# Patient Record
Sex: FEMALE | Race: WHITE | NOT HISPANIC OR LATINO | Employment: OTHER | URBAN - METROPOLITAN AREA
[De-identification: names, ages, dates, MRNs, and addresses within clinical notes are randomized per-mention and may not be internally consistent; named-entity substitution may affect disease eponyms.]

---

## 2017-03-07 ENCOUNTER — OFFICE VISIT (OUTPATIENT)
Dept: MEDICAL GROUP | Facility: LAB | Age: 67
End: 2017-03-07
Payer: COMMERCIAL

## 2017-03-07 VITALS
HEART RATE: 82 BPM | WEIGHT: 279 LBS | DIASTOLIC BLOOD PRESSURE: 98 MMHG | TEMPERATURE: 98.1 F | SYSTOLIC BLOOD PRESSURE: 128 MMHG | RESPIRATION RATE: 12 BRPM | BODY MASS INDEX: 41.32 KG/M2 | OXYGEN SATURATION: 94 % | HEIGHT: 69 IN

## 2017-03-07 DIAGNOSIS — M51.34 DDD (DEGENERATIVE DISC DISEASE), THORACIC: ICD-10-CM

## 2017-03-07 DIAGNOSIS — Z79.891 CHRONIC USE OF OPIATE DRUGS THERAPEUTIC PURPOSES: ICD-10-CM

## 2017-03-07 DIAGNOSIS — M51.36 DDD (DEGENERATIVE DISC DISEASE), LUMBAR: ICD-10-CM

## 2017-03-07 DIAGNOSIS — J45.41 ASTHMA IN ADULT, MODERATE PERSISTENT, WITH ACUTE EXACERBATION: ICD-10-CM

## 2017-03-07 DIAGNOSIS — M50.30 DDD (DEGENERATIVE DISC DISEASE), CERVICAL: ICD-10-CM

## 2017-03-07 DIAGNOSIS — G43.009 NONINTRACTABLE MIGRAINE, UNSPECIFIED MIGRAINE TYPE: ICD-10-CM

## 2017-03-07 PROCEDURE — 99214 OFFICE O/P EST MOD 30 MIN: CPT | Performed by: NURSE PRACTITIONER

## 2017-03-07 RX ORDER — PREDNISONE 20 MG/1
TABLET ORAL
Qty: 90 TAB | Refills: 2 | Status: SHIPPED | OUTPATIENT
Start: 2017-03-07 | End: 2017-03-07

## 2017-03-07 RX ORDER — PREDNISONE 20 MG/1
TABLET ORAL
Qty: 90 TAB | Refills: 2 | Status: SHIPPED | OUTPATIENT
Start: 2017-03-07 | End: 2018-01-03

## 2017-03-07 RX ORDER — HYDROCODONE BITARTRATE AND ACETAMINOPHEN 5; 325 MG/1; MG/1
1 TABLET ORAL EVERY 12 HOURS PRN
Qty: 180 TAB | Refills: 0 | Status: SHIPPED | OUTPATIENT
Start: 2017-03-07 | End: 2017-07-11 | Stop reason: SDUPTHER

## 2017-03-07 RX ORDER — HYDROCODONE BITARTRATE AND ACETAMINOPHEN 5; 325 MG/1; MG/1
1 TABLET ORAL EVERY 12 HOURS PRN
Qty: 12 TAB | Refills: 0 | Status: SHIPPED | OUTPATIENT
Start: 2017-03-07 | End: 2018-07-02 | Stop reason: SDUPTHER

## 2017-03-07 ASSESSMENT — PATIENT HEALTH QUESTIONNAIRE - PHQ9: CLINICAL INTERPRETATION OF PHQ2 SCORE: 0

## 2017-03-07 NOTE — MR AVS SNAPSHOT
"        Sheyla Garcia   3/7/2017 4:00 PM   Office Visit   MRN: [id number]    Department:  [facility]   Dept Phone:  [phone]    Description:  Female : 1950   Provider:  ALANNA Hwang           Reason for Visit     Medication Refill medication refill     Referral Needed referral needed       Allergies as of 3/7/2017     No Known Allergies      You were diagnosed with     Asthma in adult, moderate persistent, with acute exacerbation   [1639881]       DDD (degenerative disc disease), lumbar   [172485]       DDD (degenerative disc disease), cervical   [140019]       DDD (degenerative disc disease), thoracic   [203866]       Nonintractable migraine, unspecified migraine type   [3929707]       Chronic use of opiate drugs therapeutic purposes   [2195393]         Vital Signs     Blood Pressure Pulse Temperature Respirations Height Weight    128/98 mmHg 82 36.7 °C (98.1 °F) 12 1.753 m (5' 9\") 126.554 kg (279 lb)    Body Mass Index Oxygen Saturation Smoking Status             41.18 kg/m2 94% Never Smoker          Basic Information     Date Of Birth Sex Race Ethnicity Preferred Language    1950 Female White Non- English      Problem List              ICD-10-CM Priority Class Noted - Resolved    Hypertension I10   Unknown - Present    Obesity E66.9   Unknown - Present    Hypothyroidism E03.9   Unknown - Present    KIRA (obstructive sleep apnea) G47.33   2014 - Present    Scoliosis M41.9   2014 - Present    Chronic use of opiate drugs therapeutic purposes Z79.899   3/7/2017 - Present      Health Maintenance        Date Due Completion Dates    IMM DTaP/Tdap/Td Vaccine (1 - Tdap) 1971 ---    IMM ZOSTER VACCINE 2012 ---    MAMMOGRAM 2015, 2014    IMM INFLUENZA (1) 2015, 2014    COLONOSCOPY 2018 (Prv Comp), 2009 (Done)    Override on 2013: Previously completed    Override on 2009: Done (Per pt " (colonoscopy done at [facility]))            Current Immunizations     Influenza Vaccine Quad Inj (Pf) 12/7/2014  4:49 PM    Influenza Vaccine Quad Inj (Preserved) 11/13/2015  9:53 AM      Below and/or attached are the medications your provider expects you to take. Review all of your home medications and newly ordered medications with your provider and/or pharmacist. Follow medication instructions as directed by your provider and/or pharmacist. Please keep your medication list with you and share with your provider. Update the information when medications are discontinued, doses are changed, or new medications (including over-the-counter products) are added; and carry medication information at all times in the event of emergency situations     Allergies:  No Known Allergies          Medications  Valid as of: March 07, 2017 -  4:56 PM    Generic Name Brand Name Tablet Size Instructions for use    Albuterol Sulfate (Aero Soln) albuterol 108 (90 BASE) MCG/ACT Inhale 2 Puffs by mouth every 6 hours as needed for Shortness of Breath.        Albuterol Sulfate (Nebu Soln) PROVENTIL 2.5mg/3ml 3 mL by Nebulization route every four hours as needed for Shortness of Breath.        Albuterol Sulfate (Aero Soln) VENTOLIN  (90 BASE) MCG/ACT USE 2 INHALATIONS EVERY 4 HOURS AS NEEDED FOR SHORTNESS OF BREATH        B Complex-C-Folic Acid   Take 1 Tab by mouth every day.        Beta Carotene   Take 1 Tab by mouth every day.        Cyanocobalamin   Take 1 Tab by mouth every day.        Estradiol (Tab) ESTRACE 1 MG TAKE 1 TABLET EVERY DAY        Hydrocodone-Acetaminophen (Tab) NORCO 5-325 MG Take 1 Tab by mouth every 12 hours.        Hydrocodone-Acetaminophen (Tab) NORCO 5-325 MG Take 1 Tab by mouth every 12 hours as needed.        Hydrocodone-Acetaminophen (Tab) NORCO 5-325 MG Take 1 Tab by mouth every 12 hours as needed.        Imipramine HCl (Tab) TOFRANIL 10 MG Take 1 Tab by mouth every evening.        Lisinopril  (Tab) PRINIVIL 20 MG Take 1 Tab by mouth every day. TAKE 1 TAB BY MOUTH EVERY DAY.        Methocarbamol (Tab) ROBAXIN 750 MG Take 1 Tab by mouth 3 times a day as needed.        Mometasone Furo-Formoterol Fum (Aerosol) Mometasone Furo-Formoterol Fum 200-5 MCG/ACT Inhale 2 Puffs by mouth 2 times a day.        Multiple Vitamins-Minerals (Tab) CENTRUM SILVER ULTRA WOMENS  Take 1 Tab by mouth every day.        NON SPECIFIED   Blood pressure monitor.   Check BP 1-2 x daily  DX:  HTN        PredniSONE (Tab) DELTASONE 20 MG Three for two days, two for three days, one for three days, 1/2 for three days.        .                 Medicines prescribed today were sent to:     [facility] #[id number] - [place] - [address]    [address] [place] [address]    Phone: [phone] Fax: [phone]    Open 24 Hours?: No    [facility] - [place] [address]    Phone: [phone] Fax: [phone]    Open 24 Hours?: No    [facility] - [place] [address]    Phone: [phone] Fax: [phone]    Open 24 Hours?: No      Medication refill instructions:       If your prescription bottle indicates you have medication refills left, it is not necessary to call your provider’s office. Please contact your pharmacy and they will refill your medication.    If your prescription bottle indicates you do not have any refills left, you may request refills at any time through one of the following ways: The online [employer] system (except Urgent Care), by calling your provider’s office, or by asking your pharmacy to contact your provider’s office with a refill request. Medication refills are processed only during regular business hours and may not be available until the next business day. Your provider may request additional information or to have a follow-up visit with you prior to refilling your  medication.   *Please Note: Medication refills are assigned a new Rx number when refilled electronically. Your pharmacy may indicate that no refills were authorized even though a new prescription for the same medication is available at the pharmacy. Please request the medicine by name with the pharmacy before contacting your provider for a refill.        Your To Do List     Future Labs/Procedures Complete By [employer] PAIN MANAGEMENT SCREEN  As directed 3/7/2018    Comments:    Current Meds (name, sig, last dose):   Current outpatient prescriptions:   •  predniSONE, Three for two days, two for three days, one for three days, 1/2 for three days.  •  Mometasone Furo-Formoterol Fum, 2 Puff, Inhalation, BID  •  hydrocodone-acetaminophen, 1 Tab, Oral, Q12HRS PRN  •  hydrocodone-acetaminophen, 1 Tab, Oral, Q12HRS PRN  •  VENTOLIN HFA, USE 2 INHALATIONS EVERY 4 HOURS AS NEEDED FOR SHORTNESS OF BREATH  •  estradiol, TAKE 1 TABLET EVERY DAY  •  imipramine, 10 mg, Oral, Q EVENING  •  methocarbamol, 750 mg, Oral, TID PRN  •  lisinopril, 20 mg, Oral, QDAY  •  albuterol, 2 Puff, Inhalation, Q6HRS PRN  •  albuterol, 2.5 mg, Nebulization, Q4HRS PRN  •  NON SPECIFIED, Blood pressure monitor.  Check BP 1-2 x daily DX:  HTN  •  hydrocodone-acetaminophen, 1 Tab, Oral, Q12HRS  •  CENTRUM SILVER ULTRA WOMENS, 1 Tab, Oral, DAILY  •  CYANOCOBALAMIN PO, 1 Tab, Oral, DAILY  •  B Complex-C-Folic Acid (SUPER B-COMPLEX/VIT C/FA PO), 1 Tab, Oral, DAILY  •  BETA CAROTENE PO, 1 Tab, Oral, DAILY            Referral     A referral request has been sent to our patient care coordination department. Please allow 3-5 business days for us to process this request and contact you either by phone or mail. If you do not hear from us by the 5th business day, please call us at [phone].           Myze Access Code: [email or web address]  Expires: 4/6/2017  4:56 PM    Myze  A secure, online tool to manage your health information     [facility]’s MyChart® is a secure, online tool that connects you to your personalized health information from the privacy of your home -- day or night - making it very easy for you to manage your healthcare. Once the activation process is completed, you can even access your medical information using the [employer] tucker, which is available for free in the Apple Tucker store or Google Play store.     [employer] provides the following levels of access (as shown below):   My Chart Features   [facility] Doctor [facility]  Specialists [facility]  Urgent  Care Non-[facility]  Primary Care  Doctor   Email your healthcare team securely and privately 24/7 X X X    Manage appointments: schedule your next appointment; view details of past/upcoming appointments X      Request prescription refills. X      View recent personal medical records, including lab and immunizations X X X X   View health record, including health history, allergies, medications X X X X   Read reports about your outpatient visits, procedures, consult and ER notes X X X X   See your discharge summary, which is a recap of your hospital and/or ER visit that includes your diagnosis, lab results, and care plan. X X       How to register for [employer]:  1. Go to  [email or web address].  2. Click on the Sign Up Now box, which takes you to the New Member Sign Up page. You will need to provide the following information:  a. Enter your [employer] Access Code exactly as it appears at the top of this page. (You will not need to use this code after you’ve completed the sign-up process. If you do not sign up before the expiration date, you must request a new code.)   b. Enter your date of birth.   c. Enter your home email address.   d. Click Submit, and follow the next screen’s instructions.  3. Create a [employer] Software ID. This will be your [employer] Software login ID and cannot be changed, so think of one that is secure and easy to remember.  4. Create a [employer] password. You can change your password at  any time.  5. Enter your Password Reset Question and Answer. This can be used at a later time if you forget your password.   6. Enter your e-mail address. This allows you to receive e-mail notifications when new information is available in [employer].  7. Click Sign Up. You can now view your health information.    For assistance activating your [employer] account, call [phone]

## 2017-03-07 NOTE — Clinical Note
[employer]  DENNIS HwangP.N.  [address]  [place] [address]  Fax: [phone]   Authorization for Release/Disclosure of   Protected Health Information   Name: SHEYLA GARCIA : 1950 SSN: [id number]   Address: [address] [place] [address]  [place] [address] Phone:    [phone] (home)    I authorize the entity listed below to release/disclose the PHI below to:   [facility]/Zaira Swenson A.P.JIMMY. and ALANNA Hwang   Provider or Entity Name:  Dr. Landon Lugo   Address   [facility]   Phone:      Fax:     Reason for request: continuity of care   Information to be released:    [  ] LAST COLONOSCOPY,  including any PATH REPORT and follow-up  [  ] LAST FIT/COLOGUARD RESULT [  ] LAST DEXA  [  ] LAST MAMMOGRAM  [  ] LAST PAP  [  ] LAST LABS [  ] RETINA EXAM REPORT  [  ] IMMUNIZATION RECORDS  [  ] Release all info      [  ] Check here and initial the line next to each item to release ALL health information INCLUDING  _____ Care and treatment for drug and / or alcohol abuse  _____ HIV testing, infection status, or AIDS  _____ Genetic Testing    DATES OF SERVICE OR TIME PERIOD TO BE DISCLOSED: _____________  I understand and acknowledge that:  * This Authorization may be revoked at any time by you in writing, except if your health information has already been used or disclosed.  * Your health information that will be used or disclosed as a result of you signing this authorization could be re-disclosed by the recipient. If this occurs, your re-disclosed health information may no longer be protected by State or Federal laws.  * You may refuse to sign this Authorization. Your refusal will not affect your ability to obtain treatment.  * This Authorization becomes effective upon signing and will  on (date) __________.      If no date is indicated, this Authorization will  one (1) year from the signature date.    Name: Sheyla Garcia    Signature:   Date:      3/7/2017       PLEASE FAX REQUESTED RECORDS BACK TO: [phone]

## 2017-03-08 NOTE — PROGRESS NOTES
"Chief Complaint   Patient presents with   • Medication Refill     medication refill    • Referral Needed     referral needed        HPI  66-year-old established female here to follow-up on a few issues:  #1-asthma: Chronic issue. Feels that her asthma is much better controlled now that she is using a maintenance inhaler twice daily. She keeps prednisone on hand for severe flareups and has had to take a taper of prednisone twice in the past 4 months. Denies any current asthma symptoms today.  #2-chronic pain: Suffers from chronic pain all over her body but the worst area is in her low back. She reports that her pain constantly radiates down her left leg. She has frequent headaches, occurring privately twice per week but not every week. She states that she cannot lie flat because of her severe back pain and must sleep on her side with very poor sleep. She has been seeing Dr. Navarrete, whom she states that her here for a referral to neurosurgery as he suspects that she has hydrocephaly. She did have an MRI of her brain done with Dr. escoto but does not have a copy. She did have a MRI of her cervical, thoracic and lumbar spine which shows mild to moderate arthritis and degenerative disc disease.  #3-chronic narcotic dependence: She is requesting a refill of Norco. She takes approximately Norco per week for severe pain. Denies any negative side effects of Norco. As mentioned above she has pain all over her body.    Past medical, surgical, family, and social history is reviewed and updated in Epic chart by me today.   Medications and allergies reviewed and updated in Epic chart by me today.     ROS:   As documented in history of present illness above    Exam:  Blood pressure 128/98, pulse 82, temperature 36.7 °C (98.1 °F), resp. rate 12, height 1.753 m (5' 9\"), weight 126.554 kg (279 lb), SpO2 94 %.  Constitutional: Morbidly obese female, Alert, no distress, plus 3 vital signs  Skin:  Warm, dry, no rashes invisible " areas  Eye: Equal, round and reactive, conjunctiva clear  ENMT: Lips without lesions, good dentition, oropharynx clear    Neck: Trachea midline  Respiratory: Unlabored respiration  Cardiovascular: Normal rate  Neuro: Cranial nerves II through XII are intact. She reports that when she looks up at a light, that she always sees a halo and a dark center.  Psych: Alert, pleasant, well-groomed, normal affect    A/P:  1. Asthma in adult, moderate persistent, with acute exacerbation  -Stable. Refilled her maintenance inhaler and prednisone for the year that she keeps on hand for severe exacerbations. Reminded her of negative side effects of prednisone such as glaucoma, diabetes, obesity and irritation.  - predniSONE (DELTASONE) 20 MG Tab; Three for two days, two for three days, one for three days, 1/2 for three days.  Dispense: 90 Tab; Refill: 2  - Mometasone Furo-Formoterol Fum (DULERA) 200-5 MCG/ACT Aerosol; Inhale 2 Puffs by mouth 2 times a day.  Dispense: 3 Inhaler; Refill: 3    2. DDD (degenerative disc disease), lumbar  -Refer to neurosurgery as requested. Requested a copy of her medical records from Dr. escoto as well as inquired with [employer] regarding her MRI of the brain.  - REFERRAL TO NEUROSURGERY  - hydrocodone-acetaminophen (NORCO) 5-325 MG Tab per tablet; Take 1 Tab by mouth every 12 hours as needed.  Dispense: 180 Tab; Refill: 0  - hydrocodone-acetaminophen (NORCO) 5-325 MG Tab per tablet; Take 1 Tab by mouth every 12 hours as needed.  Dispense: 12 Tab; Refill: 0    3. DDD (degenerative disc disease), cervical  -As above  - REFERRAL TO NEUROSURGERY  - hydrocodone-acetaminophen (NORCO) 5-325 MG Tab per tablet; Take 1 Tab by mouth every 12 hours as needed.  Dispense: 180 Tab; Refill: 0  - hydrocodone-acetaminophen (NORCO) 5-325 MG Tab per tablet; Take 1 Tab by mouth every 12 hours as needed.  Dispense: 12 Tab; Refill: 0    4. DDD (degenerative disc disease), thoracic  -As above  - REFERRAL TO  NEUROSURGERY  - hydrocodone-acetaminophen (NORCO) 5-325 MG Tab per tablet; Take 1 Tab by mouth every 12 hours as needed.  Dispense: 180 Tab; Refill: 0  - hydrocodone-acetaminophen (NORCO) 5-325 MG Tab per tablet; Take 1 Tab by mouth every 12 hours as needed.  Dispense: 12 Tab; Refill: 0    5. Nonintractable migraine, unspecified migraine type    6. Chronic use of opiate drugs therapeutic purposes  -Updated urine drug screen and contract today. Reviewed  profile itches appropriate, showing her mail order Norco last filled on April 25, 2016.  Overall impression that this patient is benefiting from opioid therapy and that the benefits outweigh the risks of continued use. yes   - [facility]IN [facility]NAGEMENT SCREEN; Future  - Controlled Substance Treatment Agreement

## 2017-03-10 ENCOUNTER — TELEPHONE (OUTPATIENT)
Dept: MEDICAL GROUP | Facility: LAB | Age: 67
End: 2017-03-10

## 2017-03-10 NOTE — TELEPHONE ENCOUNTER
1. Caller Name: Sheyla                                         Call Back Number: [phone]      Patient approves a detailed voicemail message: N\A    Patient states she was seen a couple days ago and was asked if she wanted to try a medication for Fibromyalgia or nerve pain.  At the time she did not want to do this but she says she would like this now.  She is wondering if she can have a prescription called in for her into [facility] [address].  Please advise.

## 2017-03-10 NOTE — TELEPHONE ENCOUNTER
Has she tried gabapentin or cymbalta?  If neither, let's start with gabapentin up to 3 x per day - please let me know

## 2017-03-13 ENCOUNTER — TELEPHONE (OUTPATIENT)
Dept: MEDICAL GROUP | Facility: LAB | Age: 67
End: 2017-03-13

## 2017-03-13 NOTE — TELEPHONE ENCOUNTER
----- Message from ALANNA Hwang sent at 3/7/2017  4:31 PM PST -----  Please call [facility] for MRI of brain ordered by Dr. Lugo if they have it.  Thank you

## 2017-03-13 NOTE — Clinical Note
March 13, 2017        Sheyla Garcia, [birth year]    Good afternoon!    Please fax over patients brain MRI that was  Ordered by Dr. Lugo. Patient would like  His PCP, Zaira Swenson to review and  Place in his chart.    Thank you!        Rylee Blanco MA

## 2017-03-14 DIAGNOSIS — G89.4 CHRONIC PAIN SYNDROME: ICD-10-CM

## 2017-03-14 RX ORDER — GABAPENTIN 300 MG/1
300 CAPSULE ORAL 3 TIMES DAILY
Qty: 90 CAP | Refills: 5 | Status: SHIPPED | OUTPATIENT
Start: 2017-03-14 | End: 2018-08-09

## 2017-03-14 NOTE — TELEPHONE ENCOUNTER
Patient states she has actually been taking the gabapentin for the last 10 days and doing fine on it. She would like a refill reflecting that she takes it 3x daily will set refill

## 2017-03-22 DIAGNOSIS — I10 ESSENTIAL HYPERTENSION: ICD-10-CM

## 2017-03-22 RX ORDER — ESTRADIOL 1 MG/1
TABLET ORAL
Qty: 90 TAB | Refills: 1 | Status: SHIPPED | OUTPATIENT
Start: 2017-03-22 | End: 2017-06-05 | Stop reason: SDUPTHER

## 2017-03-22 RX ORDER — ATENOLOL 50 MG/1
50 TABLET ORAL DAILY
Qty: 30 TAB | Refills: 1 | Status: SHIPPED | OUTPATIENT
Start: 2017-03-22 | End: 2017-04-26 | Stop reason: SDUPTHER

## 2017-03-22 NOTE — TELEPHONE ENCOUNTER
C pt called and states that she had been out of lisinopril so she took some atenolol that she had and she feel that really controlled her BP better. Can she have a refill of that instead of lisinopril? I tried to set up refill but it is an historical med, alber valle

## 2017-03-27 ENCOUNTER — TELEPHONE (OUTPATIENT)
Dept: MEDICAL GROUP | Facility: LAB | Age: 67
End: 2017-03-27

## 2017-04-21 RX ORDER — METHOCARBAMOL 750 MG/1
TABLET, FILM COATED ORAL
Qty: 270 TAB | Refills: 0 | Status: SHIPPED | OUTPATIENT
Start: 2017-04-21 | End: 2019-11-22

## 2017-04-21 RX ORDER — LISINOPRIL 20 MG/1
TABLET ORAL
Qty: 90 TAB | Refills: 1 | Status: SHIPPED | OUTPATIENT
Start: 2017-04-21 | End: 2017-06-05 | Stop reason: SDUPTHER

## 2017-06-05 ENCOUNTER — TELEPHONE (OUTPATIENT)
Dept: MEDICAL GROUP | Facility: LAB | Age: 67
End: 2017-06-05

## 2017-06-05 DIAGNOSIS — N32.81 OVERACTIVE BLADDER: ICD-10-CM

## 2017-06-05 RX ORDER — ESTRADIOL 1 MG/1
TABLET ORAL
Qty: 90 TAB | Refills: 1 | Status: SHIPPED | OUTPATIENT
Start: 2017-06-05 | End: 2017-06-22 | Stop reason: SDUPTHER

## 2017-06-05 RX ORDER — LISINOPRIL 20 MG/1
20 TABLET ORAL
Qty: 90 TAB | Refills: 1 | Status: SHIPPED | OUTPATIENT
Start: 2017-06-05 | End: 2017-10-04 | Stop reason: SDUPTHER

## 2017-06-05 RX ORDER — OXYBUTYNIN CHLORIDE 10 MG/1
10 TABLET, EXTENDED RELEASE ORAL DAILY
Qty: 30 TAB | Refills: 4 | Status: SHIPPED | OUTPATIENT
Start: 2017-06-05 | End: 2018-07-07 | Stop reason: SDUPTHER

## 2017-06-05 NOTE — TELEPHONE ENCOUNTER
Patient states she had a bladder sling put in many years ago and feels it has slipped.  She is having very frequent urination and is wondering if she can have something called in for her overactive bladder.  She states she does not want schedule a surgery or anything related to this until after she becomes eligible for Medicare.  If something can be called in she would like to use [facility] pharmacy.  Please advise.

## 2017-06-05 NOTE — TELEPHONE ENCOUNTER
sent through ditropan - this is taken once daily and main side effect is dry mouth.  Ask her to let us know if it is too expensive or does not work.

## 2017-06-22 RX ORDER — SPIRONOLACTONE 25 MG/1
25 TABLET ORAL DAILY
Qty: 90 TAB | Refills: 1 | Status: SHIPPED | OUTPATIENT
Start: 2017-06-22 | End: 2017-06-22 | Stop reason: SDUPTHER

## 2017-06-22 RX ORDER — ESTRADIOL 1 MG/1
TABLET ORAL
Qty: 90 TAB | Refills: 1 | Status: SHIPPED | OUTPATIENT
Start: 2017-06-22 | End: 2017-06-22 | Stop reason: SDUPTHER

## 2017-06-22 RX ORDER — SPIRONOLACTONE 25 MG/1
25 TABLET ORAL DAILY
Qty: 90 TAB | Refills: 1 | Status: SHIPPED | OUTPATIENT
Start: 2017-06-22 | End: 2018-01-03 | Stop reason: SDUPTHER

## 2017-06-22 RX ORDER — ALBUTEROL SULFATE 90 UG/1
2 AEROSOL, METERED RESPIRATORY (INHALATION) EVERY 4 HOURS PRN
Qty: 54 G | Refills: 1 | Status: SHIPPED | OUTPATIENT
Start: 2017-06-22 | End: 2017-06-22 | Stop reason: SDUPTHER

## 2017-06-22 RX ORDER — ESTRADIOL 1 MG/1
TABLET ORAL
Qty: 90 TAB | Refills: 1 | Status: SHIPPED | OUTPATIENT
Start: 2017-06-22 | End: 2019-09-18

## 2017-06-22 RX ORDER — ALBUTEROL SULFATE 90 UG/1
2 AEROSOL, METERED RESPIRATORY (INHALATION) EVERY 4 HOURS PRN
Qty: 54 G | Refills: 1 | Status: SHIPPED | OUTPATIENT
Start: 2017-06-22 | End: 2019-05-28 | Stop reason: SDUPTHER

## 2017-07-11 DIAGNOSIS — G43.009 NONINTRACTABLE MIGRAINE, UNSPECIFIED MIGRAINE TYPE: ICD-10-CM

## 2017-07-11 DIAGNOSIS — M51.34 DDD (DEGENERATIVE DISC DISEASE), THORACIC: ICD-10-CM

## 2017-07-11 DIAGNOSIS — M51.36 DDD (DEGENERATIVE DISC DISEASE), LUMBAR: ICD-10-CM

## 2017-07-11 DIAGNOSIS — M50.30 DDD (DEGENERATIVE DISC DISEASE), CERVICAL: ICD-10-CM

## 2017-07-11 RX ORDER — HYDROCODONE BITARTRATE AND ACETAMINOPHEN 5; 325 MG/1; MG/1
1 TABLET ORAL EVERY 12 HOURS PRN
Qty: 180 TAB | Refills: 0 | Status: SHIPPED | OUTPATIENT
Start: 2017-07-11 | End: 2018-02-15 | Stop reason: SDUPTHER

## 2017-07-13 DIAGNOSIS — M51.36 DDD (DEGENERATIVE DISC DISEASE), LUMBAR: ICD-10-CM

## 2017-07-13 DIAGNOSIS — M51.34 DDD (DEGENERATIVE DISC DISEASE), THORACIC: ICD-10-CM

## 2017-07-13 DIAGNOSIS — M50.30 DDD (DEGENERATIVE DISC DISEASE), CERVICAL: ICD-10-CM

## 2017-10-04 RX ORDER — LISINOPRIL 20 MG/1
20 TABLET ORAL
Qty: 90 TAB | Refills: 1 | Status: SHIPPED | OUTPATIENT
Start: 2017-10-04 | End: 2017-12-26 | Stop reason: SDUPTHER

## 2017-10-04 NOTE — TELEPHONE ENCOUNTER
Was the patient seen in the last year in this department? Yes Lov 3/7/17    Does patient have an active prescription for medications requested? No     Received Request Via: Pharmacy     Patient lost her prescription bottle and mail order pharmacy will not replace the lost bottle. She is requesting 2 months of Lisinopril to be sent to her local pharmacy.

## 2017-12-19 RX ORDER — ESTRADIOL 1 MG/1
TABLET ORAL
Qty: 90 TAB | Refills: 3 | Status: SHIPPED | OUTPATIENT
Start: 2017-12-19 | End: 2018-10-08 | Stop reason: SDUPTHER

## 2017-12-19 NOTE — TELEPHONE ENCOUNTER
Was the patient seen in the last year in this department? Yes lov 3/7/2017    Does patient have an active prescription for medications requested? No     Received Request Via: Pharmacy

## 2017-12-26 NOTE — TELEPHONE ENCOUNTER
VOICEMAIL  1. Caller Name: Sheyla Garcia                      Call Back Number: [phone] (home)     2. Message: Sheyla Garcia left a voicemail - she now has Medicare and would like to get a physical and a complete blood work done. She is also requesting a refill for lisinopril (PRINIVIL) 20 MG Tab to be sent to [facility] not to the mail order.    3. Patient approves office to leave a detailed voicemail/MyChart message: yes

## 2017-12-27 RX ORDER — LISINOPRIL 20 MG/1
20 TABLET ORAL
Qty: 90 TAB | Refills: 1 | Status: SHIPPED | OUTPATIENT
Start: 2017-12-27 | End: 2018-01-03 | Stop reason: SDUPTHER

## 2018-01-03 ENCOUNTER — OFFICE VISIT (OUTPATIENT)
Dept: MEDICAL GROUP | Facility: LAB | Age: 68
End: 2018-01-03
Payer: MEDICARE

## 2018-01-03 VITALS
HEART RATE: 91 BPM | DIASTOLIC BLOOD PRESSURE: 84 MMHG | BODY MASS INDEX: 43.25 KG/M2 | TEMPERATURE: 98.3 F | HEIGHT: 69 IN | OXYGEN SATURATION: 93 % | SYSTOLIC BLOOD PRESSURE: 124 MMHG | RESPIRATION RATE: 16 BRPM | WEIGHT: 292 LBS

## 2018-01-03 DIAGNOSIS — R20.2 LEFT HAND PARESTHESIA: ICD-10-CM

## 2018-01-03 DIAGNOSIS — R60.0 LOWER EXTREMITY EDEMA: ICD-10-CM

## 2018-01-03 DIAGNOSIS — R73.9 HYPERGLYCEMIA: ICD-10-CM

## 2018-01-03 DIAGNOSIS — N39.41 URGE INCONTINENCE OF URINE: ICD-10-CM

## 2018-01-03 DIAGNOSIS — I10 ESSENTIAL HYPERTENSION: ICD-10-CM

## 2018-01-03 DIAGNOSIS — E66.01 MORBID OBESITY WITH BMI OF 40.0-44.9, ADULT (HCC): ICD-10-CM

## 2018-01-03 DIAGNOSIS — E03.8 OTHER SPECIFIED HYPOTHYROIDISM: ICD-10-CM

## 2018-01-03 DIAGNOSIS — M54.42 CHRONIC BILATERAL LOW BACK PAIN WITH LEFT-SIDED SCIATICA: ICD-10-CM

## 2018-01-03 DIAGNOSIS — G89.29 CHRONIC BILATERAL LOW BACK PAIN WITH LEFT-SIDED SCIATICA: ICD-10-CM

## 2018-01-03 DIAGNOSIS — J45.42 MODERATE PERSISTENT EXTRINSIC ASTHMA WITH STATUS ASTHMATICUS: ICD-10-CM

## 2018-01-03 PROCEDURE — 99215 OFFICE O/P EST HI 40 MIN: CPT | Performed by: NURSE PRACTITIONER

## 2018-01-03 RX ORDER — MONTELUKAST SODIUM 10 MG/1
10 TABLET ORAL DAILY
Qty: 30 TAB | Refills: 5 | Status: SHIPPED | OUTPATIENT
Start: 2018-01-03 | End: 2019-09-18

## 2018-01-03 RX ORDER — SPIRONOLACTONE 25 MG/1
25 TABLET ORAL DAILY
Qty: 90 TAB | Refills: 1 | Status: SHIPPED | OUTPATIENT
Start: 2018-01-03 | End: 2018-08-19 | Stop reason: SDUPTHER

## 2018-01-03 RX ORDER — LISINOPRIL 40 MG/1
40 TABLET ORAL
Qty: 90 TAB | Refills: 2 | Status: SHIPPED | OUTPATIENT
Start: 2018-01-03 | End: 2018-02-15

## 2018-01-03 ASSESSMENT — PATIENT HEALTH QUESTIONNAIRE - PHQ9: CLINICAL INTERPRETATION OF PHQ2 SCORE: 0

## 2018-01-03 NOTE — PROGRESS NOTES
"Chief Complaint   Patient presents with   • Annual Exam       HPI   67-year-old established female here with multiple concerns and complaints. She reports that she has not felt well for multiple years and has not had insurance to take care of her issues. There are several of her most pressing issues that she would like to go over today.  #1- left wrist / hand numbness :  Present x the past 3 yrs.  Has tried a brace for 4 weeks without improvement.  Often feels like a needle sensation in her left hand.  Complains of frequent numbness and tingling in her hand and left wrist. Denies any injuries or traumas. Denies left arm or neck pain.  #2- chronic low back pain with left sided sciatica:  Reports this has been present for decades, worsening for the past year. Describes her low back pain as constant with radiating pain down her left buttocks and her left leg. Describes her left leg is frequently numb and tingling, shooting all the way down to her foot. Her pain is worse with activity and sitting for long periods of time. She does not take anything for her pain on a regular basis.  #3-bilateral lower extremity edema:  Very sedentary.  Occasionally SOB on left side.    #4- asthma: Chronic issue. States her lungs are \"bad,\" with copious amounts of mucus, coughing and wheezing. Denies any hx of smoking. Did not have asthma as a child, per patient. Reports that her breathing issue started when she moved to [facility] - States she is allergic to pine.  Now lives in [place]. Takes occasional benadryl and this knocks her out.  Using dulera 2 puffs twice daily and albuterol prn.  Needing albuterol daily.   #5 - HTN: chronic issue.  Taking lisinopril 40 mg daily - Reports that home BP readings are very high - 188/94 periodically - down to 160/80.  Denies chest pain or swelling in her ankles.  #6-urinary incontinence: Chronic issue. Denies any dysuria or hematuria. Denies having abdominal pain. States that her urinary incontinence occurs " "randomly but especially when she has a full bladder or is moving. She would like a referral to a uro-gynecologist.    Social:  Working on a computer at home most days of the week - up to 8 hours.    Past medical, surgical, family, and social history is reviewed and updated in Epic chart by me today.   Medications and allergies reviewed and updated in Epic chart by me today.     ROS:   As documented in history of present illness above    Exam:  Blood pressure 124/84, pulse 91, temperature 36.8 °C (98.3 °F), resp. rate 16, height 1.753 m (5' 9\"), weight (!) 132.5 kg (292 lb), SpO2 93 %.    Constitutional: Obese female, Alert, no distress, plus 3 vital signs  Skin:  Warm, dry, no rashes invisible areas  Eye: Equal, round and reactive, conjunctiva clear  ENMT: Lips without lesions, good dentition, oropharynx clear    Neck: Trachea midline, no masses, no thyromegaly  Respiratory: Unlabored respiration, has occasional expiratory wheezing and rhonchi. No areas of specific decreased lung sounds.  Cardiovascular: Normal rate and rhythm. No pitting edema to bilateral lower extremities. No increased pigmentation to bilateral lower extremities. Bilateral pedal pulses are +2.  Abdomen: Soft, nontender.  Musculoskeletal: Positive Phalen and Kaye sign to left wrist. Full sensation with soft touch to bilateral upper extremities. Full range of motion to bilateral upper extremities. She has tenderness to her lumbar spine, bony and paraspinal muscular areas as well as her left buttocks, stating that with palpation of her left buttocks, she has radiating pain down her left leg. She walks slowly with a limp and a cane.  Psych: Alert, pleasant, well-groomed. Anxious affect.    A/P:  1. Chronic bilateral low back pain with left-sided sciatica  -Offered a referral to physiatry which she declines area she has no interest in seeing a surgeon at this point either. She is agreeable to starting physical therapy. I encouraged her to work on " trunk strengthening and her weight. Encouraged to follow up here in 4-5 weeks.  - REFERRAL TO PHYSICAL THERAPY Reason for Therapy: Eval/Treat/Report    2. Left hand paresthesia  -Suspect that she has carpal tunnel. She is interested in seeing a hand specialist and she was referred there. She has already failed 4 weeks of a wrist brace.  - REFERRAL TO ORTHOPEDICS    3. Moderate persistent extrinsic asthma with status asthmaticus  -Recommend adding on Singulair 10 mg nightly. She'll continue with twice daily dulera and using albuterol as needed. She was referred to pulmonology. Follow-up 4 weeks.  - montelukast (SINGULAIR) 10 MG Tab; Take 1 Tab by mouth every day.  Dispense: 30 Tab; Refill: 5  - Mometasone Furo-Formoterol Fum (DULERA) 200-5 MCG/ACT Aerosol; Inhale 2 Puffs by mouth 2 times a day.  Dispense: 3 Inhaler; Refill: 3  - REFERRAL TO PULMONOLOGY    4. Lower extremity edema  -Recommend checking a BNP. We'll also check a CMP for her kidney function. Suspect that her lower extremity edema is related to a high salt diet, her weight and being sedentary.  - BTYPE NATRIURETIC PEPTIDE; Future    5. Other specified hypothyroidism  -Recommend updated lab work  - TSH; Future  - FREE THYROXINE; Future    6. Essential hypertension  -Recommend updated lab work. Increased her lisinopril to 40 mg daily and encouraged her to take her spironolactone daily. She will take her spirit elected in the morning and lisinopril at night. Discussed working on her weight, starting an exercise program and eating a low-sodium diet. She will write down her blood pressures twice daily for the next 4 weeks and then follow-up here. Encouraged her to call for readings consistently below 100/70 or above 140/90.  - COMP METABOLIC PANEL; Future  - CBC WITH DIFFERENTIAL; Future  - LIPID PROFILE; Future  - lisinopril (PRINIVIL, ZESTRIL) 40 MG tablet; Take 1 Tab by mouth every day.  Dispense: 90 Tab; Refill: 2  - spironolactone (ALDACTONE) 25 MG Tab;  Take 1 Tab by mouth every day.  Dispense: 90 Tab; Refill: 1    7. Hyperglycemia  - HEMOGLOBIN A1C; Future    8. Urge incontinence of urine  -Encouraged her to avoid caffeine and work on her trunk strength as well as her weight. She was referred to gynecology.  - REFERRAL TO GYNECOLOGY    Total face to face time 40 minutes of which over 50% of this visit is spent in counseling, education and outlining a plan of treatment and coordination of care for the above conditions. This included but was not limited to discussion of medication options and potential risks related to the medications, referral and specialty care options. All patient questions were answered

## 2018-01-03 NOTE — LETTER
BLOOD PRESSURE LOG FOR: Sheyla Garcia    DATE/TIME  AM WEIGHT BLOOD  PRESSURE PULSE TIME  PM BLOOD  PRESSURE   PULSE                                                                                                                                                                                                                                        Current Blood Pressure Medications: (dose and frequency)    MEDICATION DOSE FREQUENCY                         Please tulio any medication change (increase/decrease/new med) with a *.

## 2018-01-22 DIAGNOSIS — R20.2 LEFT HAND PARESTHESIA: ICD-10-CM

## 2018-01-25 ENCOUNTER — TELEPHONE (OUTPATIENT)
Dept: MEDICAL GROUP | Facility: LAB | Age: 68
End: 2018-01-25

## 2018-01-29 DIAGNOSIS — M79.641 BILATERAL HAND PAIN: ICD-10-CM

## 2018-01-29 DIAGNOSIS — M79.642 BILATERAL HAND PAIN: ICD-10-CM

## 2018-02-15 ENCOUNTER — OFFICE VISIT (OUTPATIENT)
Dept: MEDICAL GROUP | Facility: LAB | Age: 68
End: 2018-02-15
Payer: MEDICARE

## 2018-02-15 VITALS
BODY MASS INDEX: 42.97 KG/M2 | WEIGHT: 291 LBS | SYSTOLIC BLOOD PRESSURE: 124 MMHG | OXYGEN SATURATION: 92 % | RESPIRATION RATE: 12 BRPM | DIASTOLIC BLOOD PRESSURE: 82 MMHG | HEART RATE: 84 BPM | TEMPERATURE: 98.6 F

## 2018-02-15 DIAGNOSIS — G43.009 NONINTRACTABLE MIGRAINE, UNSPECIFIED MIGRAINE TYPE: ICD-10-CM

## 2018-02-15 DIAGNOSIS — M54.41 CHRONIC BILATERAL LOW BACK PAIN WITH BILATERAL SCIATICA: ICD-10-CM

## 2018-02-15 DIAGNOSIS — M54.42 CHRONIC BILATERAL LOW BACK PAIN WITH BILATERAL SCIATICA: ICD-10-CM

## 2018-02-15 DIAGNOSIS — Z79.891 CHRONIC USE OF OPIATE DRUGS THERAPEUTIC PURPOSES: ICD-10-CM

## 2018-02-15 DIAGNOSIS — Z12.11 SPECIAL SCREENING FOR MALIGNANT NEOPLASM OF COLON: ICD-10-CM

## 2018-02-15 DIAGNOSIS — J45.40 MODERATE PERSISTENT ASTHMA WITHOUT COMPLICATION: ICD-10-CM

## 2018-02-15 DIAGNOSIS — M79.641 BILATERAL HAND PAIN: ICD-10-CM

## 2018-02-15 DIAGNOSIS — I10 ESSENTIAL HYPERTENSION: ICD-10-CM

## 2018-02-15 DIAGNOSIS — M79.642 BILATERAL HAND PAIN: ICD-10-CM

## 2018-02-15 DIAGNOSIS — G89.29 CHRONIC BILATERAL LOW BACK PAIN WITH BILATERAL SCIATICA: ICD-10-CM

## 2018-02-15 PROCEDURE — 99214 OFFICE O/P EST MOD 30 MIN: CPT | Performed by: NURSE PRACTITIONER

## 2018-02-15 RX ORDER — HYDROCODONE BITARTRATE AND ACETAMINOPHEN 5; 325 MG/1; MG/1
1 TABLET ORAL EVERY 12 HOURS PRN
Qty: 60 TAB | Refills: 0 | Status: SHIPPED | OUTPATIENT
Start: 2018-02-15 | End: 2018-03-17

## 2018-02-15 NOTE — ASSESSMENT & PLAN NOTE
Chronic issue.  Going to [facility] and doing really well with this.  Pain typically wakes her up from sleep.  Can't take gabapentin b/c of having a seizure while taking it.  Takes motrin occasionally before bed.  Takes norco if she is in severe pain.  No improvement with physiatry injections.

## 2018-02-15 NOTE — ASSESSMENT & PLAN NOTE
Chronic issue.  Taking singular 10 mg daily and dulera bid . Needs albuterol a few times per week.  No recent URI symptoms.

## 2018-02-15 NOTE — PROGRESS NOTES
Chief Complaint   Patient presents with   • Hypertension     F/V on lab work, pt did not do labs    • Pain     F/V on back pain        HPI   67-year-old established female here to follow-up on chronic issues:  Chronic bilateral low back pain with bilateral sciatica  Chronic issue.  Happy to report this is improving. Going to [facility] and doing really well with this.  Pain typically wakes her up from sleep, this is been going on for years.  Can't take gabapentin b/c of having a seizure while taking it.  No interest in trying amitriptyline or nortriptyline because of weight gain potential. Also not interested in Lyrica because of weight gain problems. Takes motrin occasionally before bed.  Takes norco if she is in severe pain.  No improvement with physiatry injections. Last MRI showed moderate degenerative disc disease. She does have to walk with a cane because of her chronic back pain. She is requesting a refill of hydrocodone, last refill was June 22 of 2017 for 15 pills and she is requesting a larger one and a that she may take it at night prior to bed when she has severe back pain.  Denies any negative side effects of hydrocodone such as constipation or itching. Denies depression. States that she's never lost or had her pills stolen.    Moderate persistent asthma without complication  Chronic issue.  Taking singular 10 mg daily and dulera bid, notes improvement in her allergy symptoms with the addition of Singulair and no longer needs albuterol 4-5 times a day . Needs albuterol a few times per week.  No recent URI symptoms.      Chronic bilateral hand pain:   Notes large improvement in the numbness and tingling in her hands with physical therapy. She does have an EMG scheduled for April but plans on canceling this if her pain continues to improve.       Past medical, surgical, family, and social history is reviewed and updated in Epic chart by me today.   Medications and allergies reviewed and updated in  Epic chart by me today.     ROS:   As documented in history of present illness above    Exam:  Blood pressure 124/82, pulse 84, temperature 37 °C (98.6 °F), resp. rate 12, weight (!) 132 kg (291 lb), SpO2 92 %.  Constitutional: Overweight female. Alert, no distress, plus 3 vital signs  Skin:  Warm, dry, no rashes invisible areas  Eye: Equal, round and reactive, conjunctiva clear  ENMT: Lips without lesions, good dentition, oropharynx clear    Neck: Trachea midline, no masses, no thyromegaly  Respiratory: Unlabored respiration, lungs clear to auscultation, no wheezes, no rhonchi  Cardiovascular: Normal rate and rhythm, no murmur, no edema  Musculoskeletal: She has a very slow gait with a limp and uses a cane.   Spine:  Palpation: Lumbar paraspinous tenderness to palpate, no midline tenderness ROM: Flexion to 30-40° and then she experiences discomfort  Reflexes - Patellar +1 bilaterally area  Straight Leg Raise -positive on the right, negative on the left  Sensation: grossly intact throughout bilaterally   Psych: Alert, pleasant, well-groomed, normal affect    A/P:  1. Chronic bilateral low back pain with bilateral sciatica  -Refilled her hydrocodone with allowance to take one half to 1 at night as needed for severe pain. For mild to moderate pain she will continue with ibuprofen or Tylenol alone. Offered her referral back to a pain management specialist but she declines. She is doing well with physical therapy and will continue with this. She has lost 1 pound since her last visit, we discussed the benefit of losing weight in terms of controlling her back pain.  - HYDROcodone-acetaminophen (NORCO) 5-325 MG Tab per tablet; Take 1 Tab by mouth every 12 hours as needed for up to 30 days.  Dispense: 60 Tab; Refill: 0    2.  Asthma:   Improved with the addition of Singulair. Continue same.    3. Nonintractable migraine, unspecified migraine type  -She states that these have resolved. She'll follow-up with me as needed  regarding this.    4. Use of opiate drugs therapeutic purposes  -Consent obtained. Urine drug screen not collected as the patient has not taken hydrocodone in several weeks.  - CONSENT FOR OPIATE PRESCRIPTION  In prescribing controlled substances to this patient, I certify that I have obtained and reviewed the medical history of Sheyla Garcia. I have also made a good marcy effort to obtain applicable records from other providers who have treated the patient and records did not demonstrate any increased risk of substance abuse that would prevent me from prescribing controlled substances.     I have conducted a physical exam and documented it. I have reviewed Ms. Garcia’s prescription history as maintained by the [place] Prescription Monitoring Program.     I have assessed the patient’s risk for abuse, dependency, and addiction using the validated Opioid Risk Tool available at https://www.mdcalc.com/mztnoq-crhq-ibtl-ort-narcotic-abuse.     Given the above, I believe the benefits of controlled substance therapy outweigh the risks. The reasons for prescribing controlled substances include non-narcotic, oral analgesic alternatives have been inadequate for pain control. Accordingly, I have discussed the risk and benefits, treatment plan, and alternative therapies with the patient.       5. Special screening for malignant neoplasm of colon  -Declined a referral for a colonoscopy. She is willing to do a fecal occult blood test.  - OCCULT BLOOD FECES IMMUNOASSAY; Future    6. Bilateral hand pain  -Improving with physical therapy. She was given written information to cancel her EMG ahead of time if her pain is improved.    7. Essential hypertension  -Currently stable. Taking only spironolactone. Follow-up for blood pressures consistently greater than 140/90.

## 2018-03-02 DIAGNOSIS — J45.41 ASTHMA IN ADULT, MODERATE PERSISTENT, WITH ACUTE EXACERBATION: ICD-10-CM

## 2018-03-05 RX ORDER — MOMETASONE FUROATE AND FORMOTEROL FUMARATE DIHYDRATE 200; 5 UG/1; UG/1
AEROSOL RESPIRATORY (INHALATION)
Qty: 39 G | Refills: 3 | Status: SHIPPED | OUTPATIENT
Start: 2018-03-05 | End: 2022-06-16 | Stop reason: SDUPTHER

## 2018-03-05 NOTE — TELEPHONE ENCOUNTER
Was the patient seen in the last year in this department? Yes Lov 2/15/18    Does patient have an active prescription for medications requested? No     Received Request Via: Pharmacy

## 2018-04-10 ENCOUNTER — TELEPHONE (OUTPATIENT)
Dept: MEDICAL GROUP | Facility: LAB | Age: 68
End: 2018-04-10

## 2018-04-10 DIAGNOSIS — K04.7 DENTAL INFECTION: ICD-10-CM

## 2018-04-10 RX ORDER — CLINDAMYCIN HYDROCHLORIDE 300 MG/1
300 CAPSULE ORAL 3 TIMES DAILY
Qty: 15 CAP | Refills: 0 | Status: SHIPPED | OUTPATIENT
Start: 2018-04-10 | End: 2018-04-15

## 2018-04-10 NOTE — TELEPHONE ENCOUNTER
would you please call in a prescription, at [facility], for a strong antibiotic as one of my molars has broken and I need to have a crown put on. While I was waiting I developed a painful swelling the same side of my face and the dentist will not install a crown unless I have taken a course in a strong antibiotic.

## 2018-05-04 ENCOUNTER — TELEPHONE (OUTPATIENT)
Dept: MEDICAL GROUP | Facility: LAB | Age: 68
End: 2018-05-04

## 2018-05-04 NOTE — TELEPHONE ENCOUNTER
"2. Physical Therapy paperwork received from [facility]  requiring provider signature.     3. All appropriate fields completed by Medical Assistant: N/A CMA printed and distributed to MA    4. Paperwork placed in \"MA to Provider\" folder/basket. Awaiting provider completion/signature.  "

## 2018-07-02 DIAGNOSIS — M51.36 DDD (DEGENERATIVE DISC DISEASE), LUMBAR: ICD-10-CM

## 2018-07-02 DIAGNOSIS — M51.34 DDD (DEGENERATIVE DISC DISEASE), THORACIC: ICD-10-CM

## 2018-07-02 DIAGNOSIS — G89.29 CHRONIC BILATERAL LOW BACK PAIN WITH BILATERAL SCIATICA: ICD-10-CM

## 2018-07-02 DIAGNOSIS — M54.5 LOW BACK PAIN, UNSPECIFIED BACK PAIN LATERALITY, UNSPECIFIED CHRONICITY, WITH SCIATICA PRESENCE UNSPECIFIED: ICD-10-CM

## 2018-07-02 DIAGNOSIS — M50.30 DDD (DEGENERATIVE DISC DISEASE), CERVICAL: ICD-10-CM

## 2018-07-02 DIAGNOSIS — M54.42 CHRONIC BILATERAL LOW BACK PAIN WITH BILATERAL SCIATICA: ICD-10-CM

## 2018-07-02 DIAGNOSIS — M54.41 CHRONIC BILATERAL LOW BACK PAIN WITH BILATERAL SCIATICA: ICD-10-CM

## 2018-07-02 RX ORDER — HYDROCODONE BITARTRATE AND ACETAMINOPHEN 5; 325 MG/1; MG/1
1 TABLET ORAL EVERY 12 HOURS PRN
Qty: 12 TAB | Refills: 0 | Status: SHIPPED | OUTPATIENT
Start: 2018-07-02 | End: 2018-08-01

## 2018-07-02 NOTE — TELEPHONE ENCOUNTER
From: Sheyla Garcia  Sent: 7/2/2018 1:29 PM PDT  Subject: Medication Renewal Request    Sheyla Garcia would like a refill of the following medications:     hydrocodone-acetaminophen (NORCO) 5-325 MG Tab per tablet [Zaira Swenson, A.P.N.]   Patient Comment: I am unable to get around can you email me a original prescription doc for a refill?    Preferred pharmacy: [id number] - [place], [place] - [address]

## 2018-07-02 NOTE — TELEPHONE ENCOUNTER
Was the patient seen in the last year in this department? Yes     Does patient have an active prescription for medications requested? No     Received Request Via: Patient     Last visit:2/15/18    Last  fill:2/15/18

## 2018-07-07 DIAGNOSIS — N32.81 OVERACTIVE BLADDER: ICD-10-CM

## 2018-07-09 RX ORDER — OXYBUTYNIN CHLORIDE 10 MG/1
10 TABLET, EXTENDED RELEASE ORAL DAILY
Qty: 30 TAB | Refills: 4 | Status: SHIPPED | OUTPATIENT
Start: 2018-07-09 | End: 2019-01-28 | Stop reason: SDUPTHER

## 2018-07-09 RX ORDER — ESTRADIOL 1 MG/1
TABLET ORAL
Qty: 90 TAB | Refills: 0 | Status: SHIPPED | OUTPATIENT
Start: 2018-07-09 | End: 2019-02-25 | Stop reason: SDUPTHER

## 2018-07-27 DIAGNOSIS — M51.36 DDD (DEGENERATIVE DISC DISEASE), LUMBAR: ICD-10-CM

## 2018-07-27 DIAGNOSIS — M51.34 DDD (DEGENERATIVE DISC DISEASE), THORACIC: ICD-10-CM

## 2018-07-27 DIAGNOSIS — M50.30 DDD (DEGENERATIVE DISC DISEASE), CERVICAL: ICD-10-CM

## 2018-07-27 NOTE — TELEPHONE ENCOUNTER
Was the patient seen in the last year in this department? Yes    Does patient have an active prescription for medications requested? No     Received Request Via: Patient   Last - 2/15/18

## 2018-07-30 RX ORDER — HYDROCODONE BITARTRATE AND ACETAMINOPHEN 5; 325 MG/1; MG/1
1 TABLET ORAL EVERY 12 HOURS PRN
Qty: 12 TAB | Refills: 0 | OUTPATIENT
Start: 2018-07-30 | End: 2018-08-29

## 2018-08-08 ENCOUNTER — TELEPHONE (OUTPATIENT)
Dept: MEDICAL GROUP | Facility: LAB | Age: 68
End: 2018-08-08

## 2018-08-08 NOTE — TELEPHONE ENCOUNTER
ESTABLISHED PATIENT PRE-VISIT PLANNING     Note: Patient will not be contacted if there is no indication to call.     1.  Reviewed notes from the last few office visits within the medical group: Yes    2.  If any orders were placed at last visit or intended to be done for this visit (i.e. 6 mos follow-up), do we have Results/Consult Notes?        •  Labs - Labs ordered, NOT completed. Patient advised to complete prior to next appointment.       •  Imaging - Imaging was not ordered at last office visit.       •  Referrals - No referrals were ordered at last office visit.    3. Is this appointment scheduled as a Hospital Follow-Up? No    4.  Immunizations were updated in Epic using WebIZ?: Epic matches WebIZ       •  Web Iz Recommendations: PREVNAR (PCV13) , TDAP and ZOSTAVAX (Shingles)    5.  Patient is due for the following Health Maintenance Topics:   Health Maintenance Due   Topic Date Due   • Annual Wellness Visit  1950   • IMM DTaP/Tdap/Td Vaccine (1 - Tdap) 11/26/1971   • IMM ZOSTER VACCINES (1 of 2) 11/26/2002   • MAMMOGRAM  02/13/2015   • BONE DENSITY  11/26/2017   • IMM PNEUMOCOCCAL 65+ (ADULT) LOW/MEDIUM RISK SERIES (1 of 2 - PCV13) 11/26/2017   • COLONOSCOPY  01/01/2018   • URINE DRUG SCREEN  03/09/2018       - Patient has completed PNEUMOVAX (PPSV23) Immunization(s) per WebIZ. Chart has been updated.    6.  MDX printed for Provider? NO    7.  Patient was NOT informed to arrive 15 min prior to their scheduled appointment and bring in their medication bottles.

## 2018-08-09 ENCOUNTER — OFFICE VISIT (OUTPATIENT)
Dept: MEDICAL GROUP | Facility: LAB | Age: 68
End: 2018-08-09
Payer: MEDICARE

## 2018-08-09 VITALS
BODY MASS INDEX: 42.65 KG/M2 | TEMPERATURE: 99.1 F | OXYGEN SATURATION: 97 % | RESPIRATION RATE: 16 BRPM | DIASTOLIC BLOOD PRESSURE: 78 MMHG | HEART RATE: 78 BPM | SYSTOLIC BLOOD PRESSURE: 108 MMHG | HEIGHT: 69 IN | WEIGHT: 288 LBS

## 2018-08-09 DIAGNOSIS — Z23 NEED FOR PNEUMOCOCCAL VACCINATION: ICD-10-CM

## 2018-08-09 DIAGNOSIS — M54.42 CHRONIC BILATERAL LOW BACK PAIN WITH LEFT-SIDED SCIATICA: ICD-10-CM

## 2018-08-09 DIAGNOSIS — M51.36 DDD (DEGENERATIVE DISC DISEASE), LUMBAR: ICD-10-CM

## 2018-08-09 DIAGNOSIS — G89.29 CHRONIC BILATERAL LOW BACK PAIN WITH LEFT-SIDED SCIATICA: ICD-10-CM

## 2018-08-09 DIAGNOSIS — Z79.891 CHRONIC USE OF OPIATE DRUGS THERAPEUTIC PURPOSES: ICD-10-CM

## 2018-08-09 DIAGNOSIS — R20.2 ARM PARESTHESIA, LEFT: ICD-10-CM

## 2018-08-09 PROCEDURE — G0009 ADMIN PNEUMOCOCCAL VACCINE: HCPCS | Performed by: NURSE PRACTITIONER

## 2018-08-09 PROCEDURE — 99213 OFFICE O/P EST LOW 20 MIN: CPT | Mod: 25 | Performed by: NURSE PRACTITIONER

## 2018-08-09 PROCEDURE — 90670 PCV13 VACCINE IM: CPT | Performed by: NURSE PRACTITIONER

## 2018-08-09 RX ORDER — HYDROCODONE BITARTRATE AND ACETAMINOPHEN 5; 325 MG/1; MG/1
1 TABLET ORAL EVERY 12 HOURS
Qty: 60 TAB | Refills: 0 | Status: SHIPPED | OUTPATIENT
Start: 2018-08-09 | End: 2018-09-08
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
## 2018-08-17 ENCOUNTER — TELEPHONE (OUTPATIENT)
Dept: MEDICAL GROUP | Facility: LAB | Age: 68
End: 2018-08-17

## 2018-08-18 DIAGNOSIS — I10 ESSENTIAL HYPERTENSION: ICD-10-CM

## 2018-08-19 RX ORDER — SPIRONOLACTONE 25 MG/1
25 TABLET ORAL DAILY
Qty: 90 TAB | Refills: 3 | Status: SHIPPED | OUTPATIENT
Start: 2018-08-19 | End: 2019-06-04

## 2018-08-19 NOTE — TELEPHONE ENCOUNTER
Was the patient seen in the last year in this department? Yes lov 8/9/18    Does patient have an active prescription for medications requested? No     Received Request Via: Pharmacy

## 2018-10-08 RX ORDER — METHOCARBAMOL 750 MG/1
750 TABLET, FILM COATED ORAL 3 TIMES DAILY PRN
Qty: 270 TAB | Refills: 2 | Status: SHIPPED | OUTPATIENT
Start: 2018-10-08 | End: 2019-06-04 | Stop reason: SDUPTHER

## 2018-10-08 RX ORDER — ESTRADIOL 1 MG/1
1 TABLET ORAL DAILY
Qty: 90 TAB | Refills: 3 | Status: SHIPPED | OUTPATIENT
Start: 2018-10-08 | End: 2019-01-02 | Stop reason: SDUPTHER

## 2018-10-23 RX ORDER — AMOXICILLIN AND CLAVULANATE POTASSIUM 875; 125 MG/1; MG/1
1 TABLET, FILM COATED ORAL 2 TIMES DAILY
Qty: 14 TAB | Refills: 0 | Status: SHIPPED | OUTPATIENT
Start: 2018-10-23 | End: 2018-10-30

## 2018-11-07 LAB
ALBUMIN SERPL-MCNC: 3.6 G/DL (ref 3.4–5)
ALP SERPL-CCNC: 95 U/L (ref 45–117)
ALT SERPL-CCNC: 24 U/L (ref 12–78)
ANION GAP SERPL CALC-SCNC: 12 MMOL/L (ref 5–15)
APTT BLD: 31 SECONDS (ref 25–31)
BASOPHILS # BLD AUTO: 0.03 X10^3/UL (ref 0–0.1)
BASOPHILS NFR BLD AUTO: 0 % (ref 0–1)
BILIRUB SERPL-MCNC: 0.6 MG/DL (ref 0.2–1)
CALCIUM SERPL-MCNC: 9.3 MG/DL (ref 8.5–10.1)
CHLORIDE SERPL-SCNC: 108 MMOL/L (ref 98–107)
CREAT SERPL-MCNC: 0.79 MG/DL (ref 0.55–1.02)
CULTURE INDICATED?: NO
EOSINOPHIL # BLD AUTO: 0.27 X10^3/UL (ref 0–0.4)
EOSINOPHIL NFR BLD AUTO: 2 % (ref 1–7)
ERYTHROCYTE [DISTWIDTH] IN BLOOD BY AUTOMATED COUNT: 13.5 % (ref 9.6–15.2)
ERYTHROCYTE [SEDIMENTATION RATE] IN BLOOD BY WESTERGREN METHOD: 25 MM/HR (ref 0–20)
HCT (SEDRATE): 45.2 % (ref 34.6–47.8)
INR PPP: 0.91 (ref 0.93–1.1)
LYMPHOCYTES # BLD AUTO: 2.87 X10^3/UL (ref 1–3.4)
LYMPHOCYTES NFR BLD AUTO: 21 % (ref 22–44)
MCH RBC QN AUTO: 32.2 PG (ref 27–34.8)
MCHC RBC AUTO-ENTMCNC: 34.3 G/DL (ref 32.4–35.8)
MCV RBC AUTO: 93.9 FL (ref 80–100)
MD: NO
MICROSCOPIC: (no result)
MONOCYTES # BLD AUTO: 0.9 X10^3/UL (ref 0.2–0.8)
MONOCYTES NFR BLD AUTO: 7 % (ref 2–9)
NEUTROPHILS # BLD AUTO: 9.68 X10^3/UL (ref 1.8–6.8)
NEUTROPHILS NFR BLD AUTO: 70 % (ref 42–75)
PLATELET # BLD AUTO: 320 X10^3/UL (ref 130–400)
PMV BLD AUTO: 8.1 FL (ref 7.4–10.4)
PROT SERPL-MCNC: 7.8 G/DL (ref 6.4–8.2)
PROTHROMBIN TIME: 9.5 SECONDS (ref 9.6–11.5)
RBC # BLD AUTO: 4.81 X10^6/UL (ref 3.82–5.3)

## 2018-11-08 ENCOUNTER — HOSPITAL ENCOUNTER (INPATIENT)
Dept: HOSPITAL 8 - OUT | Age: 68
LOS: 1 days | Discharge: HOME HEALTH | DRG: 460 | End: 2018-11-09
Attending: ORTHOPAEDIC SURGERY | Admitting: ORTHOPAEDIC SURGERY
Payer: MEDICARE

## 2018-11-08 VITALS — BODY MASS INDEX: 41.95 KG/M2 | HEIGHT: 70 IN | WEIGHT: 293 LBS

## 2018-11-08 VITALS — SYSTOLIC BLOOD PRESSURE: 139 MMHG | DIASTOLIC BLOOD PRESSURE: 79 MMHG

## 2018-11-08 DIAGNOSIS — M53.3: Primary | ICD-10-CM

## 2018-11-08 DIAGNOSIS — M19.90: ICD-10-CM

## 2018-11-08 DIAGNOSIS — M46.1: ICD-10-CM

## 2018-11-08 PROCEDURE — 93005 ELECTROCARDIOGRAM TRACING: CPT

## 2018-11-08 PROCEDURE — 0QB30ZZ EXCISION OF LEFT PELVIC BONE, OPEN APPROACH: ICD-10-PCS | Performed by: ORTHOPAEDIC SURGERY

## 2018-11-08 PROCEDURE — C1713 ANCHOR/SCREW BN/BN,TIS/BN: HCPCS

## 2018-11-08 PROCEDURE — 85610 PROTHROMBIN TIME: CPT

## 2018-11-08 PROCEDURE — 76000 FLUOROSCOPY <1 HR PHYS/QHP: CPT

## 2018-11-08 PROCEDURE — 71046 X-RAY EXAM CHEST 2 VIEWS: CPT

## 2018-11-08 PROCEDURE — 81003 URINALYSIS AUTO W/O SCOPE: CPT

## 2018-11-08 PROCEDURE — C1762 CONN TISS, HUMAN(INC FASCIA): HCPCS

## 2018-11-08 PROCEDURE — 36415 COLL VENOUS BLD VENIPUNCTURE: CPT

## 2018-11-08 PROCEDURE — 80053 COMPREHEN METABOLIC PANEL: CPT

## 2018-11-08 PROCEDURE — 0SG807Z FUSION OF LEFT SACROILIAC JOINT WITH AUTOLOGOUS TISSUE SUBSTITUTE, OPEN APPROACH: ICD-10-PCS | Performed by: ORTHOPAEDIC SURGERY

## 2018-11-08 PROCEDURE — 0SG804Z FUSION OF LEFT SACROILIAC JOINT WITH INTERNAL FIXATION DEVICE, OPEN APPROACH: ICD-10-PCS | Performed by: ORTHOPAEDIC SURGERY

## 2018-11-08 PROCEDURE — 85025 COMPLETE CBC W/AUTO DIFF WBC: CPT

## 2018-11-08 PROCEDURE — 72202 X-RAY EXAM SI JOINTS 3/> VWS: CPT

## 2018-11-08 PROCEDURE — 85730 THROMBOPLASTIN TIME PARTIAL: CPT

## 2018-11-08 PROCEDURE — 85651 RBC SED RATE NONAUTOMATED: CPT

## 2018-11-08 PROCEDURE — 4A11X4G MONITORING OF PERIPHERAL NERVOUS ELECTRICAL ACTIVITY, INTRAOPERATIVE, EXTERNAL APPROACH: ICD-10-PCS | Performed by: ORTHOPAEDIC SURGERY

## 2018-11-08 RX ADMIN — HYDROMORPHONE HYDROCHLORIDE PRN MG: 1 INJECTION, SOLUTION INTRAMUSCULAR; INTRAVENOUS; SUBCUTANEOUS at 09:41

## 2018-11-08 RX ADMIN — METHOCARBAMOL PRN MG: 750 TABLET ORAL at 23:10

## 2018-11-08 RX ADMIN — INSULIN LISPRO SCH NOTE: 100 INJECTION, SOLUTION INTRAVENOUS; SUBCUTANEOUS at 16:29

## 2018-11-08 RX ADMIN — FENTANYL CITRATE PRN MCG: 50 INJECTION INTRAMUSCULAR; INTRAVENOUS at 09:49

## 2018-11-08 RX ADMIN — HYDROCODONE BITARTRATE AND ACETAMINOPHEN PRN TAB: 10; 325 TABLET ORAL at 17:46

## 2018-11-08 RX ADMIN — INSULIN LISPRO SCH NOTE: 100 INJECTION, SOLUTION INTRAVENOUS; SUBCUTANEOUS at 16:00

## 2018-11-08 RX ADMIN — FENTANYL CITRATE PRN MCG: 50 INJECTION INTRAMUSCULAR; INTRAVENOUS at 09:39

## 2018-11-08 RX ADMIN — HYDROMORPHONE HYDROCHLORIDE PRN MG: 1 INJECTION, SOLUTION INTRAMUSCULAR; INTRAVENOUS; SUBCUTANEOUS at 10:18

## 2018-11-08 RX ADMIN — SPIRONOLACTONE SCH MG: 25 TABLET ORAL at 21:00

## 2018-11-08 RX ADMIN — HYDROMORPHONE HYDROCHLORIDE PRN MG: 1 INJECTION, SOLUTION INTRAMUSCULAR; INTRAVENOUS; SUBCUTANEOUS at 10:00

## 2018-11-08 RX ADMIN — HYDROCODONE BITARTRATE AND ACETAMINOPHEN PRN TAB: 10; 325 TABLET ORAL at 23:10

## 2018-11-09 VITALS — SYSTOLIC BLOOD PRESSURE: 125 MMHG | DIASTOLIC BLOOD PRESSURE: 80 MMHG

## 2018-11-09 VITALS — SYSTOLIC BLOOD PRESSURE: 124 MMHG | DIASTOLIC BLOOD PRESSURE: 72 MMHG

## 2018-11-09 VITALS — DIASTOLIC BLOOD PRESSURE: 73 MMHG | SYSTOLIC BLOOD PRESSURE: 114 MMHG

## 2018-11-09 RX ADMIN — ESTRADIOL SCH MG: 1 TABLET ORAL at 08:01

## 2018-11-09 RX ADMIN — HYDROCODONE BITARTRATE AND ACETAMINOPHEN PRN TAB: 10; 325 TABLET ORAL at 08:37

## 2018-11-09 RX ADMIN — HYDROCODONE BITARTRATE AND ACETAMINOPHEN PRN TAB: 10; 325 TABLET ORAL at 07:58

## 2018-11-09 RX ADMIN — ESTRADIOL SCH MG: 1 TABLET ORAL at 07:58

## 2018-11-09 RX ADMIN — SPIRONOLACTONE SCH MG: 25 TABLET ORAL at 07:58

## 2018-11-09 RX ADMIN — METHOCARBAMOL PRN MG: 750 TABLET ORAL at 08:37

## 2018-11-09 RX ADMIN — INSULIN LISPRO SCH NOTE: 100 INJECTION, SOLUTION INTRAVENOUS; SUBCUTANEOUS at 00:00

## 2018-11-09 RX ADMIN — INSULIN LISPRO SCH NOTE: 100 INJECTION, SOLUTION INTRAVENOUS; SUBCUTANEOUS at 07:59

## 2018-11-09 RX ADMIN — HYDROCODONE BITARTRATE AND ACETAMINOPHEN PRN TAB: 10; 325 TABLET ORAL at 13:08

## 2019-01-02 RX ORDER — ESTRADIOL 1 MG/1
1 TABLET ORAL DAILY
Qty: 90 TAB | Refills: 3 | Status: SHIPPED | OUTPATIENT
Start: 2019-01-02 | End: 2019-09-18

## 2019-01-28 DIAGNOSIS — N32.81 OVERACTIVE BLADDER: ICD-10-CM

## 2019-01-28 RX ORDER — OXYBUTYNIN CHLORIDE 10 MG/1
10 TABLET, EXTENDED RELEASE ORAL DAILY
Qty: 90 TAB | Refills: 3 | Status: SHIPPED | OUTPATIENT
Start: 2019-01-28 | End: 2019-11-22

## 2019-01-29 ENCOUNTER — TELEPHONE (OUTPATIENT)
Dept: MEDICAL GROUP | Facility: LAB | Age: 69
End: 2019-01-29

## 2019-01-29 NOTE — TELEPHONE ENCOUNTER
----- Message from Sheyla Garcia sent at 1/29/2019 12:50 PM PST -----  Regarding: Prescription Question  Contact: [phone]  URGENT I finally was able to have surgery on my hip, It turned out my long term pain in my back was from my Sciatica being broken. The surgery to fuse my sciatica was completed on 11/08/19 Dr. Pineda, has prescribed Oxycodone HCL, 10 MG Tablets, Quantity 80.    I am undergoing aquatic therapy'   after 1 day I am barely able to walk and the pain is really bad at night. I have been told that I need to go to my primary doctor new RX for my physical therapy pain.    I only have 2 pills left. Please write a new RX for me for: Oxycodone HCL, 10 MG Tablets, Qty 80.  Let me know when the RX is ready so, I can pick it up at your office. Please call my 's phone ED [phone] so we can  your new RX after my therapy at [facility].    Sheyla Rivera

## 2019-01-30 ENCOUNTER — OFFICE VISIT (OUTPATIENT)
Dept: MEDICAL GROUP | Facility: LAB | Age: 69
End: 2019-01-30
Payer: MEDICARE

## 2019-01-30 VITALS
HEIGHT: 69 IN | OXYGEN SATURATION: 92 % | HEART RATE: 76 BPM | RESPIRATION RATE: 16 BRPM | WEIGHT: 285 LBS | DIASTOLIC BLOOD PRESSURE: 60 MMHG | TEMPERATURE: 98.3 F | SYSTOLIC BLOOD PRESSURE: 158 MMHG | BODY MASS INDEX: 42.21 KG/M2

## 2019-01-30 DIAGNOSIS — Z98.1 S/P SPINAL FUSION: ICD-10-CM

## 2019-01-30 DIAGNOSIS — Z79.891 CHRONIC USE OF OPIATE DRUGS THERAPEUTIC PURPOSES: ICD-10-CM

## 2019-01-30 DIAGNOSIS — M54.42 ACUTE LEFT-SIDED LOW BACK PAIN WITH LEFT-SIDED SCIATICA: ICD-10-CM

## 2019-01-30 PROCEDURE — 99213 OFFICE O/P EST LOW 20 MIN: CPT | Performed by: NURSE PRACTITIONER

## 2019-01-30 RX ORDER — OXYCODONE AND ACETAMINOPHEN 10; 325 MG/1; MG/1
1 TABLET ORAL EVERY 8 HOURS PRN
Qty: 60 TAB | Refills: 0 | Status: SHIPPED | OUTPATIENT
Start: 2019-01-30 | End: 2019-02-25 | Stop reason: SDUPTHER

## 2019-01-30 NOTE — TELEPHONE ENCOUNTER
Unfortunately patient needs to come in for a prescription for oxycodone, guidelines are that narcotics are unable to be prescribed over the phone.

## 2019-01-31 NOTE — PROGRESS NOTES
"Chief Complaint   Patient presents with   • Pain     follow up surgery       HPI  Sheyla is a 68-year-old established female here with request for refill on postoperative oxycodone.  She tells me that Dr. Pineda fused SI joint 2018 but told her to return here for any continued pain management.  Started PT about 1 month ago outpatient, she was in a rehab facility for several weeks prior to. Was given oxycodone 10 mg by Dr. Pineda - taking 2 per day approximately and is running low.  Based on her  profile, her last prescription of oxycodone was given to her 2018 from Dr. Toribio.  She denies any negative side effects from oxycodone, particularly denies rash, itching, shortness of breath or constipation.      Past medical, surgical, family, and social history is reviewed and updated in Epic chart by me today.   Medications and allergies reviewed and updated in Epic chart by me today.     ROS:   As documented in history of present illness above    Exam:  Blood pressure 158/60, pulse 76, temperature 36.8 °C (98.3 °F), resp. rate 16, height 1.753 m (5' 9\"), weight (!) 129.3 kg (285 lb), SpO2 92 %, not currently breastfeeding.  Constitutional: Alert, no distress, plus 3 vital signs  Skin:  Warm, dry, no rashes invisible areas  Eye: Equal, round and reactive, conjunctiva clear  ENMT: Lips without lesions, good dentition, oropharynx clear    Neck: Trachea midline  Cardiovascular: Normal rate and rhythm  Musculoskeletal: She has a very well-healed scar to left distal lower lumbar spine-SI joint area without any open sores or overlying redness.  She walks very slowly with a cane, verbalizing pain the entire way out to the lobby.  She does tell me numerous times that Dr. Toribio has told her that her pain will improve the longer that she is in physical therapy.  Psych: Alert, pleasant, well-groomed, normal affect    Assessment / Plan / Medical Decision makin. Acute left-sided low back pain with left-sided " sciatica  - oxyCODONE-acetaminophen (PERCOCET-10)  MG Tab; Take 1 Tab by mouth every 8 hours as needed for Severe Pain for up to 30 days.  Dispense: 60 Tab; Refill: 0    2. S/P spinal fusion  -She does continue to follow-up with Dr. Toribio.  Records were requested.  She will continue in physical therapy.  OxyCODONE-acetaminophen (PERCOCET-10)  MG Tab; Take 1 Tab by mouth every 8 hours as needed for Severe Pain for up to 30 days.  Dispense: 60 Tab; Refill: 0    3. Chronic use of opiate drugs therapeutic purposes  -Overall impression that this patient is benefiting from opioid therapy and that the benefits outweigh the risks of continued use. yes   - Controlled Substance Use Agreement updated today  - Urine drug screen will be done if she continues on oxycodone but her hope is to get off of this in the next month  - Referral to pain management no  - Consent for Opiate Prescription  In prescribing controlled substances to this patient, I certify that I have obtained and reviewed the medical history of Sheyla Garcia. I have also made a good marcy effort to obtain applicable records from other providers who have treated the patient and records did not demonstrate any increased risk of substance abuse that would prevent me from prescribing controlled substances.     I have conducted a physical exam and documented it. I have reviewed Ms. Garcia’s prescription history as maintained by the [place] Prescription Monitoring Program.     I have assessed the patient’s risk for abuse, dependency, and addiction using the validated Opioid Risk Tool available at https://www.mdcalc.com/rdhfuf-mlpp-jnxb-ort-narcotic-abuse.     Given the above, I believe the benefits of controlled substance therapy outweigh the risks. The reasons for prescribing controlled substances include non-narcotic, oral analgesic alternatives have been inadequate for pain control. Accordingly, I have discussed the risk and benefits, treatment plan, and  alternative therapies with the patient.       Healthcare maintenance: Discussed all of her healthcare maintenance issues that are due and she declines all of them at this time.  She tells me that when she starts to recover, her pain is lessened and she is more mobile, she will return for a physical and orders for all healthcare maintenance.

## 2019-02-12 ENCOUNTER — TELEPHONE (OUTPATIENT)
Dept: MEDICAL GROUP | Facility: LAB | Age: 69
End: 2019-02-12

## 2019-02-12 NOTE — TELEPHONE ENCOUNTER
"· OUTPATIENT  paperwork received from [facility] requiring provider signature.     · All appropriate fields completed by Medical Assistant: No    · Paperwork placed in \"MA to Provider\" folder/basket. Awaiting provider completion/signature.  "

## 2019-02-25 DIAGNOSIS — Z98.1 S/P SPINAL FUSION: ICD-10-CM

## 2019-02-25 DIAGNOSIS — M54.42 ACUTE LEFT-SIDED LOW BACK PAIN WITH LEFT-SIDED SCIATICA: ICD-10-CM

## 2019-02-25 RX ORDER — AMOXICILLIN AND CLAVULANATE POTASSIUM 875; 125 MG/1; MG/1
1 TABLET, FILM COATED ORAL 2 TIMES DAILY
Qty: 14 TAB | Refills: 0 | Status: SHIPPED | OUTPATIENT
Start: 2019-02-25 | End: 2019-03-04

## 2019-02-25 RX ORDER — OXYCODONE AND ACETAMINOPHEN 10; 325 MG/1; MG/1
1 TABLET ORAL EVERY 8 HOURS PRN
Qty: 60 TAB | Refills: 0 | Status: SHIPPED
Start: 2019-02-25 | End: 2019-03-11 | Stop reason: SDUPTHER

## 2019-02-25 RX ORDER — ESTRADIOL 1 MG/1
1 TABLET ORAL
Qty: 90 TAB | Refills: 4 | Status: SHIPPED | OUTPATIENT
Start: 2019-02-25 | End: 2019-06-04 | Stop reason: SDUPTHER

## 2019-02-25 NOTE — TELEPHONE ENCOUNTER
Was the patient seen in the last year in this department? Yes 1-    Does patient have an active prescription for medications requested? No     Received Request Via: Pharmacy    1- #60

## 2019-02-27 ENCOUNTER — TELEPHONE (OUTPATIENT)
Dept: MEDICAL GROUP | Facility: LAB | Age: 69
End: 2019-02-27

## 2019-02-27 NOTE — TELEPHONE ENCOUNTER
"· order [facility]  paperwork received from outpatient requiring provider signature.     · All appropriate fields completed by Medical Assistant: No    · Paperwork placed in \"MA to Provider\" folder/basket. Awaiting provider completion/signature.  "

## 2019-03-11 DIAGNOSIS — M54.42 ACUTE LEFT-SIDED LOW BACK PAIN WITH LEFT-SIDED SCIATICA: ICD-10-CM

## 2019-03-11 DIAGNOSIS — Z98.1 S/P SPINAL FUSION: ICD-10-CM

## 2019-03-11 NOTE — TELEPHONE ENCOUNTER
Was the patient seen in the last year in this department? Yes LOV 1/30/19  1/30/19 #60    Does patient have an active prescription for medications requested? No     Received Request Via: Patient

## 2019-03-12 RX ORDER — OXYCODONE AND ACETAMINOPHEN 10; 325 MG/1; MG/1
1 TABLET ORAL EVERY 8 HOURS PRN
Qty: 60 TAB | Refills: 0 | Status: SHIPPED | OUTPATIENT
Start: 2019-03-12 | End: 2019-04-11

## 2019-05-28 ENCOUNTER — OFFICE VISIT (OUTPATIENT)
Dept: MEDICAL GROUP | Facility: LAB | Age: 69
End: 2019-05-28
Payer: MEDICARE

## 2019-05-28 VITALS
SYSTOLIC BLOOD PRESSURE: 130 MMHG | TEMPERATURE: 98.5 F | DIASTOLIC BLOOD PRESSURE: 86 MMHG | HEART RATE: 104 BPM | WEIGHT: 274 LBS | HEIGHT: 69 IN | BODY MASS INDEX: 40.58 KG/M2 | OXYGEN SATURATION: 97 % | RESPIRATION RATE: 16 BRPM

## 2019-05-28 DIAGNOSIS — M53.3 COCCYDYNIA: ICD-10-CM

## 2019-05-28 DIAGNOSIS — Z98.1 S/P SPINAL FUSION: ICD-10-CM

## 2019-05-28 DIAGNOSIS — Z79.891 CHRONIC USE OF OPIATE DRUGS THERAPEUTIC PURPOSES: ICD-10-CM

## 2019-05-28 DIAGNOSIS — M54.42 CHRONIC LEFT-SIDED LOW BACK PAIN WITH LEFT-SIDED SCIATICA: ICD-10-CM

## 2019-05-28 DIAGNOSIS — J45.42 MODERATE PERSISTENT EXTRINSIC ASTHMA WITH STATUS ASTHMATICUS: ICD-10-CM

## 2019-05-28 DIAGNOSIS — G89.29 CHRONIC LEFT-SIDED LOW BACK PAIN WITH LEFT-SIDED SCIATICA: ICD-10-CM

## 2019-05-28 PROBLEM — M54.41 CHRONIC BILATERAL LOW BACK PAIN WITH BILATERAL SCIATICA: Status: RESOLVED | Noted: 2018-02-15 | Resolved: 2019-05-28

## 2019-05-28 PROCEDURE — 99214 OFFICE O/P EST MOD 30 MIN: CPT | Performed by: NURSE PRACTITIONER

## 2019-05-28 RX ORDER — OXYCODONE AND ACETAMINOPHEN 10; 325 MG/1; MG/1
1 TABLET ORAL EVERY 8 HOURS PRN
Qty: 60 TAB | Refills: 0 | Status: SHIPPED | OUTPATIENT
Start: 2019-05-28 | End: 2019-06-27

## 2019-05-28 RX ORDER — ALBUTEROL SULFATE 90 UG/1
2 AEROSOL, METERED RESPIRATORY (INHALATION) EVERY 4 HOURS PRN
Qty: 54 G | Refills: 1 | Status: SHIPPED | OUTPATIENT
Start: 2019-05-28 | End: 2019-09-18 | Stop reason: SDUPTHER

## 2019-05-28 ASSESSMENT — PATIENT HEALTH QUESTIONNAIRE - PHQ9: CLINICAL INTERPRETATION OF PHQ2 SCORE: 0

## 2019-05-28 NOTE — PROGRESS NOTES
"    HPI:  68-year-old established female here for chronic pain management follow-up.  She suffers from chronic pain in \"tailbone,\" SI joints, sciatica on left side.   Dr. Toribio fused SI joint in Nov, she did PT and pain has all returned 100%.  Takes percocet for severe pain, norco for mild pain.  Does not take hydrocodone or oxycodone daily.   Ibuprofen / tylenol are not helpful and she bruises easily with ibuprofen   Last dose of controlled substance: hydrocodone 3 days ago, oxycodone 7 d ago.     She  reports that she does not drink alcohol.  She  reports that she does not use drugs.    Interval history: Jan 2019  Any major change in health since last appointment? Yes - pain is worsening.     Consequences of Chronic Opiate therapy:  (5 A's)  Analgesia: Compared to no treatment or prior treatment, pain is currently worsened  Activity: worsened  Adverse Events:denies constipation, dry mouth, itchy skin, nausea and sedation  Aberrant Behaviors: She reports she is taking medication as prescribed and is not veering from agreed treatment regimen or provider recommendations. There have been no inappropriate refills or lost/stolen meds reported.  Affect/Mood: Pain is not impacting patient's mood.  Patient denies depression/anxiety.    Nonnarcotic treatments that are being used: physical therapy, ice, heat and cbd oil.     Last imaging: Dr. Toribio's office    Opioid Risk Score: 1    Interpretation of Opioid Risk Score   Score 0-3 = Low risk of abuse. Do UDS at least once per year.  Score 4-7 = Moderate risk of abuse. Do UDS 1-4 times per year.  Score 8+ = High risk of abuse. Refer to specialist.    Last order of CONTROLLED SUBSTANCE TREATMENT AGREEMENT was found on 12/28/2017 from Office Visit on 12/28/2017     UDS Summary                URINE DRUG SCREEN Next Due 8/11/2019      Done 8/16/2018 [facility] PAIN MANAGEMENT SCREEN     Patient has more history with this topic...        Most recent UDS reviewed today and is " "consistent with prescribed medications.     I have reviewed the medical records, the Prescription Monitoring Program and I have determined that controlled substance treatment is medically indicated.     #2-asthma: Chronic issue.  Well controlled with Dulera twice daily and albuterol as needed.  Denies any recent flareups of asthma.  She also feels that Dulera helps to control her allergy symptoms.    Past medical, surgical, family, and social history is reviewed and updated in Epic chart by me today.   Medications and allergies reviewed and updated in Epic chart by me today.     ROS:   As documented in history of present illness above    Exam:  /86 (BP Location: Left arm, Patient Position: Sitting, BP Cuff Size: Large adult)   Pulse (!) 104   Temp 36.9 °C (98.5 °F)   Resp 16   Ht 1.753 m (5' 9\")   Wt 124.3 kg (274 lb)   SpO2 97%   Constitutional: Alert, no distress, plus 3 vital signs  Skin:  Warm, dry, no rashes invisible areas  Eye: Equal, round and reactive, conjunctiva clear  ENMT: Lips without lesions  Respiratory: Unlabored respiration.  Cardiovascular: Normal rate  Musculoskeletal: She does need help getting up from the exam table.  She walks slowly with a cane.  She has tenderness to her left SI joint and mid buttocks.  Psych: Alert, pleasant, well-groomed, normal affect    Assessment/ Plan / Medical Decision makin. Chronic pain from:    Low back pain with left-sided sciatica    2. Chronic use of opiate drugs therapeutic purposes  -Overall impression that this patient is benefiting from opioid therapy and that the benefits outweigh the risks of continued use. yes   - Controlled Substance Use Agreement current  -She has not taken an opiate in 3 days, urine drug screen was not collected.  She was referred back to see Dr. Toribio for discussion of possible spinal cord stimulator.  - Additional lab: CMP to assess liver and renal function - UTD   -Percocet was refilled for number 60 pills which " typically lasts her for approximately 3 months.  - Referral to pain management no  In prescribing controlled substances to this patient, I certify that I have obtained and reviewed the medical history.. I have also made a good marcy effort to obtain applicable records from other providers who have treated the patient and records did not demonstrate any increased risk of substance abuse that would prevent me from prescribing controlled substances.     I have conducted a physical exam and documented it. I have reviewed prescription history as maintained by the [place] Prescription Monitoring Program.     I have assessed the patient’s risk for abuse, dependency, and addiction using the validated Opioid Risk Tool available at https://www.mdcalc.com/tenwmo-zavl-ydcq-ort-narcotic-abuse.     Given the above, I believe the benefits of controlled substance therapy outweigh the risks. The reasons for prescribing controlled substances include non-narcotic, oral analgesic alternatives have been inadequate for pain control. Accordingly, I have discussed the risk and benefits, treatment plan, and alternative therapies with the patient.     3.  Asthma: Stable.  Continue same.    In terms of her healthcare maintenance, I discussed with her that she is due for a lot of healthcare maintenance items, all of which she refused today but would like to come back for a wellness exam and an update on her healthcare maintenance after her back is feeling better.

## 2019-06-04 DIAGNOSIS — J45.41 ASTHMA IN ADULT, MODERATE PERSISTENT, WITH ACUTE EXACERBATION: ICD-10-CM

## 2019-06-04 DIAGNOSIS — J45.20 MILD INTERMITTENT ASTHMA, UNSPECIFIED WHETHER COMPLICATED: ICD-10-CM

## 2019-06-04 RX ORDER — PREDNISONE 20 MG/1
TABLET ORAL
Qty: 90 TAB | Refills: 2 | Status: SHIPPED | OUTPATIENT
Start: 2019-06-04 | End: 2019-06-04

## 2019-06-04 RX ORDER — ESTRADIOL 1 MG/1
1 TABLET ORAL
Qty: 90 TAB | Refills: 4 | Status: SHIPPED | OUTPATIENT
Start: 2019-06-04 | End: 2019-11-22

## 2019-06-04 RX ORDER — METHOCARBAMOL 750 MG/1
750 TABLET, FILM COATED ORAL 3 TIMES DAILY PRN
Qty: 90 TAB | Refills: 2 | Status: SHIPPED | OUTPATIENT
Start: 2019-06-04 | End: 2019-11-22

## 2019-06-04 RX ORDER — PREDNISONE 20 MG/1
TABLET ORAL
Qty: 15 TAB | Refills: 0 | Status: SHIPPED | OUTPATIENT
Start: 2019-06-04 | End: 2019-06-26 | Stop reason: SDUPTHER

## 2019-06-04 NOTE — TELEPHONE ENCOUNTER
Was the patient seen in the last year in this department? Yes LOV 5/28/19    Does patient have an active prescription for medications requested? No     Received Request Via: Patient

## 2019-06-04 NOTE — TELEPHONE ENCOUNTER
According to her chart, patient has not taken predniSONE since her last Rx was discontinued on 1/3/2018.

## 2019-06-05 RX ORDER — LISINOPRIL 10 MG/1
10 TABLET ORAL DAILY
Qty: 30 TAB | Refills: 5 | Status: SHIPPED | OUTPATIENT
Start: 2019-06-05 | End: 2019-11-22

## 2019-06-26 DIAGNOSIS — J45.20 MILD INTERMITTENT ASTHMA, UNSPECIFIED WHETHER COMPLICATED: ICD-10-CM

## 2019-06-26 RX ORDER — NITROFURANTOIN 25; 75 MG/1; MG/1
100 CAPSULE ORAL 2 TIMES DAILY
Qty: 10 CAP | Refills: 0 | Status: SHIPPED | OUTPATIENT
Start: 2019-06-26 | End: 2020-02-03 | Stop reason: SDUPTHER

## 2019-06-26 RX ORDER — PREDNISONE 20 MG/1
TABLET ORAL
Qty: 15 TAB | Refills: 0 | Status: SHIPPED | OUTPATIENT
Start: 2019-06-26 | End: 2019-08-26 | Stop reason: SDUPTHER

## 2019-06-26 NOTE — TELEPHONE ENCOUNTER
From: Sheyla Garcia  Sent: 6/26/2019 10:48 AM PDT  Subject: Medication Renewal Request    Sheyla Garcia would like a refill of the following medications:     predniSONE (DELTASONE) 20 MG Tab [Zaira Swenson, A.P.N.]    Preferred pharmacy: [id number] - [place], [place] - [address]

## 2019-06-26 NOTE — TELEPHONE ENCOUNTER
Was the patient seen in the last year in this department? Yes  5/28/19  Does patient have an active prescription for medications requested? No     Received Request Via: Patient

## 2019-08-19 DIAGNOSIS — G89.29 CHRONIC LEFT-SIDED LOW BACK PAIN WITH LEFT-SIDED SCIATICA: ICD-10-CM

## 2019-08-19 DIAGNOSIS — M54.42 CHRONIC LEFT-SIDED LOW BACK PAIN WITH LEFT-SIDED SCIATICA: ICD-10-CM

## 2019-08-19 RX ORDER — OXYCODONE AND ACETAMINOPHEN 10; 325 MG/1; MG/1
1-2 TABLET ORAL EVERY 8 HOURS PRN
Qty: 30 TAB | Refills: 0 | Status: SHIPPED | OUTPATIENT
Start: 2019-08-19 | End: 2019-09-18 | Stop reason: SDUPTHER

## 2019-08-19 RX ORDER — OXYCODONE AND ACETAMINOPHEN 10; 325 MG/1; MG/1
1-2 TABLET ORAL EVERY 4 HOURS PRN
COMMUNITY
End: 2019-08-19 | Stop reason: SDUPTHER

## 2019-08-19 NOTE — TELEPHONE ENCOUNTER
Was the patient seen in the last year in this department? Yes  Does patient have an active prescription for medications requested? No   Received Request Via: Pharmacy      last filled: 5/28/19, #60

## 2019-08-26 DIAGNOSIS — J45.20 MILD INTERMITTENT ASTHMA, UNSPECIFIED WHETHER COMPLICATED: ICD-10-CM

## 2019-08-27 DIAGNOSIS — J45.20 MILD INTERMITTENT ASTHMA, UNSPECIFIED WHETHER COMPLICATED: ICD-10-CM

## 2019-08-27 RX ORDER — PREDNISONE 20 MG/1
TABLET ORAL
Refills: 0 | Status: CANCELLED | OUTPATIENT
Start: 2019-08-27

## 2019-08-27 RX ORDER — PREDNISONE 10 MG/1
TABLET ORAL
Qty: 30 TAB | Refills: 0 | Status: SHIPPED | OUTPATIENT
Start: 2019-08-27 | End: 2019-11-22

## 2019-08-27 NOTE — TELEPHONE ENCOUNTER
Was the patient seen in the last year in this department? Yes 5/28/19    Does patient have an active prescription for medications requested? No     Received Request Via: Pharmacy

## 2019-08-27 NOTE — TELEPHONE ENCOUNTER
Was the patient seen in the last year in this department? Yes    Does patient have an active prescription for medications requested? No     Received Request Via: Pharmacy/ pt asking for a 90 DAY

## 2019-08-27 NOTE — TELEPHONE ENCOUNTER
Please ask Sheyla what she is using the prednisone for?  Asthma or her arthritis?  She should not stay on prednisone long term b/c of risk of osteoporosis / diabetes / glaucoma

## 2019-08-29 DIAGNOSIS — J45.20 MILD INTERMITTENT ASTHMA, UNSPECIFIED WHETHER COMPLICATED: ICD-10-CM

## 2019-09-18 ENCOUNTER — OFFICE VISIT (OUTPATIENT)
Dept: MEDICAL GROUP | Facility: LAB | Age: 69
End: 2019-09-18
Payer: MEDICARE

## 2019-09-18 VITALS
RESPIRATION RATE: 16 BRPM | BODY MASS INDEX: 40.43 KG/M2 | OXYGEN SATURATION: 94 % | HEART RATE: 78 BPM | WEIGHT: 273 LBS | TEMPERATURE: 98 F | SYSTOLIC BLOOD PRESSURE: 124 MMHG | DIASTOLIC BLOOD PRESSURE: 90 MMHG | HEIGHT: 69 IN

## 2019-09-18 DIAGNOSIS — Z12.39 ENCOUNTER FOR SCREENING FOR MALIGNANT NEOPLASM OF BREAST: ICD-10-CM

## 2019-09-18 DIAGNOSIS — G89.29 CHRONIC LEFT-SIDED LOW BACK PAIN WITH LEFT-SIDED SCIATICA: ICD-10-CM

## 2019-09-18 DIAGNOSIS — E66.01 MORBID OBESITY WITH BMI OF 40.0-44.9, ADULT (HCC): ICD-10-CM

## 2019-09-18 DIAGNOSIS — E03.8 OTHER SPECIFIED HYPOTHYROIDISM: ICD-10-CM

## 2019-09-18 DIAGNOSIS — J45.40 MODERATE PERSISTENT ASTHMA WITHOUT COMPLICATION: ICD-10-CM

## 2019-09-18 DIAGNOSIS — M54.42 CHRONIC LEFT-SIDED LOW BACK PAIN WITH LEFT-SIDED SCIATICA: ICD-10-CM

## 2019-09-18 DIAGNOSIS — G47.33 OSA (OBSTRUCTIVE SLEEP APNEA): ICD-10-CM

## 2019-09-18 DIAGNOSIS — Z79.891 CHRONIC USE OF OPIATE DRUG FOR THERAPEUTIC PURPOSE: ICD-10-CM

## 2019-09-18 DIAGNOSIS — Z12.11 SPECIAL SCREENING FOR MALIGNANT NEOPLASM OF COLON: ICD-10-CM

## 2019-09-18 DIAGNOSIS — I10 ESSENTIAL HYPERTENSION: ICD-10-CM

## 2019-09-18 DIAGNOSIS — Z00.00 MEDICARE ANNUAL WELLNESS VISIT, SUBSEQUENT: ICD-10-CM

## 2019-09-18 PROCEDURE — G0439 PPPS, SUBSEQ VISIT: HCPCS | Performed by: NURSE PRACTITIONER

## 2019-09-18 RX ORDER — OXYCODONE AND ACETAMINOPHEN 10; 325 MG/1; MG/1
1 TABLET ORAL EVERY 8 HOURS PRN
Qty: 90 TAB | Refills: 0 | Status: SHIPPED | OUTPATIENT
Start: 2019-09-18 | End: 2019-10-18

## 2019-09-18 RX ORDER — ALBUTEROL SULFATE 90 UG/1
2 AEROSOL, METERED RESPIRATORY (INHALATION) EVERY 4 HOURS PRN
Qty: 1 INHALER | Refills: 5 | Status: SHIPPED | OUTPATIENT
Start: 2019-09-18 | End: 2019-11-22

## 2019-09-18 ASSESSMENT — ENCOUNTER SYMPTOMS: GENERAL WELL-BEING: FAIR

## 2019-09-18 ASSESSMENT — PATIENT HEALTH QUESTIONNAIRE - PHQ9: CLINICAL INTERPRETATION OF PHQ2 SCORE: 0

## 2019-09-18 ASSESSMENT — ACTIVITIES OF DAILY LIVING (ADL): BATHING_REQUIRES_ASSISTANCE: 1

## 2019-09-18 NOTE — PROGRESS NOTES
Chief Complaint   Patient presents with   • Annual Exam         HPI:  Sheyla is a 68 y.o. here for Medicare Annual Wellness Visit.  Her main concern is persistent severe back pain, radiating down her legs.  She had a surgery on her SI joint with Dr. Toribio and tells me that this was completely unhelpful and now her pain is worse.  She takes anywhere from 0-2 Percocet per day, trying to stay away from the pain pills if her pain is not severe.  Typically she goes through approximately 30 pills/month.  She is not leaving her house very often as it is difficult for her to stand and walk because of her severe back pain.  She tells me that she was turned away from 2 other neurosurgeons office, stating they could not help her.  She is interested in seeing pain management.  Physical therapy was helpful but she exhausted her physical therapy resources for the year.      Patient Active Problem List    Diagnosis Date Noted   • Moderate persistent asthma without complication 02/15/2018   • Morbid obesity with BMI of 40.0-44.9, adult (HCC) 01/03/2018   • Chronic use of opiate drugs therapeutic purposes 03/07/2017   • KIRA (obstructive sleep apnea) 12/06/2014   • Scoliosis 12/06/2014   • Hypertension    • Obesity    • Hypothyroidism        Current Outpatient Medications   Medication Sig Dispense Refill   • predniSONE (DELTASONE) 10 MG Tab TAKE 6TABS BY MOUTH DAILY X3DAYS,4TABS X2DAYS,THEN 2TABS X2DAYS 30 Tab 0   • oxyCODONE-acetaminophen (PERCOCET-10)  MG Tab Take 1-2 Tabs by mouth every 8 hours as needed for up to 30 days. 30 Tab 0   • lisinopril (PRINIVIL) 10 MG Tab Take 1 Tab by mouth every day. 30 Tab 5   • estradiol (ESTRACE) 1 MG Tab Take 1 Tab by mouth every day. 90 Tab 4   • methocarbamol (ROBAXIN) 750 MG Tab Take 1 Tab by mouth 3 times a day as needed. 90 Tab 2   • albuterol (VENTOLIN HFA) 108 (90 Base) MCG/ACT Aero Soln inhalation aerosol Inhale 2 Puffs by mouth every four hours as needed for Shortness of Breath. 54  g 1   • oxybutynin SR (DITROPAN-XL) 10 MG CR tablet Take 1 Tab by mouth every day. 90 Tab 3   • DULERA 200-5 MCG/ACT Aerosol USE 2 INHALATIONS TWICE A DAY 39 g 3   • montelukast (SINGULAIR) 10 MG Tab Take 1 Tab by mouth every day. 30 Tab 5   • methocarbamol (ROBAXIN) 750 MG Tab TAKE 1 TABLET THREE TIMES A DAY AS NEEDED 270 Tab 0   • imipramine (TOFRANIL) 10 MG Tab Take 1 Tab by mouth every evening. 90 Tab 3   • albuterol (PROAIR HFA) 108 (90 BASE) MCG/ACT Aero Soln inhalation aerosol Inhale 2 Puffs by mouth every 6 hours as needed for Shortness of Breath. 8.5 g 3   • albuterol (PROVENTIL) 2.5mg/3ml Nebu Soln solution for nebulization 3 mL by Nebulization route every four hours as needed for Shortness of Breath. 75 mL 3   • NON SPECIFIED Blood pressure monitor.   Check BP 1-2 x daily  DX:  HTN 1 Device 3   • Multiple Vitamins-Minerals (CENTRUM SILVER ULTRA WOMENS) TABS Take 1 Tab by mouth every day.     • CYANOCOBALAMIN PO Take 1 Tab by mouth every day.     • B Complex-C-Folic Acid (SUPER B-COMPLEX/VIT C/FA PO) Take 1 Tab by mouth every day.     • BETA CAROTENE PO Take 1 Tab by mouth every day.     • Mometasone Furo-Formoterol Fum (DULERA) 200-5 MCG/ACT Aerosol Inhale 2 Puffs by mouth 2 times a day. 3 Inhaler 3   • estradiol (ESTRACE) 1 MG Tab Take 1 Tab by mouth every day. 90 Tab 3   • estradiol (ESTRACE) 1 MG Tab TAKE 1 TABLET EVERY DAY 90 Tab 1     No current facility-administered medications for this visit.         Patient is taking medications as noted in medication list.  Current supplements as per medication list.     Allergies: Gabapentin    Current social contact/activities: She is involved in very little because of her pain    Is patient current with immunizations? No, due for FLU and TD. Patient is interested in receiving NONE today.    She  reports that she has never smoked. She has never used smokeless tobacco. She reports that she drinks alcohol.  Counseling given: Not Answered        DPA/Advanced  directive: unsure    ROS:    Gait: Uses a cane   Ostomy: No   Other tubes: No   Amputations: No   Chronic oxygen use No   Last eye exam overdue  Wears hearing aids: No   : Denies any urinary leakage during the last 6 months      Screening:        Depression Screening    Little interest or pleasure in doing things?  0 - not at all  Feeling down, depressed, or hopeless? 0 - not at all  Patient Health Questionnaire Score: 0    If depressive symptoms identified deferred to follow up visit unless specifically addressed in assessment and plan.    Interpretation of PHQ-9 Total Score   Score Severity   1-4 No Depression   5-9 Mild Depression   10-14 Moderate Depression   15-19 Moderately Severe Depression   20-27 Severe Depression    Screening for Cognitive Impairment    Three Minute Recall (village, kitchen, baby)  1/3    [place] clock face with all 12 numbers and set the hands to show 10 past 10.       If cognitive concerns identified, deferred for follow up unless specifically addressed in assessment and plan.    Fall Risk Assessment    Has the patient had two or more falls in the last year or any fall with injury in the last year?  No  If fall risk identified, deferred for follow up unless specifically addressed in assessment and plan.    Safety Assessment    Throw rugs on floor.  No  Handrails on all stairs.  Yes  Good lighting in all hallways.  Yes  Difficulty hearing.  No  Patient counseled about all safety risks that were identified.    Functional Assessment ADLs    Are there any barriers preventing you from cooking for yourself or meeting nutritional needs?  No.    Are there any barriers preventing you from driving safely or obtaining transportation?  No.    Are there any barriers preventing you from using a telephone or calling for help?  No.    Are there any barriers preventing you from shopping?  Yes.    Are there any barriers preventing you from taking care of your own finances?  No.    Are there any barriers  preventing you from managing your medications?  No.    Are there any barriers preventing you from showering, bathing or dressing yourself?  Yes.    Are you currently engaging in any exercise or physical activity?  No.     What is your perception of your health?  Fair.    Health Maintenance Summary                Annual Wellness Visit Overdue 1950     HEPATITIS C SCREENING Overdue 1950     IMM DTaP/Tdap/Td Vaccine Overdue 11/26/1971     IMM ZOSTER VACCINES Overdue 11/26/2002     MAMMOGRAM Overdue 2/13/2015      Done 2/13/2014 MA-SCREENING MAMMOGRAM W/ CAD    BONE DENSITY Overdue 11/26/2017     COLONOSCOPY Overdue 1/1/2018      Previously completed 1/1/2013      Patient has more history with this topic...    URINE DRUG SCREEN Overdue 3/9/2018      [facility]    IMM INFLUENZA Overdue 9/1/2019      Done 11/13/2015 Imm Admin: Influenza Vaccine Quad Inj (Preserved)     Patient has more history with this topic...    IMM PNEUMOCOCCAL VACCINE: 65+ Years Next Due 12/12/2019      Done 8/9/2018 Imm Admin: Pneumococcal Conjugate Vaccine (Prevnar/PCV-13)     Patient has more history with this topic...          Patient Care Team:  ALANNA Childers as PCP - General (Family Medicine)    Social History     Tobacco Use   • Smoking status: Never Smoker   • Smokeless tobacco: Never Used   Substance Use Topics   • Alcohol use: Yes     Comment: mod   • Drug use: Not on file     Family History   Problem Relation Age of Onset   • Heart Disease Mother         chf   • Cancer Father         lung, berrylium exposure   • Stroke Father         tia's   • Cancer Maternal Grandfather         lung   • Diabetes Neg Hx      She  has a past medical history of Hypertension, Hypothyroidism (2008), Migraine, and Obesity.   Past Surgical History:   Procedure Laterality Date   • KNEE ORIF  1/3/08    tibial plateau fracture   • BLADDER SLING FEMALE     • NASAL SEPTAL RECONSTRUCTION      age 26   •  "OTHER SURGICAL PROCEDURE      SI joint fusion - Dr. Pineda 1/2019   • TONSILLECTOMY             Exam:     /90 (BP Location: Left arm, Patient Position: Sitting, BP Cuff Size: Large adult)   Pulse 78   Temp 36.7 °C (98 °F)   Resp 16   Ht 1.753 m (5' 9\")   Wt 123.8 kg (273 lb)   SpO2 94%  Body mass index is 40.32 kg/m².    Hearing good.    Dentition good  Alert, oriented in no acute distress.  Eye contact is good, speech goal directed, affect calm      Assessment and Plan. The following treatment and monitoring plan is recommended:    1. Medicare annual wellness visit, subsequent    2. Moderate persistent asthma without complication  -Refilled albuterol.  Dulera is very expensive for her, therefore she was given a prescription to  Breo at the healthcare center.  Discussed the importance of maintenance inhalers versus rescue inhalers.  She dislikes Singulair and has good discontinue that.  Encouraged her to start an antihistamine.  Encouraged her to get a flu shot at her pharmacy as we were out of these today.  Pneumonia shots are up-to-date.  - albuterol (VENTOLIN HFA) 108 (90 Base) MCG/ACT Aero Soln inhalation aerosol; Inhale 2 Puffs by mouth every four hours as needed for Shortness of Breath.  Dispense: 1 Inhaler; Refill: 5  - Fluticasone Furoate-Vilanterol (BREO ELLIPTA) 200-25 MCG/INH AEROSOL POWDER, BREATH ACTIVATED; Inhale 1 Puff by mouth every day. Instead of dulera  Dispense: 2 Each; Refill: 0    3. Special screening for malignant neoplasm of colon  - REFERRAL TO GASTROENTEROLOGY    4. Encounter for screening for malignant neoplasm of breast  - MA-SCREENING MAMMO BILAT W/TOMOSYNTHESIS W/CAD; Future    5. Essential hypertension  -Stable.  Continue same.  - Comp Metabolic Panel; Future  - CBC WITH DIFFERENTIAL; Future  - Lipid Profile; Future    6. Other specified hypothyroidism  -Stable based on last blood work.  Recommend updated blood work.  - TSH; Future  - FREE THYROXINE; Future    7. " Chronic left-sided low back pain with left-sided sciatica  -She was given #90 oxycodone as she is having difficulty leaving her house and picking up prescriptions.  She is been very responsible with her opiates over the past several years.  She understands that she may not refill this for 90 days.  Updated her urine drug screen.  Contract is up-to-date.  She was referred to physiatry as she is interested in seeing a pain management specialist for procedures.  We did review her MRI.  - oxyCODONE-acetaminophen (PERCOCET-10)  MG Tab; Take 1 Tab by mouth every 8 hours as needed for up to 30 days.  Dispense: 90 Tab; Refill: 0  - REFERRAL TO PHYSIATRY (PMR)    8. Chronic use of opiate drug for therapeutic purpose  In prescribing controlled substances to this patient, I certify that I have obtained and reviewed the medical history of Sheyla Garcia. I have also made a good marcy effort to obtain applicable records from other providers who have treated the patient and records did not demonstrate any increased risk of substance abuse that would prevent me from prescribing controlled substances.     I have conducted a physical exam and documented it. I have reviewed Ms. Garcia’s prescription history as maintained by the [place] Prescription Monitoring Program.     I have assessed the patient’s risk for abuse, dependency, and addiction using the validated Opioid Risk Tool available at https://www.mdcalc.com/dkgisv-exuo-azlg-ort-narcotic-abuse.     Given the above, I believe the benefits of controlled substance therapy outweigh the risks. The reasons for prescribing controlled substances include non-narcotic, oral analgesic alternatives have been inadequate for pain control. Accordingly, I have discussed the risk and benefits, treatment plan, and alternative therapies with the patient.     - Pain Management Screen; Future    9. Morbid obesity with BMI of 40.0-44.9, adult (HCC)  -I encouraged her to work on portion control,  chair exercises and weight loss management.    10. KIRA (obstructive sleep apnea)  -She does poorly with a CPAP machine.  I encouraged her to prop herself up to sleep.      Services suggested: No services needed at this time  Health Care Screening recommendations as per orders if indicated.  Referrals offered: PT/OT/Nutrition counseling/Behavioral Health/Smoking cessation as per orders if indicated.    Discussion today about general wellness and lifestyle habits:    · Prevent falls and reduce trip hazards; Cautioned about securing or removing rugs.  · Have a working fire alarm and carbon monoxide detector;   · Engage in regular physical activity and social activities       Follow-up: No follow-ups on file.

## 2019-10-10 ENCOUNTER — TELEPHONE (OUTPATIENT)
Dept: MEDICAL GROUP | Facility: LAB | Age: 69
End: 2019-10-10

## 2019-10-10 DIAGNOSIS — R09.02 HYPOXIA: ICD-10-CM

## 2019-10-10 NOTE — TELEPHONE ENCOUNTER
If he says that she was not recently hospitalized, she will need an overnight pulse oximeter to qualify for nighttime oxygen.  Would like a referral to pulmonology?

## 2019-10-10 NOTE — TELEPHONE ENCOUNTER
Not quite sure what he means by coming home with it?  Was she hospitalized lately?  If so, please obtain records.

## 2019-10-10 NOTE — TELEPHONE ENCOUNTER
Spouse stated that pt needs to be on oxygen and would like to know what he needs to do. She did come home with it at one time but is not on anything right now.

## 2019-10-11 NOTE — TELEPHONE ENCOUNTER
"Referred to pulmonology and for overnight pulse oximetry.  Please fax overnight pulse ox referral / \"order\" to oxygen company that you like to use / accepts medicare.  Referrals won't take care of the overnight pulse oximetry - it is just an opportunity to create a piece of paper to fax.  thanks  "

## 2019-11-04 ENCOUNTER — APPOINTMENT (OUTPATIENT)
Dept: RADIOLOGY | Facility: MEDICAL CENTER | Age: 69
End: 2019-11-04
Attending: NURSE PRACTITIONER
Payer: MEDICARE

## 2019-11-22 ENCOUNTER — APPOINTMENT (OUTPATIENT)
Dept: RADIOLOGY | Facility: MEDICAL CENTER | Age: 69
DRG: 563 | End: 2019-11-22
Attending: EMERGENCY MEDICINE
Payer: MEDICARE

## 2019-11-22 ENCOUNTER — HOSPITAL ENCOUNTER (INPATIENT)
Facility: MEDICAL CENTER | Age: 69
LOS: 15 days | DRG: 563 | End: 2019-12-07
Attending: EMERGENCY MEDICINE | Admitting: INTERNAL MEDICINE
Payer: MEDICARE

## 2019-11-22 DIAGNOSIS — S82.842A CLOSED BIMALLEOLAR FRACTURE OF LEFT ANKLE, INITIAL ENCOUNTER: ICD-10-CM

## 2019-11-22 DIAGNOSIS — R26.2 UNABLE TO AMBULATE: ICD-10-CM
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
## 2019-11-22 DIAGNOSIS — S82.892A CLOSED FRACTURE OF LEFT ANKLE, INITIAL ENCOUNTER: ICD-10-CM

## 2019-11-22 DIAGNOSIS — M51.36 DDD (DEGENERATIVE DISC DISEASE), LUMBAR: ICD-10-CM

## 2019-11-22 DIAGNOSIS — M17.12 PRIMARY OSTEOARTHRITIS OF LEFT KNEE: ICD-10-CM

## 2019-11-22 DIAGNOSIS — M25.562 ACUTE PAIN OF LEFT KNEE: ICD-10-CM

## 2019-11-22 LAB
ALBUMIN SERPL BCP-MCNC: 3.9 G/DL (ref 3.2–4.9)
ALBUMIN/GLOB SERPL: 1.4 G/DL
ALP SERPL-CCNC: 59 U/L (ref 30–99)
ALT SERPL-CCNC: 6 U/L (ref 2–50)
ANION GAP SERPL CALC-SCNC: 10 MMOL/L (ref 0–11.9)
AST SERPL-CCNC: 13 U/L (ref 12–45)
BASOPHILS # BLD AUTO: 0.3 % (ref 0–1.8)
BASOPHILS # BLD: 0.03 K/UL (ref 0–0.12)
BILIRUB SERPL-MCNC: 0.6 MG/DL (ref 0.1–1.5)
BUN SERPL-MCNC: 16 MG/DL (ref 8–22)
CALCIUM SERPL-MCNC: 8.8 MG/DL (ref 8.5–10.5)
CHLORIDE SERPL-SCNC: 108 MMOL/L (ref 96–112)
CO2 SERPL-SCNC: 21 MMOL/L (ref 20–33)
CREAT SERPL-MCNC: 0.64 MG/DL (ref 0.5–1.4)
EOSINOPHIL # BLD AUTO: 0.11 K/UL (ref 0–0.51)
EOSINOPHIL NFR BLD: 1 % (ref 0–6.9)
ERYTHROCYTE [DISTWIDTH] IN BLOOD BY AUTOMATED COUNT: 43.6 FL (ref 35.9–50)
GLOBULIN SER CALC-MCNC: 2.7 G/DL (ref 1.9–3.5)
GLUCOSE SERPL-MCNC: 94 MG/DL (ref 65–99)
HCT VFR BLD AUTO: 49.7 % (ref 37–47)
HGB BLD-MCNC: 15.8 G/DL (ref 12–16)
IMM GRANULOCYTES # BLD AUTO: 0.03 K/UL (ref 0–0.11)
IMM GRANULOCYTES NFR BLD AUTO: 0.3 % (ref 0–0.9)
LYMPHOCYTES # BLD AUTO: 1.5 K/UL (ref 1–4.8)
LYMPHOCYTES NFR BLD: 14 % (ref 22–41)
MCH RBC QN AUTO: 30.2 PG (ref 27–33)
MCHC RBC AUTO-ENTMCNC: 31.8 G/DL (ref 33.6–35)
MCV RBC AUTO: 94.8 FL (ref 81.4–97.8)
MONOCYTES # BLD AUTO: 0.63 K/UL (ref 0–0.85)
MONOCYTES NFR BLD AUTO: 5.9 % (ref 0–13.4)
NEUTROPHILS # BLD AUTO: 8.39 K/UL (ref 2–7.15)
NEUTROPHILS NFR BLD: 78.5 % (ref 44–72)
NRBC # BLD AUTO: 0 K/UL
NRBC BLD-RTO: 0 /100 WBC
PLATELET # BLD AUTO: 250 K/UL (ref 164–446)
PMV BLD AUTO: 10.6 FL (ref 9–12.9)
POTASSIUM SERPL-SCNC: 3.6 MMOL/L (ref 3.6–5.5)
PROT SERPL-MCNC: 6.6 G/DL (ref 6–8.2)
RBC # BLD AUTO: 5.24 M/UL (ref 4.2–5.4)
SODIUM SERPL-SCNC: 139 MMOL/L (ref 135–145)
WBC # BLD AUTO: 10.7 K/UL (ref 4.8–10.8)

## 2019-11-22 PROCEDURE — 73590 X-RAY EXAM OF LOWER LEG: CPT | Mod: LT

## 2019-11-22 PROCEDURE — 99285 EMERGENCY DEPT VISIT HI MDM: CPT

## 2019-11-22 PROCEDURE — 700105 HCHG RX REV CODE 258: Performed by: INTERNAL MEDICINE

## 2019-11-22 PROCEDURE — 2W3MX1Z IMMOBILIZATION OF LEFT LOWER EXTREMITY USING SPLINT: ICD-10-PCS | Performed by: ORTHOPAEDIC SURGERY

## 2019-11-22 PROCEDURE — A9270 NON-COVERED ITEM OR SERVICE: HCPCS | Performed by: EMERGENCY MEDICINE

## 2019-11-22 PROCEDURE — 770001 HCHG ROOM/CARE - MED/SURG/GYN PRIV*

## 2019-11-22 PROCEDURE — 99222 1ST HOSP IP/OBS MODERATE 55: CPT | Mod: AI | Performed by: INTERNAL MEDICINE

## 2019-11-22 PROCEDURE — 36415 COLL VENOUS BLD VENIPUNCTURE: CPT

## 2019-11-22 PROCEDURE — 85025 COMPLETE CBC W/AUTO DIFF WBC: CPT

## 2019-11-22 PROCEDURE — 700102 HCHG RX REV CODE 250 W/ 637 OVERRIDE(OP): Performed by: EMERGENCY MEDICINE

## 2019-11-22 PROCEDURE — 72100 X-RAY EXAM L-S SPINE 2/3 VWS: CPT

## 2019-11-22 PROCEDURE — 80053 COMPREHEN METABOLIC PANEL: CPT

## 2019-11-22 PROCEDURE — 700102 HCHG RX REV CODE 250 W/ 637 OVERRIDE(OP): Performed by: INTERNAL MEDICINE

## 2019-11-22 PROCEDURE — 73560 X-RAY EXAM OF KNEE 1 OR 2: CPT | Mod: LT

## 2019-11-22 PROCEDURE — 73610 X-RAY EXAM OF ANKLE: CPT | Mod: LT

## 2019-11-22 PROCEDURE — A9270 NON-COVERED ITEM OR SERVICE: HCPCS | Performed by: INTERNAL MEDICINE

## 2019-11-22 RX ORDER — ALBUTEROL SULFATE 90 UG/1
2 AEROSOL, METERED RESPIRATORY (INHALATION) EVERY 6 HOURS PRN
Status: DISCONTINUED | OUTPATIENT
Start: 2019-11-22 | End: 2019-12-07 | Stop reason: HOSPADM

## 2019-11-22 RX ORDER — LISINOPRIL 10 MG/1
10 TABLET ORAL DAILY
COMMUNITY
End: 2020-06-18 | Stop reason: SDUPTHER

## 2019-11-22 RX ORDER — OXYCODONE HYDROCHLORIDE 10 MG/1
10 TABLET ORAL
Status: DISCONTINUED | OUTPATIENT
Start: 2019-11-22 | End: 2019-11-26

## 2019-11-22 RX ORDER — ONDANSETRON 2 MG/ML
4 INJECTION INTRAMUSCULAR; INTRAVENOUS EVERY 4 HOURS PRN
Status: DISCONTINUED | OUTPATIENT
Start: 2019-11-22 | End: 2019-12-07 | Stop reason: HOSPADM

## 2019-11-22 RX ORDER — ESTRADIOL 1 MG/1
1 TABLET ORAL DAILY
Status: ON HOLD | COMMUNITY
End: 2019-12-07

## 2019-11-22 RX ORDER — POLYETHYLENE GLYCOL 3350 17 G/17G
1 POWDER, FOR SOLUTION ORAL
Status: DISCONTINUED | OUTPATIENT
Start: 2019-11-22 | End: 2019-11-26

## 2019-11-22 RX ORDER — METHOCARBAMOL 750 MG/1
750 TABLET, FILM COATED ORAL 3 TIMES DAILY PRN
Status: ON HOLD | COMMUNITY
End: 2019-12-07

## 2019-11-22 RX ORDER — AMOXICILLIN 250 MG
2 CAPSULE ORAL 2 TIMES DAILY
Status: DISCONTINUED | OUTPATIENT
Start: 2019-11-22 | End: 2019-11-26

## 2019-11-22 RX ORDER — LISINOPRIL 10 MG/1
10 TABLET ORAL DAILY
Status: DISCONTINUED | OUTPATIENT
Start: 2019-11-22 | End: 2019-12-07 | Stop reason: HOSPADM

## 2019-11-22 RX ORDER — OXYCODONE HYDROCHLORIDE 5 MG/1
5 TABLET ORAL
Status: DISCONTINUED | OUTPATIENT
Start: 2019-11-22 | End: 2019-11-26

## 2019-11-22 RX ORDER — ACETAMINOPHEN 325 MG/1
650 TABLET ORAL EVERY 6 HOURS PRN
Status: DISCONTINUED | OUTPATIENT
Start: 2019-11-22 | End: 2019-12-07 | Stop reason: HOSPADM

## 2019-11-22 RX ORDER — M-VIT,TX,IRON,MINS/CALC/FOLIC 27MG-0.4MG
1 TABLET ORAL DAILY
COMMUNITY
End: 2023-03-13

## 2019-11-22 RX ORDER — BISACODYL 10 MG
10 SUPPOSITORY, RECTAL RECTAL
Status: DISCONTINUED | OUTPATIENT
Start: 2019-11-22 | End: 2019-11-26

## 2019-11-22 RX ORDER — ONDANSETRON 4 MG/1
4 TABLET, ORALLY DISINTEGRATING ORAL EVERY 4 HOURS PRN
Status: DISCONTINUED | OUTPATIENT
Start: 2019-11-22 | End: 2019-12-07 | Stop reason: HOSPADM

## 2019-11-22 RX ORDER — DULOXETIN HYDROCHLORIDE 30 MG/1
30 CAPSULE, DELAYED RELEASE ORAL DAILY
COMMUNITY
End: 2020-03-27 | Stop reason: SDUPTHER

## 2019-11-22 RX ORDER — BUDESONIDE AND FORMOTEROL FUMARATE DIHYDRATE 160; 4.5 UG/1; UG/1
2 AEROSOL RESPIRATORY (INHALATION) 2 TIMES DAILY
Status: DISCONTINUED | OUTPATIENT
Start: 2019-11-22 | End: 2019-12-07 | Stop reason: HOSPADM

## 2019-11-22 RX ORDER — OXYCODONE AND ACETAMINOPHEN 10; 325 MG/1; MG/1
1 TABLET ORAL
Status: ON HOLD | COMMUNITY
End: 2019-12-01

## 2019-11-22 RX ORDER — METHOCARBAMOL 750 MG/1
750 TABLET, FILM COATED ORAL 3 TIMES DAILY PRN
Status: DISCONTINUED | OUTPATIENT
Start: 2019-11-22 | End: 2019-11-24

## 2019-11-22 RX ORDER — ESTRADIOL 1 MG/1
1 TABLET ORAL
Status: DISCONTINUED | OUTPATIENT
Start: 2019-11-22 | End: 2019-12-03

## 2019-11-22 RX ORDER — OXYCODONE AND ACETAMINOPHEN 10; 325 MG/1; MG/1
1 TABLET ORAL ONCE
Status: COMPLETED | OUTPATIENT
Start: 2019-11-22 | End: 2019-11-22

## 2019-11-22 RX ORDER — SODIUM CHLORIDE 9 MG/ML
INJECTION, SOLUTION INTRAVENOUS CONTINUOUS
Status: DISCONTINUED | OUTPATIENT
Start: 2019-11-22 | End: 2019-12-03

## 2019-11-22 RX ORDER — MORPHINE SULFATE 4 MG/ML
4 INJECTION, SOLUTION INTRAMUSCULAR; INTRAVENOUS
Status: DISCONTINUED | OUTPATIENT
Start: 2019-11-22 | End: 2019-11-26

## 2019-11-22 RX ORDER — OXYBUTYNIN CHLORIDE 10 MG/1
10 TABLET, EXTENDED RELEASE ORAL DAILY
Status: ON HOLD | COMMUNITY
End: 2021-10-13

## 2019-11-22 RX ADMIN — METHOCARBAMOL TABLETS 750 MG: 750 TABLET, COATED ORAL at 22:09

## 2019-11-22 RX ADMIN — OXYCODONE HYDROCHLORIDE 10 MG: 10 TABLET ORAL at 17:22

## 2019-11-22 RX ADMIN — OXYCODONE HYDROCHLORIDE 10 MG: 10 TABLET ORAL at 21:12

## 2019-11-22 RX ADMIN — OXYCODONE HYDROCHLORIDE AND ACETAMINOPHEN 1 TABLET: 10; 325 TABLET ORAL at 14:07

## 2019-11-22 RX ADMIN — SODIUM CHLORIDE: 9 INJECTION, SOLUTION INTRAVENOUS at 22:10

## 2019-11-22 RX ADMIN — BUDESONIDE AND FORMOTEROL FUMARATE DIHYDRATE 2 PUFF: 160; 4.5 AEROSOL RESPIRATORY (INHALATION) at 22:09

## 2019-11-22 ASSESSMENT — ENCOUNTER SYMPTOMS
BACK PAIN: 1
SHORTNESS OF BREATH: 0
VOMITING: 0
FALLS: 1
FLANK PAIN: 0
FEVER: 0
DIZZINESS: 0
HEADACHES: 0

## 2019-11-22 ASSESSMENT — LIFESTYLE VARIABLES
HAVE YOU EVER FELT YOU SHOULD CUT DOWN ON YOUR DRINKING: YES
TOTAL SCORE: 1
EVER FELT BAD OR GUILTY ABOUT YOUR DRINKING: NO
AVERAGE NUMBER OF DAYS PER WEEK YOU HAVE A DRINK CONTAINING ALCOHOL: 4
ON A TYPICAL DAY WHEN YOU DRINK ALCOHOL HOW MANY DRINKS DO YOU HAVE: 3
TOTAL SCORE: 1
HAVE PEOPLE ANNOYED YOU BY CRITICIZING YOUR DRINKING: NO
EVER HAD A DRINK FIRST THING IN THE MORNING TO STEADY YOUR NERVES TO GET RID OF A HANGOVER: NO
ALCOHOL_USE: YES
CONSUMPTION TOTAL: POSITIVE
TOTAL SCORE: 1
HOW MANY TIMES IN THE PAST YEAR HAVE YOU HAD 5 OR MORE DRINKS IN A DAY: 12
DOES PATIENT WANT TO STOP DRINKING: NO

## 2019-11-22 ASSESSMENT — COGNITIVE AND FUNCTIONAL STATUS - GENERAL
DAILY ACTIVITIY SCORE: 19
SUGGESTED CMS G CODE MODIFIER MOBILITY: CL
TURNING FROM BACK TO SIDE WHILE IN FLAT BAD: A LITTLE
CLIMB 3 TO 5 STEPS WITH RAILING: A LOT
DRESSING REGULAR LOWER BODY CLOTHING: A LOT
MOVING FROM LYING ON BACK TO SITTING ON SIDE OF FLAT BED: A LOT
TOILETING: A LOT
MOBILITY SCORE: 14
STANDING UP FROM CHAIR USING ARMS: A LOT
HELP NEEDED FOR BATHING: A LITTLE
SUGGESTED CMS G CODE MODIFIER DAILY ACTIVITY: CK
MOVING TO AND FROM BED TO CHAIR: A LITTLE
WALKING IN HOSPITAL ROOM: A LOT

## 2019-11-22 ASSESSMENT — PATIENT HEALTH QUESTIONNAIRE - PHQ9
7. TROUBLE CONCENTRATING ON THINGS, SUCH AS READING THE NEWSPAPER OR WATCHING TELEVISION: SEVERAL DAYS
8. MOVING OR SPEAKING SO SLOWLY THAT OTHER PEOPLE COULD HAVE NOTICED. OR THE OPPOSITE, BEING SO FIGETY OR RESTLESS THAT YOU HAVE BEEN MOVING AROUND A LOT MORE THAN USUAL: NOT AT ALL
5. POOR APPETITE OR OVEREATING: NOT AT ALL
1. LITTLE INTEREST OR PLEASURE IN DOING THINGS: SEVERAL DAYS
2. FEELING DOWN, DEPRESSED, IRRITABLE, OR HOPELESS: SEVERAL DAYS
3. TROUBLE FALLING OR STAYING ASLEEP OR SLEEPING TOO MUCH: NOT AT ALL
SUM OF ALL RESPONSES TO PHQ QUESTIONS 1-9: 4
SUM OF ALL RESPONSES TO PHQ9 QUESTIONS 1 AND 2: 2
9. THOUGHTS THAT YOU WOULD BE BETTER OFF DEAD, OR OF HURTING YOURSELF: NOT AT ALL
4. FEELING TIRED OR HAVING LITTLE ENERGY: SEVERAL DAYS
6. FEELING BAD ABOUT YOURSELF - OR THAT YOU ARE A FAILURE OR HAVE LET YOURSELF OR YOUR FAMILY DOWN: NOT AL ALL

## 2019-11-22 NOTE — ED PROVIDER NOTES
"ED Provider Note    ED Provider Note    Primary care provider: ALANNA Childers  Means of arrival: EMS  History obtained from: patient  History limited by: None    CHIEF COMPLAINT  Chief Complaint   Patient presents with   • T-5000 GLF   • Knee Pain       HPI  Sheyla Garcia is a 68 y.o. female who presents to the Emergency Department via EMS.  Patient presents with a complaint of pain to her low back, left knee, left tib-fib and left ankle.  Patient reports a fall prior to arrival.  She was getting ready to go to a an appointment with her primary care doctor.  This takes quite a bit of effort between her and her  who typically helps her maneuver to and from the house to the car.  Typically, she uses a cane and balances, supporting herself against a wall.  She states that her primary care doctor has been \"trying desperately\" to get her a wheelchair but they have not been successful yet.  Typically, her  assists her by walking backwards holding his hands out in front of him, so that she can grasp onto them.  She has 2 steps to go down from her house to her garage to get into the car.  When she gets down the steps, the using the office chair with wheels, to get her over to the car.  They were in the process of doing this when her  forgot something, left her standing at the threshold of the garage for a few minutes.  When he returned, he tried to assist her down the steps and she states that her leg gave out on her.  She fell with her left leg folded underneath her, onto her bottom in the house.  Did not strike her head.  No upper extremity injuries.  No neck pain.  No loss of consciousness.  She does not report any new symptoms to her right lower extremity.  She reports having low back pain, that has been worsened, for the last several months, after she underwent a procedure on her SI joint by Dr. Toribio which she states has worsened the pain gradually.  She does not report any new " "symptoms since the fall.  Since the fall, she complains of new onset left knee pain, tib-fib pain and left ankle pain.  She is requesting, despite no new injuries to her back, x-rays of her back.  She is otherwise been in her normal state of health.  When I discussed with her concern about getting her home she states \"I am always in this level of pain\".  She is requesting Percocet which she was unable to take prior to leaving the house when she normally would.    REVIEW OF SYSTEMS  Review of Systems   Constitutional: Negative for fever.   HENT: Negative for congestion.    Respiratory: Negative for shortness of breath.    Cardiovascular: Negative for chest pain.   Gastrointestinal: Negative for vomiting.   Genitourinary: Negative for flank pain.   Musculoskeletal: Positive for back pain, falls and joint pain.   Neurological: Negative for dizziness and headaches.   All other systems reviewed and are negative.      PAST MEDICAL HISTORY   has a past medical history of Hypertension, Hypothyroidism (2008), Migraine, and Obesity.    SURGICAL HISTORY   has a past surgical history that includes knee orif (1/3/08); nasal septal reconstruction; tonsillectomy; bladder sling female; and other surgical procedure.    SOCIAL HISTORY  Social History     Tobacco Use   • Smoking status: Never Smoker   • Smokeless tobacco: Never Used   Substance Use Topics   • Alcohol use: Yes     Comment: mod   • Drug use: Not on file      Social History     Substance and Sexual Activity   Drug Use Not on file       FAMILY HISTORY  Family History   Problem Relation Age of Onset   • Heart Disease Mother         chf   • Cancer Father         lung, berrylium exposure   • Stroke Father         tia's   • Cancer Maternal Grandfather         lung   • Diabetes Neg Hx        CURRENT MEDICATIONS  Home Medications     Reviewed by Andrew Parker (Pharmacy Tech) on 11/22/19 at 1616  Med List Status: Complete   Medication Last Dose Status   albuterol " "(PROAIR HFA) 108 (90 BASE) MCG/ACT Aero Soln inhalation aerosol 11/21/2019 Active   albuterol (PROVENTIL) 2.5mg/3ml Nebu Soln solution for nebulization 11/21/2019 Active   B Complex-C-Folic Acid (SUPER B-COMPLEX/VIT C/FA PO) 11/21/2019 Active   BETA CAROTENE PO 11/21/2019 Active   CYANOCOBALAMIN PO 11/21/2019 Active   DULERA 200-5 MCG/ACT Aerosol 11/21/2019 Active   DULoxetine (CYMBALTA) 30 MG Cap DR Particles 11/21/2019 Active   estradiol (ESTRACE) 1 MG Tab 11/21/2019 Active   lisinopril (PRINIVIL) 10 MG Tab 11/21/2019 Active   methocarbamol (ROBAXIN) 750 MG Tab 11/21/2019 Active   oxybutynin SR (DITROPAN XL) 10 MG CR tablet 11/21/2019 Active   oxyCODONE-acetaminophen (PERCOCET-10)  MG Tab 11/21/2019 Active   therapeutic multivitamin-minerals (THERAGRAN-M) Tab 11/21/2019 Active                ALLERGIES  Allergies   Allergen Reactions   • Gabapentin      seizure       PHYSICAL EXAM  VITAL SIGNS: /81   Pulse 63   Temp 36.7 °C (98 °F) (Temporal)   Resp 16   Ht 1.753 m (5' 9\")   Wt (!) 126.6 kg (279 lb)   SpO2 92%   BMI 41.20 kg/m²   Vitals reviewed.  Constitutional: Patient is oriented to person, place, and time. Obese female Moderate distress.    Head: Normocephalic and atraumatic.   Ears: Normal external ears bilaterally.   Mouth/Throat: Oropharynx is clear and moist  Eyes: Conjunctivae are normal. Pupils are equal, round, and reactive to light.   Neck: Normal range of motion. Neck supple.  No midline tenderness or step-offs.    Cardiovascular: Normal rate, regular rhythm and normal heart sounds. Normal peripheral pulses, bilateral upper and lower extremities.  Pulmonary/Chest: Effort normal and breath sounds normal. No respiratory distress, no wheezes, rhonchi, or rales.   Abdominal: Soft. Bowel sounds are normal. There is no tenderness. No rebound or guarding, or peritoneal signs. No CVA tenderness.  Musculoskeletal: No edema.  There is diffuse tenderness to bilateral knees.  No deformities, " ecchymosis or abrasions.  There is diffuse tenderness without notable deformity to the left ankle.  There is limited range of motion of the left ankle and left knee.  No overlying skin changes to the back, there is pain to the left lumbar paraspinal muscles.  No midline lumbar tenderness.  Neurological: No focal deficits.   Skin: Skin is warm and dry. No erythema. No pallor.   Psychiatric: Patient has a normal mood and affect.     LABS  pending  All labs reviewed by me.    RADIOLOGY  DX-LUMBAR SPINE-2 OR 3 VIEWS   Final Result      1.  Moderate degenerative change of lumbar spine.   2.  No fracture or subluxation.   3.  Mild dextroconvex curvature, likely positional.   4.  Lateral view is limited by technique.      DX-TIBIA AND FIBULA LEFT   Final Result      Distal fibular and tibial fractures.      DX-ANKLE 3+ VIEWS LEFT   Final Result      Trimalleolar fracture of the left ankle.      DX-KNEE 2- LEFT   Final Result      No acute fracture. Mild degenerative changes.        The radiologist's interpretation of all radiological studies have been reviewed by me.    COURSE & MEDICAL DECISION MAKING  Pertinent Labs & Imaging studies reviewed. (See chart for details)    Obtained and reviewed past medical records.  Patient's last outpatient encounter was in September of this year she was seen by her PCP for an annual exam.  She notes at that time, she underwent SI joint surgery with Dr. Toribio, and orthopedist was unhelpful and pain was reported to be worse.  Takes Percocet daily.  Typically, 30/month.  Other problems include asthma, obesity, obstructive sleep apnea, hypertension, scoliosis hypothyroidism.  Last admission to the hospital was in December 2014 she was seen for pneumonia and hypoxic respiratory failure.    1:39 PM - Patient seen and examined at bedside.  This is a 68-year-old female who suffers with chronic low back pain, left-sided sciatica and chronic pain in general, requiring use of chronic opioids.  She  reports worsening overall pain and then suffered a fall prior to arrival with subsequent pain to the left knee lower leg and ankle.  Is requesting imaging of her low back.  Of also ordered x-rays of the left knee, tib-fib and ankle.  She will be treated with Percocet which she normally takes.  Await x-rays for disposition.    3:25 PM since reevaluated the bedside.  We discussed x-ray findings consistent with degenerative degenerative changes of her lumbar spine as well as her knee.  She also, unfortunately, has a bimalleolar fracture of her posterior tib and distal fibula on the left.  At baseline, patient has a very difficult time with mobilization at home, using her  for nearly all of her activities.  She was a fall risk before and now, I do not think there is any way she can safely be discharged home.   is at the bedside and he agrees.  Will contact the hospitalist who covers for her PCP for admission as well as Dr. Holliday to weigh in on whether this could benefit from surgical intervention.    3:37 PM discussed with the hospitalist regarding patient's baseline status as well as findings today after her fall.  Await return call from the orthopedist.  He agrees to hospitalize the patient under their service.    4:35 PM discussed with Dr. Holliday, orthopedist was able to view the patient's x-rays.  He is aware, patient will be admitted to the hospitalist service.  Patient is in a posterior splint.    Patient is admitted in stable but guarded condition.      FINAL IMPRESSION  1. Closed bimalleolar fracture of left ankle, initial encounter    2. Fall, initial encounter    3. Acute pain of left knee    4. Primary osteoarthritis of left knee    5. DDD (degenerative disc disease), lumbar    6. Unable to ambulate

## 2019-11-22 NOTE — ED TRIAGE NOTES
"Patient presents from home for a GLF while going down 2 steps, her left knee gave out and she fell. Reports her leg folded down under her as she fell onto her bottom. Patient requesting percocet during triage \"since I couldn't take one at home before I left. im always in pain.\"    No head injury and no LOC. Pt A&Ox4.   "

## 2019-11-23 PROBLEM — R53.81 DEBILITY: Status: ACTIVE | Noted: 2019-11-23

## 2019-11-23 PROBLEM — S82.892A CLOSED LEFT ANKLE FRACTURE: Status: ACTIVE | Noted: 2019-11-23

## 2019-11-23 PROBLEM — E87.6 HYPOKALEMIA: Status: ACTIVE | Noted: 2019-11-23

## 2019-11-23 LAB
ANION GAP SERPL CALC-SCNC: 10 MMOL/L (ref 0–11.9)
BASOPHILS # BLD AUTO: 0.2 % (ref 0–1.8)
BASOPHILS # BLD: 0.02 K/UL (ref 0–0.12)
BUN SERPL-MCNC: 14 MG/DL (ref 8–22)
CALCIUM SERPL-MCNC: 8.7 MG/DL (ref 8.5–10.5)
CHLORIDE SERPL-SCNC: 110 MMOL/L (ref 96–112)
CO2 SERPL-SCNC: 20 MMOL/L (ref 20–33)
CREAT SERPL-MCNC: 0.54 MG/DL (ref 0.5–1.4)
EOSINOPHIL # BLD AUTO: 0.21 K/UL (ref 0–0.51)
EOSINOPHIL NFR BLD: 2.1 % (ref 0–6.9)
ERYTHROCYTE [DISTWIDTH] IN BLOOD BY AUTOMATED COUNT: 41.9 FL (ref 35.9–50)
GLUCOSE SERPL-MCNC: 130 MG/DL (ref 65–99)
HCT VFR BLD AUTO: 43.1 % (ref 37–47)
HGB BLD-MCNC: 14.4 G/DL (ref 12–16)
IMM GRANULOCYTES # BLD AUTO: 0.03 K/UL (ref 0–0.11)
IMM GRANULOCYTES NFR BLD AUTO: 0.3 % (ref 0–0.9)
LYMPHOCYTES # BLD AUTO: 1.61 K/UL (ref 1–4.8)
LYMPHOCYTES NFR BLD: 16.2 % (ref 22–41)
MCH RBC QN AUTO: 31 PG (ref 27–33)
MCHC RBC AUTO-ENTMCNC: 33.4 G/DL (ref 33.6–35)
MCV RBC AUTO: 92.7 FL (ref 81.4–97.8)
MONOCYTES # BLD AUTO: 0.94 K/UL (ref 0–0.85)
MONOCYTES NFR BLD AUTO: 9.5 % (ref 0–13.4)
NEUTROPHILS # BLD AUTO: 7.13 K/UL (ref 2–7.15)
NEUTROPHILS NFR BLD: 71.7 % (ref 44–72)
NRBC # BLD AUTO: 0 K/UL
NRBC BLD-RTO: 0 /100 WBC
PLATELET # BLD AUTO: 232 K/UL (ref 164–446)
PMV BLD AUTO: 10.6 FL (ref 9–12.9)
POTASSIUM SERPL-SCNC: 3.3 MMOL/L (ref 3.6–5.5)
RBC # BLD AUTO: 4.65 M/UL (ref 4.2–5.4)
SODIUM SERPL-SCNC: 140 MMOL/L (ref 135–145)
WBC # BLD AUTO: 9.9 K/UL (ref 4.8–10.8)

## 2019-11-23 PROCEDURE — 80048 BASIC METABOLIC PNL TOTAL CA: CPT

## 2019-11-23 PROCEDURE — 99232 SBSQ HOSP IP/OBS MODERATE 35: CPT | Performed by: INTERNAL MEDICINE

## 2019-11-23 PROCEDURE — 700102 HCHG RX REV CODE 250 W/ 637 OVERRIDE(OP): Performed by: INTERNAL MEDICINE

## 2019-11-23 PROCEDURE — 700111 HCHG RX REV CODE 636 W/ 250 OVERRIDE (IP): Performed by: INTERNAL MEDICINE

## 2019-11-23 PROCEDURE — 36415 COLL VENOUS BLD VENIPUNCTURE: CPT

## 2019-11-23 PROCEDURE — 770001 HCHG ROOM/CARE - MED/SURG/GYN PRIV*

## 2019-11-23 PROCEDURE — A9270 NON-COVERED ITEM OR SERVICE: HCPCS | Performed by: INTERNAL MEDICINE

## 2019-11-23 PROCEDURE — 85025 COMPLETE CBC W/AUTO DIFF WBC: CPT

## 2019-11-23 RX ORDER — POTASSIUM CHLORIDE 20 MEQ/1
40 TABLET, EXTENDED RELEASE ORAL ONCE
Status: COMPLETED | OUTPATIENT
Start: 2019-11-23 | End: 2019-11-23

## 2019-11-23 RX ADMIN — ENOXAPARIN SODIUM 40 MG: 100 INJECTION SUBCUTANEOUS at 05:49

## 2019-11-23 RX ADMIN — BUDESONIDE AND FORMOTEROL FUMARATE DIHYDRATE 2 PUFF: 160; 4.5 AEROSOL RESPIRATORY (INHALATION) at 05:48

## 2019-11-23 RX ADMIN — POTASSIUM CHLORIDE 40 MEQ: 1500 TABLET, EXTENDED RELEASE ORAL at 09:38

## 2019-11-23 RX ADMIN — OXYCODONE HYDROCHLORIDE 10 MG: 10 TABLET ORAL at 13:58

## 2019-11-23 RX ADMIN — OXYCODONE HYDROCHLORIDE 10 MG: 10 TABLET ORAL at 07:53

## 2019-11-23 RX ADMIN — BUDESONIDE AND FORMOTEROL FUMARATE DIHYDRATE 2 PUFF: 160; 4.5 AEROSOL RESPIRATORY (INHALATION) at 17:37

## 2019-11-23 RX ADMIN — ACETAMINOPHEN 650 MG: 325 TABLET, FILM COATED ORAL at 13:58

## 2019-11-23 RX ADMIN — SENNOSIDES AND DOCUSATE SODIUM 2 TABLET: 8.6; 5 TABLET ORAL at 17:37

## 2019-11-23 RX ADMIN — ESTRADIOL 1 MG: 1 TABLET ORAL at 17:37

## 2019-11-23 RX ADMIN — METHOCARBAMOL TABLETS 750 MG: 750 TABLET, COATED ORAL at 05:49

## 2019-11-23 RX ADMIN — OXYCODONE HYDROCHLORIDE 10 MG: 10 TABLET ORAL at 02:37

## 2019-11-23 RX ADMIN — METHOCARBAMOL TABLETS 750 MG: 750 TABLET, COATED ORAL at 21:27

## 2019-11-23 RX ADMIN — LISINOPRIL 10 MG: 10 TABLET ORAL at 05:48

## 2019-11-23 ASSESSMENT — ENCOUNTER SYMPTOMS
PHOTOPHOBIA: 0
PALPITATIONS: 0
SPUTUM PRODUCTION: 0
CHILLS: 0
HEADACHES: 0
NERVOUS/ANXIOUS: 1
POLYDIPSIA: 0
WEIGHT LOSS: 0
NAUSEA: 0
HEMOPTYSIS: 0
FOCAL WEAKNESS: 0
TREMORS: 0
VOMITING: 0
FALLS: 0
BLURRED VISION: 0
RESPIRATORY NEGATIVE: 1
HEARTBURN: 0
NERVOUS/ANXIOUS: 0
FLANK PAIN: 0
SPEECH CHANGE: 0
BACK PAIN: 0
HALLUCINATIONS: 0
ORTHOPNEA: 0
BRUISES/BLEEDS EASILY: 0
FEVER: 0
NECK PAIN: 0
DOUBLE VISION: 0
COUGH: 0
CARDIOVASCULAR NEGATIVE: 1

## 2019-11-23 ASSESSMENT — LIFESTYLE VARIABLES: SUBSTANCE_ABUSE: 0

## 2019-11-23 NOTE — PROGRESS NOTES
"Internal Medicine Daily Progress Note    Date of Service  11/23/2019    Chief Complaint  68 y.o. female admitted 11/22/2019 with L closed bimalleolar fracture    Hospital Course   Pt with baseline debility secondary to chronic back pain, scoliosis multiple joint osteoarthritis, history of right ankle fracture, opiate dependence, hypertension, hypothyroidism, migraine, obesity who presented 11/22/2019 with complaints of left leg pain started after mechanical fall earlier today.  Apparently in the process of transferring, patient's legs \" gave up\" and she fell.  Patient was supposed to see primary care doctor yesterday.  Work-up in ER showed closed bimalleolar fracture of left ankle  ERP consulted with Dr. Holliday.  Ortho deems pt non-operative at this time.      Interval Problem Update  Significant pain but improved temporarily with pain meds morphine, oxycodone.    Consultants/Specialty  Ortho    Code Status  Full    Disposition  Home when cleared by Ortho    Review of Systems  Review of Systems   Constitutional: Positive for malaise/fatigue.   HENT: Negative.    Respiratory: Negative.    Cardiovascular: Negative.    Musculoskeletal: Positive for joint pain. Negative for falls.   Psychiatric/Behavioral: The patient is nervous/anxious.    All other systems reviewed and are negative.       Physical Exam  Temp:  [36.3 °C (97.4 °F)-36.9 °C (98.5 °F)] 36.7 °C (98.1 °F)  Pulse:  [61-96] 91  Resp:  [16-18] 16  BP: (112-158)/(74-96) 158/87  SpO2:  [90 %-93 %] 92 %    Physical Exam  Constitutional:       General: She is not in acute distress.     Appearance: She is obese. She is not toxic-appearing.   Cardiovascular:      Rate and Rhythm: Normal rate and regular rhythm.   Pulmonary:      Effort: Pulmonary effort is normal.      Breath sounds: Normal breath sounds.   Abdominal:      General: Abdomen is flat. Bowel sounds are normal.      Palpations: Abdomen is soft.   Musculoskeletal:         General: Signs of injury present. "        Feet:       Comments: L ankle wrapped   Skin:     General: Skin is warm and dry.   Neurological:      General: No focal deficit present.      Mental Status: She is alert and oriented to person, place, and time. Mental status is at baseline.      Comments: Disoriented overnight per RN         Fluids  No intake or output data in the 24 hours ending 11/23/19 1017    Laboratory  Recent Labs     11/22/19  1637 11/23/19  0234   WBC 10.7 9.9   RBC 5.24 4.65   HEMOGLOBIN 15.8 14.4   HEMATOCRIT 49.7* 43.1   MCV 94.8 92.7   MCH 30.2 31.0   MCHC 31.8* 33.4*   RDW 43.6 41.9   PLATELETCT 250 232   MPV 10.6 10.6     Recent Labs     11/22/19  1637 11/23/19  0234   SODIUM 139 140   POTASSIUM 3.6 3.3*   CHLORIDE 108 110   CO2 21 20   GLUCOSE 94 130*   BUN 16 14   CREATININE 0.64 0.54   CALCIUM 8.8 8.7                        Assessment/Plan  Closed left ankle fracture  Assessment & Plan  Non-operable fracture, will re-eval for OR if pain worsening etc  Ortho following  PT/OT per Ortho    Hypokalemia  Assessment & Plan  Replete today    Debility  Assessment & Plan  PT/OT as per Ortho    Moderate persistent asthma without complication- (present on admission)  Assessment & Plan  Outpatient f/u with [facility] recommended    Hypertension- (present on admission)  Assessment & Plan  Controlled on lisinopril       VTE prophylaxis: Daily Lovenox

## 2019-11-23 NOTE — PROGRESS NOTES
2 RN skin check complete with RN Angela.   Devices in place splint LLE.  Skin assessed under devices: unable to fully assess under splint. No breakdown noted under edges of splint.  Confirmed pressure ulcers found on N/A.  The following interventions in place: Frequent turning encouraged, pillows in use for support/positioning, purewick in use for incontinence.   Blanchable redness to sacrum, slight redness under abdominal fold lower right side abdomen. No other skin breakdown noted.

## 2019-11-23 NOTE — ED NOTES
Updated with the poc on getting admitted by hospitalist.  piv established, unable to collect enough blood sample.  called for blood draw.

## 2019-11-23 NOTE — ASSESSMENT & PLAN NOTE
Orthopedics recommended non surgical management  Pain control:  tylenol, NSAIDS, lidocaine patch for now  Avoid narcotics  PT OT evaluation  SNF

## 2019-11-23 NOTE — ASSESSMENT & PLAN NOTE
Non-operable fracture, will re-eval for OR if pain worsening etc  Ortho following  PT/OT per Ortho  Likely needs Rehab placement prior to d/c home

## 2019-11-23 NOTE — H&P
"Hospital Medicine History & Physical Note    Date of Service  11/22/2019    Primary Care Physician  ALANNA Childers    Consultants  Orthopedic surgery    Code Status  Full code    Chief Complaint  Pain in the left leg and left ankle    History of Presenting Illness  68 y.o. female with baseline debility secondary to chronic back pain, scoliosis multiple joint osteoarthritis, history of right ankle fracture, opiate dependence, hypertension, hypothyroidism, migraine, obesity who presented 11/22/2019 with complaints of left leg pain started after mechanical fall earlier today.  Apparently in the process of transferring, patient's legs \" gave up\" and she fell.  Patient was supposed to see primary care doctor today  Work-up in ER showed closed bimalleolar fracture of left ankle  ERP consulted with Dr. Holliday.  Patient will be n.p.o. at midnight for possible surgery    Review of Systems  Review of Systems   Constitutional: Negative for chills, fever and weight loss.   HENT: Negative for ear pain, hearing loss and tinnitus.    Eyes: Negative for blurred vision, double vision and photophobia.   Respiratory: Negative for cough, hemoptysis and sputum production.    Cardiovascular: Negative for chest pain, palpitations and orthopnea.   Gastrointestinal: Negative for heartburn, nausea and vomiting.   Genitourinary: Negative for dysuria, flank pain, frequency and hematuria.   Musculoskeletal: Positive for joint pain. Negative for back pain and neck pain.   Skin: Negative for itching and rash.   Neurological: Negative for tremors, speech change, focal weakness and headaches.   Endo/Heme/Allergies: Negative for environmental allergies and polydipsia. Does not bruise/bleed easily.   Psychiatric/Behavioral: Negative for hallucinations and substance abuse. The patient is not nervous/anxious.        Past Medical History   has a past medical history of Hypertension, Hypothyroidism (2008), Migraine, and Obesity.    Surgical " History   has a past surgical history that includes knee orif (1/3/08); nasal septal reconstruction; tonsillectomy; bladder sling female; and other surgical procedure.     Family History  Cancer in her father and maternal grandfather; Heart Disease in her mother; Stroke in her father.     Social History  She denies smoking she has never used smokeless tobacco. She reports current alcohol use.    Allergies  Allergies   Allergen Reactions   • Gabapentin      seizure       Medications  Prior to Admission Medications   Prescriptions Last Dose Informant Patient Reported? Taking?   B Complex-C-Folic Acid (SUPER B-COMPLEX/VIT C/FA PO) 11/21/2019 at 1000 Patient Yes No   Sig: Take 1 Tab by mouth every day.   BETA CAROTENE PO 11/21/2019 at 1000 Patient Yes No   Sig: Take 1 Dose by mouth every day. Unknown OTC Strength.   CYANOCOBALAMIN PO 11/21/2019 at 1000 Patient Yes No   Sig: Take 1 Dose by mouth every day. Unknown OTC Strength.   DULERA 200-5 MCG/ACT Aerosol 11/21/2019 at 2100 Patient No No   Sig: USE 2 INHALATIONS TWICE A DAY   DULoxetine (CYMBALTA) 30 MG Cap DR Particles 11/21/2019 at 1000 Patient Yes Yes   Sig: Take 30 mg by mouth every day.   albuterol (PROAIR HFA) 108 (90 BASE) MCG/ACT Aero Soln inhalation aerosol 11/21/2019 at 2100 Patient No No   Sig: Inhale 2 Puffs by mouth every 6 hours as needed for Shortness of Breath.   albuterol (PROVENTIL) 2.5mg/3ml Nebu Soln solution for nebulization 11/21/2019 at 2100 Patient No No   Sig: 3 mL by Nebulization route every four hours as needed for Shortness of Breath.   estradiol (ESTRACE) 1 MG Tab 11/21/2019 at 1000 Patient Yes Yes   Sig: Take 1 mg by mouth every day.   lisinopril (PRINIVIL) 10 MG Tab 11/21/2019 at 1000 Patient Yes Yes   Sig: Take 10 mg by mouth every day.   methocarbamol (ROBAXIN) 750 MG Tab 11/21/2019 at 2100 Patient Yes Yes   Sig: Take 750 mg by mouth 3 times a day as needed (Muscle Spasms.).   oxyCODONE-acetaminophen (PERCOCET-10)  MG Tab  11/21/2019 at 2100 Patient Yes Yes   Sig: Take 1 Tab by mouth every bedtime.   oxybutynin SR (DITROPAN XL) 10 MG CR tablet 11/21/2019 at 1000 Patient Yes Yes   Sig: Take 10 mg by mouth every day.   therapeutic multivitamin-minerals (THERAGRAN-M) Tab 11/21/2019 at 1000 Patient Yes Yes   Sig: Take 1 Tab by mouth every day.      Facility-Administered Medications: None       Physical Exam  Temp:  [36.4 °C (97.5 °F)-36.7 °C (98 °F)] 36.4 °C (97.5 °F)  Pulse:  [61-82] 82  Resp:  [16-18] 16  BP: (112-136)/(77-96) 112/96  SpO2:  [92 %-93 %] 93 %    Physical Exam  Vitals signs and nursing note reviewed.   Constitutional:       General: She is not in acute distress.     Appearance: Normal appearance.   HENT:      Head: Normocephalic and atraumatic.      Nose: Nose normal.      Mouth/Throat:      Mouth: Mucous membranes are moist.   Eyes:      Extraocular Movements: Extraocular movements intact.      Pupils: Pupils are equal, round, and reactive to light.   Neck:      Musculoskeletal: Normal range of motion and neck supple.   Cardiovascular:      Rate and Rhythm: Normal rate and regular rhythm.   Pulmonary:      Effort: Pulmonary effort is normal.      Breath sounds: Normal breath sounds.   Abdominal:      General: Abdomen is flat. There is no distension.      Tenderness: There is no tenderness.   Musculoskeletal: Normal range of motion.         General: No swelling or deformity.      Comments: Swelling, deformity, tenderness to palpation over the left ankle   Skin:     General: Skin is warm and dry.   Neurological:      General: No focal deficit present.      Mental Status: She is alert and oriented to person, place, and time.   Psychiatric:         Mood and Affect: Mood normal.         Behavior: Behavior normal.         Laboratory:  Recent Labs     11/22/19  1637   WBC 10.7   RBC 5.24   HEMOGLOBIN 15.8   HEMATOCRIT 49.7*   MCV 94.8   MCH 30.2   MCHC 31.8*   RDW 43.6   PLATELETCT 250   MPV 10.6     Recent Labs      11/22/19  1637   SODIUM 139   POTASSIUM 3.6   CHLORIDE 108   CO2 21   GLUCOSE 94   BUN 16   CREATININE 0.64   CALCIUM 8.8     Recent Labs     11/22/19  1637   ALTSGPT 6   ASTSGOT 13   ALKPHOSPHAT 59   TBILIRUBIN 0.6   GLUCOSE 94         No results for input(s): NTPROBNP in the last 72 hours.      No results for input(s): TROPONINT in the last 72 hours.    Urinalysis:    No results found     Imaging:  DX-LUMBAR SPINE-2 OR 3 VIEWS   Final Result      1.  Moderate degenerative change of lumbar spine.   2.  No fracture or subluxation.   3.  Mild dextroconvex curvature, likely positional.   4.  Lateral view is limited by technique.      DX-TIBIA AND FIBULA LEFT   Final Result      Distal fibular and tibial fractures.      DX-ANKLE 3+ VIEWS LEFT   Final Result      Trimalleolar fracture of the left ankle.      DX-KNEE 2- LEFT   Final Result      No acute fracture. Mild degenerative changes.            Assessment/Plan:  I anticipate this patient will require at least two midnights for appropriate medical management, necessitating inpatient admission.    Debility  Assessment & Plan  Secondary to degenerative joint and disc disease and chronic pain, obesity.  PT OT evaluation    Closed left ankle fracture  Assessment & Plan  Orthopedics evaluation pending  Pain control  PT OT evaluation  DVT prevention with heparin    Moderate persistent asthma without complication- (present on admission)  Assessment & Plan  Patient is currently not in exacerbation  Continue home inhalers  RT protocol      Morbid obesity with BMI of 40.0-44.9, adult (HCC)- (present on admission)  Assessment & Plan  Follow with PCP for weight management    Chronic use of opiate drug for therapeutic purpose- (present on admission)  Assessment & Plan  Continue muscle relaxants and opiates as needed for pain    Hypertension- (present on admission)  Assessment & Plan  Blood pressure is under control.  Continue lisinopril      VTE prophylaxis: Heparin

## 2019-11-23 NOTE — PROGRESS NOTES
Report received. Assumed care of pt. A/O x4. VSS. Responds appropriately. Denies pain, SOB. NPO at this time. Explained importance of calling before getting OOB. Call light and belongings within reach. Bed in the lowest position

## 2019-11-23 NOTE — CARE PLAN
Problem: Communication  Goal: The ability to communicate needs accurately and effectively will improve  Outcome: PROGRESSING AS EXPECTED   Patient uses call light appropriately to alert staff to needs. Educated to use call light before attempting to get out of bed.     Problem: Pain Management  Goal: Pain level will decrease to patient's comfort goal  Outcome: PROGRESSING AS EXPECTED   Patient has PRN oxycodone and robaxin given for pain. Patient complaining of muscle cramps in addition to aching and stabbing pain in leg, but reports pain medications working well to alleviate this.

## 2019-11-23 NOTE — THERAPY
PT orders received and acknowledged. Pt awaiting ortho consult and possible surgical intervention for tri-malleolar fracture. Will complete PT eval as able.

## 2019-11-23 NOTE — ED NOTES
Med Rec Updated and Complete per Pt's family at bedside  Allergies Reviewed  No PO ABX last 14 days.

## 2019-11-23 NOTE — PROGRESS NOTES
Case discussed with Dr Martinez/ Dr Holliday  Left shayne fracture non operative management  Ok to resume diet   Follow up with Dr Holliday in one week at [facility]

## 2019-11-24 PROBLEM — R41.0 DELIRIUM: Status: ACTIVE | Noted: 2019-11-24

## 2019-11-24 LAB
ANION GAP SERPL CALC-SCNC: 10 MMOL/L (ref 0–11.9)
BUN SERPL-MCNC: 8 MG/DL (ref 8–22)
CALCIUM SERPL-MCNC: 8.8 MG/DL (ref 8.5–10.5)
CHLORIDE SERPL-SCNC: 108 MMOL/L (ref 96–112)
CO2 SERPL-SCNC: 21 MMOL/L (ref 20–33)
CREAT SERPL-MCNC: 0.51 MG/DL (ref 0.5–1.4)
GLUCOSE SERPL-MCNC: 107 MG/DL (ref 65–99)
POTASSIUM SERPL-SCNC: 3.5 MMOL/L (ref 3.6–5.5)
SODIUM SERPL-SCNC: 139 MMOL/L (ref 135–145)

## 2019-11-24 PROCEDURE — 36415 COLL VENOUS BLD VENIPUNCTURE: CPT

## 2019-11-24 PROCEDURE — A9270 NON-COVERED ITEM OR SERVICE: HCPCS | Performed by: INTERNAL MEDICINE

## 2019-11-24 PROCEDURE — 770001 HCHG ROOM/CARE - MED/SURG/GYN PRIV*

## 2019-11-24 PROCEDURE — 97162 PT EVAL MOD COMPLEX 30 MIN: CPT

## 2019-11-24 PROCEDURE — 80048 BASIC METABOLIC PNL TOTAL CA: CPT

## 2019-11-24 PROCEDURE — 99232 SBSQ HOSP IP/OBS MODERATE 35: CPT | Performed by: INTERNAL MEDICINE

## 2019-11-24 PROCEDURE — 700102 HCHG RX REV CODE 250 W/ 637 OVERRIDE(OP): Performed by: INTERNAL MEDICINE

## 2019-11-24 PROCEDURE — 700111 HCHG RX REV CODE 636 W/ 250 OVERRIDE (IP): Performed by: INTERNAL MEDICINE

## 2019-11-24 RX ORDER — AMLODIPINE BESYLATE 5 MG/1
5 TABLET ORAL
Status: DISCONTINUED | OUTPATIENT
Start: 2019-11-24 | End: 2019-12-07 | Stop reason: HOSPADM

## 2019-11-24 RX ORDER — METHOCARBAMOL 500 MG/1
500 TABLET, FILM COATED ORAL 3 TIMES DAILY PRN
Status: DISCONTINUED | OUTPATIENT
Start: 2019-11-24 | End: 2019-12-03

## 2019-11-24 RX ORDER — POTASSIUM CHLORIDE 20 MEQ/1
40 TABLET, EXTENDED RELEASE ORAL ONCE
Status: COMPLETED | OUTPATIENT
Start: 2019-11-24 | End: 2019-11-24

## 2019-11-24 RX ADMIN — OXYCODONE HYDROCHLORIDE 5 MG: 5 TABLET ORAL at 12:38

## 2019-11-24 RX ADMIN — AMLODIPINE BESYLATE 5 MG: 5 TABLET ORAL at 09:00

## 2019-11-24 RX ADMIN — POTASSIUM CHLORIDE 40 MEQ: 1500 TABLET, EXTENDED RELEASE ORAL at 09:00

## 2019-11-24 RX ADMIN — ESTRADIOL 1 MG: 1 TABLET ORAL at 17:58

## 2019-11-24 RX ADMIN — METHOCARBAMOL TABLETS 500 MG: 500 TABLET, COATED ORAL at 12:44

## 2019-11-24 RX ADMIN — BUDESONIDE AND FORMOTEROL FUMARATE DIHYDRATE 2 PUFF: 160; 4.5 AEROSOL RESPIRATORY (INHALATION) at 18:07

## 2019-11-24 RX ADMIN — METHOCARBAMOL TABLETS 750 MG: 750 TABLET, COATED ORAL at 05:53

## 2019-11-24 RX ADMIN — OXYCODONE HYDROCHLORIDE 10 MG: 10 TABLET ORAL at 01:58

## 2019-11-24 RX ADMIN — LISINOPRIL 10 MG: 10 TABLET ORAL at 05:53

## 2019-11-24 RX ADMIN — BUDESONIDE AND FORMOTEROL FUMARATE DIHYDRATE 2 PUFF: 160; 4.5 AEROSOL RESPIRATORY (INHALATION) at 05:56

## 2019-11-24 RX ADMIN — METHOCARBAMOL TABLETS 500 MG: 500 TABLET, COATED ORAL at 22:00

## 2019-11-24 RX ADMIN — SENNOSIDES AND DOCUSATE SODIUM 2 TABLET: 8.6; 5 TABLET ORAL at 05:53

## 2019-11-24 RX ADMIN — OXYCODONE HYDROCHLORIDE 5 MG: 5 TABLET ORAL at 17:57

## 2019-11-24 RX ADMIN — ENOXAPARIN SODIUM 40 MG: 100 INJECTION SUBCUTANEOUS at 05:53

## 2019-11-24 ASSESSMENT — COGNITIVE AND FUNCTIONAL STATUS - GENERAL
TURNING FROM BACK TO SIDE WHILE IN FLAT BAD: UNABLE
CLIMB 3 TO 5 STEPS WITH RAILING: TOTAL
SUGGESTED CMS G CODE MODIFIER MOBILITY: CM
MOBILITY SCORE: 7
MOVING FROM LYING ON BACK TO SITTING ON SIDE OF FLAT BED: UNABLE
WALKING IN HOSPITAL ROOM: TOTAL
STANDING UP FROM CHAIR USING ARMS: A LOT
MOVING TO AND FROM BED TO CHAIR: UNABLE

## 2019-11-24 ASSESSMENT — GAIT ASSESSMENTS: GAIT LEVEL OF ASSIST: UNABLE TO PARTICIPATE

## 2019-11-24 ASSESSMENT — ENCOUNTER SYMPTOMS
CARDIOVASCULAR NEGATIVE: 1
NEUROLOGICAL NEGATIVE: 1
CONSTITUTIONAL NEGATIVE: 1
RESPIRATORY NEGATIVE: 1
DIARRHEA: 1

## 2019-11-24 NOTE — PROGRESS NOTES
2 RN skin check under splint complete.   No skin breakdown noted around splint on LLE.   Pt demonstrated movement of toes. Cap refill less than 3 seconds.

## 2019-11-24 NOTE — DISCHARGE PLANNING
[facility] Transitional Care Coordination     Referral from:  Dr. Leigh    Facesheet indicates: Medicare    Potential Rehab Diagnosis: Debility    Chart review indicates patient does not have on going medical management meeting CMS criteria for IRF level care. Patient does have therapy needs.     D/C support: spouse     Physiatry consultation denied per protocol.      Trimalleolar fracture of the left ankle. Non-operative management. Limited medical management. Anticipate patients post acute care needs can be met at skilled nursing level care.     Thank you for the referral.

## 2019-11-24 NOTE — CARE PLAN
Problem: Communication  Goal: The ability to communicate needs accurately and effectively will improve  Outcome: PROGRESSING AS EXPECTED  Note:   Patient is increasingly anxious and confused when  is not here. Called  around 1200 when patient became escalated. Talked to MD, no need for anxiety or sedative medications at this time. Patient has since been able to relax with  in room.      Problem: Pain Management  Goal: Pain level will decrease to patient's comfort goal  Outcome: PROGRESSING AS EXPECTED  Note:   Patient is still in pain today with movement. Seems comfortable when not moving. MD would like us to try to decrease pain medications due to increasing confusion.      Problem: Skin Integrity  Goal: Risk for impaired skin integrity will decrease  Outcome: PROGRESSING AS EXPECTED  Note:   Turn q2h, change when incontinent.      Problem: Mobility  Goal: Risk for activity intolerance will decrease  Outcome: PROGRESSING SLOWER THAN EXPECTED  Note:   Poor mobility. NWB to LLE, unable to control all weight on RLE.

## 2019-11-24 NOTE — PROGRESS NOTES
"Internal Medicine Daily Progress Note    Date of Service  11/24/2019    Chief Complaint  68 y.o. female admitted 11/22/2019 with L closed bimalleolar fracture    Hospital Course   Pt with baseline debility secondary to chronic back pain, scoliosis multiple joint osteoarthritis, history of right ankle fracture, opiate dependence, hypertension, hypothyroidism, migraine, obesity who presented 11/22/2019 with complaints of left leg pain started after mechanical fall earlier today.  Apparently in the process of transferring, patient's legs \" gave up\" and she fell.  Patient was supposed to see primary care doctor yesterday.  Work-up in ER showed closed bimalleolar fracture of left ankle  ERP consulted with Dr. Holliday.  Ortho deems pt non-operative at this time.      Interval Problem Update  Ankle pain improving  Very confused  Unable to participate in PT so reducing pain med dose to see if improves mentation    Consultants/Specialty  Ortho    Code Status  Full    Disposition  Rehab when approved    Review of Systems  Review of Systems   Constitutional: Negative.    HENT: Negative.    Respiratory: Negative.    Cardiovascular: Negative.    Gastrointestinal: Positive for diarrhea.        She states she has intermittent diarrhea  She was advised to f/u with GI as outpt but has had colonoscopy   Musculoskeletal: Positive for joint pain.        C/w L ankle fx   Neurological: Negative.    Psychiatric/Behavioral:        Confused        Physical Exam  Temp:  [36.1 °C (97 °F)-37.1 °C (98.7 °F)] 36.1 °C (97 °F)  Pulse:  [74-91] 74  Resp:  [16-18] 18  BP: (137-171)/(66-93) 146/91  SpO2:  [90 %-98 %] 91 %    Physical Exam  Constitutional:       Appearance: She is obese. She is not ill-appearing, toxic-appearing or diaphoretic.   Cardiovascular:      Rate and Rhythm: Normal rate and regular rhythm.      Pulses: Normal pulses.      Heart sounds: Normal heart sounds.   Pulmonary:      Effort: Pulmonary effort is normal.      Breath " sounds: Normal breath sounds.   Abdominal:      General: Abdomen is flat. Bowel sounds are normal.      Palpations: Abdomen is soft.   Musculoskeletal:         General: Tenderness and signs of injury present.   Skin:     General: Skin is warm and dry.   Neurological:      Mental Status: She is alert. She is disoriented.      Comments: Speaking about her outpt primary provider and how she has seen her today walking by in the hospital.         Fluids    Intake/Output Summary (Last 24 hours) at 11/24/2019 1303  Last data filed at 11/24/2019 0636  Gross per 24 hour   Intake 240 ml   Output 550 ml   Net -310 ml       Laboratory  Recent Labs     11/22/19  1637 11/23/19  0234   WBC 10.7 9.9   RBC 5.24 4.65   HEMOGLOBIN 15.8 14.4   HEMATOCRIT 49.7* 43.1   MCV 94.8 92.7   MCH 30.2 31.0   MCHC 31.8* 33.4*   RDW 43.6 41.9   PLATELETCT 250 232   MPV 10.6 10.6     Recent Labs     11/22/19  1637 11/23/19  0234 11/24/19  0438   SODIUM 139 140 139   POTASSIUM 3.6 3.3* 3.5*   CHLORIDE 108 110 108   CO2 21 20 21   GLUCOSE 94 130* 107*   BUN 16 14 8   CREATININE 0.64 0.54 0.51   CALCIUM 8.8 8.7 8.8                   Imaging  DX-LUMBAR SPINE-2 OR 3 VIEWS   Final Result      1.  Moderate degenerative change of lumbar spine.   2.  No fracture or subluxation.   3.  Mild dextroconvex curvature, likely positional.   4.  Lateral view is limited by technique.      DX-TIBIA AND FIBULA LEFT   Final Result      Distal fibular and tibial fractures.      DX-ANKLE 3+ VIEWS LEFT   Final Result      Trimalleolar fracture of the left ankle.      DX-KNEE 2- LEFT   Final Result      No acute fracture. Mild degenerative changes.           Assessment/Plan  Closed left ankle fracture  Assessment & Plan  Non-operable fracture, will re-eval for OR if pain worsening etc  Ortho following  PT/OT per Ortho  Likely needs Rehab placement prior to d/c home    Delirium- (present on admission)  Assessment & Plan  Pt still confused  Reduce pain meds  Manage HTN  No  evidence of new infection    Hypokalemia  Assessment & Plan  Replete today    Debility  Assessment & Plan  PT/OT as per Ortho    Morbid obesity with BMI of 40.0-44.9, adult (HCC)- (present on admission)  Assessment & Plan  Outpatient f/u with [facility] recommended      Chronic use of opiate drug for therapeutic purpose- (present on admission)  Assessment & Plan  Reduce to see if mental status improves    Hypertension- (present on admission)  Assessment & Plan  Not currently controlled on lisinopril  Will add Norvasc       VTE prophylaxis: enoxaparin

## 2019-11-24 NOTE — CARE PLAN
Problem: Communication  Goal: The ability to communicate needs accurately and effectively will improve  Outcome: PROGRESSING AS EXPECTED  Note:   Patient has pulled out IV x 2 today. Talked with MD Leigh, patient is eating and drinking, ok for no IV access for now.      Problem: Safety  Goal: Will remain free from injury  Outcome: PROGRESSING AS EXPECTED  Note:   Patient is still intermittently confused.  is planning to stay the night.      Problem: Pain Management  Goal: Pain level will decrease to patient's comfort goal  Outcome: PROGRESSING AS EXPECTED

## 2019-11-24 NOTE — THERAPY
"Physical Therapy Evaluation completed.   Bed Mobility:  Supine to Sit: Maximal Assist  Transfers: Sit to Stand: Unable to Participate  Gait: Level Of Assist: Unable to Participate   Plan of Care: Will benefit from Physical Therapy 3 times per week  Discharge Recommendations: Equipment: Will Continue to Assess for Equipment Needs. Recommend post-acute placement for continued physical therapy services prior to discharge home. Patient can tolerate post-acute therapies at a 5x/week frequency.       See \"Rehab Therapy-Acute\" Patient Summary Report for complete documentation.     Pt is a 69yo female presenting to acute after GLF. Pt sustained L tib/fib fx, non-op. Per RN, LLE is NWB and splinted. Pt presents to PT limited by pain and impaired cognition. RN reports spouse states this is not baseline for pt. Pt required continuous redirection to task and showed poor insight into deficits. Pt required max A for all mobility. Sat EOB 10 min with physical assist to maintain balance despite BUE support and feet on ground. Unable to perform STS at this time. Pt incontinent of stool and urine during session, performed rolling multiple times for pericare. Pt is not safe to return home at this time and is unable to transfer to . Recommend postacute placement for continued therapy. Will continue to work with pt in acute setting.   "

## 2019-11-25 ENCOUNTER — APPOINTMENT (OUTPATIENT)
Dept: RADIOLOGY | Facility: MEDICAL CENTER | Age: 69
DRG: 563 | End: 2019-11-25
Attending: INTERNAL MEDICINE
Payer: MEDICARE

## 2019-11-25 LAB
AMORPH CRY #/AREA URNS HPF: PRESENT /HPF
ANION GAP SERPL CALC-SCNC: 11 MMOL/L (ref 0–11.9)
APPEARANCE UR: ABNORMAL
BACTERIA #/AREA URNS HPF: ABNORMAL /HPF
BASOPHILS # BLD AUTO: 0.3 % (ref 0–1.8)
BASOPHILS # BLD: 0.03 K/UL (ref 0–0.12)
BILIRUB UR QL STRIP.AUTO: NEGATIVE
BUN SERPL-MCNC: 8 MG/DL (ref 8–22)
CALCIUM SERPL-MCNC: 9.1 MG/DL (ref 8.5–10.5)
CHLORIDE SERPL-SCNC: 105 MMOL/L (ref 96–112)
CO2 SERPL-SCNC: 25 MMOL/L (ref 20–33)
COLOR UR: YELLOW
CREAT SERPL-MCNC: 0.59 MG/DL (ref 0.5–1.4)
EOSINOPHIL # BLD AUTO: 0.33 K/UL (ref 0–0.51)
EOSINOPHIL NFR BLD: 3.2 % (ref 0–6.9)
EPI CELLS #/AREA URNS HPF: ABNORMAL /HPF
ERYTHROCYTE [DISTWIDTH] IN BLOOD BY AUTOMATED COUNT: 42.9 FL (ref 35.9–50)
GLUCOSE SERPL-MCNC: 112 MG/DL (ref 65–99)
GLUCOSE UR STRIP.AUTO-MCNC: NEGATIVE MG/DL
HCT VFR BLD AUTO: 46.6 % (ref 37–47)
HGB BLD-MCNC: 15.1 G/DL (ref 12–16)
IMM GRANULOCYTES # BLD AUTO: 0.03 K/UL (ref 0–0.11)
IMM GRANULOCYTES NFR BLD AUTO: 0.3 % (ref 0–0.9)
KETONES UR STRIP.AUTO-MCNC: 40 MG/DL
LEUKOCYTE ESTERASE UR QL STRIP.AUTO: ABNORMAL
LYMPHOCYTES # BLD AUTO: 1.85 K/UL (ref 1–4.8)
LYMPHOCYTES NFR BLD: 18.1 % (ref 22–41)
MCH RBC QN AUTO: 30.3 PG (ref 27–33)
MCHC RBC AUTO-ENTMCNC: 32.4 G/DL (ref 33.6–35)
MCV RBC AUTO: 93.6 FL (ref 81.4–97.8)
MICRO URNS: ABNORMAL
MONOCYTES # BLD AUTO: 1.08 K/UL (ref 0–0.85)
MONOCYTES NFR BLD AUTO: 10.6 % (ref 0–13.4)
MUCOUS THREADS #/AREA URNS HPF: ABNORMAL /HPF
NEUTROPHILS # BLD AUTO: 6.88 K/UL (ref 2–7.15)
NEUTROPHILS NFR BLD: 67.5 % (ref 44–72)
NITRITE UR QL STRIP.AUTO: NEGATIVE
NRBC # BLD AUTO: 0 K/UL
NRBC BLD-RTO: 0 /100 WBC
PH UR STRIP.AUTO: 6.5 [PH] (ref 5–8)
PLATELET # BLD AUTO: 200 K/UL (ref 164–446)
PMV BLD AUTO: 11.5 FL (ref 9–12.9)
POTASSIUM SERPL-SCNC: 3.8 MMOL/L (ref 3.6–5.5)
PROT UR QL STRIP: NEGATIVE MG/DL
RBC # BLD AUTO: 4.98 M/UL (ref 4.2–5.4)
RBC # URNS HPF: ABNORMAL /HPF
RBC UR QL AUTO: NEGATIVE
SODIUM SERPL-SCNC: 141 MMOL/L (ref 135–145)
SP GR UR STRIP.AUTO: 1.02
UROBILINOGEN UR STRIP.AUTO-MCNC: 0.2 MG/DL
WBC # BLD AUTO: 10.2 K/UL (ref 4.8–10.8)
WBC #/AREA URNS HPF: ABNORMAL /HPF

## 2019-11-25 PROCEDURE — 70551 MRI BRAIN STEM W/O DYE: CPT

## 2019-11-25 PROCEDURE — 70450 CT HEAD/BRAIN W/O DYE: CPT

## 2019-11-25 PROCEDURE — 700111 HCHG RX REV CODE 636 W/ 250 OVERRIDE (IP): Performed by: INTERNAL MEDICINE

## 2019-11-25 PROCEDURE — 80048 BASIC METABOLIC PNL TOTAL CA: CPT

## 2019-11-25 PROCEDURE — A9270 NON-COVERED ITEM OR SERVICE: HCPCS | Performed by: INTERNAL MEDICINE

## 2019-11-25 PROCEDURE — 36415 COLL VENOUS BLD VENIPUNCTURE: CPT

## 2019-11-25 PROCEDURE — 85025 COMPLETE CBC W/AUTO DIFF WBC: CPT

## 2019-11-25 PROCEDURE — 81001 URINALYSIS AUTO W/SCOPE: CPT

## 2019-11-25 PROCEDURE — 700102 HCHG RX REV CODE 250 W/ 637 OVERRIDE(OP): Performed by: INTERNAL MEDICINE

## 2019-11-25 PROCEDURE — 770001 HCHG ROOM/CARE - MED/SURG/GYN PRIV*

## 2019-11-25 PROCEDURE — 306589 SLEEVE,VASO CALF LARGE: Performed by: INTERNAL MEDICINE

## 2019-11-25 PROCEDURE — 700105 HCHG RX REV CODE 258: Performed by: INTERNAL MEDICINE

## 2019-11-25 PROCEDURE — 99232 SBSQ HOSP IP/OBS MODERATE 35: CPT | Performed by: INTERNAL MEDICINE

## 2019-11-25 RX ORDER — TOPIRAMATE 25 MG/1
25 TABLET ORAL
COMMUNITY
End: 2020-03-27 | Stop reason: SDUPTHER

## 2019-11-25 RX ORDER — DULOXETIN HYDROCHLORIDE 30 MG/1
30 CAPSULE, DELAYED RELEASE ORAL DAILY
Status: DISCONTINUED | OUTPATIENT
Start: 2019-11-25 | End: 2019-12-07 | Stop reason: HOSPADM

## 2019-11-25 RX ADMIN — BUDESONIDE AND FORMOTEROL FUMARATE DIHYDRATE 2 PUFF: 160; 4.5 AEROSOL RESPIRATORY (INHALATION) at 04:49

## 2019-11-25 RX ADMIN — BUDESONIDE AND FORMOTEROL FUMARATE DIHYDRATE 2 PUFF: 160; 4.5 AEROSOL RESPIRATORY (INHALATION) at 16:59

## 2019-11-25 RX ADMIN — ONDANSETRON 4 MG: 4 TABLET, ORALLY DISINTEGRATING ORAL at 11:55

## 2019-11-25 RX ADMIN — DULOXETINE HYDROCHLORIDE 30 MG: 30 CAPSULE, DELAYED RELEASE ORAL at 14:30

## 2019-11-25 RX ADMIN — LISINOPRIL 10 MG: 10 TABLET ORAL at 04:49

## 2019-11-25 RX ADMIN — ESTRADIOL 1 MG: 1 TABLET ORAL at 16:59

## 2019-11-25 RX ADMIN — METHOCARBAMOL TABLETS 500 MG: 500 TABLET, COATED ORAL at 20:46

## 2019-11-25 RX ADMIN — ACETAMINOPHEN 650 MG: 325 TABLET, FILM COATED ORAL at 16:59

## 2019-11-25 RX ADMIN — AMLODIPINE BESYLATE 5 MG: 5 TABLET ORAL at 04:49

## 2019-11-25 RX ADMIN — METHOCARBAMOL TABLETS 500 MG: 500 TABLET, COATED ORAL at 11:53

## 2019-11-25 RX ADMIN — SODIUM CHLORIDE: 9 INJECTION, SOLUTION INTRAVENOUS at 14:30

## 2019-11-25 RX ADMIN — ONDANSETRON 4 MG: 2 INJECTION INTRAMUSCULAR; INTRAVENOUS at 20:17

## 2019-11-25 RX ADMIN — ENOXAPARIN SODIUM 40 MG: 100 INJECTION SUBCUTANEOUS at 04:49

## 2019-11-25 ASSESSMENT — ENCOUNTER SYMPTOMS
NEUROLOGICAL NEGATIVE: 1
DIARRHEA: 1
RESPIRATORY NEGATIVE: 1
CARDIOVASCULAR NEGATIVE: 1
CONSTITUTIONAL NEGATIVE: 1

## 2019-11-25 NOTE — PROGRESS NOTES
"Internal Medicine Daily Progress Note    Date of Service  11/25/2019    Chief Complaint  68 y.o. female admitted 11/22/2019 with L closed bimalleolar fracture    Hospital Course   Pt with baseline debility secondary to chronic back pain, scoliosis multiple joint osteoarthritis, history of right ankle fracture, opiate dependence, hypertension, hypothyroidism, migraine, obesity who presented 11/22/2019 with complaints of left leg pain started after mechanical fall earlier today.  Apparently in the process of transferring, patient's legs \" gave up\" and she fell.  Patient was supposed to see primary care doctor yesterday.  Work-up in ER showed closed bimalleolar fracture of left ankle  ERP consulted with Dr. Holliday.  Ortho deems pt non-operative at this time.      Interval Problem Update  Pt seen and examined, afebrile, no overnight events, still with some ankle pain. Pt is very confused, unclear etiology at this time. Ct head neg for acute issues, holding narcotics.   PT/OT eval     Consultants/Specialty  Ortho    Code Status  Full    Disposition  Rehab when approved    Review of Systems  Review of Systems   Constitutional: Negative.    HENT: Negative.    Respiratory: Negative.    Cardiovascular: Negative.    Gastrointestinal: Positive for diarrhea.        She states she has intermittent diarrhea  She was advised to f/u with GI as outpt but has had colonoscopy   Musculoskeletal: Positive for joint pain.        C/w L ankle fx   Neurological: Negative.    Psychiatric/Behavioral:        Confused        Physical Exam  Temp:  [36.1 °C (96.9 °F)-36.8 °C (98.2 °F)] 36.5 °C (97.7 °F)  Pulse:  [] 80  Resp:  [17-18] 18  BP: (116-156)/(71-93) 116/88  SpO2:  [92 %-94 %] 93 %    Physical Exam  Constitutional:       Appearance: She is obese. She is not ill-appearing, toxic-appearing or diaphoretic.   Cardiovascular:      Rate and Rhythm: Normal rate and regular rhythm.      Pulses: Normal pulses.      Heart sounds: Normal " heart sounds.   Pulmonary:      Effort: Pulmonary effort is normal.      Breath sounds: Normal breath sounds.   Abdominal:      General: Abdomen is flat. Bowel sounds are normal.      Palpations: Abdomen is soft.   Musculoskeletal:         General: Tenderness and signs of injury present.   Skin:     General: Skin is warm and dry.   Neurological:      Mental Status: She is alert. She is disoriented.      Comments: Speaking about her outpt primary provider and how she has seen her today walking by in the hospital.         Fluids    Intake/Output Summary (Last 24 hours) at 11/25/2019 1416  Last data filed at 11/25/2019 1300  Gross per 24 hour   Intake 600 ml   Output --   Net 600 ml       Laboratory  Recent Labs     11/22/19  1637 11/23/19  0234 11/25/19  0430   WBC 10.7 9.9 10.2   RBC 5.24 4.65 4.98   HEMOGLOBIN 15.8 14.4 15.1   HEMATOCRIT 49.7* 43.1 46.6   MCV 94.8 92.7 93.6   MCH 30.2 31.0 30.3   MCHC 31.8* 33.4* 32.4*   RDW 43.6 41.9 42.9   PLATELETCT 250 232 200   MPV 10.6 10.6 11.5     Recent Labs     11/23/19  0234 11/24/19  0438 11/25/19  0430   SODIUM 140 139 141   POTASSIUM 3.3* 3.5* 3.8   CHLORIDE 110 108 105   CO2 20 21 25   GLUCOSE 130* 107* 112*   BUN 14 8 8   CREATININE 0.54 0.51 0.59   CALCIUM 8.7 8.8 9.1                   Imaging  CT-HEAD W/O   Final Result      No acute intracranial findings.         DX-LUMBAR SPINE-2 OR 3 VIEWS   Final Result      1.  Moderate degenerative change of lumbar spine.   2.  No fracture or subluxation.   3.  Mild dextroconvex curvature, likely positional.   4.  Lateral view is limited by technique.      DX-TIBIA AND FIBULA LEFT   Final Result      Distal fibular and tibial fractures.      DX-ANKLE 3+ VIEWS LEFT   Final Result      Trimalleolar fracture of the left ankle.      DX-KNEE 2- LEFT   Final Result      No acute fracture. Mild degenerative changes.           Assessment/Plan  Closed left ankle fracture  Assessment & Plan  Orthopedics evaluation pending  Pain  control  PT OT evaluation  DVT prevention with heparin    Delirium- (present on admission)  Assessment & Plan  Pt still confused, unclear etiology  CT head neg for acute issues.  Possible narcotics, will need cautious with their use for her pain.      Debility  Assessment & Plan  Secondary to degenerative joint and disc disease and chronic pain, obesity.  PT OT evaluation    Moderate persistent asthma without complication- (present on admission)  Assessment & Plan  Patient is currently not in exacerbation  Continue home inhalers  RT protocol      Morbid obesity with BMI of 40.0-44.9, adult (HCC)- (present on admission)  Assessment & Plan  Follow with PCP for weight management    Chronic use of opiate drug for therapeutic purpose- (present on admission)  Assessment & Plan  Continue muscle relaxants and also carefull on narcotics as might be the cause of her confusion     Hypertension- (present on admission)  Assessment & Plan  Blood pressure is under control.  Continue lisinopril       VTE prophylaxis: enoxaparin

## 2019-11-25 NOTE — PROGRESS NOTES
Notified Dr. Lyon regarding change in mentation, patient confused, oriented to self only, MD placed orders for head CT.

## 2019-11-25 NOTE — ASSESSMENT & PLAN NOTE
Related to narcotics ??  And UTI ??    Electrolytes wnl.   No narcotics for few days now.   MRI brain showed possible NPH (ventromegaly)   UA positive for enterococcus   No need for EEG at this time   No signs of withdrawal.  Continue IVF at this time

## 2019-11-25 NOTE — PROGRESS NOTES
2 RN skin check under splint complete with Ramakrishna.  No skin breakdown noted around splint on LLE.  Pt demostrated movement of toes. Cap refill less than 3 seconds.

## 2019-11-25 NOTE — PROGRESS NOTES
Received verbal order for urinalysis and CBC per Dr. Lyon.     reports patient takes cymbalta 30 mg at home, received verbal order per Dr. Lyon to re-order home medication.    No IV, received verbal order to insert IV.
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
## 2019-11-26 PROBLEM — G93.40 ENCEPHALOPATHY ACUTE: Status: ACTIVE | Noted: 2019-11-24

## 2019-11-26 LAB
ANION GAP SERPL CALC-SCNC: 11 MMOL/L (ref 0–11.9)
BUN SERPL-MCNC: 9 MG/DL (ref 8–22)
CALCIUM SERPL-MCNC: 9.1 MG/DL (ref 8.5–10.5)
CHLORIDE SERPL-SCNC: 107 MMOL/L (ref 96–112)
CO2 SERPL-SCNC: 22 MMOL/L (ref 20–33)
CREAT SERPL-MCNC: 0.55 MG/DL (ref 0.5–1.4)
ERYTHROCYTE [DISTWIDTH] IN BLOOD BY AUTOMATED COUNT: 42.5 FL (ref 35.9–50)
GLUCOSE SERPL-MCNC: 90 MG/DL (ref 65–99)
HCT VFR BLD AUTO: 47.1 % (ref 37–47)
HGB BLD-MCNC: 15.5 G/DL (ref 12–16)
MCH RBC QN AUTO: 30.8 PG (ref 27–33)
MCHC RBC AUTO-ENTMCNC: 32.9 G/DL (ref 33.6–35)
MCV RBC AUTO: 93.6 FL (ref 81.4–97.8)
PLATELET # BLD AUTO: 236 K/UL (ref 164–446)
PMV BLD AUTO: 10.7 FL (ref 9–12.9)
POTASSIUM SERPL-SCNC: 3.5 MMOL/L (ref 3.6–5.5)
RBC # BLD AUTO: 5.03 M/UL (ref 4.2–5.4)
SODIUM SERPL-SCNC: 140 MMOL/L (ref 135–145)
WBC # BLD AUTO: 9.1 K/UL (ref 4.8–10.8)

## 2019-11-26 PROCEDURE — 700102 HCHG RX REV CODE 250 W/ 637 OVERRIDE(OP): Performed by: INTERNAL MEDICINE

## 2019-11-26 PROCEDURE — A9270 NON-COVERED ITEM OR SERVICE: HCPCS | Performed by: INTERNAL MEDICINE

## 2019-11-26 PROCEDURE — 80048 BASIC METABOLIC PNL TOTAL CA: CPT

## 2019-11-26 PROCEDURE — 700105 HCHG RX REV CODE 258: Performed by: INTERNAL MEDICINE

## 2019-11-26 PROCEDURE — 85027 COMPLETE CBC AUTOMATED: CPT

## 2019-11-26 PROCEDURE — 99232 SBSQ HOSP IP/OBS MODERATE 35: CPT | Performed by: INTERNAL MEDICINE

## 2019-11-26 PROCEDURE — 700111 HCHG RX REV CODE 636 W/ 250 OVERRIDE (IP): Performed by: INTERNAL MEDICINE

## 2019-11-26 PROCEDURE — 97535 SELF CARE MNGMENT TRAINING: CPT

## 2019-11-26 PROCEDURE — 36415 COLL VENOUS BLD VENIPUNCTURE: CPT

## 2019-11-26 PROCEDURE — 770001 HCHG ROOM/CARE - MED/SURG/GYN PRIV*

## 2019-11-26 PROCEDURE — 97530 THERAPEUTIC ACTIVITIES: CPT

## 2019-11-26 PROCEDURE — 97165 OT EVAL LOW COMPLEX 30 MIN: CPT

## 2019-11-26 RX ORDER — LOPERAMIDE HYDROCHLORIDE 2 MG/1
2 CAPSULE ORAL 4 TIMES DAILY PRN
Status: DISCONTINUED | OUTPATIENT
Start: 2019-11-26 | End: 2019-12-03

## 2019-11-26 RX ORDER — IBUPROFEN 400 MG/1
400 TABLET ORAL EVERY 6 HOURS PRN
Status: DISCONTINUED | OUTPATIENT
Start: 2019-11-26 | End: 2019-12-07 | Stop reason: HOSPADM

## 2019-11-26 RX ADMIN — BUDESONIDE AND FORMOTEROL FUMARATE DIHYDRATE 2 PUFF: 160; 4.5 AEROSOL RESPIRATORY (INHALATION) at 17:02

## 2019-11-26 RX ADMIN — SODIUM CHLORIDE: 9 INJECTION, SOLUTION INTRAVENOUS at 04:53

## 2019-11-26 RX ADMIN — METHOCARBAMOL TABLETS 500 MG: 500 TABLET, COATED ORAL at 17:02

## 2019-11-26 RX ADMIN — ESTRADIOL 1 MG: 1 TABLET ORAL at 17:00

## 2019-11-26 RX ADMIN — IBUPROFEN 400 MG: 400 TABLET, FILM COATED ORAL at 17:00

## 2019-11-26 RX ADMIN — ENOXAPARIN SODIUM 40 MG: 100 INJECTION SUBCUTANEOUS at 04:53

## 2019-11-26 RX ADMIN — AMLODIPINE BESYLATE 5 MG: 5 TABLET ORAL at 04:54

## 2019-11-26 RX ADMIN — ACETAMINOPHEN 650 MG: 325 TABLET, FILM COATED ORAL at 11:51

## 2019-11-26 RX ADMIN — ACETAMINOPHEN 650 MG: 325 TABLET, FILM COATED ORAL at 05:37

## 2019-11-26 RX ADMIN — ONDANSETRON 4 MG: 2 INJECTION INTRAMUSCULAR; INTRAVENOUS at 11:52

## 2019-11-26 RX ADMIN — LOPERAMIDE HYDROCHLORIDE 2 MG: 2 CAPSULE ORAL at 17:00

## 2019-11-26 RX ADMIN — METHOCARBAMOL TABLETS 500 MG: 500 TABLET, COATED ORAL at 05:37

## 2019-11-26 RX ADMIN — LISINOPRIL 10 MG: 10 TABLET ORAL at 04:53

## 2019-11-26 RX ADMIN — OXYCODONE HYDROCHLORIDE 5 MG: 5 TABLET ORAL at 07:40

## 2019-11-26 RX ADMIN — DULOXETINE HYDROCHLORIDE 30 MG: 30 CAPSULE, DELAYED RELEASE ORAL at 04:53

## 2019-11-26 ASSESSMENT — ENCOUNTER SYMPTOMS
DIARRHEA: 1
NEUROLOGICAL NEGATIVE: 1
HEADACHES: 0
CHILLS: 0
WEIGHT LOSS: 0
FOCAL WEAKNESS: 0
ABDOMINAL PAIN: 0
COUGH: 0
NECK PAIN: 0
VOMITING: 0
EYE PAIN: 0
CONSTITUTIONAL NEGATIVE: 1
STRIDOR: 0
SEIZURES: 0
NAUSEA: 0
DIZZINESS: 0
BACK PAIN: 0
EYE REDNESS: 0
MYALGIAS: 0
EYE DISCHARGE: 0
SPUTUM PRODUCTION: 0
BLURRED VISION: 0
ORTHOPNEA: 0
FEVER: 0
PALPITATIONS: 0
SHORTNESS OF BREATH: 0
RESPIRATORY NEGATIVE: 1
HEARTBURN: 0
CARDIOVASCULAR NEGATIVE: 1

## 2019-11-26 ASSESSMENT — ACTIVITIES OF DAILY LIVING (ADL): TOILETING: INDEPENDENT

## 2019-11-26 ASSESSMENT — COGNITIVE AND FUNCTIONAL STATUS - GENERAL
PERSONAL GROOMING: A LITTLE
DAILY ACTIVITIY SCORE: 14
STANDING UP FROM CHAIR USING ARMS: A LOT
SUGGESTED CMS G CODE MODIFIER MOBILITY: CM
WALKING IN HOSPITAL ROOM: TOTAL
SUGGESTED CMS G CODE MODIFIER DAILY ACTIVITY: CK
DRESSING REGULAR LOWER BODY CLOTHING: A LOT
MOVING FROM LYING ON BACK TO SITTING ON SIDE OF FLAT BED: UNABLE
TOILETING: A LOT
TURNING FROM BACK TO SIDE WHILE IN FLAT BAD: A LOT
EATING MEALS: A LITTLE
HELP NEEDED FOR BATHING: TOTAL
MOBILITY SCORE: 8
DRESSING REGULAR UPPER BODY CLOTHING: A LITTLE
MOVING TO AND FROM BED TO CHAIR: UNABLE
CLIMB 3 TO 5 STEPS WITH RAILING: TOTAL

## 2019-11-26 ASSESSMENT — GAIT ASSESSMENTS: GAIT LEVEL OF ASSIST: UNABLE TO PARTICIPATE
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
## 2019-11-26 NOTE — PROGRESS NOTES
Received bedside report from NOC RN. Patient participated in report and asked for pain medication. Patient had no other needs at this time and call light is within reach. PT approached this RN and asked to work with her when  arrives.

## 2019-11-26 NOTE — THERAPY
"Physical Therapy Treatment completed.   Bed Mobility:  Supine to Sit: Minimal Assist  Transfers: Sit to Stand: Unable to Participate  Gait: Level Of Assist: Unable to Participate  Plan of Care: Will benefit from Physical Therapy 3 times per week  Discharge Recommendations: Equipment: Will Continue to Assess for Equipment Needs. Recommend post-acute placement for continued physical therapy services prior to discharge home. Patient can tolerate post-acute therapies at a 5x/week frequency.       See \"Rehab Therapy-Acute\" Patient Summary Report for complete documentation.     Pt seen for PT treatment session. Spouse present and wishes to take pt home. Spouse reports he assists pt with bed mobility and transfers at home. Pt typically ambulates short household distances with cane. Spouse understands pt needs to be more mobile before cleared for home from therapy. Pt performed supine <> sit with min A and HOB elevated. Has adjustable bed at home. Pt was able to maintain sitting balance EOB with UE support. Was unable to perform STS at this time despite assist, demonstration, and use of FWW. Poor sequencing and insight into deficits. Easily distracted and requires redirection to task. Pt eager to pivot and to ambulate to bathroom. Pt becoming dizzy at EOB with increased anxiousness and began to impulsively return to bed. Discussed POC including attempting slide board xfr next session for using WC at home. Continue to recommend postacute placement for therapy prior to return home to increase safety, reduce falls, and maximize functional independence. Will follow.   "

## 2019-11-26 NOTE — PROGRESS NOTES
"Internal Medicine Daily Progress Note    Date of Service  11/26/2019    Chief Complaint  68 y.o. female admitted 11/22/2019 with L closed bimalleolar fracture    Hospital Course   Pt with baseline debility secondary to chronic back pain, scoliosis multiple joint osteoarthritis, history of right ankle fracture, opiate dependence, hypertension, hypothyroidism, migraine, obesity who presented 11/22/2019 with complaints of left leg pain started after mechanical fall earlier today.  Apparently in the process of transferring, patient's legs \" gave up\" and she fell.  Patient was supposed to see primary care doctor yesterday.  Work-up in ER showed closed bimalleolar fracture of left ankle  ERP consulted with Dr. Holliday.  Ortho deems pt non-operative at this time.      Interval Problem Update  Patient is alert and orientated X4 today. Complaint about her left LE pain 16/10. I explained to her about pain management with complication of narcotics and plan to choose to use non narcotics treatment and she agrees to it. Labs reviewed K 3.5    Consultants/Specialty  Ortho    Code Status  Full    Disposition  Rehab when approved    Review of Systems  Review of Systems   Constitutional: Negative.  Negative for chills, fever and weight loss.   HENT: Negative.  Negative for congestion and nosebleeds.    Eyes: Negative for blurred vision, pain, discharge and redness.   Respiratory: Negative.  Negative for cough, sputum production, shortness of breath and stridor.    Cardiovascular: Negative.  Negative for chest pain, palpitations and orthopnea.   Gastrointestinal: Positive for diarrhea. Negative for abdominal pain, heartburn, nausea and vomiting.        She states she has intermittent diarrhea  She was advised to f/u with GI as outpt but has had colonoscopy   Genitourinary: Negative for dysuria, frequency and urgency.   Musculoskeletal: Positive for joint pain. Negative for back pain, myalgias and neck pain.        C/w L ankle fx "   Skin: Negative for itching and rash.   Neurological: Negative.  Negative for dizziness, focal weakness, seizures and headaches.   Psychiatric/Behavioral:        Confused        Physical Exam  Temp:  [36.1 °C (96.9 °F)-36.9 °C (98.5 °F)] 36.1 °C (96.9 °F)  Pulse:  [76-89] 76  Resp:  [16-18] 16  BP: (116-153)/(66-90) 153/82  SpO2:  [90 %-96 %] 96 %    Physical Exam  Vitals signs reviewed.   Constitutional:       General: She is not in acute distress.     Appearance: Normal appearance. She is obese. She is not ill-appearing, toxic-appearing or diaphoretic.   HENT:      Head: Normocephalic and atraumatic.      Nose: No congestion or rhinorrhea.   Eyes:      Extraocular Movements: Extraocular movements intact.      Pupils: Pupils are equal, round, and reactive to light.   Neck:      Musculoskeletal: Normal range of motion and neck supple.   Cardiovascular:      Rate and Rhythm: Normal rate and regular rhythm.      Pulses: Normal pulses.      Heart sounds: Normal heart sounds.   Pulmonary:      Effort: Pulmonary effort is normal. No respiratory distress.      Breath sounds: Normal breath sounds.   Abdominal:      General: Abdomen is flat. Bowel sounds are normal. There is no distension.      Palpations: Abdomen is soft.      Tenderness: There is no tenderness.   Musculoskeletal:         General: Tenderness and signs of injury present. No swelling.   Skin:     General: Skin is warm and dry.      Findings: No erythema.   Neurological:      General: No focal deficit present.      Mental Status: She is alert. She is disoriented.      Comments: Speaking about her outpt primary provider and how she has seen her today walking by in the hospital.         Fluids    Intake/Output Summary (Last 24 hours) at 11/26/2019 0804  Last data filed at 11/26/2019 0538  Gross per 24 hour   Intake 1356.5 ml   Output 300 ml   Net 1056.5 ml       Laboratory  Recent Labs     11/25/19  0430 11/26/19  0527   WBC 10.2 9.1   RBC 4.98 5.03    HEMOGLOBIN 15.1 15.5   HEMATOCRIT 46.6 47.1*   MCV 93.6 93.6   MCH 30.3 30.8   MCHC 32.4* 32.9*   RDW 42.9 42.5   PLATELETCT 200 236   MPV 11.5 10.7     Recent Labs     11/24/19  0438 11/25/19  0430 11/26/19  0527   SODIUM 139 141 140   POTASSIUM 3.5* 3.8 3.5*   CHLORIDE 108 105 107   CO2 21 25 22   GLUCOSE 107* 112* 90   BUN 8 8 9   CREATININE 0.51 0.59 0.55   CALCIUM 8.8 9.1 9.1                   Imaging  CT-HEAD W/O   Final Result      No acute intracranial findings.         DX-LUMBAR SPINE-2 OR 3 VIEWS   Final Result      1.  Moderate degenerative change of lumbar spine.   2.  No fracture or subluxation.   3.  Mild dextroconvex curvature, likely positional.   4.  Lateral view is limited by technique.      DX-TIBIA AND FIBULA LEFT   Final Result      Distal fibular and tibial fractures.      DX-ANKLE 3+ VIEWS LEFT   Final Result      Trimalleolar fracture of the left ankle.      DX-KNEE 2- LEFT   Final Result      No acute fracture. Mild degenerative changes.      MR-BRAIN-W/O    (Results Pending)        Assessment/Plan  Closed left ankle fracture  Assessment & Plan  Orthopedics recommended non surgical management  Pain control: no narcotics, tylenol, NSAIDS  PT OT evaluation  DVT prevention with heparin    Encephalopathy acute- (present on admission)  Assessment & Plan  Improving  Holding narcotics for pain    Hypokalemia  Assessment & Plan  K 3.5 today  Worsening  Replete as needed  Follow cmp in am    Debility  Assessment & Plan  Secondary to degenerative joint and disc disease and chronic pain, obesity.  PT OT evaluation    Moderate persistent asthma without complication- (present on admission)  Assessment & Plan  Patient is currently not in exacerbation  Continue home inhalers  RT protocol      Morbid obesity with BMI of 40.0-44.9, adult (HCC)- (present on admission)  Assessment & Plan  Follow with PCP for weight management    Chronic use of opiate drug for therapeutic purpose- (present on  admission)  Assessment & Plan  Continue muscle relaxants and also carefull on narcotics as might be the cause of her confusion     Hypertension- (present on admission)  Assessment & Plan  Blood pressure is under control.  Continue lisinopril       VTE prophylaxis: enoxaparin

## 2019-11-27 PROCEDURE — 770001 HCHG ROOM/CARE - MED/SURG/GYN PRIV*

## 2019-11-27 PROCEDURE — 29515 APPLICATION SHORT LEG SPLINT: CPT

## 2019-11-27 PROCEDURE — A9270 NON-COVERED ITEM OR SERVICE: HCPCS | Performed by: INTERNAL MEDICINE

## 2019-11-27 PROCEDURE — 700102 HCHG RX REV CODE 250 W/ 637 OVERRIDE(OP): Performed by: INTERNAL MEDICINE

## 2019-11-27 PROCEDURE — 99232 SBSQ HOSP IP/OBS MODERATE 35: CPT | Performed by: INTERNAL MEDICINE

## 2019-11-27 PROCEDURE — 97530 THERAPEUTIC ACTIVITIES: CPT

## 2019-11-27 PROCEDURE — 97535 SELF CARE MNGMENT TRAINING: CPT

## 2019-11-27 PROCEDURE — 700111 HCHG RX REV CODE 636 W/ 250 OVERRIDE (IP): Performed by: INTERNAL MEDICINE

## 2019-11-27 RX ADMIN — ACETAMINOPHEN 650 MG: 325 TABLET, FILM COATED ORAL at 00:23

## 2019-11-27 RX ADMIN — LISINOPRIL 10 MG: 10 TABLET ORAL at 09:15

## 2019-11-27 RX ADMIN — IBUPROFEN 400 MG: 400 TABLET, FILM COATED ORAL at 22:14

## 2019-11-27 RX ADMIN — METHOCARBAMOL TABLETS 500 MG: 500 TABLET, COATED ORAL at 22:14

## 2019-11-27 RX ADMIN — METHOCARBAMOL TABLETS 500 MG: 500 TABLET, COATED ORAL at 09:15

## 2019-11-27 RX ADMIN — METHOCARBAMOL TABLETS 500 MG: 500 TABLET, COATED ORAL at 17:32

## 2019-11-27 RX ADMIN — DULOXETINE HYDROCHLORIDE 30 MG: 30 CAPSULE, DELAYED RELEASE ORAL at 09:15

## 2019-11-27 RX ADMIN — ONDANSETRON 4 MG: 4 TABLET, ORALLY DISINTEGRATING ORAL at 12:05

## 2019-11-27 RX ADMIN — ACETAMINOPHEN 650 MG: 325 TABLET, FILM COATED ORAL at 22:14

## 2019-11-27 RX ADMIN — AMLODIPINE BESYLATE 5 MG: 5 TABLET ORAL at 09:15

## 2019-11-27 RX ADMIN — IBUPROFEN 400 MG: 400 TABLET, FILM COATED ORAL at 03:45

## 2019-11-27 RX ADMIN — BUDESONIDE AND FORMOTEROL FUMARATE DIHYDRATE 2 PUFF: 160; 4.5 AEROSOL RESPIRATORY (INHALATION) at 09:14

## 2019-11-27 RX ADMIN — BUDESONIDE AND FORMOTEROL FUMARATE DIHYDRATE 2 PUFF: 160; 4.5 AEROSOL RESPIRATORY (INHALATION) at 17:32

## 2019-11-27 RX ADMIN — ESTRADIOL 1 MG: 1 TABLET ORAL at 11:04

## 2019-11-27 RX ADMIN — ENOXAPARIN SODIUM 40 MG: 100 INJECTION SUBCUTANEOUS at 09:14

## 2019-11-27 RX ADMIN — METHOCARBAMOL TABLETS 500 MG: 500 TABLET, COATED ORAL at 00:23

## 2019-11-27 ASSESSMENT — ENCOUNTER SYMPTOMS
SPUTUM PRODUCTION: 0
VOMITING: 0
COUGH: 0
DIZZINESS: 0
NAUSEA: 0
NECK PAIN: 0
MYALGIAS: 0
EYE DISCHARGE: 0
HEARTBURN: 0
SEIZURES: 0
STRIDOR: 0
HEADACHES: 0
SHORTNESS OF BREATH: 0
EYE REDNESS: 0
PALPITATIONS: 0
EYE PAIN: 0
DIARRHEA: 0
FOCAL WEAKNESS: 0
WEIGHT LOSS: 0
CHILLS: 0
BACK PAIN: 0

## 2019-11-27 ASSESSMENT — COGNITIVE AND FUNCTIONAL STATUS - GENERAL
PERSONAL GROOMING: A LITTLE
CLIMB 3 TO 5 STEPS WITH RAILING: TOTAL
TURNING FROM BACK TO SIDE WHILE IN FLAT BAD: UNABLE
MOBILITY SCORE: 7
SUGGESTED CMS G CODE MODIFIER DAILY ACTIVITY: CK
MOVING FROM LYING ON BACK TO SITTING ON SIDE OF FLAT BED: UNABLE
TOILETING: A LOT
DRESSING REGULAR UPPER BODY CLOTHING: A LITTLE
DRESSING REGULAR LOWER BODY CLOTHING: A LOT
STANDING UP FROM CHAIR USING ARMS: A LOT
EATING MEALS: A LITTLE
DAILY ACTIVITIY SCORE: 15
MOVING TO AND FROM BED TO CHAIR: UNABLE
HELP NEEDED FOR BATHING: A LOT
WALKING IN HOSPITAL ROOM: TOTAL
SUGGESTED CMS G CODE MODIFIER MOBILITY: CM

## 2019-11-27 ASSESSMENT — GAIT ASSESSMENTS: GAIT LEVEL OF ASSIST: UNABLE TO PARTICIPATE

## 2019-11-27 NOTE — PROGRESS NOTES
Reapplied posterior short leg splint to patients LLE  If you have any questions please [place] at [phone]

## 2019-11-27 NOTE — PROGRESS NOTES
2 RN skin check under splint complete with Percy.  No skin breakdown noted around splint on LLE.  Pt demostrated movement of toes. Cap refill less than 3 seconds.  Pt has some excoriation to perineal area, dri-kanika pads and barrier cream in use. Imodium ordered for frequent diarrhea.

## 2019-11-27 NOTE — THERAPY
"Physical Therapy Treatment completed.   Bed Mobility:  Supine to Sit: Minimal Assist  Transfers: Sit to Stand: Unable to Participate  Gait: Level Of Assist: Unable to Participate   Plan of Care: Will benefit from Physical Therapy 4 times per week  Discharge Recommendations: Equipment: Will Continue to Assess for Equipment Needs. Recommend post-acute placement for continued physical therapy services prior to discharge home. Patient can tolerate post-acute therapies at a 5x/week frequency.       See \"Rehab Therapy-Acute\" Patient Summary Report for complete documentation.     Pt seen for family training with seated slide board transfers. Spouse present throughout. Spouse demonstrated ability to assist pt to EOB like at home. Pt required mod A from therapist x2 for seated SB xfr from EOB to WC. Pt had difficulty maintaining LLE NWB despite cues, and difficulty weight shifting anteriorly for safe transfer. Pt requires continuous redirection to task and still presents with poor insight into deficits and poor safety awareness. Unsafe for spouse to perform seated slide board transfers alone with pt at this time. Recommend postacute placement for continued therapy and family training prior to return home. Will continue to follow in acute setting.     Addendum: returned in PM to assist BTB. Required max A with slide board from WC to EOB. Spouse present for continued family training. Pt continues to have difficulty with anterior weight shift.   "

## 2019-11-27 NOTE — PROGRESS NOTES
Received a call from the switch board that the pt had called the [place] [employer] for assistance to get up from her bed. This RN went into her room to check on pt, and reeducated pt on using her call light and to not call 911 as the nursing staff would be taking care of her. Cell phone was placed on night stand and pt was given her call light to use. Security alos came up to speak to pt. The pt is still A & O 2-3 with disorientation to time and event at times.

## 2019-11-27 NOTE — PROGRESS NOTES
"Internal Medicine Daily Progress Note    Date of Service  11/27/2019    Chief Complaint  68 y.o. female admitted 11/22/2019 with L closed bimalleolar fracture    Hospital Course   Pt with baseline debility secondary to chronic back pain, scoliosis multiple joint osteoarthritis, history of right ankle fracture, opiate dependence, hypertension, hypothyroidism, migraine, obesity who presented 11/22/2019 with complaints of left leg pain started after mechanical fall earlier today.  Apparently in the process of transferring, patient's legs \" gave up\" and she fell.  Patient was supposed to see primary care doctor yesterday.  Work-up in ER showed closed bimalleolar fracture of left ankle  ERP consulted with Dr. Holliday.  Ortho deems pt non-operative at this time.      Interval Problem Update  Her pain is tolerable. Had a long discussion with the patient and  about pain control and they agreed with non narcotics pain management. PT/OT. Patient was seen and examined by me today. Case was discussed with RN and multidisplinary team during rounds. Denies nausea, vomiting, diarrhea.       Consultants/Specialty  Ortho    Code Status  Full    Disposition  Rehab when approved    Review of Systems  Review of Systems   Constitutional: Negative for chills and weight loss.   HENT: Negative for congestion and nosebleeds.    Eyes: Negative for pain, discharge and redness.   Respiratory: Negative for cough, sputum production, shortness of breath and stridor.    Cardiovascular: Negative for chest pain and palpitations.   Gastrointestinal: Negative for diarrhea, heartburn, nausea and vomiting.        She states she has intermittent diarrhea  She was advised to f/u with GI as outpt but has had colonoscopy   Genitourinary: Negative for dysuria, frequency and urgency.   Musculoskeletal: Positive for joint pain. Negative for back pain, myalgias and neck pain.        C/w L ankle fx   Skin: Negative for itching and rash.   Neurological: " Negative for dizziness, focal weakness, seizures and headaches.   Psychiatric/Behavioral:        Confused        Physical Exam  Temp:  [36.4 °C (97.5 °F)-37 °C (98.6 °F)] 37 °C (98.6 °F)  Pulse:  [73-82] 78  Resp:  [15-17] 17  BP: (112-153)/(64-82) 132/73  SpO2:  [90 %-96 %] 90 %    Physical Exam  Vitals signs reviewed.   Constitutional:       General: She is not in acute distress.     Appearance: Normal appearance. She is not toxic-appearing.   HENT:      Head: Normocephalic and atraumatic.      Nose: No congestion or rhinorrhea.   Eyes:      Extraocular Movements: Extraocular movements intact.      Pupils: Pupils are equal, round, and reactive to light.   Neck:      Musculoskeletal: Normal range of motion and neck supple.   Cardiovascular:      Rate and Rhythm: Normal rate and regular rhythm.      Pulses: Normal pulses.      Heart sounds: Normal heart sounds.   Pulmonary:      Effort: Pulmonary effort is normal.      Breath sounds: Normal breath sounds.   Abdominal:      General: Abdomen is flat. Bowel sounds are normal. There is no distension.      Palpations: Abdomen is soft.   Musculoskeletal:         General: Tenderness and signs of injury present. No swelling.   Skin:     General: Skin is warm and dry.      Findings: No erythema.   Neurological:      General: No focal deficit present.      Mental Status: She is alert. She is disoriented.      Comments: Speaking about her outpt primary provider and how she has seen her today walking by in the hospital.         Fluids    Intake/Output Summary (Last 24 hours) at 11/27/2019 0759  Last data filed at 11/27/2019 0400  Gross per 24 hour   Intake 480 ml   Output 450 ml   Net 30 ml       Laboratory  Recent Labs     11/25/19 0430 11/26/19  0527   WBC 10.2 9.1   RBC 4.98 5.03   HEMOGLOBIN 15.1 15.5   HEMATOCRIT 46.6 47.1*   MCV 93.6 93.6   MCH 30.3 30.8   MCHC 32.4* 32.9*   RDW 42.9 42.5   PLATELETCT 200 236   MPV 11.5 10.7     Recent Labs     11/25/19 0430  11/26/19  0527   SODIUM 141 140   POTASSIUM 3.8 3.5*   CHLORIDE 105 107   CO2 25 22   GLUCOSE 112* 90   BUN 8 9   CREATININE 0.59 0.55   CALCIUM 9.1 9.1                   Imaging  MR-BRAIN-W/O   Final Result      1.  Moderate lateral ventriculomegaly has detailed above. This may in part be related to underlying generalized atrophy and ex vacuo dilatation. Cannot exclude communicating or normal pressure hydrocephalus.   2.  Mild chronic microvascular ischemic type changes.   3.  No acute infarct or hemorrhage.      CT-HEAD W/O   Final Result      No acute intracranial findings.         DX-LUMBAR SPINE-2 OR 3 VIEWS   Final Result      1.  Moderate degenerative change of lumbar spine.   2.  No fracture or subluxation.   3.  Mild dextroconvex curvature, likely positional.   4.  Lateral view is limited by technique.      DX-TIBIA AND FIBULA LEFT   Final Result      Distal fibular and tibial fractures.      DX-ANKLE 3+ VIEWS LEFT   Final Result      Trimalleolar fracture of the left ankle.      DX-KNEE 2- LEFT   Final Result      No acute fracture. Mild degenerative changes.           Assessment/Plan  Closed left ankle fracture  Assessment & Plan  Orthopedics recommended non surgical management  Pain control: no narcotics, tylenol, NSAIDS  PT OT evaluation  DVT prevention with heparin    Encephalopathy acute- (present on admission)  Assessment & Plan  Related to narcotics  Improving  Holding narcotics for pain    Hypokalemia  Assessment & Plan  Replete as needed  Follow cmp in am    Debility  Assessment & Plan  Secondary to degenerative joint and disc disease and chronic pain, obesity.  PT OT evaluation    Moderate persistent asthma without complication- (present on admission)  Assessment & Plan  Patient is currently not in exacerbation  Continue home inhalers  RT protocol      Morbid obesity with BMI of 40.0-44.9, adult (HCC)- (present on admission)  Assessment & Plan  Follow with PCP for weight management    Chronic use  of opiate drug for therapeutic purpose- (present on admission)  Assessment & Plan  Continue muscle relaxants and also carefull on narcotics as might be the cause of her confusion     Hypertension- (present on admission)  Assessment & Plan  Blood pressure is under control.  Continue lisinopril       VTE prophylaxis: enoxaparin

## 2019-11-27 NOTE — CARE PLAN
Problem: Safety  Goal: Will remain free from falls  Outcome: PROGRESSING AS EXPECTED   The pt is a high fall risk, precautions are in place including: bed alarm, bed is low, room next to nurse's station, RN charting outside of room, and checking on pt often.     Problem: Communication  Goal: The ability to communicate needs accurately and effectively will improve  Outcome: PROGRESSING SLOWER THAN EXPECTED   The pt has been educated a nd reeducated on using her call light to get assistance but was often be found trying to get out of bed. Pt also called 911 to get help to get out of bed and this RN was informed.

## 2019-11-27 NOTE — THERAPY
"Occupational Therapy Treatment completed with focus on ADLs, ADL transfers, patient education and caregiver training.  Functional Status: Pt seen today for OT treatment. Pleasant and cooperative, less anxious today but still with poor insight into deficits and sequencing of txfs/ADLs. Vielka bed mobility. ModA rolling in bed. MaxA to don underwear -  assisting throughout session. ModA seated SB txf to w/c. Pt able to tolerate 1.5 hr up in w/c, then needed to be assisted back to bed with maxA. Pt's  present for training however at this point pt is still not progressing as quickly with SB txfs as would be preferred to go home. She remains limited by weakness, fatigue, impaired balance, and impaired cognition which impacts independence in self care and functional mobility.  Plan of Care: Will benefit from Occupational Therapy 3 times per week  Discharge Recommendations:  Equipment Will Continue to Assess for Equipment Needs. Recommend post-acute placement for additional occupational therapy services prior to discharge home. Patient can tolerate post-acute therapies at a 5x/week frequency.      See \"Rehab Therapy-Acute\" Patient Summary Report for complete documentation.   "

## 2019-11-28 PROCEDURE — 99232 SBSQ HOSP IP/OBS MODERATE 35: CPT | Performed by: INTERNAL MEDICINE

## 2019-11-28 PROCEDURE — 700102 HCHG RX REV CODE 250 W/ 637 OVERRIDE(OP): Performed by: INTERNAL MEDICINE

## 2019-11-28 PROCEDURE — A9270 NON-COVERED ITEM OR SERVICE: HCPCS | Performed by: INTERNAL MEDICINE

## 2019-11-28 PROCEDURE — 700111 HCHG RX REV CODE 636 W/ 250 OVERRIDE (IP): Performed by: INTERNAL MEDICINE

## 2019-11-28 PROCEDURE — 770001 HCHG ROOM/CARE - MED/SURG/GYN PRIV*

## 2019-11-28 RX ADMIN — IBUPROFEN 400 MG: 400 TABLET, FILM COATED ORAL at 16:51

## 2019-11-28 RX ADMIN — IBUPROFEN 400 MG: 400 TABLET, FILM COATED ORAL at 06:22

## 2019-11-28 RX ADMIN — ENOXAPARIN SODIUM 40 MG: 100 INJECTION SUBCUTANEOUS at 06:22

## 2019-11-28 RX ADMIN — DULOXETINE HYDROCHLORIDE 30 MG: 30 CAPSULE, DELAYED RELEASE ORAL at 06:21

## 2019-11-28 RX ADMIN — LISINOPRIL 10 MG: 10 TABLET ORAL at 06:21

## 2019-11-28 RX ADMIN — ACETAMINOPHEN 650 MG: 325 TABLET, FILM COATED ORAL at 06:22

## 2019-11-28 RX ADMIN — BUDESONIDE AND FORMOTEROL FUMARATE DIHYDRATE 2 PUFF: 160; 4.5 AEROSOL RESPIRATORY (INHALATION) at 06:22

## 2019-11-28 RX ADMIN — ESTRADIOL 1 MG: 1 TABLET ORAL at 15:10

## 2019-11-28 RX ADMIN — ACETAMINOPHEN 650 MG: 325 TABLET, FILM COATED ORAL at 20:06

## 2019-11-28 RX ADMIN — AMLODIPINE BESYLATE 5 MG: 5 TABLET ORAL at 06:21

## 2019-11-28 RX ADMIN — BUDESONIDE AND FORMOTEROL FUMARATE DIHYDRATE 2 PUFF: 160; 4.5 AEROSOL RESPIRATORY (INHALATION) at 16:51

## 2019-11-28 RX ADMIN — METHOCARBAMOL TABLETS 500 MG: 500 TABLET, COATED ORAL at 15:10

## 2019-11-28 ASSESSMENT — ENCOUNTER SYMPTOMS
CHILLS: 0
HEADACHES: 0
NECK PAIN: 0
EYE REDNESS: 0
SHORTNESS OF BREATH: 0
BACK PAIN: 0
DIARRHEA: 0
EYE DISCHARGE: 0
FOCAL WEAKNESS: 0
MYALGIAS: 0
EYE PAIN: 0
SPUTUM PRODUCTION: 0
VOMITING: 0
SEIZURES: 0
WEIGHT LOSS: 0
STRIDOR: 0

## 2019-11-28 NOTE — PROGRESS NOTES
"Internal Medicine Daily Progress Note    Date of Service  11/28/2019    Chief Complaint  68 y.o. female admitted 11/22/2019 with L closed bimalleolar fracture    Hospital Course   Pt with baseline debility secondary to chronic back pain, scoliosis multiple joint osteoarthritis, history of right ankle fracture, opiate dependence, hypertension, hypothyroidism, migraine, obesity who presented 11/22/2019 with complaints of left leg pain started after mechanical fall earlier today.  Apparently in the process of transferring, patient's legs \" gave up\" and she fell.  Patient was supposed to see primary care doctor yesterday.  Work-up in ER showed closed bimalleolar fracture of left ankle  ERP consulted with Dr. Holliday.  Ortho deems pt non-operative at this time.      Interval Problem Update  Patient is sleeping comfortably on the bed.  I discussed with the  about discharge planning.  Her pain is under control.  No more confusion according to the .  Patient was seen and examined by me today. Case was discussed with RN and multidisplinary team during rounds. Denies nausea, vomiting, diarrhea.       Consultants/Specialty  Ortho    Code Status  Full    Disposition  Rehab when approved    Review of Systems  Review of Systems   Constitutional: Negative for chills and weight loss.   HENT: Negative for congestion and nosebleeds.    Eyes: Negative for pain, discharge and redness.   Respiratory: Negative for sputum production, shortness of breath and stridor.    Cardiovascular: Negative for chest pain.   Gastrointestinal: Negative for diarrhea and vomiting.        She states she has intermittent diarrhea  She was advised to f/u with GI as outpt but has had colonoscopy   Genitourinary: Negative for frequency and urgency.   Musculoskeletal: Positive for joint pain. Negative for back pain, myalgias and neck pain.        C/w L ankle fx   Skin: Negative for itching and rash.   Neurological: Negative for focal weakness, " seizures and headaches.   Psychiatric/Behavioral:        Confused        Physical Exam  Temp:  [36.1 °C (97 °F)-36.7 °C (98.1 °F)] 36.1 °C (97 °F)  Pulse:  [76-84] 84  Resp:  [17-18] 17  BP: (122-163)/(75-83) 122/75  SpO2:  [90 %-94 %] 90 %    Physical Exam  Vitals signs reviewed.   Constitutional:       General: She is not in acute distress.     Appearance: Normal appearance. She is not toxic-appearing.   HENT:      Head: Normocephalic and atraumatic.      Nose: No congestion or rhinorrhea.   Eyes:      Extraocular Movements: Extraocular movements intact.      Pupils: Pupils are equal, round, and reactive to light.   Neck:      Musculoskeletal: Normal range of motion and neck supple.   Cardiovascular:      Rate and Rhythm: Normal rate and regular rhythm.      Heart sounds: Normal heart sounds.   Pulmonary:      Effort: Pulmonary effort is normal.      Breath sounds: Normal breath sounds.   Abdominal:      General: Abdomen is flat. Bowel sounds are normal.   Musculoskeletal:         General: Tenderness and signs of injury present. No swelling.   Skin:     General: Skin is warm and dry.      Findings: No erythema.   Neurological:      General: No focal deficit present.      Mental Status: She is alert.      Comments: Speaking about her outpt primary provider and how she has seen her today walking by in the hospital.         Fluids    Intake/Output Summary (Last 24 hours) at 11/28/2019 0752  Last data filed at 11/28/2019 0317  Gross per 24 hour   Intake --   Output 900 ml   Net -900 ml       Laboratory  Recent Labs     11/26/19  0527   WBC 9.1   RBC 5.03   HEMOGLOBIN 15.5   HEMATOCRIT 47.1*   MCV 93.6   MCH 30.8   MCHC 32.9*   RDW 42.5   PLATELETCT 236   MPV 10.7     Recent Labs     11/26/19  0527   SODIUM 140   POTASSIUM 3.5*   CHLORIDE 107   CO2 22   GLUCOSE 90   BUN 9   CREATININE 0.55   CALCIUM 9.1                   Imaging  MR-BRAIN-W/O   Final Result      1.  Moderate lateral ventriculomegaly has detailed above.  This may in part be related to underlying generalized atrophy and ex vacuo dilatation. Cannot exclude communicating or normal pressure hydrocephalus.   2.  Mild chronic microvascular ischemic type changes.   3.  No acute infarct or hemorrhage.      CT-HEAD W/O   Final Result      No acute intracranial findings.         DX-LUMBAR SPINE-2 OR 3 VIEWS   Final Result      1.  Moderate degenerative change of lumbar spine.   2.  No fracture or subluxation.   3.  Mild dextroconvex curvature, likely positional.   4.  Lateral view is limited by technique.      DX-TIBIA AND FIBULA LEFT   Final Result      Distal fibular and tibial fractures.      DX-ANKLE 3+ VIEWS LEFT   Final Result      Trimalleolar fracture of the left ankle.      DX-KNEE 2- LEFT   Final Result      No acute fracture. Mild degenerative changes.           Assessment/Plan  Closed left ankle fracture  Assessment & Plan  Orthopedics recommended non surgical management  Pain control: no narcotics, tylenol, NSAIDS  PT OT evaluation  SNF    Encephalopathy acute- (present on admission)  Assessment & Plan  Related to narcotics  Improved  No narcotics     Hypokalemia  Assessment & Plan  Replete as needed  Follow cmp in am    Debility  Assessment & Plan  Secondary to degenerative joint and disc disease and chronic pain, obesity.  PT OT evaluation    Moderate persistent asthma without complication- (present on admission)  Assessment & Plan  Patient is currently not in exacerbation  Continue home inhalers  RT protocol      Morbid obesity with BMI of 40.0-44.9, adult (HCC)- (present on admission)  Assessment & Plan  Follow with PCP for weight management    Chronic use of opiate drug for therapeutic purpose- (present on admission)  Assessment & Plan  Continue muscle relaxants and also carefull on narcotics as might be the cause of her confusion     Hypertension- (present on admission)  Assessment & Plan  Blood pressure is under control.  Continue lisinopril       VTE  prophylaxis: enoxaparin

## 2019-11-28 NOTE — CARE PLAN
Problem: Communication  Goal: The ability to communicate needs accurately and effectively will improve  Outcome: PROGRESSING AS EXPECTED     Problem: Safety  Goal: Will remain free from injury  Outcome: PROGRESSING AS EXPECTED  Goal: Will remain free from falls  Outcome: PROGRESSING AS EXPECTED     Problem: Discharge Barriers/Planning  Goal: Patient's continuum of care needs will be met  Outcome: PROGRESSING AS EXPECTED     Problem: Mobility  Goal: Risk for activity intolerance will decrease  Outcome: PROGRESSING AS EXPECTED

## 2019-11-28 NOTE — CARE PLAN
Problem: Communication  Goal: The ability to communicate needs accurately and effectively will improve  Outcome: PROGRESSING AS EXPECTED     Problem: Safety  Goal: Will remain free from injury  Outcome: PROGRESSING AS EXPECTED  Goal: Will remain free from falls  Outcome: PROGRESSING AS EXPECTED     Problem: Pain Management  Goal: Pain level will decrease to patient's comfort goal  Outcome: PROGRESSING AS EXPECTED     Problem: Mobility  Goal: Risk for activity intolerance will decrease  Outcome: PROGRESSING AS EXPECTED

## 2019-11-28 NOTE — CARE PLAN
Problem: Pain Management  Goal: Pain level will decrease to patient's comfort goal  Outcome: PROGRESSING AS EXPECTED   The pt is complaining of less pain even with movement.    Problem: Respiratory:  Goal: Respiratory status will improve  Outcome: PROGRESSING AS EXPECTED   The pt is able to maintain a normal oxygen level without supplemental oxygen.      Problem: Urinary Elimination:  Goal: Ability to reestablish a normal urinary elimination pattern will improve  Outcome: PROGRESSING AS EXPECTED   The pt is urinating with the help of the pure wick at a normal rate without pain.

## 2019-11-29 PROCEDURE — A9270 NON-COVERED ITEM OR SERVICE: HCPCS | Performed by: INTERNAL MEDICINE

## 2019-11-29 PROCEDURE — 700102 HCHG RX REV CODE 250 W/ 637 OVERRIDE(OP): Performed by: INTERNAL MEDICINE

## 2019-11-29 PROCEDURE — 700111 HCHG RX REV CODE 636 W/ 250 OVERRIDE (IP): Performed by: INTERNAL MEDICINE

## 2019-11-29 PROCEDURE — 770001 HCHG ROOM/CARE - MED/SURG/GYN PRIV*

## 2019-11-29 PROCEDURE — 99232 SBSQ HOSP IP/OBS MODERATE 35: CPT | Performed by: INTERNAL MEDICINE

## 2019-11-29 RX ORDER — TRAMADOL HYDROCHLORIDE 50 MG/1
50 TABLET ORAL EVERY 6 HOURS PRN
Status: DISCONTINUED | OUTPATIENT
Start: 2019-11-29 | End: 2019-12-07 | Stop reason: HOSPADM

## 2019-11-29 RX ORDER — GUAIFENESIN 600 MG/1
600 TABLET, EXTENDED RELEASE ORAL EVERY 12 HOURS
Status: DISCONTINUED | OUTPATIENT
Start: 2019-11-29 | End: 2019-12-07 | Stop reason: HOSPADM

## 2019-11-29 RX ADMIN — BUDESONIDE AND FORMOTEROL FUMARATE DIHYDRATE 2 PUFF: 160; 4.5 AEROSOL RESPIRATORY (INHALATION) at 16:08

## 2019-11-29 RX ADMIN — LISINOPRIL 10 MG: 10 TABLET ORAL at 05:56

## 2019-11-29 RX ADMIN — BUDESONIDE AND FORMOTEROL FUMARATE DIHYDRATE 2 PUFF: 160; 4.5 AEROSOL RESPIRATORY (INHALATION) at 05:58

## 2019-11-29 RX ADMIN — GUAIFENESIN 600 MG: 600 TABLET, EXTENDED RELEASE ORAL at 17:52

## 2019-11-29 RX ADMIN — METHOCARBAMOL TABLETS 500 MG: 500 TABLET, COATED ORAL at 05:56

## 2019-11-29 RX ADMIN — TRAMADOL HYDROCHLORIDE 50 MG: 50 TABLET, FILM COATED ORAL at 10:06

## 2019-11-29 RX ADMIN — ESTRADIOL 1 MG: 1 TABLET ORAL at 16:08

## 2019-11-29 RX ADMIN — IBUPROFEN 400 MG: 400 TABLET, FILM COATED ORAL at 15:13

## 2019-11-29 RX ADMIN — TRAMADOL HYDROCHLORIDE 50 MG: 50 TABLET, FILM COATED ORAL at 16:08

## 2019-11-29 RX ADMIN — ENOXAPARIN SODIUM 40 MG: 100 INJECTION SUBCUTANEOUS at 05:56

## 2019-11-29 RX ADMIN — METHOCARBAMOL TABLETS 500 MG: 500 TABLET, COATED ORAL at 11:44

## 2019-11-29 RX ADMIN — IBUPROFEN 400 MG: 400 TABLET, FILM COATED ORAL at 05:56

## 2019-11-29 RX ADMIN — AMLODIPINE BESYLATE 5 MG: 5 TABLET ORAL at 05:56

## 2019-11-29 RX ADMIN — ACETAMINOPHEN 650 MG: 325 TABLET, FILM COATED ORAL at 21:34

## 2019-11-29 RX ADMIN — METHOCARBAMOL TABLETS 500 MG: 500 TABLET, COATED ORAL at 21:34

## 2019-11-29 ASSESSMENT — ENCOUNTER SYMPTOMS
BACK PAIN: 0
CHILLS: 0
SPUTUM PRODUCTION: 0
HEADACHES: 0
SHORTNESS OF BREATH: 0
STRIDOR: 0
VOMITING: 0
NECK PAIN: 0
WEIGHT LOSS: 0
DIARRHEA: 0
EYE PAIN: 0
FOCAL WEAKNESS: 0
EYE DISCHARGE: 0

## 2019-11-29 NOTE — CARE PLAN
Problem: Nutritional:  Goal: Achieve adequate nutritional intake  Description  Patient will consume = or >50% of meals and snacks.    Outcome: NOT MET

## 2019-11-29 NOTE — PROGRESS NOTES
"Internal Medicine Daily Progress Note    Date of Service  11/29/2019    Chief Complaint  68 y.o. female admitted 11/22/2019 with L closed bimalleolar fracture    Hospital Course   Pt with baseline debility secondary to chronic back pain, scoliosis multiple joint osteoarthritis, history of right ankle fracture, opiate dependence, hypertension, hypothyroidism, migraine, obesity who presented 11/22/2019 with complaints of left leg pain started after mechanical fall earlier today.  Apparently in the process of transferring, patient's legs \" gave up\" and she fell.  Patient was supposed to see primary care doctor yesterday.  Work-up in ER showed closed bimalleolar fracture of left ankle  ERP consulted with Dr. Holliday.  Ortho deems pt non-operative at this time.      Interval Problem Update  Still complaining about pain and requests for more pain medication.  I started her on tramadol 50 mg every 6 hours as needed.  Monitor closely for possible narcotic related complication including confusion and respiratory arrest. patient was seen and examined by me today. Case was discussed with RN and multidisplinary team during rounds. Denies nausea, vomiting, diarrhea.       Consultants/Specialty  Ortho    Code Status  Full    Disposition  Rehab when approved    Review of Systems  Review of Systems   Constitutional: Negative for chills and weight loss.   HENT: Negative for congestion and nosebleeds.    Eyes: Negative for pain and discharge.   Respiratory: Negative for sputum production, shortness of breath and stridor.    Cardiovascular: Negative for chest pain.   Gastrointestinal: Negative for diarrhea and vomiting.        She states she has intermittent diarrhea  She was advised to f/u with GI as outpt but has had colonoscopy   Genitourinary: Negative for frequency and urgency.   Musculoskeletal: Positive for joint pain. Negative for back pain and neck pain.        C/w L ankle fx   Skin: Negative for itching and rash. "   Neurological: Negative for focal weakness and headaches.   Psychiatric/Behavioral:        Confused        Physical Exam  Temp:  [36.6 °C (97.8 °F)-36.9 °C (98.4 °F)] 36.6 °C (97.8 °F)  Pulse:  [66-81] 66  Resp:  [15-18] 16  BP: (129-160)/(75-77) 142/75  SpO2:  [92 %-95 %] 95 %    Physical Exam  Vitals signs reviewed.   Constitutional:       General: She is not in acute distress.     Appearance: Normal appearance. She is not toxic-appearing.   HENT:      Head: Normocephalic.      Nose: No congestion.   Eyes:      Extraocular Movements: Extraocular movements intact.      Pupils: Pupils are equal, round, and reactive to light.   Neck:      Musculoskeletal: Normal range of motion and neck supple.   Cardiovascular:      Rate and Rhythm: Normal rate and regular rhythm.   Pulmonary:      Effort: Pulmonary effort is normal.      Breath sounds: Normal breath sounds.   Abdominal:      General: Abdomen is flat. Bowel sounds are normal.   Musculoskeletal:         General: Tenderness and signs of injury present. No swelling.   Skin:     General: Skin is warm and dry.      Findings: No erythema.   Neurological:      General: No focal deficit present.      Mental Status: She is alert.      Comments: Speaking about her outpt primary provider and how she has seen her today walking by in the hospital.         Fluids    Intake/Output Summary (Last 24 hours) at 11/29/2019 0746  Last data filed at 11/29/2019 0600  Gross per 24 hour   Intake 440 ml   Output 600 ml   Net -160 ml       Laboratory                        Imaging  MR-BRAIN-W/O   Final Result      1.  Moderate lateral ventriculomegaly has detailed above. This may in part be related to underlying generalized atrophy and ex vacuo dilatation. Cannot exclude communicating or normal pressure hydrocephalus.   2.  Mild chronic microvascular ischemic type changes.   3.  No acute infarct or hemorrhage.      CT-HEAD W/O   Final Result      No acute intracranial findings.          DX-LUMBAR SPINE-2 OR 3 VIEWS   Final Result      1.  Moderate degenerative change of lumbar spine.   2.  No fracture or subluxation.   3.  Mild dextroconvex curvature, likely positional.   4.  Lateral view is limited by technique.      DX-TIBIA AND FIBULA LEFT   Final Result      Distal fibular and tibial fractures.      DX-ANKLE 3+ VIEWS LEFT   Final Result      Trimalleolar fracture of the left ankle.      DX-KNEE 2- LEFT   Final Result      No acute fracture. Mild degenerative changes.           Assessment/Plan  Closed left ankle fracture  Assessment & Plan  Orthopedics recommended non surgical management  Pain control:  tylenol, NSAIDS, tramadol for now  PT OT evaluation  SNF    Encephalopathy acute- (present on admission)  Assessment & Plan  Related to narcotics  Improved  No narcotics     Hypokalemia  Assessment & Plan  Replete as needed  Follow cmp in am    Debility  Assessment & Plan  Secondary to degenerative joint and disc disease and chronic pain, obesity.  PT OT evaluation    Moderate persistent asthma without complication- (present on admission)  Assessment & Plan  Patient is currently not in exacerbation  Continue home inhalers  RT protocol      Morbid obesity with BMI of 40.0-44.9, adult (HCC)- (present on admission)  Assessment & Plan  Follow with PCP for weight management    Chronic use of opiate drug for therapeutic purpose- (present on admission)  Assessment & Plan  Continue muscle relaxants and also carefull on narcotics as might be the cause of her confusion     Hypertension- (present on admission)  Assessment & Plan  Blood pressure is under control.  Continue lisinopril       VTE prophylaxis: enoxaparin

## 2019-11-29 NOTE — DISCHARGE PLANNING
Received Choice form at 1054  Agency/Facility Name: [facility]  Referral sent per Choice form @ [phone]

## 2019-11-29 NOTE — CARE PLAN
Problem: Safety  Goal: Will remain free from falls  Outcome: PROGRESSING AS EXPECTED   Fall precautions in place. Patient and family encouraged to use call light to alert staff of needs and before attempting to get out of bed. Patient and family verbalized understanding. Call light and personal belongings within reach. Hourly rounding in place.     Problem: Urinary Elimination:  Goal: Ability to reestablish a normal urinary elimination pattern will improve  Outcome: PROGRESSING SLOWER THAN EXPECTED   Patient incontinent. Frequent checks for incontinence in place to maintain skin integrity. Purewick changed.

## 2019-11-29 NOTE — DIETARY
"Nutrition services: Day 7 of admit.  Sheyla Garcia is a 69 y.o. female with admitting DX of Closed Fracture of left tibia and fibula.    Pt seen for weekly rescreen, noted poor intake. Pt reports poor intake related to abd pain/diarrhea. Discussed food options that can be helpful for diarrhea and tips for optimizing intake. Pt agreeable to snacks three times daily. Interview limited as pt had trouble staying focused on nutrition. Pt reports normal intake PTA (~ 2 meals/day) with intentional wt loss related to eating better/watching portion sizes over past 2-3 months, pt did not wish to state UBW. Caregiver at bedside and reports pt was eating healthy PTA.       Assessment:  Height: 175.3 cm (5' 9\")  Weight: (!) 126.6 kg (279 lb)  Body mass index is 41.2 kg/m²., BMI classification: Class III Obesity/Morbid obesity.   Diet/Intake: Regular. Per ADL, pt with variable PO (<25% to 50-75% of meals) over past five documented meals.     Evaluation:   1. Pertinent labs: K+ 3.5  2. Pertinent Meds: Imodium.   3. LLE with generalize, 2+ edema per RN notes/ADL today.       Malnutrition Risk: does not meet criteria at this itme.     Recommendations/Plan:  1. Will add snacks TID to help optimize PO intake.   2. Encourage PO and document all intake  as % taken in ADL's to provide interdisciplinary communication across all shifts.   3. Monitor weight.  4. Nutrition rep will continue to see patient for ongoing meal and snack preferences.     RD following.           "

## 2019-11-29 NOTE — CARE PLAN
Problem: Communication  Goal: The ability to communicate needs accurately and effectively will improve  Outcome: PROGRESSING AS EXPECTED     Problem: Safety  Goal: Will remain free from injury  Outcome: PROGRESSING AS EXPECTED  Goal: Will remain free from falls  Outcome: PROGRESSING AS EXPECTED  Note:   Bed locked in lowest position with two side rails up     Problem: Mobility  Goal: Risk for activity intolerance will decrease  Outcome: PROGRESSING AS EXPECTED

## 2019-11-30 PROCEDURE — A9270 NON-COVERED ITEM OR SERVICE: HCPCS | Performed by: INTERNAL MEDICINE

## 2019-11-30 PROCEDURE — 51798 US URINE CAPACITY MEASURE: CPT

## 2019-11-30 PROCEDURE — 700101 HCHG RX REV CODE 250: Performed by: INTERNAL MEDICINE

## 2019-11-30 PROCEDURE — 770001 HCHG ROOM/CARE - MED/SURG/GYN PRIV*

## 2019-11-30 PROCEDURE — 700102 HCHG RX REV CODE 250 W/ 637 OVERRIDE(OP): Performed by: INTERNAL MEDICINE

## 2019-11-30 PROCEDURE — 700111 HCHG RX REV CODE 636 W/ 250 OVERRIDE (IP): Performed by: INTERNAL MEDICINE

## 2019-11-30 PROCEDURE — 99232 SBSQ HOSP IP/OBS MODERATE 35: CPT | Performed by: INTERNAL MEDICINE

## 2019-11-30 PROCEDURE — 700105 HCHG RX REV CODE 258: Performed by: INTERNAL MEDICINE

## 2019-11-30 RX ORDER — LIDOCAINE 50 MG/G
1 PATCH TOPICAL EVERY 24 HOURS
Status: DISCONTINUED | OUTPATIENT
Start: 2019-11-30 | End: 2019-12-07 | Stop reason: HOSPADM

## 2019-11-30 RX ADMIN — SODIUM CHLORIDE: 9 INJECTION, SOLUTION INTRAVENOUS at 06:09

## 2019-11-30 RX ADMIN — BUDESONIDE AND FORMOTEROL FUMARATE DIHYDRATE 2 PUFF: 160; 4.5 AEROSOL RESPIRATORY (INHALATION) at 18:26

## 2019-11-30 RX ADMIN — GUAIFENESIN 600 MG: 600 TABLET, EXTENDED RELEASE ORAL at 06:09

## 2019-11-30 RX ADMIN — ONDANSETRON 4 MG: 2 INJECTION INTRAMUSCULAR; INTRAVENOUS at 01:05

## 2019-11-30 RX ADMIN — IBUPROFEN 400 MG: 400 TABLET, FILM COATED ORAL at 19:56

## 2019-11-30 RX ADMIN — AMLODIPINE BESYLATE 5 MG: 5 TABLET ORAL at 06:10

## 2019-11-30 RX ADMIN — ACETAMINOPHEN 650 MG: 325 TABLET, FILM COATED ORAL at 19:56

## 2019-11-30 RX ADMIN — SODIUM CHLORIDE: 9 INJECTION, SOLUTION INTRAVENOUS at 19:47

## 2019-11-30 RX ADMIN — LIDOCAINE 1 PATCH: 50 PATCH CUTANEOUS at 14:10

## 2019-11-30 RX ADMIN — LISINOPRIL 10 MG: 10 TABLET ORAL at 06:09

## 2019-11-30 RX ADMIN — GUAIFENESIN 600 MG: 600 TABLET, EXTENDED RELEASE ORAL at 18:26

## 2019-11-30 RX ADMIN — ENOXAPARIN SODIUM 40 MG: 100 INJECTION SUBCUTANEOUS at 06:12

## 2019-11-30 RX ADMIN — ESTRADIOL 1 MG: 1 TABLET ORAL at 16:39

## 2019-11-30 ASSESSMENT — ENCOUNTER SYMPTOMS
PHOTOPHOBIA: 0
VOMITING: 0
WEIGHT LOSS: 0
EYE PAIN: 0
BACK PAIN: 0
HEADACHES: 0
DIARRHEA: 0
SPUTUM PRODUCTION: 0
FOCAL WEAKNESS: 0
NAUSEA: 0
EYE DISCHARGE: 0
STRIDOR: 0

## 2019-11-30 NOTE — PROGRESS NOTES
Received report from NOC RN. Patient and  sleeping. Call light is within reach and no other needs at this time.

## 2019-11-30 NOTE — CARE PLAN
Problem: Pain Management  Goal: Pain level will decrease to patient's comfort goal  Outcome: PROGRESSING AS EXPECTED  Managing Pt pain with non narcotics.      Problem: Psychosocial Needs:  Goal: Level of anxiety will decrease  Outcome: PROGRESSING SLOWER THAN EXPECTED  Pt needs frequent reorientation and reassurance.

## 2019-11-30 NOTE — PROGRESS NOTES
"Internal Medicine Daily Progress Note    Date of Service  11/30/2019    Chief Complaint  68 y.o. female admitted 11/22/2019 with L closed bimalleolar fracture    Hospital Course   Pt with baseline debility secondary to chronic back pain, scoliosis multiple joint osteoarthritis, history of right ankle fracture, opiate dependence, hypertension, hypothyroidism, migraine, obesity who presented 11/22/2019 with complaints of left leg pain started after mechanical fall earlier today.  Apparently in the process of transferring, patient's legs \" gave up\" and she fell.  Patient was supposed to see primary care doctor yesterday.  Work-up in ER showed closed bimalleolar fracture of left ankle  ERP consulted with Dr. Holliday.  Ortho deems pt non-operative at this time.      Interval Problem Update  The patient and  don't want tramadol. Started on lidocaine patch. Monitor closely for possible narcotic related complication including confusion and respiratory arrest. patient was seen and examined by me today. Case was discussed with RN and multidisplinary team during rounds. Denies nausea, vomiting, diarrhea.       Consultants/Specialty  Ortho    Code Status  Full    Disposition  Rehab when approved    Review of Systems  Review of Systems   Constitutional: Negative for weight loss.   HENT: Negative for congestion and nosebleeds.    Eyes: Negative for photophobia, pain and discharge.   Respiratory: Negative for sputum production and stridor.    Cardiovascular: Negative for chest pain.   Gastrointestinal: Negative for diarrhea, nausea and vomiting.        She states she has intermittent diarrhea  She was advised to f/u with GI as outpt but has had colonoscopy   Genitourinary: Negative for frequency and urgency.   Musculoskeletal: Positive for joint pain. Negative for back pain.        C/w L ankle fx   Skin: Negative for itching and rash.   Neurological: Negative for focal weakness and headaches.   Psychiatric/Behavioral:        " Confused        Physical Exam  Temp:  [35.8 °C (96.5 °F)-37.1 °C (98.8 °F)] 36.7 °C (98.1 °F)  Pulse:  [69-77] 77  Resp:  [16-17] 16  BP: (128-152)/(70-83) 152/80  SpO2:  [91 %-97 %] 91 %    Physical Exam  Vitals signs reviewed.   Constitutional:       General: She is not in acute distress.     Appearance: Normal appearance. She is not toxic-appearing.   HENT:      Head: Normocephalic.      Nose: No congestion.   Eyes:      Extraocular Movements: Extraocular movements intact.      Pupils: Pupils are equal, round, and reactive to light.   Neck:      Musculoskeletal: Normal range of motion.   Cardiovascular:      Rate and Rhythm: Normal rate.   Pulmonary:      Effort: Pulmonary effort is normal.      Breath sounds: Normal breath sounds.   Abdominal:      General: Abdomen is flat. Bowel sounds are normal.   Musculoskeletal:         General: Tenderness and signs of injury present. No swelling.   Skin:     General: Skin is warm.   Neurological:      General: No focal deficit present.      Mental Status: She is alert.      Comments: Speaking about her outpt primary provider and how she has seen her today walking by in the hospital.         Fluids    Intake/Output Summary (Last 24 hours) at 11/30/2019 0746  Last data filed at 11/30/2019 0600  Gross per 24 hour   Intake 150 ml   Output 850 ml   Net -700 ml       Laboratory                        Imaging  MR-BRAIN-W/O   Final Result      1.  Moderate lateral ventriculomegaly has detailed above. This may in part be related to underlying generalized atrophy and ex vacuo dilatation. Cannot exclude communicating or normal pressure hydrocephalus.   2.  Mild chronic microvascular ischemic type changes.   3.  No acute infarct or hemorrhage.      CT-HEAD W/O   Final Result      No acute intracranial findings.         DX-LUMBAR SPINE-2 OR 3 VIEWS   Final Result      1.  Moderate degenerative change of lumbar spine.   2.  No fracture or subluxation.   3.  Mild dextroconvex curvature,  likely positional.   4.  Lateral view is limited by technique.      DX-TIBIA AND FIBULA LEFT   Final Result      Distal fibular and tibial fractures.      DX-ANKLE 3+ VIEWS LEFT   Final Result      Trimalleolar fracture of the left ankle.      DX-KNEE 2- LEFT   Final Result      No acute fracture. Mild degenerative changes.           Assessment/Plan  Closed left ankle fracture  Assessment & Plan  Orthopedics recommended non surgical management  Pain control:  tylenol, NSAIDS, lidocaine patch for now  PT OT evaluation  SNF    Encephalopathy acute- (present on admission)  Assessment & Plan  Related to narcotics  Improved  No narcotics     Hypokalemia  Assessment & Plan  Replete as needed  Follow cmp in am    Debility  Assessment & Plan  Secondary to degenerative joint and disc disease and chronic pain, obesity.  PT OT evaluation    Moderate persistent asthma without complication- (present on admission)  Assessment & Plan  Patient is currently not in exacerbation  Continue home inhalers  RT protocol      Morbid obesity with BMI of 40.0-44.9, adult (HCC)- (present on admission)  Assessment & Plan  Follow with PCP for weight management    Chronic use of opiate drug for therapeutic purpose- (present on admission)  Assessment & Plan  Continue muscle relaxants and also carefull on narcotics as might be the cause of her confusion     Essential hypertension- (present on admission)  Assessment & Plan  Blood pressure is under control.  Continue lisinopril       VTE prophylaxis: enoxaparin

## 2019-12-01 ENCOUNTER — HOME HEALTH ADMISSION (OUTPATIENT)
Dept: HOME HEALTH SERVICES | Facility: HOME HEALTHCARE | Age: 69
End: 2019-12-01
Payer: MEDICARE

## 2019-12-01 PROCEDURE — A9270 NON-COVERED ITEM OR SERVICE: HCPCS | Performed by: INTERNAL MEDICINE

## 2019-12-01 PROCEDURE — 99232 SBSQ HOSP IP/OBS MODERATE 35: CPT | Performed by: INTERNAL MEDICINE

## 2019-12-01 PROCEDURE — 770001 HCHG ROOM/CARE - MED/SURG/GYN PRIV*

## 2019-12-01 PROCEDURE — 700102 HCHG RX REV CODE 250 W/ 637 OVERRIDE(OP): Performed by: INTERNAL MEDICINE

## 2019-12-01 PROCEDURE — A9270 NON-COVERED ITEM OR SERVICE: HCPCS | Performed by: HOSPITALIST

## 2019-12-01 PROCEDURE — 700111 HCHG RX REV CODE 636 W/ 250 OVERRIDE (IP): Performed by: INTERNAL MEDICINE

## 2019-12-01 PROCEDURE — 700101 HCHG RX REV CODE 250: Performed by: INTERNAL MEDICINE

## 2019-12-01 PROCEDURE — 700102 HCHG RX REV CODE 250 W/ 637 OVERRIDE(OP): Performed by: HOSPITALIST

## 2019-12-01 PROCEDURE — 700112 HCHG RX REV CODE 229: Performed by: HOSPITALIST

## 2019-12-01 PROCEDURE — 700105 HCHG RX REV CODE 258: Performed by: INTERNAL MEDICINE

## 2019-12-01 RX ORDER — LIDOCAINE 50 MG/G
1 PATCH TOPICAL EVERY 24 HOURS
Qty: 10 PATCH | Refills: 0 | Status: SHIPPED | OUTPATIENT
Start: 2019-12-02 | End: 2020-10-15 | Stop reason: SDUPTHER

## 2019-12-01 RX ORDER — POLYETHYLENE GLYCOL 3350 17 G/17G
1 POWDER, FOR SOLUTION ORAL DAILY
Status: DISCONTINUED | OUTPATIENT
Start: 2019-12-02 | End: 2019-12-03

## 2019-12-01 RX ORDER — BISACODYL 10 MG
10 SUPPOSITORY, RECTAL RECTAL ONCE
Status: COMPLETED | OUTPATIENT
Start: 2019-12-01 | End: 2019-12-01

## 2019-12-01 RX ORDER — DOCUSATE SODIUM 100 MG/1
100 CAPSULE, LIQUID FILLED ORAL 2 TIMES DAILY
Status: DISCONTINUED | OUTPATIENT
Start: 2019-12-01 | End: 2019-12-07 | Stop reason: HOSPADM

## 2019-12-01 RX ORDER — IBUPROFEN 400 MG/1
400 TABLET ORAL EVERY 6 HOURS PRN
Qty: 30 TAB | Refills: 0 | Status: SHIPPED | OUTPATIENT
Start: 2019-12-01 | End: 2021-05-05

## 2019-12-01 RX ORDER — AMLODIPINE BESYLATE 5 MG/1
5 TABLET ORAL DAILY
Qty: 30 TAB | Refills: 0 | Status: ON HOLD | OUTPATIENT
Start: 2019-12-02 | End: 2021-08-06

## 2019-12-01 RX ADMIN — GUAIFENESIN 600 MG: 600 TABLET, EXTENDED RELEASE ORAL at 17:08

## 2019-12-01 RX ADMIN — GUAIFENESIN 600 MG: 600 TABLET, EXTENDED RELEASE ORAL at 05:58

## 2019-12-01 RX ADMIN — BUDESONIDE AND FORMOTEROL FUMARATE DIHYDRATE 2 PUFF: 160; 4.5 AEROSOL RESPIRATORY (INHALATION) at 17:08

## 2019-12-01 RX ADMIN — SODIUM CHLORIDE: 9 INJECTION, SOLUTION INTRAVENOUS at 08:34

## 2019-12-01 RX ADMIN — ENOXAPARIN SODIUM 40 MG: 100 INJECTION SUBCUTANEOUS at 05:58

## 2019-12-01 RX ADMIN — SODIUM CHLORIDE: 9 INJECTION, SOLUTION INTRAVENOUS at 20:44

## 2019-12-01 RX ADMIN — AMLODIPINE BESYLATE 5 MG: 5 TABLET ORAL at 05:58

## 2019-12-01 RX ADMIN — ACETAMINOPHEN 650 MG: 325 TABLET, FILM COATED ORAL at 17:08

## 2019-12-01 RX ADMIN — BISACODYL 10 MG: 10 SUPPOSITORY RECTAL at 20:24

## 2019-12-01 RX ADMIN — LIDOCAINE 1 PATCH: 50 PATCH CUTANEOUS at 08:35

## 2019-12-01 RX ADMIN — BUDESONIDE AND FORMOTEROL FUMARATE DIHYDRATE 2 PUFF: 160; 4.5 AEROSOL RESPIRATORY (INHALATION) at 05:58

## 2019-12-01 RX ADMIN — ESTRADIOL 1 MG: 1 TABLET ORAL at 17:08

## 2019-12-01 RX ADMIN — METHOCARBAMOL TABLETS 500 MG: 500 TABLET, COATED ORAL at 17:10

## 2019-12-01 RX ADMIN — LISINOPRIL 10 MG: 10 TABLET ORAL at 05:58

## 2019-12-01 RX ADMIN — DOCUSATE SODIUM 100 MG: 100 CAPSULE, LIQUID FILLED ORAL at 20:24

## 2019-12-01 ASSESSMENT — ENCOUNTER SYMPTOMS
WEIGHT LOSS: 0
PHOTOPHOBIA: 0
EYE PAIN: 0
STRIDOR: 0
FOCAL WEAKNESS: 0
VOMITING: 0
BACK PAIN: 0
DIARRHEA: 0

## 2019-12-01 NOTE — FACE TO FACE
Face to Face Supporting Documentation - Home Health    The encounter with this patient was in whole or in part the primary reason for home health admission.    Date of encounter:   Patient:                    MRN:                       YOB: 2017  Sheyla Garcia  [id number]  1950     Home health to see patient for:  Skilled Nursing care for assessment, interventions & education    Skilled need for:  Comment: left ankle fracture    Skilled nursing interventions to include:  Comment: PT/OT    Homebound status evidenced by:  Need the aid of supportive devices such as crutches, canes, wheelchairs or walkers. Leaving home requires a considerable and taxing effort. There is a normal inability to leave the home.    Community Physician to provide follow up care: ALANNA Childers     Optional Interventions? No      I certify the face to face encounter for this home health care referral meets the CMS requirements and the encounter/clinical assessment with the patient was, in whole, or in part, for the medical condition(s) listed above, which is the primary reason for home health care. Based on my clinical findings: the service(s) are medically necessary, support the need for home health care, and the homebound criteria are met.  I certify that this patient has had a face to face encounter by myself.  Florentin Evans M.D. - NPI: [id number]

## 2019-12-01 NOTE — PROGRESS NOTES
"Internal Medicine Daily Progress Note    Date of Service  12/1/2019    Chief Complaint  68 y.o. female admitted 11/22/2019 with L closed bimalleolar fracture    Hospital Course   Pt with baseline debility secondary to chronic back pain, scoliosis multiple joint osteoarthritis, history of right ankle fracture, opiate dependence, hypertension, hypothyroidism, migraine, obesity who presented 11/22/2019 with complaints of left leg pain started after mechanical fall earlier today.  Apparently in the process of transferring, patient's legs \" gave up\" and she fell.  Patient was supposed to see primary care doctor yesterday.  Work-up in ER showed closed bimalleolar fracture of left ankle  ERP consulted with Dr. Holliday.  Ortho deems pt non-operative at this time.      Interval Problem Update  I had a long discussion with patient and  today.  Her pain is controlled with lidocaine patch.  They stated that they would like to be discharged home with home health and do not wish to go to SNF.  The  stated that he works from home and he should be able to take care of her at home.  I arranged home health for the patient.   patient was seen and examined by me today. Case was discussed with RN and multidisplinary team during rounds. Denies nausea, vomiting, diarrhea.       Consultants/Specialty  Ortho    Code Status  Full    Disposition  Rehab when approved    Review of Systems  Review of Systems   Constitutional: Negative for weight loss.   HENT: Negative for congestion and nosebleeds.    Eyes: Negative for photophobia and pain.   Respiratory: Negative for stridor.    Cardiovascular: Negative for chest pain.   Gastrointestinal: Negative for diarrhea and vomiting.        She states she has intermittent diarrhea  She was advised to f/u with GI as outpt but has had colonoscopy   Genitourinary: Negative for frequency and urgency.   Musculoskeletal: Positive for joint pain. Negative for back pain.        C/w L ankle fx "   Skin: Negative for rash.   Neurological: Negative for focal weakness.   Psychiatric/Behavioral:        Confused        Physical Exam  Temp:  [36.1 °C (97 °F)-37.2 °C (98.9 °F)] 36.1 °C (97 °F)  Pulse:  [67-86] 67  Resp:  [16-18] 16  BP: (108-147)/(56-83) 147/81  SpO2:  [90 %-92 %] 92 %    Physical Exam  Vitals signs reviewed.   Constitutional:       General: She is not in acute distress.     Appearance: Normal appearance. She is not toxic-appearing.   HENT:      Nose: No congestion.   Eyes:      Extraocular Movements: Extraocular movements intact.      Pupils: Pupils are equal, round, and reactive to light.   Neck:      Musculoskeletal: Normal range of motion.   Pulmonary:      Effort: Pulmonary effort is normal.   Abdominal:      General: Bowel sounds are normal.   Musculoskeletal:         General: Tenderness and signs of injury present. No swelling.   Skin:     General: Skin is warm.   Neurological:      General: No focal deficit present.      Mental Status: She is alert.      Comments: Speaking about her outpt primary provider and how she has seen her today walking by in the hospital.         Fluids    Intake/Output Summary (Last 24 hours) at 12/1/2019 0808  Last data filed at 12/1/2019 0027  Gross per 24 hour   Intake 100 ml   Output 150 ml   Net -50 ml       Laboratory                        Imaging  MR-BRAIN-W/O   Final Result      1.  Moderate lateral ventriculomegaly has detailed above. This may in part be related to underlying generalized atrophy and ex vacuo dilatation. Cannot exclude communicating or normal pressure hydrocephalus.   2.  Mild chronic microvascular ischemic type changes.   3.  No acute infarct or hemorrhage.      CT-HEAD W/O   Final Result      No acute intracranial findings.         DX-LUMBAR SPINE-2 OR 3 VIEWS   Final Result      1.  Moderate degenerative change of lumbar spine.   2.  No fracture or subluxation.   3.  Mild dextroconvex curvature, likely positional.   4.  Lateral view is  limited by technique.      DX-TIBIA AND FIBULA LEFT   Final Result      Distal fibular and tibial fractures.      DX-ANKLE 3+ VIEWS LEFT   Final Result      Trimalleolar fracture of the left ankle.      DX-KNEE 2- LEFT   Final Result      No acute fracture. Mild degenerative changes.           Assessment/Plan  Closed left ankle fracture  Assessment & Plan  Orthopedics recommended non surgical management  Pain control:  tylenol, NSAIDS, lidocaine patch for now  Avoid narcotics  PT OT evaluation  SNF    Encephalopathy acute- (present on admission)  Assessment & Plan  Related to narcotics  Improved  No narcotics     Hypokalemia  Assessment & Plan  Replete as needed  Follow cmp in am    Debility  Assessment & Plan  Secondary to degenerative joint and disc disease and chronic pain, obesity.  PT OT evaluation    Moderate persistent asthma without complication- (present on admission)  Assessment & Plan  Patient is currently not in exacerbation  Continue home inhalers  RT protocol      Morbid obesity with BMI of 40.0-44.9, adult (HCC)- (present on admission)  Assessment & Plan  Follow with PCP for weight management    Chronic use of opiate drug for therapeutic purpose- (present on admission)  Assessment & Plan  Continue muscle relaxants and also carefull on narcotics as might be the cause of her confusion     Essential hypertension- (present on admission)  Assessment & Plan  Blood pressure is under control.  Continue lisinopril       VTE prophylaxis: enoxaparin

## 2019-12-01 NOTE — DISCHARGE PLANNING
Received Choice form at 1520  Agency/Facility Name: [facility]  Referral sent per Choice form @ 1548     Agency/Facility Name: [facility]  Spoke To: Candy  Outcome: Will patient be okay to DC home without assistance until 12/4? If so, MD must make note for HH to accept.

## 2019-12-01 NOTE — DISCHARGE SUMMARY
"Discharge Summary    CHIEF COMPLAINT ON ADMISSION  Chief Complaint   Patient presents with   • T-5000 GLF   • Knee Pain       Reason for Admission  EMS      Admission Date  11/22/2019    CODE STATUS  Full Code    HPI & HOSPITAL COURSE    Pt with baseline debility secondary to chronic back pain, scoliosis multiple joint osteoarthritis, history of right ankle fracture, opiate dependence, hypertension, hypothyroidism, migraine, obesity who presented 11/22/2019 with complaints of left leg pain started after mechanical fall earlier today.  Apparently in the process of transferring, patient's legs \" gave up\" and she fell.  Work-up in ER showed closed bimalleolar fracture of left ankle, ERP consulted with Dr. Holliday.  Ortho deems pt non-operative at this time.  The patient was admitted for pain control.  PT OT saw her and recommended SNF.  According to Dr. Holliday the patient can be discharged home and follow-up with them as an outpatient.  Today the patient and  stated that they want to go to SNF and he wanted to go home with home health which I arranged for them.  I saw and examined the patient today.  They were explained about the risks of not going to SNF including recurrent fall and he understood the risks but he insisted that they want to go home. [facility] will follow the patient after the discharge.  Please call [phone] to schedule PCP appointment for patient.    Required specialty appointments include:     Dr. Holliday on 12/3/19    Therefore, she is discharged in fair and stable condition to home with close outpatient follow-up.    The patient met 2-midnight criteria for an inpatient stay at the time of discharge.    Discharge Date  12/02/19      FOLLOW UP ITEMS POST DISCHARGE      DISCHARGE DIAGNOSES  Active Problems:    Closed left ankle fracture POA: Unknown    Essential hypertension POA: Yes    Chronic use of opiate drug for therapeutic purpose POA: Yes    Morbid obesity with BMI of 40.0-44.9, adult " (HCC) POA: Yes    Moderate persistent asthma without complication POA: Yes    Debility POA: Unknown    Hypokalemia POA: No    Encephalopathy acute POA: Yes  Resolved Problems:    * No resolved hospital problems. *      FOLLOW UP  Future Appointments   Date Time Provider Department Center   2/21/2020  2:00 PM Elsa Hodges M.D. [facility] None     Zaira Swenson, [facility]  [address] [facility] [address]  [place] [id number] [phone]    Schedule an appointment as soon as possible for a visit in 1 week  Hospital follow-up appointment with PCP    Abe Holliday M.D.  [address]  [place] [address]  [phone]    On 12/3/2019        MEDICATIONS ON DISCHARGE     Medication List      START taking these medications      Instructions   amLODIPine 5 MG Tabs  Start taking on:  December 2, 2019  Commonly known as:  NORVASC   Take 1 Tab by mouth every day.  Dose:  5 mg     ibuprofen 400 MG Tabs  Commonly known as:  MOTRIN   Take 1 Tab by mouth every 6 hours as needed.  Dose:  400 mg     lidocaine 5 % Ptch  Start taking on:  December 2, 2019  Commonly known as:  LIDODERM   Apply 1 Patch to skin as directed every 24 hours.  Dose:  1 Patch        CONTINUE taking these medications      Instructions   * albuterol 108 (90 Base) MCG/ACT Aers inhalation aerosol  Commonly known as:  PROAIR HFA   Inhale 2 Puffs by mouth every 6 hours as needed for Shortness of Breath.  Dose:  2 Puff     * albuterol 2.5mg/3ml Nebu solution for nebulization  Commonly known as:  PROVENTIL   3 mL by Nebulization route every four hours as needed for Shortness of Breath.  Dose:  2.5 mg     BETA CAROTENE PO   Take 1 Dose by mouth every day. Unknown OTC Strength.  Dose:  1 Dose     CYANOCOBALAMIN PO   Take 1 Dose by mouth every day. Unknown OTC Strength.  Dose:  1 Dose     DITROPAN XL 10 MG CR tablet  Generic drug:  oxybutynin SR   Take 10 mg by mouth every day.  Dose:  10 mg     DULERA 200-5 MCG/ACT Aero  Generic drug:  Mometasone Furo-Formoterol Fum    USE 2 INHALATIONS TWICE A DAY     DULoxetine 30 MG Cpep  Commonly known as:  CYMBALTA   Take 30 mg by mouth every day.  Dose:  30 mg     estradiol 1 MG Tabs  Commonly known as:  ESTRACE   Take 1 mg by mouth every day.  Dose:  1 mg     lisinopril 10 MG Tabs  Commonly known as:  PRINIVIL   Take 10 mg by mouth every day.  Dose:  10 mg     methocarbamol 750 MG Tabs  Commonly known as:  ROBAXIN   Take 750 mg by mouth 3 times a day as needed (Muscle Spasms.).  Dose:  750 mg     SUPER B-COMPLEX/VIT C/FA PO   Take 1 Tab by mouth every day.  Dose:  1 Tab     therapeutic multivitamin-minerals Tabs   Take 1 Tab by mouth every day.  Dose:  1 Tab     topiramate 25 MG Tabs  Commonly known as:  TOPAMAX   Take 25 mg by mouth every bedtime.  Dose:  25 mg         * This list has 2 medication(s) that are the same as other medications prescribed for you. Read the directions carefully, and ask your doctor or other care provider to review them with you.            STOP taking these medications    oxyCODONE-acetaminophen  MG Tabs  Commonly known as:  PERCOCET-10            Allergies  Allergies   Allergen Reactions   • Gabapentin      seizure       DIET  Orders Placed This Encounter   Procedures   • Diet Order Regular     Standing Status:   Standing     Number of Occurrences:   1     Order Specific Question:   Diet:     Answer:   Regular [1]       ACTIVITY  As tolerated.  Weight bearing as tolerated    CONSULTATIONS  Dr. vann    PROCEDURES      LABORATORY  Lab Results   Component Value Date    SODIUM 140 11/26/2019    POTASSIUM 3.5 (L) 11/26/2019    CHLORIDE 107 11/26/2019    CO2 22 11/26/2019    GLUCOSE 90 11/26/2019    BUN 9 11/26/2019    CREATININE 0.55 11/26/2019        Lab Results   Component Value Date    WBC 9.1 11/26/2019    HEMOGLOBIN 15.5 11/26/2019    HEMATOCRIT 47.1 (H) 11/26/2019    PLATELETCT 236 11/26/2019        Total time of the discharge process exceeds 38 minutes.

## 2019-12-01 NOTE — DISCHARGE PLANNING
Order for home health received. Met with patient at bedside. She is worried about her  and does not want to discuss home health at this time. Nursing aware. Nursing will call when patient ready.

## 2019-12-02 ENCOUNTER — PATIENT OUTREACH (OUTPATIENT)
Dept: HEALTH INFORMATION MANAGEMENT | Facility: OTHER | Age: 69
End: 2019-12-02

## 2019-12-02 PROCEDURE — 700112 HCHG RX REV CODE 229: Performed by: HOSPITALIST

## 2019-12-02 PROCEDURE — 700102 HCHG RX REV CODE 250 W/ 637 OVERRIDE(OP): Performed by: HOSPITALIST

## 2019-12-02 PROCEDURE — 770006 HCHG ROOM/CARE - MED/SURG/GYN SEMI*

## 2019-12-02 PROCEDURE — A9270 NON-COVERED ITEM OR SERVICE: HCPCS | Performed by: HOSPITALIST

## 2019-12-02 PROCEDURE — A9270 NON-COVERED ITEM OR SERVICE: HCPCS | Performed by: INTERNAL MEDICINE

## 2019-12-02 PROCEDURE — 700102 HCHG RX REV CODE 250 W/ 637 OVERRIDE(OP): Performed by: INTERNAL MEDICINE

## 2019-12-02 PROCEDURE — 700101 HCHG RX REV CODE 250: Performed by: INTERNAL MEDICINE

## 2019-12-02 PROCEDURE — 99239 HOSP IP/OBS DSCHRG MGMT >30: CPT | Performed by: INTERNAL MEDICINE

## 2019-12-02 PROCEDURE — 97530 THERAPEUTIC ACTIVITIES: CPT

## 2019-12-02 PROCEDURE — 700111 HCHG RX REV CODE 636 W/ 250 OVERRIDE (IP): Performed by: INTERNAL MEDICINE

## 2019-12-02 RX ADMIN — GUAIFENESIN 600 MG: 600 TABLET, EXTENDED RELEASE ORAL at 05:30

## 2019-12-02 RX ADMIN — LISINOPRIL 10 MG: 10 TABLET ORAL at 05:29

## 2019-12-02 RX ADMIN — GUAIFENESIN 600 MG: 600 TABLET, EXTENDED RELEASE ORAL at 16:15

## 2019-12-02 RX ADMIN — BUDESONIDE AND FORMOTEROL FUMARATE DIHYDRATE 2 PUFF: 160; 4.5 AEROSOL RESPIRATORY (INHALATION) at 21:07

## 2019-12-02 RX ADMIN — POLYETHYLENE GLYCOL 3350 1 PACKET: 17 POWDER, FOR SOLUTION ORAL at 05:30

## 2019-12-02 RX ADMIN — DOCUSATE SODIUM 100 MG: 100 CAPSULE, LIQUID FILLED ORAL at 05:29

## 2019-12-02 RX ADMIN — ACETAMINOPHEN 650 MG: 325 TABLET, FILM COATED ORAL at 05:29

## 2019-12-02 RX ADMIN — TRAMADOL HYDROCHLORIDE 50 MG: 50 TABLET, FILM COATED ORAL at 22:23

## 2019-12-02 RX ADMIN — ESTRADIOL 1 MG: 1 TABLET ORAL at 16:16

## 2019-12-02 RX ADMIN — DOCUSATE SODIUM 100 MG: 100 CAPSULE, LIQUID FILLED ORAL at 16:15

## 2019-12-02 RX ADMIN — LIDOCAINE 1 PATCH: 50 PATCH CUTANEOUS at 09:15

## 2019-12-02 RX ADMIN — ENOXAPARIN SODIUM 40 MG: 100 INJECTION SUBCUTANEOUS at 05:30

## 2019-12-02 RX ADMIN — AMLODIPINE BESYLATE 5 MG: 5 TABLET ORAL at 05:30

## 2019-12-02 RX ADMIN — TRAMADOL HYDROCHLORIDE 50 MG: 50 TABLET, FILM COATED ORAL at 16:15

## 2019-12-02 ASSESSMENT — COGNITIVE AND FUNCTIONAL STATUS - GENERAL
STANDING UP FROM CHAIR USING ARMS: A LOT
SUGGESTED CMS G CODE MODIFIER MOBILITY: CM
CLIMB 3 TO 5 STEPS WITH RAILING: TOTAL
WALKING IN HOSPITAL ROOM: TOTAL
MOVING FROM LYING ON BACK TO SITTING ON SIDE OF FLAT BED: UNABLE
MOBILITY SCORE: 7
TURNING FROM BACK TO SIDE WHILE IN FLAT BAD: UNABLE
MOVING TO AND FROM BED TO CHAIR: UNABLE

## 2019-12-02 ASSESSMENT — GAIT ASSESSMENTS: GAIT LEVEL OF ASSIST: UNABLE TO PARTICIPATE

## 2019-12-02 NOTE — THERAPY
"Physical Therapy Treatment completed.   Bed Mobility:  Supine to Sit: Moderate Assist  Transfers: Sit to Stand: Unable to Participate  Gait: Level Of Assist: Unable to Participate   Plan of Care: Will benefit from Physical Therapy 4 times per week  Discharge Recommendations: Equipment: Will Continue to Assess for Equipment Needs. Recommend post-acute placement for continued physical therapy services prior to discharge home. Patient can tolerate post-acute therapies at a 5x/week frequency.       See \"Rehab Therapy-Acute\" Patient Summary Report for complete documentation.     Pt seen for PT treatment session. Spouse adamant on taking pt home but with greater understanding at end of session how SNF will benefit them. Pt remains unsafe to return home at this time. Pt and spouse required additional assist from therapist for slide board transfer from EOB to commode. Pt unable to maintain LLE NWB when spouse assists pt to standing. Given impaired cognition, pt is unable to follow commands for weight shifting appropriately for successful slide board transfer. Required assist for scooting, slide board placement, and to stabilize commode which spouse would be unable to do alone. Spouse rolled pt from commode back to bed total A though not advised, required four people to assist in repositioning pt in bed. Continue to recommend SNF prior to return home. Recommend EOB mobilization with nursing. May need to use mechanical lift for dependent transfers. Will continue to work with pt in acute setting.   "

## 2019-12-02 NOTE — PROGRESS NOTES
"Notified hospitalist of patient requesting bowel movement medications (specifically suppository) to help facilitate a bm as patient has not had a bm since 11/28.  Patient states she is \"prone to constipation ever since I gave birth to my daughter.\"  Hospitalist put in orders for bowel protocol medications.  "

## 2019-12-02 NOTE — CARE PLAN
Problem: Nutritional:  Goal: Achieve adequate nutritional intake  Description  Patient will consume = or >50% of meals and snacks.    Outcome: PROGRESSING AS EXPECTED  Per chart pt PO remains variable, %. RD will continue to follow.

## 2019-12-02 NOTE — DISCHARGE PLANNING
ATTN: Case Management  RE: Referral for Home Health    [facility]  has received your referral.   At this time we will be unable to see patient until 12/4/2019. MELVIN Vivar is aware and will notify ELIZABETH and MD As of 12/1/2019, we have accepted the Home Health referral for the patient listed above.    A [facility] clinician will be out to see the patient within 48 hours. If you have any questions or concerns regarding the patient’s transition to Home Health, please do not hesitate to contact us at [phone].      We look forward to collaborating with you,  [facility] Team

## 2019-12-02 NOTE — CARE PLAN
Problem: Mobility  Goal: Risk for activity intolerance will decrease  Outcome: PROGRESSING SLOWER THAN EXPECTED   Patient refused ambulation this shift. Continue to encourage OOB for meals, and edge of bed.     Problem: Venous Thromboembolism (VTW)/Deep Vein Thrombosis (DVT) Prevention:  Goal: Patient will participate in Venous Thrombosis (VTE)/Deep Vein Thrombosis (DVT)Prevention Measures  Outcome: PROGRESSING AS EXPECTED  Lovenox in use. No order for SCDs.     Problem: Pain Management  Goal: Pain level will decrease to patient's comfort goal  Outcome: PROGRESSING AS EXPECTED     Problem: Skin Integrity  Goal: Risk for impaired skin integrity will decrease  Outcome: PROGRESSING AS EXPECTED  Barrier cream in use, changing ying pads to keep patient skin as dry as possible, pillows in use for support and comfort.     Problem: Urinary Elimination:  Goal: Ability to reestablish a normal urinary elimination pattern will improve  Outcome: PROGRESSING AS EXPECTED  Purewick in place to monitor input and output and because patient incontinent at times

## 2019-12-03 PROBLEM — E87.6 HYPOKALEMIA: Status: RESOLVED | Noted: 2019-11-23 | Resolved: 2019-12-03

## 2019-12-03 PROCEDURE — A9270 NON-COVERED ITEM OR SERVICE: HCPCS | Performed by: INTERNAL MEDICINE

## 2019-12-03 PROCEDURE — 700102 HCHG RX REV CODE 250 W/ 637 OVERRIDE(OP): Performed by: INTERNAL MEDICINE

## 2019-12-03 PROCEDURE — 700111 HCHG RX REV CODE 636 W/ 250 OVERRIDE (IP): Performed by: INTERNAL MEDICINE

## 2019-12-03 PROCEDURE — A9270 NON-COVERED ITEM OR SERVICE: HCPCS | Performed by: HOSPITALIST

## 2019-12-03 PROCEDURE — 770006 HCHG ROOM/CARE - MED/SURG/GYN SEMI*

## 2019-12-03 PROCEDURE — 99231 SBSQ HOSP IP/OBS SF/LOW 25: CPT | Performed by: INTERNAL MEDICINE

## 2019-12-03 PROCEDURE — 700101 HCHG RX REV CODE 250: Performed by: INTERNAL MEDICINE

## 2019-12-03 PROCEDURE — 700112 HCHG RX REV CODE 229: Performed by: HOSPITALIST

## 2019-12-03 RX ORDER — TIZANIDINE 4 MG/1
4 TABLET ORAL EVERY 6 HOURS PRN
Status: DISCONTINUED | OUTPATIENT
Start: 2019-12-03 | End: 2019-12-07 | Stop reason: HOSPADM

## 2019-12-03 RX ADMIN — DULOXETINE HYDROCHLORIDE 30 MG: 30 CAPSULE, DELAYED RELEASE ORAL at 05:10

## 2019-12-03 RX ADMIN — ACETAMINOPHEN 650 MG: 325 TABLET, FILM COATED ORAL at 17:13

## 2019-12-03 RX ADMIN — GUAIFENESIN 600 MG: 600 TABLET, EXTENDED RELEASE ORAL at 17:14

## 2019-12-03 RX ADMIN — ENOXAPARIN SODIUM 40 MG: 100 INJECTION SUBCUTANEOUS at 05:10

## 2019-12-03 RX ADMIN — LIDOCAINE 1 PATCH: 50 PATCH CUTANEOUS at 09:15

## 2019-12-03 RX ADMIN — DOCUSATE SODIUM 100 MG: 100 CAPSULE, LIQUID FILLED ORAL at 05:10

## 2019-12-03 RX ADMIN — LISINOPRIL 10 MG: 10 TABLET ORAL at 05:10

## 2019-12-03 RX ADMIN — TRAMADOL HYDROCHLORIDE 50 MG: 50 TABLET, FILM COATED ORAL at 05:10

## 2019-12-03 RX ADMIN — AMLODIPINE BESYLATE 5 MG: 5 TABLET ORAL at 05:10

## 2019-12-03 RX ADMIN — GUAIFENESIN 600 MG: 600 TABLET, EXTENDED RELEASE ORAL at 05:10

## 2019-12-03 RX ADMIN — BUDESONIDE AND FORMOTEROL FUMARATE DIHYDRATE 2 PUFF: 160; 4.5 AEROSOL RESPIRATORY (INHALATION) at 17:15

## 2019-12-03 ASSESSMENT — ENCOUNTER SYMPTOMS
VOMITING: 0
FOCAL WEAKNESS: 0
BACK PAIN: 0
PHOTOPHOBIA: 0
WEIGHT LOSS: 0
STRIDOR: 0
DIARRHEA: 0
EYE PAIN: 0

## 2019-12-03 NOTE — CONSULTS
11/22/2019    Reason for consultation: Left ankle fracture    Consultation on Sheyla Garcia at the request of Dr. Lopez for a left ankle fracture.  The patient is a 69 y.o. female who presents with a left ankle fracture due to a mechanical fall.  The patient noted immediate pain and inability to move the affected extremity due to pain.  They were evaluated in the ER, and Orthopedics was consulted. Patient denies numbness, paresthesias, loss of consciousness or other symptoms.  She is reportedly minimally ambulatory secondary to chronic pain and back/neck issues.    Past Medical History:   Diagnosis Date   • Hypertension    • Hypothyroidism 2008   • Migraine    • Obesity        Past Surgical History:   Procedure Laterality Date   • KNEE ORIF  1/3/08    tibial plateau fracture   • BLADDER SLING FEMALE     • NASAL SEPTAL RECONSTRUCTION      age 26   • OTHER SURGICAL PROCEDURE      SI joint fusion - Dr. Pineda 1/2019   • TONSILLECTOMY         Medications  No current facility-administered medications on file prior to encounter.      Current Outpatient Medications on File Prior to Encounter   Medication Sig Dispense Refill   • topiramate (TOPAMAX) 25 MG Tab Take 25 mg by mouth every bedtime.     • estradiol (ESTRACE) 1 MG Tab Take 1 mg by mouth every day.     • lisinopril (PRINIVIL) 10 MG Tab Take 10 mg by mouth every day.     • methocarbamol (ROBAXIN) 750 MG Tab Take 750 mg by mouth 3 times a day as needed (Muscle Spasms.).     • therapeutic multivitamin-minerals (THERAGRAN-M) Tab Take 1 Tab by mouth every day.     • oxybutynin SR (DITROPAN XL) 10 MG CR tablet Take 10 mg by mouth every day.     • DULoxetine (CYMBALTA) 30 MG Cap DR Particles Take 30 mg by mouth every day.     • DULERA 200-5 MCG/ACT Aerosol USE 2 INHALATIONS TWICE A DAY 39 g 3   • albuterol (PROAIR HFA) 108 (90 BASE) MCG/ACT Aero Soln inhalation aerosol Inhale 2 Puffs by mouth every 6 hours as needed for Shortness of Breath. 8.5 g 3   • albuterol  (PROVENTIL) 2.5mg/3ml Nebu Soln solution for nebulization 3 mL by Nebulization route every four hours as needed for Shortness of Breath. 75 mL 3   • CYANOCOBALAMIN PO Take 1 Dose by mouth every day. Unknown OTC Strength.     • B Complex-C-Folic Acid (SUPER B-COMPLEX/VIT C/FA PO) Take 1 Tab by mouth every day.     • BETA CAROTENE PO Take 1 Dose by mouth every day. Unknown OTC Strength.         Allergies  Gabapentin    ROS  Per HPI. All other systems were reviewed and found to be negative    Family History   Problem Relation Age of Onset   • Heart Disease Mother         chf   • Cancer Father         lung, berrylium exposure   • Stroke Father         tia's   • Cancer Maternal Grandfather         lung   • Diabetes Neg Hx        Social History     Socioeconomic History   • Marital status:      Spouse name: Not on file   • Number of children: Not on file   • Years of education: Not on file   • Highest education level: Not on file   Occupational History   • Not on file   Social Needs   • Financial resource strain: Not on file   • Food insecurity:     Worry: Not on file     Inability: Not on file   • Transportation needs:     Medical: Not on file     Non-medical: Not on file   Tobacco Use   • Smoking status: Never Smoker   • Smokeless tobacco: Never Used   Substance and Sexual Activity   • Alcohol use: Yes     Comment: mod   • Drug use: Not on file   • 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:  , realtor   Lifestyle   • Physical activity:     Days per week: Not on file     Minutes per session: Not on file   • Stress: Not on file   Relationships   • Social connections:     Talks on phone: Not on file     Gets together: Not on file     Attends [employer] service: Not on file     Active member of club or organization: Not on file     Attends meetings of clubs or organizations: Not on file     Relationship status: Not on file   • Intimate partner violence:     Fear of current or ex partner: Not on file      "Emotionally abused: Not on file     Physically abused: Not on file     Forced sexual activity: Not on file   Other Topics Concern   • Not on file   Social History Narrative   • Not on file       Physical Exam  Vitals  /89   Pulse 96   Temp 36.5 °C (97.7 °F) (Temporal)   Resp 17   Ht 1.753 m (5' 9\")   Wt (!) 126.6 kg (279 lb)   SpO2 90%   General: Well Developed, Well Nourished, no acute distress  Psychiatric: Alert and oriented x3, appropriate responses to questions, pleasant mood and affect.  HEENT: Normocephalic, atraumatic  Eyes: Anicteric, PERRL  Neck: Supple, nontender, no masses  Chest: Symmetric expansion of the chest wall, non-tender to palpation, no distress.  Heart: RRR, palpable peripheral pulses  Abdomen: Soft, NT, ND  Skin: Intact, no open wounds  Extremities: Tender to palpation about left ankle laterally  Neuro: Intact light touch sensation in left foot at baseline, intact motor TA/GS/EHL/P  Vascular: 2+ DP on left , Capillary refill <2 seconds    Radiographs:  MR-BRAIN-W/O   Final Result      1.  Moderate lateral ventriculomegaly has detailed above. This may in part be related to underlying generalized atrophy and ex vacuo dilatation. Cannot exclude communicating or normal pressure hydrocephalus.   2.  Mild chronic microvascular ischemic type changes.   3.  No acute infarct or hemorrhage.      CT-HEAD W/O   Final Result      No acute intracranial findings.         DX-LUMBAR SPINE-2 OR 3 VIEWS   Final Result      1.  Moderate degenerative change of lumbar spine.   2.  No fracture or subluxation.   3.  Mild dextroconvex curvature, likely positional.   4.  Lateral view is limited by technique.      DX-TIBIA AND FIBULA LEFT   Final Result      Distal fibular and tibial fractures.      DX-ANKLE 3+ VIEWS LEFT   Final Result      Trimalleolar fracture of the left ankle.      DX-KNEE 2- LEFT   Final Result      No acute fracture. Mild degenerative changes.          Laboratory Values      No " results for input(s): SODIUM, POTASSIUM, CHLORIDE, CO2, GLUCOSE, BUN, CPKTOTAL in the last 72 hours.          Impression:    #1 Minimally displaced left distal fibula fracture  #2 Nondisplaced left posterior malleolus fracture    Plan:    Non-operative management for now.  Toe touch weightbearing left lower extremity, splint immobilization.  She should follow up in my office in a week for standing radiographs or stress films to verify fracture stability.

## 2019-12-03 NOTE — CARE PLAN
Problem: Safety  Goal: Will remain free from falls  Outcome: PROGRESSING AS EXPECTED  Pt education reinforced, pt verbalized she will use call light if needing assistance, pt demonstrates ability to use call light appropriately.        Problem: Discharge Barriers/Planning  Goal: Patient's continuum of care needs will be met  Outcome: PROGRESSING AS EXPECTED   Pt updated on POC, pt referral to [facility] was accepted but unable to be seen until 12/4 for evaluation. Pt verbalized understanding and has no further questions or concerns or needs at this time.

## 2019-12-03 NOTE — PROGRESS NOTES
"Internal Medicine Daily Progress Note    Date of Service  12/3/2019    Chief Complaint  68 y.o. female admitted 11/22/2019 with L closed bimalleolar fracture    Hospital Course   Pt with baseline debility secondary to chronic back pain, scoliosis multiple joint osteoarthritis, history of right ankle fracture, opiate dependence, hypertension, hypothyroidism, migraine, obesity who presented 11/22/2019 with complaints of left leg pain started after mechanical fall earlier today.  Apparently in the process of transferring, patient's legs \" gave up\" and she fell.  Patient was supposed to see primary care doctor yesterday.  Work-up in ER showed closed bimalleolar fracture of left ankle  ERP consulted with Dr. Holliday.  Ortho deems pt non-operative at this time.      Interval Problem Update  Pt still oriented x2. MRI showed ventromegaly. Possible NPH ??  states that she does not have memory problems at home. She is real stator. Drinking 3 times per week. Mild tremor on examination. There is some problem with gait and urinary incontinence per her  at home.       Consultants/Specialty  Ortho    Code Status  Full    Disposition  Rehab when approved    Review of Systems  Review of Systems   Constitutional: Negative for weight loss.   HENT: Negative for congestion and nosebleeds.    Eyes: Negative for photophobia and pain.   Respiratory: Negative for stridor.    Cardiovascular: Negative for chest pain.   Gastrointestinal: Negative for diarrhea and vomiting.   Genitourinary: Negative for frequency and urgency.   Musculoskeletal: Positive for joint pain. Negative for back pain.        C/w L ankle fx   Skin: Negative for rash.   Neurological: Negative for focal weakness.   Psychiatric/Behavioral:        Confused        Physical Exam  Temp:  [36 °C (96.8 °F)-36.6 °C (97.8 °F)] 36.5 °C (97.7 °F)  Pulse:  [68-96] 96  Resp:  [16-17] 17  BP: (136-161)/(80-89) 148/89  SpO2:  [90 %-93 %] 90 %    Physical Exam  Vitals signs " reviewed.   Constitutional:       General: She is not in acute distress.     Appearance: Normal appearance. She is not toxic-appearing.   HENT:      Nose: No congestion.   Eyes:      Extraocular Movements: Extraocular movements intact.      Pupils: Pupils are equal, round, and reactive to light.   Neck:      Musculoskeletal: Normal range of motion.   Pulmonary:      Effort: Pulmonary effort is normal.   Abdominal:      General: Bowel sounds are normal.   Musculoskeletal:         General: Tenderness and signs of injury present. No swelling.   Skin:     General: Skin is warm.   Neurological:      General: No focal deficit present.      Mental Status: She is alert. She is disoriented.      Comments: .         Fluids    Intake/Output Summary (Last 24 hours) at 12/3/2019 1413  Last data filed at 12/3/2019 0800  Gross per 24 hour   Intake 80 ml   Output 650 ml   Net -570 ml       Laboratory                        Imaging  MR-BRAIN-W/O   Final Result      1.  Moderate lateral ventriculomegaly has detailed above. This may in part be related to underlying generalized atrophy and ex vacuo dilatation. Cannot exclude communicating or normal pressure hydrocephalus.   2.  Mild chronic microvascular ischemic type changes.   3.  No acute infarct or hemorrhage.      CT-HEAD W/O   Final Result      No acute intracranial findings.         DX-LUMBAR SPINE-2 OR 3 VIEWS   Final Result      1.  Moderate degenerative change of lumbar spine.   2.  No fracture or subluxation.   3.  Mild dextroconvex curvature, likely positional.   4.  Lateral view is limited by technique.      DX-TIBIA AND FIBULA LEFT   Final Result      Distal fibular and tibial fractures.      DX-ANKLE 3+ VIEWS LEFT   Final Result      Trimalleolar fracture of the left ankle.      DX-KNEE 2- LEFT   Final Result      No acute fracture. Mild degenerative changes.           Assessment/Plan  Closed left ankle fracture  Assessment & Plan  Orthopedics recommended non surgical  management  Pain control:  tylenol, NSAIDS, lidocaine patch for now  Avoid narcotics  PT OT evaluation  SNF    Encephalopathy acute- (present on admission)  Assessment & Plan  Related to narcotics ??   Improved  No narcotics   MRI brain showed possible NPH (ventromegaly)   Recheck electrolytes. Continue to monitor     Debility  Assessment & Plan  Secondary to degenerative joint and disc disease and chronic pain, obesity.  PT OT evaluation    Moderate persistent asthma without complication- (present on admission)  Assessment & Plan  Patient is currently not in exacerbation  Continue home inhalers  RT protocol      Morbid obesity with BMI of 40.0-44.9, adult (HCC)- (present on admission)  Assessment & Plan  Follow with PCP for weight management    Chronic use of opiate drug for therapeutic purpose- (present on admission)  Assessment & Plan  Continue muscle relaxants and also carefull on narcotics as might be the cause of her confusion     Hypothyroidism- (present on admission)  Assessment & Plan  tsh to follow  Not on meds       Essential hypertension- (present on admission)  Assessment & Plan  Blood pressure is under control.  Continue lisinopril       VTE prophylaxis: enoxaparin

## 2019-12-04 LAB
ALBUMIN SERPL BCP-MCNC: 4 G/DL (ref 3.2–4.9)
ALBUMIN/GLOB SERPL: 1.3 G/DL
ALP SERPL-CCNC: 79 U/L (ref 30–99)
ALT SERPL-CCNC: 12 U/L (ref 2–50)
ANION GAP SERPL CALC-SCNC: 11 MMOL/L (ref 0–11.9)
APPEARANCE UR: ABNORMAL
AST SERPL-CCNC: 17 U/L (ref 12–45)
BACTERIA #/AREA URNS HPF: ABNORMAL /HPF
BASOPHILS # BLD AUTO: 0.3 % (ref 0–1.8)
BASOPHILS # BLD: 0.03 K/UL (ref 0–0.12)
BILIRUB SERPL-MCNC: 0.9 MG/DL (ref 0.1–1.5)
BILIRUB UR QL STRIP.AUTO: ABNORMAL
BUN SERPL-MCNC: 12 MG/DL (ref 8–22)
CALCIUM SERPL-MCNC: 9.6 MG/DL (ref 8.5–10.5)
CHLORIDE SERPL-SCNC: 104 MMOL/L (ref 96–112)
CO2 SERPL-SCNC: 24 MMOL/L (ref 20–33)
COLOR UR: ABNORMAL
CREAT SERPL-MCNC: 0.42 MG/DL (ref 0.5–1.4)
EOSINOPHIL # BLD AUTO: 0.55 K/UL (ref 0–0.51)
EOSINOPHIL NFR BLD: 4.9 % (ref 0–6.9)
EPI CELLS #/AREA URNS HPF: NEGATIVE /HPF
ERYTHROCYTE [DISTWIDTH] IN BLOOD BY AUTOMATED COUNT: 40.6 FL (ref 35.9–50)
GLOBULIN SER CALC-MCNC: 3.1 G/DL (ref 1.9–3.5)
GLUCOSE SERPL-MCNC: 118 MG/DL (ref 65–99)
GLUCOSE UR STRIP.AUTO-MCNC: NEGATIVE MG/DL
HCT VFR BLD AUTO: 48.9 % (ref 37–47)
HGB BLD-MCNC: 16.3 G/DL (ref 12–16)
HYALINE CASTS #/AREA URNS LPF: ABNORMAL /LPF
IMM GRANULOCYTES # BLD AUTO: 0.04 K/UL (ref 0–0.11)
IMM GRANULOCYTES NFR BLD AUTO: 0.4 % (ref 0–0.9)
KETONES UR STRIP.AUTO-MCNC: 40 MG/DL
LEUKOCYTE ESTERASE UR QL STRIP.AUTO: ABNORMAL
LYMPHOCYTES # BLD AUTO: 2.18 K/UL (ref 1–4.8)
LYMPHOCYTES NFR BLD: 19.4 % (ref 22–41)
MAGNESIUM SERPL-MCNC: 1.6 MG/DL (ref 1.5–2.5)
MCH RBC QN AUTO: 30.1 PG (ref 27–33)
MCHC RBC AUTO-ENTMCNC: 33.3 G/DL (ref 33.6–35)
MCV RBC AUTO: 90.2 FL (ref 81.4–97.8)
MICRO URNS: ABNORMAL
MONOCYTES # BLD AUTO: 0.97 K/UL (ref 0–0.85)
MONOCYTES NFR BLD AUTO: 8.7 % (ref 0–13.4)
NEUTROPHILS # BLD AUTO: 7.44 K/UL (ref 2–7.15)
NEUTROPHILS NFR BLD: 66.3 % (ref 44–72)
NITRITE UR QL STRIP.AUTO: NEGATIVE
NRBC # BLD AUTO: 0 K/UL
NRBC BLD-RTO: 0 /100 WBC
PH UR STRIP.AUTO: 5.5 [PH] (ref 5–8)
PHOSPHATE SERPL-MCNC: 3 MG/DL (ref 2.5–4.5)
PLATELET # BLD AUTO: 242 K/UL (ref 164–446)
PMV BLD AUTO: 9.9 FL (ref 9–12.9)
POTASSIUM SERPL-SCNC: 3.2 MMOL/L (ref 3.6–5.5)
PROT SERPL-MCNC: 7.1 G/DL (ref 6–8.2)
PROT UR QL STRIP: NEGATIVE MG/DL
RBC # BLD AUTO: 5.42 M/UL (ref 4.2–5.4)
RBC UR QL AUTO: NEGATIVE
SODIUM SERPL-SCNC: 139 MMOL/L (ref 135–145)
SP GR UR STRIP.AUTO: 1.03
TSH SERPL DL<=0.005 MIU/L-ACNC: 3.23 UIU/ML (ref 0.38–5.33)
UROBILINOGEN UR STRIP.AUTO-MCNC: 1 MG/DL
WBC # BLD AUTO: 11.2 K/UL (ref 4.8–10.8)
WBC #/AREA URNS HPF: ABNORMAL /HPF

## 2019-12-04 PROCEDURE — 700102 HCHG RX REV CODE 250 W/ 637 OVERRIDE(OP): Performed by: INTERNAL MEDICINE

## 2019-12-04 PROCEDURE — A9270 NON-COVERED ITEM OR SERVICE: HCPCS | Performed by: INTERNAL MEDICINE

## 2019-12-04 PROCEDURE — 85025 COMPLETE CBC W/AUTO DIFF WBC: CPT

## 2019-12-04 PROCEDURE — 87077 CULTURE AEROBIC IDENTIFY: CPT

## 2019-12-04 PROCEDURE — 700112 HCHG RX REV CODE 229: Performed by: HOSPITALIST

## 2019-12-04 PROCEDURE — 84100 ASSAY OF PHOSPHORUS: CPT

## 2019-12-04 PROCEDURE — 87186 SC STD MICRODIL/AGAR DIL: CPT

## 2019-12-04 PROCEDURE — 80053 COMPREHEN METABOLIC PANEL: CPT

## 2019-12-04 PROCEDURE — 700101 HCHG RX REV CODE 250: Performed by: INTERNAL MEDICINE

## 2019-12-04 PROCEDURE — A9270 NON-COVERED ITEM OR SERVICE: HCPCS | Performed by: HOSPITALIST

## 2019-12-04 PROCEDURE — 87086 URINE CULTURE/COLONY COUNT: CPT

## 2019-12-04 PROCEDURE — 84443 ASSAY THYROID STIM HORMONE: CPT

## 2019-12-04 PROCEDURE — 99232 SBSQ HOSP IP/OBS MODERATE 35: CPT | Performed by: INTERNAL MEDICINE

## 2019-12-04 PROCEDURE — 770006 HCHG ROOM/CARE - MED/SURG/GYN SEMI*

## 2019-12-04 PROCEDURE — 700111 HCHG RX REV CODE 636 W/ 250 OVERRIDE (IP): Performed by: INTERNAL MEDICINE

## 2019-12-04 PROCEDURE — 81001 URINALYSIS AUTO W/SCOPE: CPT

## 2019-12-04 PROCEDURE — 83735 ASSAY OF MAGNESIUM: CPT

## 2019-12-04 PROCEDURE — 36415 COLL VENOUS BLD VENIPUNCTURE: CPT

## 2019-12-04 RX ORDER — POTASSIUM CHLORIDE 20 MEQ/1
40 TABLET, EXTENDED RELEASE ORAL ONCE
Status: COMPLETED | OUTPATIENT
Start: 2019-12-04 | End: 2019-12-04

## 2019-12-04 RX ORDER — LORAZEPAM 2 MG/ML
1 INJECTION INTRAMUSCULAR ONCE
Status: COMPLETED | OUTPATIENT
Start: 2019-12-04 | End: 2019-12-04

## 2019-12-04 RX ADMIN — IBUPROFEN 400 MG: 400 TABLET, FILM COATED ORAL at 08:53

## 2019-12-04 RX ADMIN — GUAIFENESIN 600 MG: 600 TABLET, EXTENDED RELEASE ORAL at 18:01

## 2019-12-04 RX ADMIN — LIDOCAINE 1 PATCH: 50 PATCH CUTANEOUS at 08:55

## 2019-12-04 RX ADMIN — IBUPROFEN 400 MG: 400 TABLET, FILM COATED ORAL at 18:01

## 2019-12-04 RX ADMIN — POTASSIUM CHLORIDE 40 MEQ: 1500 TABLET, EXTENDED RELEASE ORAL at 08:53

## 2019-12-04 RX ADMIN — ENOXAPARIN SODIUM 40 MG: 100 INJECTION SUBCUTANEOUS at 05:22

## 2019-12-04 RX ADMIN — GUAIFENESIN 600 MG: 600 TABLET, EXTENDED RELEASE ORAL at 05:22

## 2019-12-04 RX ADMIN — DULOXETINE HYDROCHLORIDE 30 MG: 30 CAPSULE, DELAYED RELEASE ORAL at 05:22

## 2019-12-04 RX ADMIN — BUDESONIDE AND FORMOTEROL FUMARATE DIHYDRATE 2 PUFF: 160; 4.5 AEROSOL RESPIRATORY (INHALATION) at 18:06

## 2019-12-04 RX ADMIN — DOCUSATE SODIUM 100 MG: 100 CAPSULE, LIQUID FILLED ORAL at 18:01

## 2019-12-04 RX ADMIN — LISINOPRIL 10 MG: 10 TABLET ORAL at 05:22

## 2019-12-04 RX ADMIN — AMLODIPINE BESYLATE 5 MG: 5 TABLET ORAL at 05:22

## 2019-12-04 RX ADMIN — LORAZEPAM 1 MG: 2 INJECTION INTRAMUSCULAR; INTRAVENOUS at 15:30

## 2019-12-04 ASSESSMENT — ENCOUNTER SYMPTOMS
PHOTOPHOBIA: 0
DIARRHEA: 0
VOMITING: 0
FOCAL WEAKNESS: 0
BACK PAIN: 0
WEIGHT LOSS: 0
EYE PAIN: 0
STRIDOR: 0

## 2019-12-04 ASSESSMENT — PATIENT HEALTH QUESTIONNAIRE - PHQ9
1. LITTLE INTEREST OR PLEASURE IN DOING THINGS: NOT AT ALL
SUM OF ALL RESPONSES TO PHQ9 QUESTIONS 1 AND 2: 0

## 2019-12-04 NOTE — DISCHARGE PLANNING
Agency/Facility Name: [facility]   Spoke To: Aminah  Outcome: Patient accepted, pending verification of bed availability for 12/4. Awaiting return call to this CCA.

## 2019-12-04 NOTE — DISCHARGE PLANNING
Anticipated Discharge Disposition: [facility]     Action: Transfer packet completed and placed in chart, Pam, bedside nurse updated.  Pt not to transfer today per Dr. Lyon, checking u/a. Per Dr. Lyon, possible transfer to SNF tomorrow.    Barriers to Discharge: Medical Clearance    Plan: F/U with medical team, pt

## 2019-12-04 NOTE — CARE PLAN
"  Problem: Discharge Barriers/Planning  Goal: Patient's continuum of care needs will be met  Outcome: PROGRESSING AS EXPECTED  Pt updated on POC, pt to be seen by [facility] today, but PT/OT recommend SNF for rehab. Pt verbalized understanding, denies further questions.       Problem: Pain Management  Goal: Pain level will decrease to patient's comfort goal  Outcome: PROGRESSING AS EXPECTED  Pt rated pain at 4/10 after being asked to use pain scale 2x, pt alert, oriented to self \"Sheyla\", event \"broke my left ankle\", location \"[facility]\", and partial time \"2019, I forgot what day it is\". No nonverbal signs of pain evident, pt refuses need for pain intervention, instructed to use call light if needing assistance, verbalized understanding.  "

## 2019-12-04 NOTE — DISCHARGE PLANNING
Per RN ELIZABETH Stoll patient not medically cleared.   Spoke to [facility] at [facility]    Transport is scheduled for 12/5 at 1300 going to [facility].    Voicemail left with Aminah at [facility].

## 2019-12-04 NOTE — DISCHARGE PLANNING
Received Transport Form at 0930  Spoke to [facility] at [facility]    Transport is scheduled for 12/4 at 1300 going to [facility].    BERLIN Stoll notified.

## 2019-12-04 NOTE — PROGRESS NOTES
"Internal Medicine Daily Progress Note    Date of Service  12/4/2019    Chief Complaint  68 y.o. female admitted 11/22/2019 with L closed bimalleolar fracture    Hospital Course   Pt with baseline debility secondary to chronic back pain, scoliosis multiple joint osteoarthritis, history of right ankle fracture, opiate dependence, hypertension, hypothyroidism, migraine, obesity who presented 11/22/2019 with complaints of left leg pain started after mechanical fall earlier today.  Apparently in the process of transferring, patient's legs \" gave up\" and she fell.  Patient was supposed to see primary care doctor yesterday.  Work-up in ER showed closed bimalleolar fracture of left ankle  ERP consulted with Dr. Holliday.  Ortho deems pt non-operative at this time.      Interval Problem Update  Pt still oriented x2. MRI showed ventromegaly. Possible NPH ??  states that she does not have memory problems at home. She is real stator. Drinking 3 times per week. Mild tremor on examination. There is some problem with gait and urinary incontinence per her  at home.   12/4- still not her baseline per her . Discuss with medical RN staff. No much difference on her mentation since pt arrived. She has chronic urinary incontinence but no other urinary symptoms. WBC slight elevated. Slight tachycardia. Hold on transfer to SNF until pt medical cleared. Will consider EEG if UA negative for infection.       Consultants/Specialty  Ortho    Code Status  Full    Disposition  Rehab when approved    Review of Systems  Review of Systems   Constitutional: Negative for weight loss.   HENT: Negative for congestion and nosebleeds.    Eyes: Negative for photophobia and pain.   Respiratory: Negative for stridor.    Cardiovascular: Negative for chest pain.   Gastrointestinal: Negative for diarrhea and vomiting.   Genitourinary: Negative for frequency and urgency.   Musculoskeletal: Positive for joint pain. Negative for back pain.       "  C/w L ankle fx   Skin: Negative for rash.   Neurological: Negative for focal weakness.   Psychiatric/Behavioral:        Confused        Physical Exam  Temp:  [36.2 °C (97.1 °F)-36.7 °C (98 °F)] 36.2 °C (97.1 °F)  Pulse:  [] 106  Resp:  [16-18] 18  BP: (131-145)/() 145/107  SpO2:  [90 %-92 %] 90 %    Physical Exam  Vitals signs reviewed.   Constitutional:       General: She is not in acute distress.     Appearance: Normal appearance. She is not toxic-appearing.   HENT:      Nose: No congestion.   Eyes:      Extraocular Movements: Extraocular movements intact.      Pupils: Pupils are equal, round, and reactive to light.   Neck:      Musculoskeletal: Normal range of motion.   Pulmonary:      Effort: Pulmonary effort is normal.   Abdominal:      General: Bowel sounds are normal.   Musculoskeletal:         General: Tenderness and signs of injury present. No swelling.   Skin:     General: Skin is warm.   Neurological:      General: No focal deficit present.      Mental Status: She is alert. She is disoriented.      Comments: .         Fluids    Intake/Output Summary (Last 24 hours) at 12/4/2019 1500  Last data filed at 12/4/2019 0900  Gross per 24 hour   Intake 420 ml   Output 300 ml   Net 120 ml       Laboratory  Recent Labs     12/04/19  0430   WBC 11.2*   RBC 5.42*   HEMOGLOBIN 16.3*   HEMATOCRIT 48.9*   MCV 90.2   MCH 30.1   MCHC 33.3*   RDW 40.6   PLATELETCT 242   MPV 9.9     Recent Labs     12/04/19  0430   SODIUM 139   POTASSIUM 3.2*   CHLORIDE 104   CO2 24   GLUCOSE 118*   BUN 12   CREATININE 0.42*   CALCIUM 9.6                   Imaging  MR-BRAIN-W/O   Final Result      1.  Moderate lateral ventriculomegaly has detailed above. This may in part be related to underlying generalized atrophy and ex vacuo dilatation. Cannot exclude communicating or normal pressure hydrocephalus.   2.  Mild chronic microvascular ischemic type changes.   3.  No acute infarct or hemorrhage.      CT-HEAD W/O   Final Result       No acute intracranial findings.         DX-LUMBAR SPINE-2 OR 3 VIEWS   Final Result      1.  Moderate degenerative change of lumbar spine.   2.  No fracture or subluxation.   3.  Mild dextroconvex curvature, likely positional.   4.  Lateral view is limited by technique.      DX-TIBIA AND FIBULA LEFT   Final Result      Distal fibular and tibial fractures.      DX-ANKLE 3+ VIEWS LEFT   Final Result      Trimalleolar fracture of the left ankle.      DX-KNEE 2- LEFT   Final Result      No acute fracture. Mild degenerative changes.           Assessment/Plan  Closed left ankle fracture  Assessment & Plan  Orthopedics recommended non surgical management  Pain control:  tylenol, NSAIDS, lidocaine patch for now  Avoid narcotics  PT OT evaluation  SNF    Encephalopathy acute- (present on admission)  Assessment & Plan  Related to narcotics ?? No clear explanation. Electrolytes wnl.   No narcotics for few days now.   MRI brain showed possible NPH (ventromegaly)   UA to follow   Next test EEG ??   No signs of withdrawal. Also consider psychiatry eval     Debility  Assessment & Plan  Secondary to degenerative joint and disc disease and chronic pain, obesity.  PT OT evaluation    Moderate persistent asthma without complication- (present on admission)  Assessment & Plan  Patient is currently not in exacerbation  Continue home inhalers  RT protocol      Morbid obesity with BMI of 40.0-44.9, adult (HCC)- (present on admission)  Assessment & Plan  Follow with PCP for weight management    Chronic use of opiate drug for therapeutic purpose- (present on admission)  Assessment & Plan  Continue muscle relaxants and also carefull on narcotics as might be the cause of her confusion     Hypothyroidism- (present on admission)  Assessment & Plan  tsh to follow  Not on meds       Essential hypertension- (present on admission)  Assessment & Plan  Blood pressure is under control.  Continue lisinopril       VTE prophylaxis: enoxaparin

## 2019-12-04 NOTE — PROGRESS NOTES
Patient aao x 1. Patient oriented to self.  Patient's spouse states that patient is more confused than yesterday. Last labs drawn for patient are 11/26/19.  Patient states she is having burning and pain when urinating.  Incontinent of urine and bowel.  MD notified of AMS and dysuria.  MD ordered labs to be drawn tomorrow 12/4/19.

## 2019-12-05 PROBLEM — N30.00 ACUTE CYSTITIS: Status: ACTIVE | Noted: 2019-12-05

## 2019-12-05 LAB
ALBUMIN SERPL BCP-MCNC: 4 G/DL (ref 3.2–4.9)
ALBUMIN/GLOB SERPL: 1.4 G/DL
ALP SERPL-CCNC: 82 U/L (ref 30–99)
ALT SERPL-CCNC: 23 U/L (ref 2–50)
ANION GAP SERPL CALC-SCNC: 10 MMOL/L (ref 0–11.9)
AST SERPL-CCNC: 23 U/L (ref 12–45)
BASOPHILS # BLD AUTO: 0.4 % (ref 0–1.8)
BASOPHILS # BLD: 0.04 K/UL (ref 0–0.12)
BILIRUB SERPL-MCNC: 0.9 MG/DL (ref 0.1–1.5)
BUN SERPL-MCNC: 15 MG/DL (ref 8–22)
CALCIUM SERPL-MCNC: 9.5 MG/DL (ref 8.5–10.5)
CHLORIDE SERPL-SCNC: 105 MMOL/L (ref 96–112)
CO2 SERPL-SCNC: 24 MMOL/L (ref 20–33)
CREAT SERPL-MCNC: 0.43 MG/DL (ref 0.5–1.4)
EOSINOPHIL # BLD AUTO: 0.45 K/UL (ref 0–0.51)
EOSINOPHIL NFR BLD: 4.7 % (ref 0–6.9)
ERYTHROCYTE [DISTWIDTH] IN BLOOD BY AUTOMATED COUNT: 42.8 FL (ref 35.9–50)
GLOBULIN SER CALC-MCNC: 2.8 G/DL (ref 1.9–3.5)
GLUCOSE SERPL-MCNC: 103 MG/DL (ref 65–99)
HCT VFR BLD AUTO: 50.1 % (ref 37–47)
HGB BLD-MCNC: 16.3 G/DL (ref 12–16)
IMM GRANULOCYTES # BLD AUTO: 0.04 K/UL (ref 0–0.11)
IMM GRANULOCYTES NFR BLD AUTO: 0.4 % (ref 0–0.9)
LYMPHOCYTES # BLD AUTO: 2.16 K/UL (ref 1–4.8)
LYMPHOCYTES NFR BLD: 22.7 % (ref 22–41)
MCH RBC QN AUTO: 30.2 PG (ref 27–33)
MCHC RBC AUTO-ENTMCNC: 32.5 G/DL (ref 33.6–35)
MCV RBC AUTO: 92.8 FL (ref 81.4–97.8)
MONOCYTES # BLD AUTO: 1.01 K/UL (ref 0–0.85)
MONOCYTES NFR BLD AUTO: 10.6 % (ref 0–13.4)
NEUTROPHILS # BLD AUTO: 5.83 K/UL (ref 2–7.15)
NEUTROPHILS NFR BLD: 61.2 % (ref 44–72)
NRBC # BLD AUTO: 0 K/UL
NRBC BLD-RTO: 0 /100 WBC
PLATELET # BLD AUTO: 231 K/UL (ref 164–446)
PMV BLD AUTO: 10.1 FL (ref 9–12.9)
POTASSIUM SERPL-SCNC: 3.7 MMOL/L (ref 3.6–5.5)
PROT SERPL-MCNC: 6.8 G/DL (ref 6–8.2)
RBC # BLD AUTO: 5.4 M/UL (ref 4.2–5.4)
SODIUM SERPL-SCNC: 139 MMOL/L (ref 135–145)
WBC # BLD AUTO: 9.5 K/UL (ref 4.8–10.8)

## 2019-12-05 PROCEDURE — 700101 HCHG RX REV CODE 250: Performed by: INTERNAL MEDICINE

## 2019-12-05 PROCEDURE — 700111 HCHG RX REV CODE 636 W/ 250 OVERRIDE (IP): Performed by: INTERNAL MEDICINE

## 2019-12-05 PROCEDURE — 99232 SBSQ HOSP IP/OBS MODERATE 35: CPT | Performed by: INTERNAL MEDICINE

## 2019-12-05 PROCEDURE — 700102 HCHG RX REV CODE 250 W/ 637 OVERRIDE(OP): Performed by: INTERNAL MEDICINE

## 2019-12-05 PROCEDURE — A9270 NON-COVERED ITEM OR SERVICE: HCPCS | Performed by: INTERNAL MEDICINE

## 2019-12-05 PROCEDURE — 36415 COLL VENOUS BLD VENIPUNCTURE: CPT

## 2019-12-05 PROCEDURE — 700112 HCHG RX REV CODE 229: Performed by: HOSPITALIST

## 2019-12-05 PROCEDURE — 770006 HCHG ROOM/CARE - MED/SURG/GYN SEMI*

## 2019-12-05 PROCEDURE — 700105 HCHG RX REV CODE 258: Performed by: INTERNAL MEDICINE

## 2019-12-05 PROCEDURE — 80053 COMPREHEN METABOLIC PANEL: CPT

## 2019-12-05 PROCEDURE — A9270 NON-COVERED ITEM OR SERVICE: HCPCS | Performed by: HOSPITALIST

## 2019-12-05 PROCEDURE — 85025 COMPLETE CBC W/AUTO DIFF WBC: CPT

## 2019-12-05 RX ORDER — SODIUM CHLORIDE 9 MG/ML
INJECTION, SOLUTION INTRAVENOUS CONTINUOUS
Status: DISCONTINUED | OUTPATIENT
Start: 2019-12-05 | End: 2019-12-07

## 2019-12-05 RX ORDER — NITROFURANTOIN 25; 75 MG/1; MG/1
100 CAPSULE ORAL 2 TIMES DAILY WITH MEALS
Status: DISCONTINUED | OUTPATIENT
Start: 2019-12-05 | End: 2019-12-05

## 2019-12-05 RX ORDER — PROCHLORPERAZINE EDISYLATE 5 MG/ML
10 INJECTION INTRAMUSCULAR; INTRAVENOUS EVERY 6 HOURS PRN
Status: DISCONTINUED | OUTPATIENT
Start: 2019-12-05 | End: 2019-12-05 | Stop reason: CLARIF

## 2019-12-05 RX ADMIN — DOCUSATE SODIUM 100 MG: 100 CAPSULE, LIQUID FILLED ORAL at 18:27

## 2019-12-05 RX ADMIN — ENOXAPARIN SODIUM 40 MG: 100 INJECTION SUBCUTANEOUS at 09:00

## 2019-12-05 RX ADMIN — BUDESONIDE AND FORMOTEROL FUMARATE DIHYDRATE 2 PUFF: 160; 4.5 AEROSOL RESPIRATORY (INHALATION) at 18:28

## 2019-12-05 RX ADMIN — SODIUM CHLORIDE: 9 INJECTION, SOLUTION INTRAVENOUS at 10:41

## 2019-12-05 RX ADMIN — IBUPROFEN 400 MG: 400 TABLET, FILM COATED ORAL at 08:58

## 2019-12-05 RX ADMIN — AMLODIPINE BESYLATE 5 MG: 5 TABLET ORAL at 09:00

## 2019-12-05 RX ADMIN — LISINOPRIL 10 MG: 10 TABLET ORAL at 08:59

## 2019-12-05 RX ADMIN — BUDESONIDE AND FORMOTEROL FUMARATE DIHYDRATE 2 PUFF: 160; 4.5 AEROSOL RESPIRATORY (INHALATION) at 09:01

## 2019-12-05 RX ADMIN — GUAIFENESIN 600 MG: 600 TABLET, EXTENDED RELEASE ORAL at 08:59

## 2019-12-05 RX ADMIN — GUAIFENESIN 600 MG: 600 TABLET, EXTENDED RELEASE ORAL at 18:27

## 2019-12-05 RX ADMIN — IBUPROFEN 400 MG: 400 TABLET, FILM COATED ORAL at 18:27

## 2019-12-05 RX ADMIN — DULOXETINE HYDROCHLORIDE 30 MG: 30 CAPSULE, DELAYED RELEASE ORAL at 08:58

## 2019-12-05 RX ADMIN — LIDOCAINE 1 PATCH: 50 PATCH CUTANEOUS at 09:01

## 2019-12-05 RX ADMIN — CEFTRIAXONE SODIUM 2 G: 2 INJECTION, POWDER, FOR SOLUTION INTRAMUSCULAR; INTRAVENOUS at 10:41

## 2019-12-05 RX ADMIN — SODIUM CHLORIDE: 9 INJECTION, SOLUTION INTRAVENOUS at 20:55

## 2019-12-05 ASSESSMENT — ENCOUNTER SYMPTOMS
BACK PAIN: 0
FOCAL WEAKNESS: 0
VOMITING: 0
DIARRHEA: 0
EYE PAIN: 0
WEIGHT LOSS: 0
STRIDOR: 0
PHOTOPHOBIA: 0

## 2019-12-05 NOTE — CARE PLAN
Problem: Discharge Barriers/Planning  Goal: Patient's continuum of care needs will be met  Outcome: PROGRESSING AS EXPECTED  Pt and spouse at bedside updated on POC, SNF referral sent, awaiting response. Pt resting comfortably, spouse requested to let pt sleep.  Pt not cleared medically, spouse is aware, mentation not baseline yet, pt and spouse has no further questions/concerns at this time.      Problem: Pain Management  Goal: Pain level will decrease to patient's comfort goal  Outcome: PROGRESSING AS EXPECTED  Pt unable to verbalize a number on 0-10 scale, only verbalizes no pain. Pt appears fatigued, sleeping comfortably.

## 2019-12-05 NOTE — CARE PLAN
"  Problem: Communication  Goal: The ability to communicate needs accurately and effectively will improve  Outcome: PROGRESSING AS EXPECTED  Note:   A/O to self and place. Pt not able to state situation, states year is 2014, and states she is here because she \"hurt my back pretty bad.\" Call light within reach. Pt uses call light appropriately.      Problem: Safety  Goal: Will remain free from injury  Outcome: PROGRESSING AS EXPECTED  Note:   Impulsive behavior noted this morning. Pt had not tried to get OOB since. Call light within reach. Bed in lowest position. Non skid socks on. Personal items within reach. Bed alarm on.      Problem: Infection  Goal: Will remain free from infection  Outcome: PROGRESSING AS EXPECTED  Note:   WBC 11.2. Afebrile. HR tachycardic. No signs of infection from L foot. U/A sent to evaluate for UTI.      Problem: Venous Thromboembolism (VTW)/Deep Vein Thrombosis (DVT) Prevention:  Goal: Patient will participate in Venous Thrombosis (VTE)/Deep Vein Thrombosis (DVT)Prevention Measures  Outcome: PROGRESSING AS EXPECTED  Note:   SCD's on to R leg.      Problem: Bowel/Gastric:  Goal: Normal bowel function is maintained or improved  Outcome: PROGRESSING AS EXPECTED  Note:   BM today. Denies constipation or bloating. Incontinent of stool.      Problem: Knowledge Deficit  Goal: Knowledge of disease process/condition, treatment plan, diagnostic tests, and medications will improve  Outcome: PROGRESSING AS EXPECTED  Note:   Discussed POC with pt and . State understanding. Denied goals for day.      Problem: Pain Management  Goal: Pain level will decrease to patient's comfort goal  Outcome: PROGRESSING AS EXPECTED  Note:   Lidocaine patch applied to back. Motrin provided for pain.      Problem: Mobility  Goal: Risk for activity intolerance will decrease  Outcome: PROGRESSING AS EXPECTED  Note:   Q2hr turning in place. X2 staff members needed for bed mobility. Pt is unable to turn self in bed. " Have not seen pt attempt to sit EOB. Refused when offered EOB for meal. PT/OT following. Recommend SNF.      Problem: Skin Integrity  Goal: Risk for impaired skin integrity will decrease  Outcome: PROGRESSING AS EXPECTED  Note:   Q2hr turning in place. L foot elevated. Pressure points supported with pillows. Waffle mattress in place. Coccyx red but blanchable.      Problem: Respiratory:  Goal: Respiratory status will improve  Outcome: PROGRESSING AS EXPECTED  Note:   Denies SOB. O2 sats >90% on RA. LSCL.      Problem: Urinary Elimination:  Goal: Ability to reestablish a normal urinary elimination pattern will improve  Outcome: PROGRESSING AS EXPECTED  Note:   Incontinent of bladder. Purewick in place. Straight cath completed. See previous note. 200 ml output. Sent for U/A.      Problem: Psychosocial Needs:  Goal: Level of anxiety will decrease  Outcome: PROGRESSING AS EXPECTED  Note:   Anxious throughout day. Distraction provided. Redirection provided. Therapeutic listening provided.

## 2019-12-05 NOTE — PROGRESS NOTES
Straight cath attempt x2 failed due to pt anxiety and fighting staff during insertion attempt. Physician aware. Order for x1 ativan ordered. Straight cath completed. 200 ml dark urine with purulence/very hazy noted for output. Sent for U/A.

## 2019-12-05 NOTE — CARE PLAN
"  Problem: Communication  Goal: The ability to communicate needs accurately and effectively will improve  Outcome: PROGRESSING SLOWER THAN EXPECTED  Note:   A/O to self and place. Pt not able to state situation, unable to state year, and states she is here because she \"had a baby\" Call light within reach. Pt uses call light appropriately.      Problem: Safety  Goal: Will remain free from injury  Outcome: PROGRESSING AS EXPECTED  Note:   No impulsive behavior noted. Pt has not tried to get OOB. Call light within reach. Bed in lowest position. Non skid socks on. Personal items within reach. Bed alarm on.      Problem: Infection  Goal: Will remain free from infection  Outcome: PROGRESSING AS EXPECTED  Note:   WBC 9.5. Afebrile. HR tachycardic in 90's. No signs of infection from L foot. U/A completed yesterday.      Problem: Venous Thromboembolism (VTW)/Deep Vein Thrombosis (DVT) Prevention:  Goal: Patient will participate in Venous Thrombosis (VTE)/Deep Vein Thrombosis (DVT)Prevention Measures  Outcome: PROGRESSING AS EXPECTED  Note:   SCD's on to R leg. Lovenox ordered.      Problem: Bowel/Gastric:  Goal: Normal bowel function is maintained or improved  Outcome: PROGRESSING AS EXPECTED  Note:   No BM thus far. Incontinent of stool.      Problem: Knowledge Deficit  Goal: Knowledge of disease process/condition, treatment plan, diagnostic tests, and medications will improve  Outcome: PROGRESSING AS EXPECTED  Note:   Discussed POC with pt and . State understanding. Denied goals for day.      Problem: Pain Management  Goal: Pain level will decrease to patient's comfort goal  Outcome: PROGRESSING AS EXPECTED  Note:   Lidocaine patch applied to back. Motrin provided for pain.      Problem: Mobility  Goal: Risk for activity intolerance will decrease  Outcome: PROGRESSING AS EXPECTED  Note:   Q2hr turning in place. X2 staff members needed for bed mobility. Pt is unable to turn self in bed.  Refused when offered EOB for " meal. PT/OT following. Recommend SNF.      Problem: Skin Integrity  Goal: Risk for impaired skin integrity will decrease  Outcome: PROGRESSING AS EXPECTED  Note:   Q2hr turning in place. L foot elevated. Pressure points supported with pillows. Waffle mattress in place. Coccyx red but blanchable.      Problem: Respiratory:  Goal: Respiratory status will improve  Outcome: PROGRESSING AS EXPECTED  Note:   Denies SOB. O2 sats >90% on RA. LSCL.      Problem: Urinary Elimination:  Goal: Ability to reestablish a normal urinary elimination pattern will improve  Outcome: PROGRESSING AS EXPECTED  Note:   Incontinent of bladder. Purewick in place.      Problem: Psychosocial Needs:  Goal: Level of anxiety will decrease  Outcome: PROGRESSING AS EXPECTED  Note:   Calm and cooperative today. Easily distracted.

## 2019-12-05 NOTE — DIETARY
"Nutrition Services: Update   Day 13 of admit.  Sheyla Garcia is a 69 y.o. female with admitting DX of Closed fracture of left tibia and fibula    Pt is currently on regular diet. Pt is receiving snacks TID and high protein milkshake with lunch. Per RN note yesterday pt is \"A/O to self and place\". Per chart pt PO refused to 75%. Wt 11/22: 126.554 kg via stand up scale - no updated wt.     Malnutrition Risk: Pt noted with variable intake, no other criteria noted at this time.     Recommendations/Plan:  1. Encourage intake of meals  2. Document intake of all meals as % taken in ADL's to provide interdisciplinary communication across all shifts.   3. Please obtain a new measured weight. Monitor weight.  4. Nutrition rep will continue to see patient for ongoing meal and snack preferences.    RD following    "

## 2019-12-05 NOTE — DISCHARGE PLANNING
Per Bedside RN patient is not medically cleared.     Agency/Facility Name: [facility]  Spoke To: Yohana  Outcome: Transport cancelled.     Agency[facility]Facility Name: [facility]  Spoke To: Aminah  Outcome: Voicemail left to inform patient is not medically cleared and transport is cancelled.

## 2019-12-05 NOTE — PROGRESS NOTES
Straight cath attempt x2 failed due to pt anxiety and fighting staff during insertion attempt. Physician aware. Order for x1 ativan ordered. Straight cath completed. 200 ml dark urine with purulence noted for output. Sent for U/A.

## 2019-12-05 NOTE — PROGRESS NOTES
"Pt sleeping comfortably, snoring sometimes. Pt woken up for morning meds, pt refused stating \"I don't want.\" Tried 2x more to get pt to take medicine, did not reply any further, pt sleeping again. Encouraged fluid intake, refused. VSS, no nonverbal signs of pain.   "

## 2019-12-05 NOTE — ASSESSMENT & PLAN NOTE
Switch to amoxicillin. Pending sensitivity. enterococcus   Continue to monitor, mentation improving

## 2019-12-05 NOTE — DISCHARGE PLANNING
ATTN: Case Management  RE: Referral for Home Health    Reason for referral denial: [facility] will transfer to [facility]              Unfortunately, we are not able to accept this referral for the reason listed above. If further clarity is needed, our Transitional Care Specialists are available to discuss any barriers to service at [phone].      We look forward to collaborating with you in the future,  [facility] Team

## 2019-12-05 NOTE — CARE PLAN
Problem: Nutritional:  Goal: Achieve adequate nutritional intake  Description  Patient will consume = or >50% of meals and snacks.    Outcome: PROGRESSING AS EXPECTED

## 2019-12-06 LAB
ALBUMIN SERPL BCP-MCNC: 3.7 G/DL (ref 3.2–4.9)
ALBUMIN/GLOB SERPL: 1.4 G/DL
ALP SERPL-CCNC: 84 U/L (ref 30–99)
ALT SERPL-CCNC: 23 U/L (ref 2–50)
AMMONIA PLAS-SCNC: 38 UMOL/L (ref 11–45)
ANION GAP SERPL CALC-SCNC: 10 MMOL/L (ref 0–11.9)
AST SERPL-CCNC: 22 U/L (ref 12–45)
BASOPHILS # BLD AUTO: 0.3 % (ref 0–1.8)
BASOPHILS # BLD: 0.03 K/UL (ref 0–0.12)
BILIRUB SERPL-MCNC: 0.9 MG/DL (ref 0.1–1.5)
BUN SERPL-MCNC: 13 MG/DL (ref 8–22)
CALCIUM SERPL-MCNC: 8.9 MG/DL (ref 8.5–10.5)
CHLORIDE SERPL-SCNC: 107 MMOL/L (ref 96–112)
CO2 SERPL-SCNC: 21 MMOL/L (ref 20–33)
CREAT SERPL-MCNC: 0.39 MG/DL (ref 0.5–1.4)
EOSINOPHIL # BLD AUTO: 0.36 K/UL (ref 0–0.51)
EOSINOPHIL NFR BLD: 3.1 % (ref 0–6.9)
ERYTHROCYTE [DISTWIDTH] IN BLOOD BY AUTOMATED COUNT: 41.4 FL (ref 35.9–50)
GLOBULIN SER CALC-MCNC: 2.7 G/DL (ref 1.9–3.5)
GLUCOSE SERPL-MCNC: 105 MG/DL (ref 65–99)
HCT VFR BLD AUTO: 47 % (ref 37–47)
HGB BLD-MCNC: 15.8 G/DL (ref 12–16)
IMM GRANULOCYTES # BLD AUTO: 0.04 K/UL (ref 0–0.11)
IMM GRANULOCYTES NFR BLD AUTO: 0.3 % (ref 0–0.9)
LYMPHOCYTES # BLD AUTO: 1.9 K/UL (ref 1–4.8)
LYMPHOCYTES NFR BLD: 16.3 % (ref 22–41)
MCH RBC QN AUTO: 30.8 PG (ref 27–33)
MCHC RBC AUTO-ENTMCNC: 33.6 G/DL (ref 33.6–35)
MCV RBC AUTO: 91.6 FL (ref 81.4–97.8)
MONOCYTES # BLD AUTO: 1.11 K/UL (ref 0–0.85)
MONOCYTES NFR BLD AUTO: 9.5 % (ref 0–13.4)
NEUTROPHILS # BLD AUTO: 8.19 K/UL (ref 2–7.15)
NEUTROPHILS NFR BLD: 70.5 % (ref 44–72)
NRBC # BLD AUTO: 0 K/UL
NRBC BLD-RTO: 0 /100 WBC
PLATELET # BLD AUTO: 220 K/UL (ref 164–446)
PMV BLD AUTO: 10.5 FL (ref 9–12.9)
POTASSIUM SERPL-SCNC: 3.7 MMOL/L (ref 3.6–5.5)
PROT SERPL-MCNC: 6.4 G/DL (ref 6–8.2)
RBC # BLD AUTO: 5.13 M/UL (ref 4.2–5.4)
SODIUM SERPL-SCNC: 138 MMOL/L (ref 135–145)
WBC # BLD AUTO: 11.6 K/UL (ref 4.8–10.8)

## 2019-12-06 PROCEDURE — 700105 HCHG RX REV CODE 258: Performed by: INTERNAL MEDICINE

## 2019-12-06 PROCEDURE — 700111 HCHG RX REV CODE 636 W/ 250 OVERRIDE (IP): Performed by: INTERNAL MEDICINE

## 2019-12-06 PROCEDURE — A9270 NON-COVERED ITEM OR SERVICE: HCPCS | Performed by: INTERNAL MEDICINE

## 2019-12-06 PROCEDURE — 700102 HCHG RX REV CODE 250 W/ 637 OVERRIDE(OP): Performed by: INTERNAL MEDICINE

## 2019-12-06 PROCEDURE — 80053 COMPREHEN METABOLIC PANEL: CPT

## 2019-12-06 PROCEDURE — A9270 NON-COVERED ITEM OR SERVICE: HCPCS | Performed by: HOSPITALIST

## 2019-12-06 PROCEDURE — 85025 COMPLETE CBC W/AUTO DIFF WBC: CPT

## 2019-12-06 PROCEDURE — 770006 HCHG ROOM/CARE - MED/SURG/GYN SEMI*

## 2019-12-06 PROCEDURE — 700112 HCHG RX REV CODE 229: Performed by: HOSPITALIST

## 2019-12-06 PROCEDURE — 700101 HCHG RX REV CODE 250: Performed by: INTERNAL MEDICINE

## 2019-12-06 PROCEDURE — 99232 SBSQ HOSP IP/OBS MODERATE 35: CPT | Performed by: INTERNAL MEDICINE

## 2019-12-06 PROCEDURE — 36415 COLL VENOUS BLD VENIPUNCTURE: CPT

## 2019-12-06 PROCEDURE — 82140 ASSAY OF AMMONIA: CPT

## 2019-12-06 RX ORDER — AMOXICILLIN 500 MG/1
500 CAPSULE ORAL EVERY 8 HOURS
Status: DISCONTINUED | OUTPATIENT
Start: 2019-12-06 | End: 2019-12-07 | Stop reason: HOSPADM

## 2019-12-06 RX ADMIN — BUDESONIDE AND FORMOTEROL FUMARATE DIHYDRATE 2 PUFF: 160; 4.5 AEROSOL RESPIRATORY (INHALATION) at 18:21

## 2019-12-06 RX ADMIN — AMOXICILLIN 500 MG: 500 CAPSULE ORAL at 21:03

## 2019-12-06 RX ADMIN — LIDOCAINE 1 PATCH: 50 PATCH CUTANEOUS at 08:47

## 2019-12-06 RX ADMIN — AMOXICILLIN 500 MG: 500 CAPSULE ORAL at 18:22

## 2019-12-06 RX ADMIN — DOCUSATE SODIUM 100 MG: 100 CAPSULE, LIQUID FILLED ORAL at 05:30

## 2019-12-06 RX ADMIN — AMLODIPINE BESYLATE 5 MG: 5 TABLET ORAL at 05:30

## 2019-12-06 RX ADMIN — SODIUM CHLORIDE: 9 INJECTION, SOLUTION INTRAVENOUS at 18:46

## 2019-12-06 RX ADMIN — IBUPROFEN 400 MG: 400 TABLET, FILM COATED ORAL at 21:03

## 2019-12-06 RX ADMIN — BUDESONIDE AND FORMOTEROL FUMARATE DIHYDRATE 2 PUFF: 160; 4.5 AEROSOL RESPIRATORY (INHALATION) at 05:40

## 2019-12-06 RX ADMIN — CEFTRIAXONE SODIUM 2 G: 2 INJECTION, POWDER, FOR SOLUTION INTRAMUSCULAR; INTRAVENOUS at 05:31

## 2019-12-06 RX ADMIN — SODIUM CHLORIDE: 9 INJECTION, SOLUTION INTRAVENOUS at 05:34

## 2019-12-06 RX ADMIN — LISINOPRIL 10 MG: 10 TABLET ORAL at 05:30

## 2019-12-06 RX ADMIN — GUAIFENESIN 600 MG: 600 TABLET, EXTENDED RELEASE ORAL at 18:22

## 2019-12-06 RX ADMIN — ENOXAPARIN SODIUM 40 MG: 100 INJECTION SUBCUTANEOUS at 05:30

## 2019-12-06 RX ADMIN — GUAIFENESIN 600 MG: 600 TABLET, EXTENDED RELEASE ORAL at 05:30

## 2019-12-06 ASSESSMENT — ENCOUNTER SYMPTOMS
EYE PAIN: 0
STRIDOR: 0
DIARRHEA: 0
WEIGHT LOSS: 0
PHOTOPHOBIA: 0
VOMITING: 0
BACK PAIN: 0
FOCAL WEAKNESS: 0

## 2019-12-06 NOTE — PROGRESS NOTES
2 RN skin assessment under device done with Mauro.  Splint to LLE.  +1 pedal pulse, less than 3 seconds capillary refill, and pt demonstrated movement of leg and toes.    No erythema or drainage around splint.

## 2019-12-06 NOTE — PROGRESS NOTES
Bedside shift report performed and new staff introduced, pt resting in bed, calm and pleasant yet confused demeanor. Fall and safety precautions in place, bed alarm on, pt does not use call light appropriately. Pt has no needs at this time, hourly rounds ongoing, call light and belongings within reach.    Will need ATBs until the 8th. Will reassess for dc whether pt can transfer to SNF with IV vs PO ATB.

## 2019-12-06 NOTE — DISCHARGE PLANNING
Agency/Facility Name: [facility]  Spoke To: Aminah  Outcome: Attempted to follow up to determine bed availability, however no answer. Voicemail was left.

## 2019-12-06 NOTE — DISCHARGE PLANNING
Agency/Facility Name: [facility]  Spoke To: Aminah  Outcome: no beds available, pending bed possibly for Monday 12/9

## 2019-12-07 VITALS
OXYGEN SATURATION: 94 % | DIASTOLIC BLOOD PRESSURE: 74 MMHG | HEART RATE: 98 BPM | TEMPERATURE: 98 F | HEIGHT: 69 IN | BODY MASS INDEX: 41.32 KG/M2 | RESPIRATION RATE: 16 BRPM | WEIGHT: 279 LBS | SYSTOLIC BLOOD PRESSURE: 145 MMHG

## 2019-12-07 PROBLEM — G93.40 ENCEPHALOPATHY ACUTE: Status: RESOLVED | Noted: 2019-11-24 | Resolved: 2019-12-07

## 2019-12-07 LAB
ALBUMIN SERPL BCP-MCNC: 3.5 G/DL (ref 3.2–4.9)
ALBUMIN/GLOB SERPL: 1.3 G/DL
ALP SERPL-CCNC: 83 U/L (ref 30–99)
ALT SERPL-CCNC: 17 U/L (ref 2–50)
ANION GAP SERPL CALC-SCNC: 8 MMOL/L (ref 0–11.9)
AST SERPL-CCNC: 16 U/L (ref 12–45)
BASOPHILS # BLD AUTO: 0.4 % (ref 0–1.8)
BASOPHILS # BLD: 0.03 K/UL (ref 0–0.12)
BILIRUB SERPL-MCNC: 0.8 MG/DL (ref 0.1–1.5)
BUN SERPL-MCNC: 6 MG/DL (ref 8–22)
CALCIUM SERPL-MCNC: 8.8 MG/DL (ref 8.5–10.5)
CHLORIDE SERPL-SCNC: 109 MMOL/L (ref 96–112)
CO2 SERPL-SCNC: 22 MMOL/L (ref 20–33)
CREAT SERPL-MCNC: 0.42 MG/DL (ref 0.5–1.4)
EOSINOPHIL # BLD AUTO: 0.43 K/UL (ref 0–0.51)
EOSINOPHIL NFR BLD: 5.3 % (ref 0–6.9)
ERYTHROCYTE [DISTWIDTH] IN BLOOD BY AUTOMATED COUNT: 42.1 FL (ref 35.9–50)
GLOBULIN SER CALC-MCNC: 2.6 G/DL (ref 1.9–3.5)
GLUCOSE SERPL-MCNC: 98 MG/DL (ref 65–99)
HCT VFR BLD AUTO: 46.3 % (ref 37–47)
HGB BLD-MCNC: 15 G/DL (ref 12–16)
IMM GRANULOCYTES # BLD AUTO: 0.02 K/UL (ref 0–0.11)
IMM GRANULOCYTES NFR BLD AUTO: 0.2 % (ref 0–0.9)
LYMPHOCYTES # BLD AUTO: 1.99 K/UL (ref 1–4.8)
LYMPHOCYTES NFR BLD: 24.4 % (ref 22–41)
MCH RBC QN AUTO: 29.9 PG (ref 27–33)
MCHC RBC AUTO-ENTMCNC: 32.4 G/DL (ref 33.6–35)
MCV RBC AUTO: 92.2 FL (ref 81.4–97.8)
MONOCYTES # BLD AUTO: 0.74 K/UL (ref 0–0.85)
MONOCYTES NFR BLD AUTO: 9.1 % (ref 0–13.4)
NEUTROPHILS # BLD AUTO: 4.95 K/UL (ref 2–7.15)
NEUTROPHILS NFR BLD: 60.6 % (ref 44–72)
NRBC # BLD AUTO: 0 K/UL
NRBC BLD-RTO: 0 /100 WBC
PLATELET # BLD AUTO: 226 K/UL (ref 164–446)
PMV BLD AUTO: 10.1 FL (ref 9–12.9)
POTASSIUM SERPL-SCNC: 3.4 MMOL/L (ref 3.6–5.5)
PROT SERPL-MCNC: 6.1 G/DL (ref 6–8.2)
RBC # BLD AUTO: 5.02 M/UL (ref 4.2–5.4)
SODIUM SERPL-SCNC: 139 MMOL/L (ref 135–145)
WBC # BLD AUTO: 8.2 K/UL (ref 4.8–10.8)

## 2019-12-07 PROCEDURE — 85025 COMPLETE CBC W/AUTO DIFF WBC: CPT

## 2019-12-07 PROCEDURE — A9270 NON-COVERED ITEM OR SERVICE: HCPCS | Performed by: INTERNAL MEDICINE

## 2019-12-07 PROCEDURE — 700102 HCHG RX REV CODE 250 W/ 637 OVERRIDE(OP): Performed by: INTERNAL MEDICINE

## 2019-12-07 PROCEDURE — 36415 COLL VENOUS BLD VENIPUNCTURE: CPT

## 2019-12-07 PROCEDURE — 700101 HCHG RX REV CODE 250: Performed by: INTERNAL MEDICINE

## 2019-12-07 PROCEDURE — 99239 HOSP IP/OBS DSCHRG MGMT >30: CPT | Performed by: INTERNAL MEDICINE

## 2019-12-07 PROCEDURE — 700111 HCHG RX REV CODE 636 W/ 250 OVERRIDE (IP): Performed by: INTERNAL MEDICINE

## 2019-12-07 PROCEDURE — 80053 COMPREHEN METABOLIC PANEL: CPT

## 2019-12-07 RX ORDER — POTASSIUM CHLORIDE 20 MEQ/1
40 TABLET, EXTENDED RELEASE ORAL ONCE
Status: COMPLETED | OUTPATIENT
Start: 2019-12-07 | End: 2019-12-07

## 2019-12-07 RX ORDER — AMOXICILLIN 500 MG/1
500 CAPSULE ORAL EVERY 8 HOURS
Qty: 21 CAP | Refills: 0
Start: 2019-12-07 | End: 2019-12-14

## 2019-12-07 RX ORDER — TIZANIDINE 4 MG/1
4 TABLET ORAL EVERY 6 HOURS PRN
Qty: 30 TAB | Refills: 3 | Status: SHIPPED | OUTPATIENT
Start: 2019-12-07 | End: 2021-05-05

## 2019-12-07 RX ADMIN — IBUPROFEN 400 MG: 400 TABLET, FILM COATED ORAL at 09:18

## 2019-12-07 RX ADMIN — ENOXAPARIN SODIUM 40 MG: 100 INJECTION SUBCUTANEOUS at 06:27

## 2019-12-07 RX ADMIN — LIDOCAINE 1 PATCH: 50 PATCH CUTANEOUS at 08:51

## 2019-12-07 RX ADMIN — BUDESONIDE AND FORMOTEROL FUMARATE DIHYDRATE 2 PUFF: 160; 4.5 AEROSOL RESPIRATORY (INHALATION) at 06:26

## 2019-12-07 RX ADMIN — POTASSIUM CHLORIDE 40 MEQ: 1500 TABLET, EXTENDED RELEASE ORAL at 08:48

## 2019-12-07 RX ADMIN — AMLODIPINE BESYLATE 5 MG: 5 TABLET ORAL at 06:27

## 2019-12-07 RX ADMIN — LISINOPRIL 10 MG: 10 TABLET ORAL at 06:27

## 2019-12-07 RX ADMIN — AMOXICILLIN 500 MG: 500 CAPSULE ORAL at 06:27

## 2019-12-07 RX ADMIN — GUAIFENESIN 600 MG: 600 TABLET, EXTENDED RELEASE ORAL at 06:27

## 2019-12-07 NOTE — PROGRESS NOTES
Patient approved for DC by Hospitalist and SNF has a bed to accept her. She will be going to [facility]. Sent patient with appropriate scripts and AVS. AVS reviewed with patients  since patient is confused.  understands AVS, patient left the floor with [facility] at 1315.

## 2019-12-07 NOTE — DISCHARGE PLANNING
Agency/Facility Name: [facility]  Spoke To: Aminah  Outcome: Bed available for patient today.     RN ELIZABETH Gonzalez notified.

## 2019-12-07 NOTE — DISCHARGE INSTRUCTIONS
Acute Urinary Retention, Female  Urinary retention means you are unable to pee completely or at all (empty your bladder).  Follow these instructions at home:  · Drink enough fluids to keep your pee (urine) clear or pale yellow.  · If you are sent home with a tube that drains the bladder (catheter), there will be a drainage bag attached to it. There are two types of bags. One is big that you can wear at night without having to empty it. One is smaller and needs to be emptied more often.  ¨ Keep the drainage bag emptied.  ¨ Keep the drainage bag lower than the tube.  · Only take medicine as told by your doctor.  Contact a doctor if:  · You have a low-grade fever.  · You have spasms or you are leaking pee when you have spasms.  Get help right away if:  · You have chills or a fever.  · Your catheter stops draining pee.  · Your catheter falls out.  · You have increased bleeding that does not stop after you have rested and increased the amount of fluids you had been drinking.  This information is not intended to replace advice given to you by your health care provider. Make sure you discuss any questions you have with your health care provider.  Document Released: 06/05/2009 Document Revised: 05/25/2017 Document Reviewed: 05/29/2014  [employer] Interactive Patient Education © 2017 [employer]      Discharge Instructions    Discharged to SNF by ambulance with relative. Discharged via ambulance, hospital escort: Yes.  Special equipment needed: Not Applicable    Be sure to schedule a follow-up appointment with your primary care doctor or any specialists as instructed.     Discharge Plan:   Influenza Vaccine Indication: Not indicated: Previously immunized this influenza season and > 8 years of age    I understand that a diet low in cholesterol, fat, and sodium is recommended for good health. Unless I have been given specific instructions below for another diet, I accept this instruction as my diet prescription.   Other diet:  Regular Diet    Special Instructions: None    · Is patient discharged on Warfarin / Coumadin?   No     Depression / Suicide Risk    As you are discharged from this [facility] facility, it is important to learn how to keep safe from harming yourself.    Recognize the warning signs:  · Abrupt changes in personality, positive or negative- including increase in energy   · Giving away possessions  · Change in eating patterns- significant weight changes-  positive or negative  · Change in sleeping patterns- unable to sleep or sleeping all the time   · Unwillingness or inability to communicate  · Depression  · Unusual sadness, discouragement and loneliness  · Talk of wanting to die  · Neglect of personal appearance   · Rebelliousness- reckless behavior  · Withdrawal from people/activities they love  · Confusion- inability to concentrate     If you or a loved one observes any of these behaviors or has concerns about self-harm, here's what you can do:  · Talk about it- your feelings and reasons for harming yourself  · Remove any means that you might use to hurt yourself (examples: pills, rope, extension cords, firearm)  · Get professional help from the community (Mental Health, Substance Abuse, psychological counseling)  · Do not be alone:Call your Safe Contact- someone whom you trust who will be there for you.  · Call your local CRISIS HOTLINE 670-0903 or 085-397-1152  · Call your local Children's Mobile Crisis Response Team [place] (948) 619-1372 or www.[employer]  · Call the toll free National Suicide Prevention Hotlines   · National Suicide Prevention Lifeline 014-412-HBXU (1293)  · National Hope Line Network 800-SUICIDE (346-2480)      Ankle Fracture  A fracture is a break in a bone. A cast or splint may be used to protect the ankle and heal the break. Sometimes, surgery is needed.  Follow these instructions at home:  · Use crutches as told by your doctor. It is very important that you use your crutches  correctly.  · Do not put weight or pressure on the injured ankle until told by your doctor.  · Keep your ankle raised (elevated) when sitting or lying down.  · Apply ice to the ankle:  ¨ Put ice in a plastic bag.  ¨ Place a towel between your cast and the bag.  ¨ Leave the ice on for 20 minutes, 2-3 times a day.  · If you have a plaster or fiberglass cast:  ¨ Do not try to scratch under the cast with any objects.  ¨ Check the skin around the cast every day. You may put lotion on red or sore areas.  ¨ Keep your cast dry and clean.  · If you have a plaster splint:  ¨ Wear the splint as told by your doctor.  ¨ You can loosen the elastic around the splint if your toes get numb, tingle, or turn cold or blue.  · Do not put pressure on any part of your cast or splint. It may break. Rest your plaster splint or cast only on a pillow the first 24 hours until it is fully hardened.  · Cover your cast or splint with a plastic bag during showers.  · Do not lower your cast or splint into water.  · Take medicine as told by your doctor.  · Do not drive until your doctor says it is safe.  · Follow-up with your doctor as told. It is very important that you go to your follow-up visits.  Contact a doctor if:  The swelling and discomfort gets worse.  Get help right away if:  · Your splint or cast breaks.  · You continue to have very bad pain.  · You have new pain or swelling after your splint or cast was put on.  · Your skin or toes below the injured ankle:  ¨ Turn blue or gray.  ¨ Feel cold, numb, or you cannot feel them.  · There is a bad smell or yellowish white fluid (pus) coming from under the splint or cast.  This information is not intended to replace advice given to you by your health care provider. Make sure you discuss any questions you have with your health care provider.  Document Released: 10/15/2010 Document Revised: 05/25/2017 Document Reviewed: 07/17/2014  [employer] Interactive Patient Education © 2017 [employer]

## 2019-12-07 NOTE — CARE PLAN
Problem: Safety  Goal: Will remain free from injury  Outcome: PROGRESSING AS EXPECTED  Goal: Will remain free from falls  Outcome: PROGRESSING AS EXPECTED     Problem: Skin Integrity  Goal: Risk for impaired skin integrity will decrease  Outcome: PROGRESSING AS EXPECTED     High fall risk interventions in place as patient is confused and skin preventative measures in place to maintain skin integrity.

## 2019-12-07 NOTE — DISCHARGE PLANNING
MALA received call from Aminah from [facility] updating this SW that she has a bed available this evening for patient.  MALA teixeira texted MD and was updated that patient is not medically cleared today.  MALA updated Aminah.

## 2019-12-07 NOTE — DISCHARGE SUMMARY
"Discharge Summary    CHIEF COMPLAINT ON ADMISSION  Chief Complaint   Patient presents with   • T-5000 GLF   • Knee Pain       Reason for Admission  EMS      CODE STATUS  Full Code    HPI & HOSPITAL COURSE  This is a 69 y.o. female here with  baseline debility secondary to chronic back pain, scoliosis multiple joint osteoarthritis, history of right ankle fracture, opiate dependence, hypertension, hypothyroidism, migraine, obesity who presented 11/22/2019 with complaints of left leg pain started after mechanical fall earlier today.  Apparently in the process of transferring, patient's legs \" gave up\" and she fell.  Work-up in ER showed closed bimalleolar fracture of left ankle  ERP consulted with Dr. Holliday.  Ortho deems pt non-operative at this time.  Patient was seen by PT/OT and was recommended skilled nursing facility.  Initially her  and patient was asking for home health instead of skilled nursing facility.  After her  realizing the high a care that his wife was needing they both agreed with SNF. However patient continued to remain sedated, sleepy and not at her baseline per her . She did have some lower pelvic pain. Her  did report to RN that pt was complaining to him, burning sensation during urination. UA was done and showed UTI/enterococcus faecalis.  Initially she was treated with ceftriaxone which switch to amoxicillin p.o. patient did improve with antibiotic change.  Sensitivity of urine culture still pending however patient is significantly clinically improving.  Also 12/6/2019 patient was developing diarrhea.  She was monitored overnight.  Her  stated the patient has intermittent constipation and diarrhea.  Diarrhea resolved on its own.  Patient mentation significantly improved.  Brain MRI negative for stroke or mass.  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  Her  did report that patient sometimes have problem with balance, urinary " incontinence.  Also she has some memory problems at home.  I did advised him to follow-up and consider neurologist as outpatient when patient is stable.  No indication per acute intervention at this time. 12/7/2019 mentation continues to improve.  Leukocytosis resolved.  Vitals stable.    Therefore, she is discharged in guarded and stable condition to skilled nursing facility.    The patient met 2-midnight criteria for an inpatient stay at the time of discharge.      FOLLOW UP ITEMS POST DISCHARGE  Urine culture     DISCHARGE DIAGNOSES  Active Problems:    Closed left ankle fracture POA: Yes    Essential hypertension POA: Yes    Hypothyroidism POA: Yes    Chronic use of opiate drug for therapeutic purpose POA: Yes    Morbid obesity with BMI of 40.0-44.9, adult (HCC) POA: Yes    Moderate persistent asthma without complication POA: Yes    Debility POA: Yes    Acute cystitis POA: Unknown  Resolved Problems:    Hypokalemia POA: No    Encephalopathy acute POA: Yes      FOLLOW UP  Future Appointments   Date Time Provider Department Center   2/21/2020  2:00 PM Elsa Hodges M.D. [facility] None     Zaira Swenson, A.P.N.  [address] [address] [address]  [phone]    Schedule an appointment as soon as possible for a visit in 1 week  Hospital follow-up appointment with PCP    Abe Holliday M.D.  [address] [facility] [address]  [phone]    On 12/3/2019      [address] [id number] [phone]          MEDICATIONS ON DISCHARGE     Medication List      START taking these medications      Instructions   amLODIPine 5 MG Tabs  Commonly known as:  NORVASC   Take 1 Tab by mouth every day.  Dose:  5 mg     amoxicillin 500 MG Caps  Commonly known as:  AMOXIL   Take 1 Cap by mouth every 8 hours for 7 days.  Dose:  500 mg     ibuprofen 400 MG Tabs  Commonly known as:  MOTRIN   Take 1 Tab by mouth every 6 hours as needed.  Dose:  400 mg     lidocaine 5 %  Ptch  Commonly known as:  LIDODERM   Apply 1 Patch to skin as directed every 24 hours.  Dose:  1 Patch     tizanidine 4 MG Tabs  Commonly known as:  ZANAFLEX   Take 1 Tab by mouth every 6 hours as needed.  Dose:  4 mg        CONTINUE taking these medications      Instructions   * albuterol 108 (90 Base) MCG/ACT Aers inhalation aerosol  Commonly known as:  PROAIR HFA   Inhale 2 Puffs by mouth every 6 hours as needed for Shortness of Breath.  Dose:  2 Puff     * albuterol 2.5mg/3ml Nebu solution for nebulization  Commonly known as:  PROVENTIL   3 mL by Nebulization route every four hours as needed for Shortness of Breath.  Dose:  2.5 mg     BETA CAROTENE PO   Take 1 Dose by mouth every day. Unknown OTC Strength.  Dose:  1 Dose     CYANOCOBALAMIN PO   Take 1 Dose by mouth every day. Unknown OTC Strength.  Dose:  1 Dose     DITROPAN XL 10 MG CR tablet  Generic drug:  oxybutynin SR   Take 10 mg by mouth every day.  Dose:  10 mg     DULERA 200-5 MCG/ACT Aero  Generic drug:  Mometasone Furo-Formoterol Fum   USE 2 INHALATIONS TWICE A DAY     DULoxetine 30 MG Cpep  Commonly known as:  CYMBALTA   Take 30 mg by mouth every day.  Dose:  30 mg     lisinopril 10 MG Tabs  Commonly known as:  PRINIVIL   Take 10 mg by mouth every day.  Dose:  10 mg     SUPER B-COMPLEX/VIT C/FA PO   Take 1 Tab by mouth every day.  Dose:  1 Tab     therapeutic multivitamin-minerals Tabs   Take 1 Tab by mouth every day.  Dose:  1 Tab     topiramate 25 MG Tabs  Commonly known as:  TOPAMAX   Take 25 mg by mouth every bedtime.  Dose:  25 mg         * This list has 2 medication(s) that are the same as other medications prescribed for you. Read the directions carefully, and ask your doctor or other care provider to review them with you.            STOP taking these medications    estradiol 1 MG Tabs  Commonly known as:  ESTRACE     methocarbamol 750 MG Tabs  Commonly known as:  ROBAXIN     oxyCODONE-acetaminophen  MG Tabs  Commonly known as:   PERCOCET-10            Allergies  Allergies   Allergen Reactions   • Gabapentin      seizure       DIET  Orders Placed This Encounter   Procedures   • Diet Order Regular     Standing Status:   Standing     Number of Occurrences:   1     Order Specific Question:   Diet:     Answer:   Regular [1]       ACTIVITY  As tolerated and directed by skilled nursing.  Non-weight bearing LEFT leg    LINES, DRAINS, AND WOUNDS  This is an automated list. Peripheral IVs will be removed prior to discharge.  Peripheral IV 12/05/19 20 G Left;Anterior Forearm (Active)   Site Assessment Clean;Dry;Intact 12/6/2019  9:15 PM   Dressing Type Transparent 12/6/2019  9:15 PM   Line Status Scrubbed the hub prior to access;Flushed;Infusing 12/6/2019  9:15 PM   Dressing Status Clean;Dry;Intact 12/6/2019  9:15 PM   Dressing Intervention N/A 12/6/2019  9:15 PM   Date Primary Tubing Changed 12/05/19 12/5/2019 12:00 PM   Date Secondary Tubing Changed 12/06/19 12/6/2019  5:39 AM   NEXT Primary Tubing Change  12/07/19 12/6/2019  5:39 AM   NEXT Secondary Tubing Change  12/07/19 12/6/2019  5:39 AM   Infiltration Grading ([facility], [facility]) 0 12/6/2019  9:15 PM   Phlebitis Scale ([facility] Only) 0 12/6/2019  9:15 PM          Peripheral IV 12/05/19 20 G Left;Anterior Forearm (Active)   Site Assessment Clean;Dry;Intact 12/6/2019  9:15 PM   Dressing Type Transparent 12/6/2019  9:15 PM   Line Status Scrubbed the hub prior to access;Flushed;Infusing 12/6/2019  9:15 PM   Dressing Status Clean;Dry;Intact 12/6/2019  9:15 PM   Dressing Intervention N/A 12/6/2019  9:15 PM   Date Primary Tubing Changed 12/05/19 12/5/2019 12:00 PM   Date Secondary Tubing Changed 12/06/19 12/6/2019  5:39 AM   NEXT Primary Tubing Change  12/07/19 12/6/2019  5:39 AM   NEXT Secondary Tubing Change  12/07/19 12/6/2019  5:39 AM   Infiltration Grading ([facility]) 0 12/6/2019  9:15 PM   Phlebitis Scale ([facility] Only) 0 12/6/2019  9:15 PM               MENTAL STATUS ON TRANSFER  Level of  Consciousness: Confused  Orientation : Disoriented to Time, Disoriented to Event  Speech: Speech Clear    CONSULTATIONS  Ortho   She will need to follow up wt [facility] in one week with Dr. Holliday     PROCEDURES  None     LABORATORY  Lab Results   Component Value Date    SODIUM 139 12/07/2019    POTASSIUM 3.4 (L) 12/07/2019    CHLORIDE 109 12/07/2019    CO2 22 12/07/2019    GLUCOSE 98 12/07/2019    BUN 6 (L) 12/07/2019    CREATININE 0.42 (L) 12/07/2019        Lab Results   Component Value Date    WBC 8.2 12/07/2019    HEMOGLOBIN 15.0 12/07/2019    HEMATOCRIT 46.3 12/07/2019    PLATELETCT 226 12/07/2019        Total time of the discharge process exceeds 34 minutes.

## 2019-12-07 NOTE — CARE PLAN
Problem: Communication  Goal: The ability to communicate needs accurately and effectively will improve  Outcome: MET     Problem: Safety  Goal: Will remain free from injury  Outcome: MET  Goal: Will remain free from falls  Outcome: MET     Problem: Infection  Goal: Will remain free from infection  Outcome: MET  Note:   Transferred to SNF with appropriate Abx coverage.      Problem: Venous Thromboembolism (VTW)/Deep Vein Thrombosis (DVT) Prevention:  Goal: Patient will participate in Venous Thrombosis (VTE)/Deep Vein Thrombosis (DVT)Prevention Measures  Outcome: MET     Problem: Bowel/Gastric:  Goal: Normal bowel function is maintained or improved  Outcome: MET  Goal: Will not experience complications related to bowel motility  Outcome: MET     Problem: Knowledge Deficit  Goal: Knowledge of disease process/condition, treatment plan, diagnostic tests, and medications will improve  Outcome: MET  Goal: Knowledge of the prescribed therapeutic regimen will improve  Outcome: MET     Problem: Discharge Barriers/Planning  Goal: Patient's continuum of care needs will be met  Outcome: MET     Problem: Pain Management  Goal: Pain level will decrease to patient's comfort goal  Outcome: MET     Problem: Mobility  Goal: Risk for activity intolerance will decrease  Outcome: MET     Problem: Skin Integrity  Goal: Risk for impaired skin integrity will decrease  Outcome: MET  Note:   Patient incontinent of B & B at 1230. Got patient cleaned up and dressed to send to SNF.      Problem: Respiratory:  Goal: Respiratory status will improve  Outcome: MET     Problem: Urinary Elimination:  Goal: Ability to reestablish a normal urinary elimination pattern will improve  Outcome: MET  Note:   Incontinent.      Problem: Psychosocial Needs:  Goal: Level of anxiety will decrease  Outcome: MET

## 2019-12-07 NOTE — DISCHARGE PLANNING
Received Transport Form @ 4459  Spoke to Yohana @ [facility]    Transport is scheduled for 12/7 @1300 going to [facility].    BERLIN sánchez. Aminah @ [facility] notified of transport time.

## 2019-12-07 NOTE — PROGRESS NOTES
"Internal Medicine Daily Progress Note    Date of Service  12/6/2019    Chief Complaint  68 y.o. female admitted 11/22/2019 with L closed bimalleolar fracture    Hospital Course   Pt with baseline debility secondary to chronic back pain, scoliosis multiple joint osteoarthritis, history of right ankle fracture, opiate dependence, hypertension, hypothyroidism, migraine, obesity who presented 11/22/2019 with complaints of left leg pain started after mechanical fall earlier today.  Apparently in the process of transferring, patient's legs \" gave up\" and she fell.  Patient was supposed to see primary care doctor yesterday.  Work-up in ER showed closed bimalleolar fracture of left ankle  ERP consulted with Dr. Holliday.  Ortho deems pt non-operative at this time.      Interval Problem Update  Pt still oriented x2. MRI showed ventromegaly. Possible NPH ??  states that she does not have memory problems at home. She is real stator. Drinking 3 times per week. Mild tremor on examination. There is some problem with gait and urinary incontinence per her  at home.   12/4- still not her baseline per her . Discuss with medical RN staff. No much difference on her mentation since pt arrived. She has chronic urinary incontinence but no other urinary symptoms. WBC slight elevated. Slight tachycardia. Hold on transfer to SNF until pt medical cleared. Will consider EEG if UA negative for infection.   12/5- she is poor historian. She tells me that she is very tired and she would like to sleep. She has trouble keeping up with conversation. Not been able to get off bed. Not eating well. She was able to tell her  writing. She tells me that she is in hospital, what happened, not correct with date. UA positive for UTI. Started on ceftriaxone. She denies pain. She has not taken any narcotic since 12/3. Not medical stable for dc   12/6- she is more awake today, however she started to have diarrhea. She is high risk for " c-diff. WBC slight elevated. Her  tells me that what usually happens with her, constipation then diarrhea. If persistent. And WBC increasing will check c-diff. Also urine showed enterococcus. Will switch to amoxicillin, and pending sensitivity       Consultants/Specialty  Ortho    Code Status  Full    Disposition  Rehab when approved    Review of Systems  Review of Systems   Constitutional: Negative for weight loss.   HENT: Negative for congestion and nosebleeds.    Eyes: Negative for photophobia and pain.   Respiratory: Negative for stridor.    Cardiovascular: Negative for chest pain.   Gastrointestinal: Negative for diarrhea and vomiting.   Genitourinary: Negative for frequency and urgency.   Musculoskeletal: Positive for joint pain. Negative for back pain.        C/w L ankle fx   Skin: Negative for rash.   Neurological: Negative for focal weakness.   Psychiatric/Behavioral:        Confused, but slight better         Physical Exam  Temp:  [36.7 °C (98.1 °F)-37.2 °C (98.9 °F)] 36.8 °C (98.3 °F)  Pulse:  [] 101  Resp:  [15-18] 16  BP: (130-149)/(78-97) 130/85  SpO2:  [91 %-94 %] 94 %    Physical Exam  Vitals signs reviewed.   Constitutional:       General: She is not in acute distress.     Appearance: Normal appearance. She is not toxic-appearing.   HENT:      Nose: No congestion.   Eyes:      Extraocular Movements: Extraocular movements intact.      Pupils: Pupils are equal, round, and reactive to light.   Neck:      Musculoskeletal: Normal range of motion.   Pulmonary:      Effort: Pulmonary effort is normal.   Abdominal:      General: Bowel sounds are normal.   Musculoskeletal:         General: Tenderness and signs of injury present. No swelling.   Skin:     General: Skin is warm.   Neurological:      General: No focal deficit present.      Mental Status: She is alert. She is disoriented.      Comments: .         Fluids    Intake/Output Summary (Last 24 hours) at 12/6/2019 7939  Last data filed at  12/6/2019 0616  Gross per 24 hour   Intake 200 ml   Output 800 ml   Net -600 ml       Laboratory  Recent Labs     12/04/19  0430 12/05/19 0450 12/06/19 0459   WBC 11.2* 9.5 11.6*   RBC 5.42* 5.40 5.13   HEMOGLOBIN 16.3* 16.3* 15.8   HEMATOCRIT 48.9* 50.1* 47.0   MCV 90.2 92.8 91.6   MCH 30.1 30.2 30.8   MCHC 33.3* 32.5* 33.6   RDW 40.6 42.8 41.4   PLATELETCT 242 231 220   MPV 9.9 10.1 10.5     Recent Labs     12/04/19 0430 12/05/19 0450 12/06/19 0459   SODIUM 139 139 138   POTASSIUM 3.2* 3.7 3.7   CHLORIDE 104 105 107   CO2 24 24 21   GLUCOSE 118* 103* 105*   BUN 12 15 13   CREATININE 0.42* 0.43* 0.39*   CALCIUM 9.6 9.5 8.9                   Imaging  MR-BRAIN-W/O   Final Result      1.  Moderate lateral ventriculomegaly has detailed above. This may in part be related to underlying generalized atrophy and ex vacuo dilatation. Cannot exclude communicating or normal pressure hydrocephalus.   2.  Mild chronic microvascular ischemic type changes.   3.  No acute infarct or hemorrhage.      CT-HEAD W/O   Final Result      No acute intracranial findings.         DX-LUMBAR SPINE-2 OR 3 VIEWS   Final Result      1.  Moderate degenerative change of lumbar spine.   2.  No fracture or subluxation.   3.  Mild dextroconvex curvature, likely positional.   4.  Lateral view is limited by technique.      DX-TIBIA AND FIBULA LEFT   Final Result      Distal fibular and tibial fractures.      DX-ANKLE 3+ VIEWS LEFT   Final Result      Trimalleolar fracture of the left ankle.      DX-KNEE 2- LEFT   Final Result      No acute fracture. Mild degenerative changes.           Assessment/Plan  Closed left ankle fracture  Assessment & Plan  Orthopedics recommended non surgical management  Pain control:  tylenol, NSAIDS, lidocaine patch for now  Avoid narcotics  PT OT evaluation  SNF    Acute cystitis  Assessment & Plan  Switch to amoxicillin. Pending sensitivity. enterococcus   Continue to monitor, mentation improving       Encephalopathy  acute- (present on admission)  Assessment & Plan  Related to narcotics ??  And UTI ??    Electrolytes wnl.   No narcotics for few days now.   MRI brain showed possible NPH (ventromegaly)   UA positive for enterococcus   No need for EEG at this time   No signs of withdrawal.  Continue IVF at this time     Debility  Assessment & Plan  Secondary to degenerative joint and disc disease and chronic pain, obesity.  PT OT evaluation    Moderate persistent asthma without complication- (present on admission)  Assessment & Plan  Patient is currently not in exacerbation  Continue home inhalers  RT protocol      Morbid obesity with BMI of 40.0-44.9, adult (HCC)- (present on admission)  Assessment & Plan  Follow with PCP for weight management    Chronic use of opiate drug for therapeutic purpose- (present on admission)  Assessment & Plan  Continue muscle relaxants and also carefull on narcotics as might be the cause of her confusion     Hypothyroidism- (present on admission)  Assessment & Plan  tsh normal 12/4  Not on meds   Continue to monitor       Essential hypertension- (present on admission)  Assessment & Plan  Blood pressure is under control.  Continue lisinopril       VTE prophylaxis: enoxaparin

## 2019-12-09 LAB
BACTERIA UR CULT: ABNORMAL
BACTERIA UR CULT: ABNORMAL
SIGNIFICANT IND 70042: ABNORMAL
SITE SITE: ABNORMAL
SOURCE SOURCE: ABNORMAL

## 2019-12-16 ENCOUNTER — TELEPHONE (OUTPATIENT)
Dept: MEDICAL GROUP | Facility: LAB | Age: 69
End: 2019-12-16

## 2019-12-17 NOTE — TELEPHONE ENCOUNTER
Ambika SLADE from [facility]- [phone].   She has questions with pts medication. IBU and lisinopril. Should pt continue both? She was verifying pts med list.

## 2019-12-17 NOTE — TELEPHONE ENCOUNTER
Yes, as far as I can see from her hospital d/c summary, she should continue both.  I have not seen her since hospitalization.  She needs a 40 minute hospital f/u when she is feeling up to getting out of the house.

## 2020-01-07 ENCOUNTER — OFFICE VISIT (OUTPATIENT)
Dept: MEDICAL GROUP | Facility: LAB | Age: 70
End: 2020-01-07
Payer: MEDICARE

## 2020-01-07 VITALS
TEMPERATURE: 98.6 F | DIASTOLIC BLOOD PRESSURE: 60 MMHG | RESPIRATION RATE: 16 BRPM | SYSTOLIC BLOOD PRESSURE: 118 MMHG | WEIGHT: 260 LBS | OXYGEN SATURATION: 93 % | HEART RATE: 84 BPM | HEIGHT: 69 IN | BODY MASS INDEX: 38.51 KG/M2

## 2020-01-07 DIAGNOSIS — M54.42 CHRONIC LEFT-SIDED LOW BACK PAIN WITH LEFT-SIDED SCIATICA: ICD-10-CM

## 2020-01-07 DIAGNOSIS — M79.7 FIBROMYALGIA: ICD-10-CM

## 2020-01-07 DIAGNOSIS — G91.2 NPH (NORMAL PRESSURE HYDROCEPHALUS) (HCC): ICD-10-CM

## 2020-01-07 DIAGNOSIS — G93.89 CEREBRAL VENTRICULOMEGALY: ICD-10-CM

## 2020-01-07 DIAGNOSIS — Z09 HOSPITAL DISCHARGE FOLLOW-UP: ICD-10-CM

## 2020-01-07 DIAGNOSIS — S82.842G CLOSED BIMALLEOLAR FRACTURE OF LEFT ANKLE WITH DELAYED HEALING, SUBSEQUENT ENCOUNTER: ICD-10-CM

## 2020-01-07 DIAGNOSIS — G89.29 CHRONIC LEFT-SIDED LOW BACK PAIN WITH LEFT-SIDED SCIATICA: ICD-10-CM

## 2020-01-07 DIAGNOSIS — Z79.891 CHRONIC USE OF OPIATE DRUG FOR THERAPEUTIC PURPOSE: ICD-10-CM

## 2020-01-07 PROBLEM — N30.00 ACUTE CYSTITIS: Status: RESOLVED | Noted: 2019-12-05 | Resolved: 2020-01-07

## 2020-01-07 PROCEDURE — 99215 OFFICE O/P EST HI 40 MIN: CPT | Performed by: NURSE PRACTITIONER

## 2020-01-07 RX ORDER — OXYCODONE AND ACETAMINOPHEN 10; 325 MG/1; MG/1
1 TABLET ORAL EVERY 8 HOURS PRN
Qty: 90 TAB | Refills: 0 | Status: SHIPPED | OUTPATIENT
Start: 2020-01-07 | End: 2020-06-02 | Stop reason: SDUPTHER

## 2020-01-07 ASSESSMENT — PATIENT HEALTH QUESTIONNAIRE - PHQ9: CLINICAL INTERPRETATION OF PHQ2 SCORE: 0

## 2020-01-07 NOTE — PROGRESS NOTES
Hospital f/u    [facility]  Sheyla is a 69-year-old established female here for hospital follow-up, she is here with her  today.  Originally went to the hospital on November 22 after a fall in bathroom at home (while dizzy per pt) and received a diagnoses of a closed bimalleolar fracture of her left ankle, orthopedics deemed this nonoperative.  Was kept inpatient at [facility] x 2 weeks.  Was kept inpatient b/c of  decreased mentation inpatient, possibly related to UTI which showed enterococcus on culture, treated with antibiotics while hospitalized.  MRI of brain showed vetriculomegaly and NPH.  Eventually discharged to a skilled nursing facility as her  was having difficulty caring for her at home.     is here with her today - tells me that prior to hospitalization she had been seeing Dr. Burgess for pain management of fibromyagia - recently started on cymbalta and topamax - was tapering up.      Is now seeing orthopedics (Dr. Holliday) outpatient -was in a splint while in the skilled nursing facility, is in a cast x the past 3 weeks - has f/u this Friday there.  Currently non-weight bearing - mostly adhering to this.      Mentally  reports pt is back to normal - no further hallucinations / memory problems.  Pt did not hit her head when she fell / broke her ankle.   states she was not having significant memory problems before this fall 2019 and new meds for fibromyalgia.     Since being home from the hospital denies GI or  issues. Denies CP or trouble breathing.  Denies headaches.  Living at home with  - has WC, walker.  Home health is coming - also PT and OT.      She is requesting a refill of Percocet for chronic pain management.  Chronic pain recheck for: chronic low back pain  Last dose of controlled substance: 3 weeks ago  Chronic pain treated with percocet taken at most 4 x per week    She  reports current alcohol use.  She  has no history on file for drug.    Interval  "history: seen here when needed for refills  Any major change in health since last appointment? Yes as above    Consequences of Chronic Opiate therapy:  (5 A's)  Analgesia: Compared to no treatment or prior treatment, pain is currently not changed  Activity: worsened  Adverse Events: She denies constipation, dry mouth, itchy skin, nausea and sedation  Aberrant Behaviors: She reports she is taking medication as prescribed and is not veering from agreed treatment regimen or provider recommendations. There have been no inappropriate refills or lost/stolen meds reported.  Affect/Mood: Pain is not impacting patient's mood.  Patient denies depression/anxiety.    Nonnarcotic treatments that are being used: NSAIDs/BARBOUR-2, ice and heat.     Last imagin2019    Opioid Risk Score: No value filed.    Interpretation of Opioid Risk Score   Score 0-3 = Low risk of abuse. Do UDS at least once per year.  Score 4-7 = Moderate risk of abuse. Do UDS 1-4 times per year.  Score 8+ = High risk of abuse. Refer to specialist.    Last order of CONTROLLED SUBSTANCE TREATMENT AGREEMENT was found on 2019 from Office Visit on 2019     UDS Summary                URINE DRUG SCREEN Next Due 2020      Done 2019 PAIN MANAGEMENT SCREEN     Patient has more history with this topic...        Most recent UDS reviewed today and is consistent with prescribed medications.     I have reviewed the medical records, the Prescription Monitoring Program and I have determined that controlled substance treatment is medically indicated.       Past medical, surgical, family, and social history is reviewed and updated in Epic chart by me today.   Medications and allergies reviewed and updated in Epic chart by me today.     ROS:   As documented in history of present illness above    Exam:  /60 (BP Location: Right arm, Patient Position: Sitting, BP Cuff Size: Adult)   Pulse 84   Temp 37 °C (98.6 °F)   Resp 16   Ht 1.753 m (5' 9\")   Wt " 117.9 kg (260 lb)   SpO2 93%   Constitutional: Alert, no distress, plus 3 vital signs  Skin:  Warm, dry, no rashes invisible areas  Eye: Equal, round and reactive, conjunctiva clear  ENMT: Lips without lesions, good dentition, oropharynx clear    Neck: Trachea midline, no masses.  Respiratory: Unlabored respiration, lungs clear to auscultation, no wheezes, no rhonchi  Cardiovascular: Normal rate and rhythm  Musculoskeletal: Her left ankle is in a cast.  Her 10 toes are warm with cap refill less than 3 seconds bilaterally.  Psych: Alert, pleasant, well-groomed, normal affect  Neurologic: Alert and oriented. Cranial nerves II-XII intact, EOMs intact, no tongue deviation, PERRL, no facial asymmetry to motor or sensation    Assessment / Plan / Medical Decision makin. Hospital discharge follow-up  -Reviewed the patient's hospitalization with both her and her  today and answered all questions that they had.    2. NPH (normal pressure hydrocephalus) (HCC)  -Reviewed MRI with her.  Her  states that mentally she is back to normal.  Recommend repeat MRI in late February, 3 months from initial MRI.  - MR-BRAIN-W/O; Future    3. Cerebral ventriculomegaly  - MR-BRAIN-W/O; Future    4. Chronic left-sided low back pain with left-sided sciatica  -Plans on following up with Dr. Burgess for any possible interventions.  Has lost weight which we discussed is helpful.  Using Percocet minimally, typically number 90 pills will last her for 3 to 5 months.  - oxyCODONE-acetaminophen (PERCOCET-10)  MG Tab; Take 1 Tab by mouth every 8 hours as needed for Severe Pain for up to 30 days.  Dispense: 90 Tab; Refill: 0    5. Fibromyalgia  -As above in #4.  - oxyCODONE-acetaminophen (PERCOCET-10)  MG Tab; Take 1 Tab by mouth every 8 hours as needed for Severe Pain for up to 30 days.  Dispense: 90 Tab; Refill: 0    6. Chronic use of opiate drug for therapeutic purpose  -Overall impression that this patient is  benefiting from opioid therapy and that the benefits outweigh the risks of continued use. yes   - Controlled Substance Use Agreement current  - Urine drug screen current and reviewed / compliant with rx medications  - Additional lab: CMP to assess liver and renal function - UTD   - Rx refill prescriptions are done for 3 months, No early refills.   - Referral to pain management no  In prescribing controlled substances to this patient, I certify that I have obtained and reviewed the medical history of Sheyla Garcia. I have also made a good marcy effort to obtain applicable records from other providers who have treated the patient and records did not demonstrate any increased risk of substance abuse that would prevent me from prescribing controlled substances.     I have conducted a physical exam and documented it. I have reviewed Ms. Garcia’s prescription history as maintained by the [place] Prescription Monitoring Program.     I have assessed the patient’s risk for abuse, dependency, and addiction using the validated Opioid Risk Tool available at https://www.mdcalc.com/fpayyx-ntyd-jnkp-ort-narcotic-abuse.     Given the above, I believe the benefits of controlled substance therapy outweigh the risks. The reasons for prescribing controlled substances include non-narcotic, oral analgesic alternatives have been inadequate for pain control. Accordingly, I have discussed the risk and benefits, treatment plan, and alternative therapies with the patient.       7. Closed bimalleolar fracture of left ankle with delayed healing, subsequent encounter  -Followed closely by orthopedics.  Doing well in her cast.  Encouraged her to adhere to nonweightbearing instructions.    Total face to face time 40 minutes of which over 50% of this visit is spent in counseling, education and outlining a plan of treatment and coordination of care for the above conditions. This included but was not limited to discussion of medication options and  potential risks related to the medications, referral and specialty care options. All patient questions were answered

## 2020-02-03 RX ORDER — NITROFURANTOIN 25; 75 MG/1; MG/1
100 CAPSULE ORAL 2 TIMES DAILY
Qty: 10 CAP | Refills: 0 | Status: SHIPPED | OUTPATIENT
Start: 2020-02-03 | End: 2020-06-15 | Stop reason: SDUPTHER

## 2020-03-27 RX ORDER — TOPIRAMATE 25 MG/1
25 TABLET ORAL
Qty: 30 TAB | Refills: 0 | Status: SHIPPED | OUTPATIENT
Start: 2020-03-27 | End: 2020-04-28 | Stop reason: SDUPTHER

## 2020-03-27 RX ORDER — DULOXETIN HYDROCHLORIDE 30 MG/1
30 CAPSULE, DELAYED RELEASE ORAL DAILY
Qty: 90 CAP | Refills: 0 | Status: SHIPPED
Start: 2020-03-27 | End: 2020-10-15

## 2020-03-27 NOTE — TELEPHONE ENCOUNTER
----- Message from Sheyla Garcia sent at 3/27/2020 11:37 AM PDT -----  Regarding: Prescription Question  Contact: [phone]  Zaira Swenson or Team,   I am no longer seeing Dr. Burgess, but I am still in need of prescriptions that he had put me on for my fibromyalgia. Would you be able to fill these prescriptions for me?  First is Duloxetine (generic for Cymbalta) 30 Mg (One capsule per day at bedtime)  Second is Topiramate (generic for Topamax) 25 MG (Three tablets per day at bedtime)    Can you please send them to the [facility] pharmacy on [address]? Please call me if there are any issues with this. [phone].    Best Regards,  Sheyla Garcia

## 2020-03-27 NOTE — TELEPHONE ENCOUNTER
Received request via: Patient    Was the patient seen in the last year in this department? Yes  1/7/20  Does the patient have an active prescription (recently filled or refills available) for medication(s) requested? No

## 2020-03-28 DIAGNOSIS — J45.41 ASTHMA IN ADULT, MODERATE PERSISTENT, WITH ACUTE EXACERBATION: ICD-10-CM

## 2020-03-30 RX ORDER — ALBUTEROL SULFATE 90 UG/1
2 AEROSOL, METERED RESPIRATORY (INHALATION) EVERY 6 HOURS PRN
Qty: 8.5 G | Refills: 3 | Status: SHIPPED | OUTPATIENT
Start: 2020-03-30 | End: 2020-04-28 | Stop reason: SDUPTHER

## 2020-04-28 ENCOUNTER — PATIENT MESSAGE (OUTPATIENT)
Dept: MEDICAL GROUP | Facility: LAB | Age: 70
End: 2020-04-28

## 2020-04-28 DIAGNOSIS — J45.41 ASTHMA IN ADULT, MODERATE PERSISTENT, WITH ACUTE EXACERBATION: ICD-10-CM

## 2020-04-28 RX ORDER — TOPIRAMATE 25 MG/1
75 TABLET ORAL
Qty: 90 TAB | Refills: 0 | Status: SHIPPED
Start: 2020-04-28 | End: 2020-06-02

## 2020-04-28 RX ORDER — ALBUTEROL SULFATE 90 UG/1
2 AEROSOL, METERED RESPIRATORY (INHALATION) EVERY 6 HOURS PRN
Qty: 8.5 G | Refills: 3 | Status: SHIPPED | OUTPATIENT
Start: 2020-04-28 | End: 2020-08-24 | Stop reason: SDUPTHER

## 2020-04-28 NOTE — TELEPHONE ENCOUNTER
----- Message from Sheyla Garcia sent at 4/28/2020  3:09 PM PDT -----  Regarding: Prescription Question  Contact: [phone]  Zaira,    I had attempted to get a refill of my Abuterol inhaler but because it said to use [facility] which has a back order of the generic I would have to pay the full price of $75 at [facility] where if we just say generic on the order they can use other companies which would only cost us $25. I would like you to put in an order for me and change the pharmacy to [facility] in [place].    I also need a refill of Topiramate 25 MG. On the last prescription it had 30 tablets taking one tablet per day at bedtime. When Dr. Burgess prescribed this he had me taking 3 tablet per day at bedtime so please increase the prescription to 90 tablets. If you have any questions please feel free to give me a call at my home number [phone]    Best Regards,  Sheyla Garcia

## 2020-06-02 ENCOUNTER — TELEMEDICINE (OUTPATIENT)
Dept: MEDICAL GROUP | Facility: LAB | Age: 70
End: 2020-06-02
Payer: MEDICARE

## 2020-06-02 VITALS — WEIGHT: 249 LBS | OXYGEN SATURATION: 91 % | BODY MASS INDEX: 36.88 KG/M2 | HEART RATE: 81 BPM | HEIGHT: 69 IN

## 2020-06-02 DIAGNOSIS — M79.672 LEFT FOOT PAIN: ICD-10-CM

## 2020-06-02 DIAGNOSIS — M54.42 CHRONIC LEFT-SIDED LOW BACK PAIN WITH LEFT-SIDED SCIATICA: ICD-10-CM

## 2020-06-02 DIAGNOSIS — M79.7 FIBROMYALGIA: ICD-10-CM

## 2020-06-02 DIAGNOSIS — M25.552 LEFT HIP PAIN: ICD-10-CM

## 2020-06-02 DIAGNOSIS — H66.92 LEFT ACUTE OTITIS MEDIA: ICD-10-CM

## 2020-06-02 DIAGNOSIS — E66.01 MORBID OBESITY WITH BMI OF 40.0-44.9, ADULT (HCC): ICD-10-CM

## 2020-06-02 DIAGNOSIS — Z79.891 CHRONIC USE OF OPIATE DRUG FOR THERAPEUTIC PURPOSE: ICD-10-CM

## 2020-06-02 DIAGNOSIS — R73.01 IMPAIRED FASTING GLUCOSE: ICD-10-CM

## 2020-06-02 DIAGNOSIS — I10 ESSENTIAL HYPERTENSION: ICD-10-CM

## 2020-06-02 DIAGNOSIS — E03.9 ACQUIRED HYPOTHYROIDISM: ICD-10-CM

## 2020-06-02 DIAGNOSIS — G89.29 CHRONIC LEFT-SIDED LOW BACK PAIN WITH LEFT-SIDED SCIATICA: ICD-10-CM

## 2020-06-02 PROCEDURE — 99214 OFFICE O/P EST MOD 30 MIN: CPT | Mod: 95,CR | Performed by: NURSE PRACTITIONER

## 2020-06-02 RX ORDER — CEFUROXIME AXETIL 250 MG/1
250 TABLET ORAL 2 TIMES DAILY
Qty: 14 TAB | Refills: 0 | Status: SHIPPED | OUTPATIENT
Start: 2020-06-02 | End: 2020-06-09

## 2020-06-02 RX ORDER — OXYCODONE AND ACETAMINOPHEN 10; 325 MG/1; MG/1
1 TABLET ORAL EVERY 8 HOURS PRN
Qty: 90 TAB | Refills: 0 | Status: SHIPPED | OUTPATIENT
Start: 2020-06-02 | End: 2020-12-28 | Stop reason: SDUPTHER

## 2020-06-02 ASSESSMENT — FIBROSIS 4 INDEX: FIB4 SCORE: 1.15

## 2020-06-02 NOTE — PROGRESS NOTES
Telemedicine Visit: Established Patient     This encounter was conducted via Zoom .   Verbal consent was obtained. Patient's identity was verified.    Subjective:   CC:   Sheyla  is a 69 y.o. female presenting for evaluation and management of:    Requesting a refill of Percocet.  Tells me that she dislikes taking Percocet on a regular basis but without it, her pain is severe and she is unable to sleep.  Last refill of Percocet was in January 2020 by myself.  Tylenol and ibuprofen do not touch her pain in the evenings.  History of ankle fracture last winter  -was doing well with PT but unfortunately fell during x-ray in approximately mid Feb and had a set back.  Tells me that everything was going well again until she fell at home last week.   Seeing Dr. Holliday next Monday.    Last fall was about a week ago - Was standing at home with walker, turned head /took pill and fell - hit left hip / left foot - onto tile.  Can walk with walker / bear weight.  Having pain in her left hip as well as her left foot.  Has chronic pain in her low back.  Diagnoses also of fibromyalgia.  Continues on Cymbalta for fibromyalgia but had diarrhea with Topamax and stopped taking this a few weeks ago.  Doing well otherwise - has been staying home for months b/c of coronavirus.    Requesting refill of percocet - denies any side effects of this. Never takes more than one percocet per day.    Has not been to see Dr. Burgess since last fall.     C/o left ear pain x 3 weeks.  + drainage from left ear - thick and orange.  Also has sinus congestion but denies sore throat, cough or wheezing.  Denies fever.  Denies any ear trauma.  Requesting antibiotics.  No history of recurrent ear infections.    Hypothyroidism: Chronic issue.  Stopped taking levothyroxine and an unknown point in her life over the past few years.  Energy level is always very poor.  She denies constipation unless she takes hydrocodone.  Tells me hydrocodone has led to bowel  obstructions in the past.  Has not had lab work for her thyroid done since December.    ROS   Denies any recent fevers or chills. No nausea or vomiting. No chest pains or shortness of breath.     Allergies   Allergen Reactions   • Gabapentin      seizure       Current medicines (including changes today)  Current Outpatient Medications   Medication Sig Dispense Refill   • albuterol (PROAIR HFA) 108 (90 Base) MCG/ACT Aero Soln inhalation aerosol Inhale 2 Puffs by mouth every 6 hours as needed for Shortness of Breath. 8.5 g 3   • topiramate (TOPAMAX) 25 MG Tab Take 3 Tabs by mouth every bedtime. 90 Tab 0   • DULoxetine (CYMBALTA) 30 MG Cap DR Particles Take 1 Cap by mouth every day. 90 Cap 0   • tizanidine (ZANAFLEX) 4 MG Tab Take 1 Tab by mouth every 6 hours as needed. 30 Tab 3   • amLODIPine (NORVASC) 5 MG Tab Take 1 Tab by mouth every day. 30 Tab 0   • ibuprofen (MOTRIN) 400 MG Tab Take 1 Tab by mouth every 6 hours as needed. 30 Tab 0   • lidocaine (LIDODERM) 5 % Patch Apply 1 Patch to skin as directed every 24 hours. 10 Patch 0   • lisinopril (PRINIVIL) 10 MG Tab Take 10 mg by mouth every day.     • therapeutic multivitamin-minerals (THERAGRAN-M) Tab Take 1 Tab by mouth every day.     • oxybutynin SR (DITROPAN XL) 10 MG CR tablet Take 10 mg by mouth every day.     • DULERA 200-5 MCG/ACT Aerosol USE 2 INHALATIONS TWICE A DAY 39 g 3   • albuterol (PROVENTIL) 2.5mg/3ml Nebu Soln solution for nebulization 3 mL by Nebulization route every four hours as needed for Shortness of Breath. 75 mL 3   • CYANOCOBALAMIN PO Take 1 Dose by mouth every day. Unknown OTC Strength.     • B Complex-C-Folic Acid (SUPER B-COMPLEX/VIT C/FA PO) Take 1 Tab by mouth every day.     • BETA CAROTENE PO Take 1 Dose by mouth every day. Unknown OTC Strength.       No current facility-administered medications for this visit.        Patient Active Problem List    Diagnosis Date Noted   • Closed left ankle fracture 11/23/2019     Priority: High   •  "Debility 11/23/2019   • Moderate persistent asthma without complication 02/15/2018   • Morbid obesity with BMI of 40.0-44.9, adult (HCC) 01/03/2018   • Chronic use of opiate drug for therapeutic purpose 03/07/2017   • KIRA (obstructive sleep apnea) 12/06/2014   • Scoliosis 12/06/2014   • Essential hypertension    • Obesity    • Hypothyroidism        Family History   Problem Relation Age of Onset   • Heart Disease Mother         chf   • Cancer Father         lung, berrylium exposure   • Stroke Father         tia's   • Cancer Maternal Grandfather         lung   • Diabetes Neg Hx        She  has a past medical history of Hypertension, Hypothyroidism (2008), Migraine, and Obesity.  She  has a past surgical history that includes knee orif (1/3/08); nasal septal reconstruction; tonsillectomy; bladder sling female; and other surgical procedure.       Objective:   Pulse 81   Ht 1.753 m (5' 9\")   Wt 112.9 kg (249 lb)   SpO2 91%   BMI 36.77 kg/m²     Physical Exam:  Constitutional: Alert, no distress, well-groomed.  Skin: No rashes in visible areas.  Eye: Round. Conjunctiva clear, lids normal. No icterus.   ENMT: Lips pink without lesions, good dentition, moist mucous membranes. Phonation normal.  Neck: No masses, no thyromegaly. Moves freely without pain.  CV: Pulse as reported by patient  Respiratory: Unlabored respiratory effort, no cough or audible wheeze  Psych: Alert and oriented x3, normal affect and mood.       Assessment and Plan:   The following treatment plan was discussed:   1. Chronic left-sided low back pain with left-sided sciatica  oxyCODONE-acetaminophen (PERCOCET-10)  MG Tab   2. Fibromyalgia  oxyCODONE-acetaminophen (PERCOCET-10)  MG Tab   3. Left hip pain  oxyCODONE-acetaminophen (PERCOCET-10)  MG Tab   4. Left foot pain  oxyCODONE-acetaminophen (PERCOCET-10)  MG Tab   5. Chronic use of opiate drug for therapeutic purpose  Controlled Substance Treatment Agreement   6. Left acute " otitis media  cefUROXime (CEFTIN) 250 MG Tab   7. Acquired hypothyroidism  TSH    FREE THYROXINE   8. Essential hypertension  Comp Metabolic Panel    CBC WITH DIFFERENTIAL    HEMOGLOBIN A1C   9. Morbid obesity with BMI of 40.0-44.9, adult (HCC)  HEMOGLOBIN A1C   10. Impaired fasting glucose  HEMOGLOBIN A1C     Refilled her Percocet.  Encouraged her to make a follow-up with Dr. Burgess, chronic pain management.  She tells me that she thinks that her current Percocet prescription will last her for 3 to 6 months.  Has a follow-up also with orthopedics.  She understands that Percocet can be constipating, sedating and increase her fall risk.  In prescribing controlled substances to this patient, I certify that I have obtained and reviewed the medical history of Sheyla Garcia. I have also made a good marcy effort to obtain applicable records from other providers who have treated the patient and records did not demonstrate any increased risk of substance abuse that would prevent me from prescribing controlled substances.     I have conducted a physical exam and documented it. I have reviewed Ms. Garcia’s prescription history as maintained by the [place] Prescription Monitoring Program.     I have assessed the patient’s risk for abuse, dependency, and addiction using the validated Opioid Risk Tool available at https://www.mdcalc.com/zjqawd-uclb-ydwq-ort-narcotic-abuse.     Given the above, I believe the benefits of controlled substance therapy outweigh the risks. The reasons for prescribing controlled substances include non-narcotic, oral analgesic alternatives have been inadequate for pain control. Accordingly, I have discussed the risk and benefits, treatment plan, and alternative therapies with the patient.       Overall impression that this patient is benefiting from opioid therapy and that the benefits outweigh the risks of continued use. yes   - Controlled Substance Use Agreement current  - Urine drug screen due - pt  is not had her Percocet in quite some time, last prescription refill was January 2020.  She understands that she needs to do a urine drug screen if she needs a refill beyond this prescription.  Updated controlled substance agreement by signing for the patient with her consent and send her a copy.  - Additional lab: CMP to assess liver and renal function - requested.  - Rx refill prescriptions are done for 3 months, No early refills.   - Referral to pain management no    Recommend updated thyroid lab work along with a CBC, CMP and A1c.    She was prescribed Ceftin 250 mg twice daily for 7 days and is agreeable to following up in person within a week if her ear pain persists.      Follow-up: As above, otherwise within 3 months if she needs a refill of her pain medication.

## 2020-06-12 NOTE — TELEPHONE ENCOUNTER
From: Sheyla Garcia  To: Office of ALANNA Childers  Sent: 6/12/2020 2:00 PM PDT  Subject: Medication Renewal Request    Sheyla Garcia would like a refill of the following medications:   Other - Nitrofurantoin Mono- MG, This is for a bladder infection. Please send to [facility] [address]    Preferred pharmacy: [id number] - [place], [place] - [address]

## 2020-06-15 RX ORDER — NITROFURANTOIN 25; 75 MG/1; MG/1
100 CAPSULE ORAL 2 TIMES DAILY
Qty: 10 CAP | Refills: 0 | Status: SHIPPED | OUTPATIENT
Start: 2020-06-15 | End: 2020-06-20

## 2020-06-15 NOTE — TELEPHONE ENCOUNTER
Please call patient.  Please confirm that she is requesting nitrofurantoin for urinary tract infection symptoms and ask a little bit about how long her symptoms have been around.  want to make sure she is requesting medicine for the correct thing.  Thanks  Zaira

## 2020-06-15 NOTE — TELEPHONE ENCOUNTER
Patient states that she has had 3 in the last 3 months. Has been battling diarrhea. She had some on hand that she was taking but wasn't enough. She is having painful urination.

## 2020-06-18 RX ORDER — LISINOPRIL 10 MG/1
10 TABLET ORAL DAILY
Qty: 30 TAB | Refills: 3 | Status: SHIPPED | OUTPATIENT
Start: 2020-06-18 | End: 2020-09-06 | Stop reason: SDUPTHER

## 2020-06-18 NOTE — TELEPHONE ENCOUNTER
----- Message from Sheyla Garcia sent at 6/18/2020 10:04 AM PDT -----  Regarding: Prescription Question  Contact: [phone]  Sheyla needs a refill of Lisinopril 10 Mg. [facility]. Please have it sent to [facility] in [place].

## 2020-07-06 DIAGNOSIS — J45.20 MILD INTERMITTENT ASTHMA, UNSPECIFIED WHETHER COMPLICATED: ICD-10-CM

## 2020-07-06 RX ORDER — PREDNISONE 10 MG/1
TABLET ORAL
Qty: 30 TAB | Refills: 0 | Status: SHIPPED | OUTPATIENT
Start: 2020-07-06 | End: 2020-10-15

## 2020-07-06 RX ORDER — ESTRADIOL 1 MG/1
1 TABLET ORAL
Qty: 90 TAB | Refills: 4 | Status: SHIPPED | OUTPATIENT
Start: 2020-07-06 | End: 2021-07-08

## 2020-07-06 NOTE — TELEPHONE ENCOUNTER
----- Message from Sheyla Garcia sent at 7/5/2020  5:29 PM PDT -----  Regarding: Prescription Question  Contact: [phone]  I also need a refill for my Estrodiol 1 MG , Qty 90. [facility] also [facility] to [facility]

## 2020-07-06 NOTE — TELEPHONE ENCOUNTER
----- Message from Sheyla Garcia sent at 7/5/2020  5:25 PM PDT -----  Regarding: Prescription Question  Contact: [phone]  I am in need of Prednisone, 10 MG Qty 30. 6 tables one per day for 3 days, 4tabs x 3days then 2tabs x 2days. I have used this in the past to clear up my lungs. Please send to [facility] on [place] [address].

## 2020-07-21 RX ORDER — METHOCARBAMOL 750 MG/1
750 TABLET, FILM COATED ORAL 3 TIMES DAILY PRN
Qty: 120 TAB | Refills: 1 | Status: SHIPPED | OUTPATIENT
Start: 2020-07-21 | End: 2020-11-16 | Stop reason: SDUPTHER

## 2020-07-21 NOTE — TELEPHONE ENCOUNTER
Received request via:Patient    Was the patient seen in the last year in this department? Yes  6/2/20  Does the patient have an active prescription (recently filled or refills available) for medication(s) requested? No

## 2020-07-21 NOTE — TELEPHONE ENCOUNTER
----- Message from Sheyla Garcia sent at 7/21/2020  1:16 PM PDT -----  Regarding: Prescription Question  Contact: [phone]  I am not sure why this isn't in the prescription refill list but I need a refill of my Methocarbamol 750 MG 60 tablets. Please send it over to [facility] [address] as they are [facility] the price [facility].    Thanks,  Sheyla Garcia

## 2020-08-24 DIAGNOSIS — J45.41 ASTHMA IN ADULT, MODERATE PERSISTENT, WITH ACUTE EXACERBATION: ICD-10-CM

## 2020-08-24 RX ORDER — ALBUTEROL SULFATE 90 UG/1
2 AEROSOL, METERED RESPIRATORY (INHALATION) EVERY 6 HOURS PRN
Qty: 8.5 G | Refills: 3 | Status: SHIPPED | OUTPATIENT
Start: 2020-08-24 | End: 2020-10-29 | Stop reason: SDUPTHER

## 2020-08-24 NOTE — TELEPHONE ENCOUNTER
Received request via: Pharmacy    Was the patient seen in the last year in this department? Yes  6/2/20  Does the patient have an active prescription (recently filled or refills available) for medication(s) requested? No

## 2020-09-08 RX ORDER — LISINOPRIL 10 MG/1
10 TABLET ORAL DAILY
Qty: 30 TAB | Refills: 6 | Status: SHIPPED | OUTPATIENT
Start: 2020-09-08 | End: 2020-11-16 | Stop reason: SDUPTHER

## 2020-10-15 ENCOUNTER — OFFICE VISIT (OUTPATIENT)
Dept: MEDICAL GROUP | Facility: LAB | Age: 70
End: 2020-10-15
Payer: MEDICARE

## 2020-10-15 VITALS
RESPIRATION RATE: 16 BRPM | OXYGEN SATURATION: 93 % | DIASTOLIC BLOOD PRESSURE: 76 MMHG | HEART RATE: 90 BPM | SYSTOLIC BLOOD PRESSURE: 120 MMHG | BODY MASS INDEX: 37.77 KG/M2 | TEMPERATURE: 97.9 F | HEIGHT: 69 IN | WEIGHT: 255 LBS

## 2020-10-15 DIAGNOSIS — M25.552 BILATERAL HIP PAIN: ICD-10-CM

## 2020-10-15 DIAGNOSIS — R44.3 HALLUCINATIONS: ICD-10-CM

## 2020-10-15 DIAGNOSIS — Z23 NEED FOR VACCINATION: ICD-10-CM

## 2020-10-15 DIAGNOSIS — G89.29 CHRONIC BILATERAL LOW BACK PAIN WITH LEFT-SIDED SCIATICA: ICD-10-CM

## 2020-10-15 DIAGNOSIS — R10.2 PAIN IN FEMALE PELVIS: ICD-10-CM

## 2020-10-15 DIAGNOSIS — M54.42 CHRONIC BILATERAL LOW BACK PAIN WITH LEFT-SIDED SCIATICA: ICD-10-CM

## 2020-10-15 DIAGNOSIS — M25.551 BILATERAL HIP PAIN: ICD-10-CM

## 2020-10-15 DIAGNOSIS — R29.6 FREQUENT FALLS: ICD-10-CM

## 2020-10-15 DIAGNOSIS — R19.7 DIARRHEA, UNSPECIFIED TYPE: ICD-10-CM

## 2020-10-15 PROCEDURE — 99215 OFFICE O/P EST HI 40 MIN: CPT | Mod: 25 | Performed by: NURSE PRACTITIONER

## 2020-10-15 PROCEDURE — G0008 ADMIN INFLUENZA VIRUS VAC: HCPCS | Performed by: NURSE PRACTITIONER

## 2020-10-15 PROCEDURE — 90662 IIV NO PRSV INCREASED AG IM: CPT | Performed by: NURSE PRACTITIONER

## 2020-10-15 RX ORDER — LIDOCAINE 50 MG/G
3 PATCH TOPICAL EVERY 24 HOURS
Qty: 90 PATCH | Refills: 1 | Status: SHIPPED | OUTPATIENT
Start: 2020-10-15 | End: 2020-10-19 | Stop reason: SDUPTHER

## 2020-10-15 RX ORDER — DIPHENOXYLATE HYDROCHLORIDE AND ATROPINE SULFATE 2.5; .025 MG/1; MG/1
1 TABLET ORAL 2 TIMES DAILY PRN
Qty: 60 TAB | Refills: 1 | Status: SHIPPED | OUTPATIENT
Start: 2020-10-15 | End: 2020-10-16 | Stop reason: SDUPTHER

## 2020-10-15 ASSESSMENT — FIBROSIS 4 INDEX: FIB4 SCORE: 1.15

## 2020-10-15 NOTE — PROGRESS NOTES
"Chief Complaint   Patient presents with   • Fall     painin leg       HPI  Sheyla is a 69-year-old established female here with multiple complaints and concerns.  1.-Frequent falls: Unsure if this is a chronic issue for the patient.  She states that she is had progressively worsening back pain for several years and has felt more unsteady on her feet for over a year.  She states that she has felt weaker, more unsteady on her feet and at risk of falls more so x the past few months -she has fallen twice in the past month.  + dizziness prior to falls.  Unable to get back up on her own -  helps her.  Feels that she is weak, has worsening pain to low back and radiating down left leg.  Right knee always hurts.  + numbness / tingling in feet.  She feels stuck in her wheelchair, stating that she is too weak and in too much pain to use her walker.  She suffers from chronic pain in her low back, her pelvis, points to her left SI joint, left buttocks and her left hip.  Her pain has worsened since she fell 8 days ago.  She did not go in to see anyone after her fall 8 days ago.  She did not hit her head 8 days ago.   PT has been helpful for her in the past and she would like to restart this. She has difficulty leaving her house/getting in a wheelchair because of her pain and weakness, would prefer home physical therapy.  Felt dizzy / loopy with duloxetine.  States she was \"wacky\" with gabapentin.  Lidocaine patches typically help.  Hallucinates with opiates, hearing her  talk to women at night after taking oxycodone and has recorded this, he is not talking to women in reality.    2- Diarrhea:  Urgent / horrible.  Has had intermittent diarrhea x years but now diarrhea is daily.  Can't pin point foods that cause diarrhea.  Diarrhea is watery and she cramps prior to.  Can have diarrhea 1-3 x per day.  Has tried imodium without improvement.   No recent antibiotics lately.   Last colonoscopy 11/2019 and two polyps, " "otherwise normal.        Past medical, surgical, family, and social history is reviewed and updated in Epic chart by me today.   Medications and allergies reviewed and updated in Epic chart by me today.     ROS:   As documented in history of present illness above    Exam:  /76   Pulse 90   Temp 36.6 °C (97.9 °F)   Resp 16   Ht 1.753 m (5' 9\")   Wt 115.7 kg (255 lb)   SpO2 93%   Constitutional: Overweight female, alert, no distress, plus 3 vital signs  Skin:  Warm, dry, no rashes invisible areas  Eye: Equal, round and reactive, conjunctiva clear  ENMT: Lips without lesions, good dentition, oropharynx clear    Neck: Trachea midline, no masses, no thyromegaly  Respiratory: Unlabored respiration, lungs clear to auscultation, no wheezes, no rhonchi  Cardiovascular: Normal rate and rhythm.  Musculoskeletal: She does have bony tenderness with palpation to her lower lumbar spine although I do not feel any step-offs, bruising and I do not see any overlying rashes.  She is able to flex her spine while seated on the exam table.  She does have full range of motion of her left hip although this causes her discomfort with external rotation.  She is able to go from exam table to wheelchair although slowly and with the assistance of our medical assistant.  Psych: Alert, pleasant, well-groomed, normal affect    Assessment / Plan / Medical Decision makin. Diarrhea, unspecified type  -This is a longstanding issue, not acute.  Low suspicion for infectious origin as she has not been on antibiotics in a year, does not travel and her  does not have diarrhea.  Reviewed her last colonoscopy from less than 1 year ago which was negative for any type of malignancy or inflammatory bowel disease.  Trial of Lomotil up to twice daily.  Discussed side effects of Lomotil.  If she becomes constipated, she understands that she should stop Lomotil and contact me.  If Lomotil is not helpful over the next 2 weeks, she will " contact me.  - diphenoxylate-atropine (LOMOTIL) 2.5-0.025 MG Tab; Take 1 Tab by mouth 2 times a day as needed for Diarrhea for up to 30 days.  Dispense: 60 Tab; Refill: 1    2. Chronic bilateral low back pain with left-sided sciatica  -Recommend updated imaging, lidocaine patches up to 3/day and initiation of physical therapy.  Highly encouraged her to consider MRI and a spinal specialty consult.  I will follow up with her after imaging returns and with confirmation that she would like to move forward with MRI/neurosurgery consult.  - DX-LUMBAR SPINE-2 OR 3 VIEWS; Future  - DX-HIP-BILATERAL-WITH PELVIS-3/4 VIEWS; Future  - lidocaine (LIDODERM) 5 % Patch; Apply 3 Patches to skin as directed every 24 hours.  Dispense: 90 Patch; Refill: 1  - REFERRAL TO HOME HEALTH    3. Bilateral hip pain  - DX-LUMBAR SPINE-2 OR 3 VIEWS; Future  - DX-HIP-BILATERAL-WITH PELVIS-3/4 VIEWS; Future  - lidocaine (LIDODERM) 5 % Patch; Apply 3 Patches to skin as directed every 24 hours.  Dispense: 90 Patch; Refill: 1  - REFERRAL TO HOME HEALTH    4. Pain in female pelvis  - DX-LUMBAR SPINE-2 OR 3 VIEWS; Future  - DX-HIP-BILATERAL-WITH PELVIS-3/4 VIEWS; Future  - lidocaine (LIDODERM) 5 % Patch; Apply 3 Patches to skin as directed every 24 hours.  Dispense: 90 Patch; Refill: 1    5. Hallucinations  -Likely related to opiate use as she states this only happens after opiate intake.  Recommend refraining from opiates.  Trial of lidocaine patches for pain control.  Recommend a UA which she was unable to collect in our office today but will do with her lab work, to rule out infection causing hallucinations.  She was not actively hallucinating in the office, stating she only seems to hallucinate at night after taking an opiate.  - URINALYSIS,CULTURE IF INDICATED; Future    6. Frequent falls  -Certainly concerned about her increasing weakness and falls.   She would benefit from physical therapy.  She struggles to get in and out of her house/wheelchair  and in the car.  I believe that home physical therapy would be most beneficial for her and a referral to home health physical therapy was placed today.  - REFERRAL TO HOME HEALTH    7. Need for vaccination  - INFLUENZA VACCINE, HIGH DOSE (65+ ONLY)    Total face to face time 40 minutes of which over 50% of this visit is spent in counseling, education and outlining a plan of treatment and coordination of care for the above conditions. This included but was not limited to discussion of medication options and potential risks related to the medications, referral and specialty care options. All patient questions were answered

## 2020-10-16 DIAGNOSIS — R19.7 DIARRHEA, UNSPECIFIED TYPE: ICD-10-CM

## 2020-10-16 RX ORDER — DIPHENOXYLATE HYDROCHLORIDE AND ATROPINE SULFATE 2.5; .025 MG/1; MG/1
1 TABLET ORAL 2 TIMES DAILY PRN
Qty: 60 TAB | Refills: 1 | Status: SHIPPED | OUTPATIENT
Start: 2020-10-16 | End: 2020-11-15

## 2020-10-16 NOTE — TELEPHONE ENCOUNTER
----- Message from Sheyla Garcia sent at 10/16/2020  9:33 AM PDT -----  Regarding: Prescription Question  Contact: [phone]  Sorry for the trouble but can you send the prescriptions requested yesterday for the diphenoxylate and lidocaine patches over to [facility] - [place], [place] - [address]  [address] [place] [address]. This will save us over $80 with our [employer] discount. Thanks Jasiel Garcia

## 2020-10-19 ENCOUNTER — PATIENT MESSAGE (OUTPATIENT)
Dept: MEDICAL GROUP | Facility: LAB | Age: 70
End: 2020-10-19

## 2020-10-19 DIAGNOSIS — G89.29 CHRONIC BILATERAL LOW BACK PAIN WITH LEFT-SIDED SCIATICA: ICD-10-CM

## 2020-10-19 DIAGNOSIS — M25.552 BILATERAL HIP PAIN: ICD-10-CM

## 2020-10-19 DIAGNOSIS — M54.42 CHRONIC BILATERAL LOW BACK PAIN WITH LEFT-SIDED SCIATICA: ICD-10-CM

## 2020-10-19 DIAGNOSIS — R10.2 PAIN IN FEMALE PELVIS: ICD-10-CM

## 2020-10-19 DIAGNOSIS — M25.551 BILATERAL HIP PAIN: ICD-10-CM

## 2020-10-19 RX ORDER — LIDOCAINE 50 MG/G
3 PATCH TOPICAL EVERY 24 HOURS
Qty: 90 PATCH | Refills: 1 | Status: SHIPPED | OUTPATIENT
Start: 2020-10-19 | End: 2021-11-03 | Stop reason: SDUPTHER

## 2020-10-19 NOTE — TELEPHONE ENCOUNTER
----- Message from Sheyla Garcia sent at 10/19/2020 10:45 AM PDT -----  Regarding: Prescription Question  Contact: [phone]  Can you please verify that you moved the prescription request for the lidocaine patches over to [facility]?

## 2020-10-29 DIAGNOSIS — J45.41 ASTHMA IN ADULT, MODERATE PERSISTENT, WITH ACUTE EXACERBATION: ICD-10-CM

## 2020-10-29 RX ORDER — ALBUTEROL SULFATE 90 UG/1
2 AEROSOL, METERED RESPIRATORY (INHALATION) EVERY 6 HOURS PRN
Qty: 8.5 G | Refills: 3 | Status: SHIPPED | OUTPATIENT
Start: 2020-10-29 | End: 2021-04-05 | Stop reason: SDUPTHER

## 2020-10-29 NOTE — TELEPHONE ENCOUNTER
Received request via: Pharmacy    Was the patient seen in the last year in this department? Yes  10/15/20  Does the patient have an active prescription (recently filled or refills available) for medication(s) requested? No

## 2020-11-16 RX ORDER — METHOCARBAMOL 750 MG/1
750 TABLET, FILM COATED ORAL 3 TIMES DAILY PRN
Qty: 120 TAB | Refills: 1 | Status: SHIPPED | OUTPATIENT
Start: 2020-11-16 | End: 2021-05-19 | Stop reason: SDUPTHER

## 2020-11-16 RX ORDER — LISINOPRIL 10 MG/1
10 TABLET ORAL DAILY
Qty: 30 TAB | Refills: 6 | Status: SHIPPED | OUTPATIENT
Start: 2020-11-16 | End: 2021-03-31 | Stop reason: SDUPTHER

## 2020-11-16 NOTE — TELEPHONE ENCOUNTER
Received request via: Patient    Was the patient seen in the last year in this department? Yes  10/15/20  Does the patient have an active prescription (recently filled or refills available) for medication(s) requested? No

## 2020-11-16 NOTE — TELEPHONE ENCOUNTER
----- Message from Sheyla Garcia sent at 11/16/2020  9:29 AM PST -----  Regarding: Prescription Question  Contact: [phone]  Can you please verify that the prescription refill request for Lisinopril , and Methocarbamol has been sent over to [facility].    Thanks

## 2020-12-02 ENCOUNTER — TELEPHONE (OUTPATIENT)
Dept: MEDICAL GROUP | Facility: LAB | Age: 70
End: 2020-12-02

## 2020-12-03 ENCOUNTER — TELEPHONE (OUTPATIENT)
Dept: MEDICAL GROUP | Facility: LAB | Age: 70
End: 2020-12-03

## 2020-12-03 DIAGNOSIS — J45.20 MILD INTERMITTENT ASTHMA, UNSPECIFIED WHETHER COMPLICATED: ICD-10-CM

## 2020-12-03 DIAGNOSIS — R44.3 HALLUCINATIONS: Primary | ICD-10-CM

## 2020-12-03 RX ORDER — PREDNISONE 10 MG/1
TABLET ORAL
Qty: 30 TAB | Refills: 0 | Status: SHIPPED
Start: 2020-12-03 | End: 2021-05-05

## 2020-12-03 NOTE — TELEPHONE ENCOUNTER
"· Home Health paperwork received from [facility] requiring provider signature.     · All appropriate fields completed by Medical Assistant: No    · Paperwork placed in \"MA to Provider\" folder/basket. Awaiting provider completion/signature.  "

## 2020-12-03 NOTE — TELEPHONE ENCOUNTER
Referral placed urgently.  Please asked her to go to [facility] or [facility] if her hallucinations become dangerous to herself or her .

## 2020-12-03 NOTE — TELEPHONE ENCOUNTER
----- Message from Sheyla Garcia sent at 12/2/2020 10:32 PM PST -----  Regarding: Non-Urgent Medical Question  Contact: [phone]  I am in need of a psychiatric doctor referral. I am still hearing talking from my  at night and after recording it he is not saying anything.

## 2020-12-03 NOTE — TELEPHONE ENCOUNTER
----- Message from Sheyla Garcia sent at 12/2/2020 10:26 PM PST -----  Regarding: Prescription Question  Contact: [phone]  I need a prescription refill for Prednisone 10 MG, qty 30. This is needed for lung issue and this has cleared it up in the past.    Sheyla Garcia

## 2020-12-28 ENCOUNTER — PATIENT MESSAGE (OUTPATIENT)
Dept: MEDICAL GROUP | Facility: LAB | Age: 70
End: 2020-12-28

## 2020-12-28 DIAGNOSIS — M79.7 FIBROMYALGIA: ICD-10-CM

## 2020-12-28 DIAGNOSIS — G89.29 CHRONIC LEFT-SIDED LOW BACK PAIN WITH LEFT-SIDED SCIATICA: ICD-10-CM

## 2020-12-28 DIAGNOSIS — M54.42 CHRONIC LEFT-SIDED LOW BACK PAIN WITH LEFT-SIDED SCIATICA: ICD-10-CM

## 2020-12-28 DIAGNOSIS — M25.552 LEFT HIP PAIN: ICD-10-CM

## 2020-12-28 DIAGNOSIS — M79.672 LEFT FOOT PAIN: ICD-10-CM

## 2020-12-28 NOTE — TELEPHONE ENCOUNTER
----- Message from Sheyla Garcia sent at 12/28/2020  1:57 PM PST -----  Regarding: Prescription Question  Contact: [phone]  Zaira, I had completely used up my Oxycodone/Acetaminophen 10-325MG about a month ago and have been trying to get by with just using the Lidocaine patch.  The patch's work some but I just can't function without extreme pain. I need to every once in awhile get some relief from the pain. Because of this I am asking for a refill of the Oxycodone/Acetaminophen.    Best Regards,  Sheyla Garcia

## 2020-12-28 NOTE — PATIENT COMMUNICATION
Received request via: Patient    Was the patient seen in the last year in this department? Yes  10/15/2020  Does the patient have an active prescription (recently filled or refills available) for medication(s) requested? No

## 2020-12-29 RX ORDER — OXYCODONE AND ACETAMINOPHEN 10; 325 MG/1; MG/1
1 TABLET ORAL EVERY 8 HOURS PRN
Qty: 90 TAB | Refills: 0 | Status: SHIPPED | OUTPATIENT
Start: 2020-12-29 | End: 2021-03-25

## 2021-01-07 ENCOUNTER — HOSPITAL ENCOUNTER (OUTPATIENT)
Dept: RADIOLOGY | Facility: MEDICAL CENTER | Age: 71
End: 2021-01-07
Attending: NURSE PRACTITIONER
Payer: MEDICARE

## 2021-01-07 DIAGNOSIS — M54.42 CHRONIC BILATERAL LOW BACK PAIN WITH LEFT-SIDED SCIATICA: ICD-10-CM

## 2021-01-07 DIAGNOSIS — M25.552 BILATERAL HIP PAIN: ICD-10-CM

## 2021-01-07 DIAGNOSIS — M25.551 BILATERAL HIP PAIN: ICD-10-CM

## 2021-01-07 DIAGNOSIS — G89.29 CHRONIC BILATERAL LOW BACK PAIN WITH LEFT-SIDED SCIATICA: ICD-10-CM

## 2021-01-07 DIAGNOSIS — R10.2 PAIN IN FEMALE PELVIS: ICD-10-CM

## 2021-01-07 PROCEDURE — 72100 X-RAY EXAM L-S SPINE 2/3 VWS: CPT

## 2021-01-07 PROCEDURE — 73522 X-RAY EXAM HIPS BI 3-4 VIEWS: CPT

## 2021-02-09 ENCOUNTER — PATIENT MESSAGE (OUTPATIENT)
Dept: MEDICAL GROUP | Facility: LAB | Age: 71
End: 2021-02-09

## 2021-02-09 DIAGNOSIS — R44.3 HALLUCINATIONS: ICD-10-CM

## 2021-02-09 NOTE — TELEPHONE ENCOUNTER
----- Message from Sheyla Garcia sent at 2/9/2021 12:09 PM PST -----  Regarding: Non-Urgent Medical Question  Contact: [phone]  Hi Zaira,    I had an appointment with Yun Butts, ([phone])  a Therapist Intern, PHD, LSW, CSW-1  at [facility],  yesterday. Yun asked me to contact you for  the following [facility] referrals she needs:  1.) Neurologist (ECG)  2.) Neuro Pysc  (Evaluation)  3.)Eusebia Leon PHD,  [facility] PHD    Please let me know when you have submitted the referrals.    Thank You,  Sheyla Garcia

## 2021-03-03 DIAGNOSIS — Z23 NEED FOR VACCINATION: ICD-10-CM

## 2021-03-08 ENCOUNTER — PATIENT MESSAGE (OUTPATIENT)
Dept: MEDICAL GROUP | Facility: LAB | Age: 71
End: 2021-03-08

## 2021-03-08 DIAGNOSIS — G89.29 CHRONIC LEFT-SIDED LOW BACK PAIN WITH LEFT-SIDED SCIATICA: ICD-10-CM

## 2021-03-08 DIAGNOSIS — M54.42 CHRONIC LEFT-SIDED LOW BACK PAIN WITH LEFT-SIDED SCIATICA: ICD-10-CM

## 2021-03-09 NOTE — PATIENT COMMUNICATION
Patient requesting referral to specialist for hip pain.     Referral to physiatry pended.     Please sign/ammend if appropriate.

## 2021-03-09 NOTE — TELEPHONE ENCOUNTER
From: Sheyla Garcia  To: Nurse Practicioner Zaira Swenson  Sent: 3/8/2021 4:21 PM PST  Subject: Test Result Question    DX-HIP-BILATERAL-WITH PELVIS-3/4 V - Details showed arthritic problems. Please go ahead and send over to a specialist so we can try injections into the hips to see if that will relieve some of my pain.   X Size Of Lesion In Cm: 0.6

## 2021-03-10 ENCOUNTER — PATIENT MESSAGE (OUTPATIENT)
Dept: MEDICAL GROUP | Facility: LAB | Age: 71
End: 2021-03-10

## 2021-03-10 NOTE — TELEPHONE ENCOUNTER
There are a couple of options.  We can offer her one of our Access visits tomorrow evening if still available and give her a shot of Toradol here in the office which is an intramuscular anti-inflammatory that helps for migraines.  Other option is I can send through a prescription of Imitrex which is an abortive migraine medication to her pharmacy if she is comfortable with that.  Let me know.  Thank you

## 2021-03-11 ENCOUNTER — APPOINTMENT (OUTPATIENT)
Dept: MEDICAL GROUP | Facility: LAB | Age: 71
End: 2021-03-11
Payer: MEDICARE

## 2021-03-11 RX ORDER — SUMATRIPTAN 50 MG/1
50 TABLET, FILM COATED ORAL
Qty: 9 TABLET | Refills: 3 | Status: SHIPPED | OUTPATIENT
Start: 2021-03-11 | End: 2021-07-15 | Stop reason: SDUPTHER

## 2021-03-12 ENCOUNTER — IMMUNIZATION (OUTPATIENT)
Dept: FAMILY PLANNING/WOMEN'S HEALTH CLINIC | Facility: IMMUNIZATION CENTER | Age: 71
End: 2021-03-12
Attending: INTERNAL MEDICINE
Payer: MEDICARE

## 2021-03-12 DIAGNOSIS — Z23 ENCOUNTER FOR VACCINATION: Primary | ICD-10-CM

## 2021-03-12 DIAGNOSIS — Z23 NEED FOR VACCINATION: ICD-10-CM

## 2021-03-12 PROCEDURE — 91301 MODERNA SARS-COV-2 VACCINE: CPT | Performed by: INTERNAL MEDICINE

## 2021-03-12 PROCEDURE — 0011A MODERNA SARS-COV-2 VACCINE: CPT | Performed by: INTERNAL MEDICINE

## 2021-03-18 ENCOUNTER — PATIENT MESSAGE (OUTPATIENT)
Dept: MEDICAL GROUP | Facility: LAB | Age: 71
End: 2021-03-18

## 2021-03-18 DIAGNOSIS — M16.0 BILATERAL HIP JOINT ARTHRITIS: ICD-10-CM

## 2021-03-20 NOTE — TELEPHONE ENCOUNTER
From: Sheyla Garcia  To: Nurse Practicioner Zaira Swenson  Sent: 3/18/2021 11:06 AM PDT  Subject: Test Result Question    I have not been to either one that I know of. Can you pick one and send over a referral for me ? If you have a doctor within any of those to recommend please do so. The pain is becoming unbearable!      ----- Message -----   From:Nurse Practicioner Zaira Swenson   Sent:3/8/2021 5:05 PM PST   To:Sheyla Garcia   Subject:RE: Test Result Question    Bassam Carrion,  Have you seen an orthopedic doctor in the past that you have liked, such as [facility] or [facility]?  Zaira      ----- Message -----   From:Sheyla Garcia   Sent:3/8/2021 4:21 PM PST   To:Nurse Practicioner Zaira Swenson   Subject:Test Result Question    DX-HIP-BILATERAL-WITH PELVIS-3/4 V - Details showed arthritic problems. Please go ahead and send over to a specialist so we can try injections into the hips to see if that will relieve some of my pain.

## 2021-03-25 ENCOUNTER — TELEMEDICINE (OUTPATIENT)
Dept: MEDICAL GROUP | Facility: LAB | Age: 71
End: 2021-03-25
Payer: MEDICARE

## 2021-03-25 VITALS — HEIGHT: 69 IN | BODY MASS INDEX: 37.66 KG/M2

## 2021-03-25 DIAGNOSIS — G89.29 CHRONIC LEFT-SIDED LOW BACK PAIN WITH LEFT-SIDED SCIATICA: ICD-10-CM

## 2021-03-25 DIAGNOSIS — Z79.891 CHRONIC USE OF OPIATE DRUG FOR THERAPEUTIC PURPOSE: ICD-10-CM

## 2021-03-25 DIAGNOSIS — M54.42 CHRONIC LEFT-SIDED LOW BACK PAIN WITH LEFT-SIDED SCIATICA: ICD-10-CM

## 2021-03-25 PROCEDURE — 99213 OFFICE O/P EST LOW 20 MIN: CPT | Mod: 95,CR | Performed by: NURSE PRACTITIONER

## 2021-03-25 RX ORDER — HYDROCODONE BITARTRATE AND ACETAMINOPHEN 10; 325 MG/1; MG/1
1-2 TABLET ORAL EVERY 6 HOURS PRN
Qty: 60 TABLET | Refills: 0 | Status: SHIPPED | OUTPATIENT
Start: 2021-03-25 | End: 2021-04-24

## 2021-03-25 NOTE — PROGRESS NOTES
Cc  Pain f/u    HPI:   This evaluation was conducted via Zoom using secure and encrypted videoconferencing technology. The patient was in a private location in the state of [place].    The patient's identity was confirmed and verbal consent was obtained for this virtual visit.  Chronic pain recheck for:  Chronic mid low back pain, left hip and down left leg .  Last dose of controlled substance: last night  Chronic pain treated with percocet only for severe pain - typically 1, 3 x per week.   Wants to go back to hydrocodone as this worked better for her.    She  reports that she does not drink alcohol.  She  reports that she does not use drugs.    Interval history: she is seen as needed for pain med f/u -pain meds will last her anywhere from 3 to 12 months.  Any major change in health since last appointment? No    Consequences of Chronic Opiate therapy:  (5 A's)  Analgesia: Compared to no treatment or prior treatment, pain is currently worsened  Activity: worsened  Adverse Events: denies constipation, dry mouth, itchy skin, nausea and sedation  Aberrant Behaviors: She reports she is taking medication as prescribed and is not veering from agreed treatment regimen or provider recommendations. There have been no inappropriate refills or lost/stolen meds reported.  Affect/Mood: Pain is not impacting patient's mood.  Patient denies depression/anxiety.    Nonnarcotic treatments that are being used: muscle relaxers, ice and heat.     Last imagin2021    Opioid Risk Score: 1    Interpretation of Opioid Risk Score   Score 0-3 = Low risk of abuse. Do UDS at least once per year.  Score 4-7 = Moderate risk of abuse. Do UDS 1-4 times per year.  Score 8+ = High risk of abuse. Refer to specialist.    Last order of CONTROLLED SUBSTANCE TREATMENT AGREEMENT was found on 2017 from Office Visit on 2017     UDS Summary                URINE DRUG SCREEN Next Due 2019      Done 2018 [facility] PAIN MANAGEMENT SCREEN      Patient has more history with this topic...        Most recent UDS reviewed today and is consistent with prescribed medications.  Overdue.     I have reviewed the medical records, the Prescription Monitoring Program and I have determined that controlled substance treatment is medically indicated.         Past medical, surgical, family, and social history is reviewed and updated in Epic chart by me today.   Medications and allergies reviewed and updated in Epic chart by me today.     ROS:   As documented in history of present illness above    Exam:  Gen: NAD  Resp: nonlabored.  Able to speak in full sentences  Psy: pleasant / cooperative.   Neuro:  Alert and oriented x 3  Assessment/ Plan / Medical Decision makin. Chronic pain from:    Lumbar degenerative disc disease and osteoarthritis of spine and hip.    2. Chronic use of opiate drugs therapeutic purposes  -Patient requested to discontinue oxycodone and changed back to hydrocodone for pain control.  We discussed that this change is a downgrade in pain control and she verbalized understanding, stating hydrocodone works better for her.  -Overall impression that this patient is benefiting from opioid therapy and that the benefits outweigh the risks of continued use. yes   - Controlled Substance Use Agreement current  - Urine drug screen c is due and she will have this done at the lab when she has her labs collected as this visit was done virtually today.  - Additional lab: CMP to assess liver and renal function -scheduled  - Rx refill prescriptions are done for number 60 pills which typically last this patient anywhere from 3 to 6 months, depending on her pain level.  She states numerous times that she does not like to take pain medications and only takes them for severe pain.  - Referral to pain management no -we discussed seeing physiatry for interventions if hydrocodone is also not helpful.  In prescribing controlled substances to this patient, I certify  that I have obtained and reviewed the medical history.. I have also made a good marcy effort to obtain applicable records from other providers who have treated the patient and records did not demonstrate any increased risk of substance abuse that would prevent me from prescribing controlled substances.     I have conducted a physical exam and documented it. I have reviewed Ms. Cortez’s prescription history as maintained by the [place] Prescription Monitoring Program.     I have assessed the patient’s risk for abuse, dependency, and addiction using the validated Opioid Risk Tool available at https://www.mdcalc.com/faoutw-mqqb-sgsz-ort-narcotic-abuse.     Given the above, I believe the benefits of controlled substance therapy outweigh the risks. The reasons for prescribing controlled substances include non-narcotic, oral analgesic alternatives have been inadequate for pain control. Accordingly, I have discussed the risk and benefits, treatment plan, and alternative therapies with the patient.

## 2021-03-31 RX ORDER — LISINOPRIL 10 MG/1
10 TABLET ORAL DAILY
Qty: 100 TABLET | Refills: 2 | Status: SHIPPED | OUTPATIENT
Start: 2021-03-31 | End: 2022-06-20 | Stop reason: SDUPTHER

## 2021-03-31 NOTE — TELEPHONE ENCOUNTER
Received request via: Pharmacy  100 day request  Was the patient seen in the last year in this department? Yes  3/25/21  Does the patient have an active prescription (recently filled or refills available) for medication(s) requested? No

## 2021-04-05 DIAGNOSIS — J45.41 ASTHMA IN ADULT, MODERATE PERSISTENT, WITH ACUTE EXACERBATION: ICD-10-CM

## 2021-04-06 RX ORDER — ALBUTEROL SULFATE 90 UG/1
2 AEROSOL, METERED RESPIRATORY (INHALATION) EVERY 6 HOURS PRN
Qty: 8.5 G | Refills: 3 | Status: SHIPPED | OUTPATIENT
Start: 2021-04-06 | End: 2022-06-20 | Stop reason: SDUPTHER

## 2021-04-06 RX ORDER — ALBUTEROL SULFATE 2.5 MG/3ML
2.5 SOLUTION RESPIRATORY (INHALATION) EVERY 4 HOURS PRN
Qty: 75 ML | Refills: 3 | Status: SHIPPED | OUTPATIENT
Start: 2021-04-06 | End: 2022-02-06 | Stop reason: SDUPTHER

## 2021-04-09 ENCOUNTER — PATIENT MESSAGE (OUTPATIENT)
Dept: HEALTH INFORMATION MANAGEMENT | Facility: OTHER | Age: 71
End: 2021-04-09

## 2021-04-09 ENCOUNTER — PATIENT MESSAGE (OUTPATIENT)
Dept: MEDICAL GROUP | Facility: LAB | Age: 71
End: 2021-04-09

## 2021-04-09 DIAGNOSIS — R44.3 HALLUCINATIONS: ICD-10-CM

## 2021-04-09 DIAGNOSIS — R41.3 MEMORY LOSS: ICD-10-CM

## 2021-04-12 ENCOUNTER — TELEPHONE (OUTPATIENT)
Dept: MEDICAL GROUP | Facility: LAB | Age: 71
End: 2021-04-12

## 2021-04-12 NOTE — PATIENT COMMUNICATION
Called and spoke with imaging scheduling. The order is not valid because it does not contain an ICD-10 code. They recommend PCP place this order with appropriate ICD-10 code and CC results to Dr. Eloise Lino.     MRI Brain w/&w/o contrast pended, in comments to radiology asked to CC results to Dr. Eloise Lino fax# [phone].    Thank you,  BERLIN Figueroa

## 2021-04-12 NOTE — TELEPHONE ENCOUNTER
Attached is actually an order.  Ok to let her know this and we can fax order to [facility] to get approved through medicare and get her scheduled. Thank you!

## 2021-04-12 NOTE — TELEPHONE ENCOUNTER
SUMAtriptan (IMITREX) 50 MG Tab 9 tablet 3/3 3/11/2021     NO PRIOR AUTH NEEDED FOR MEDICATION PRESCRIBED FOR 9 FOR 30 DAYS

## 2021-04-17 ENCOUNTER — IMMUNIZATION (OUTPATIENT)
Dept: FAMILY PLANNING/WOMEN'S HEALTH CLINIC | Facility: IMMUNIZATION CENTER | Age: 71
End: 2021-04-17
Payer: MEDICARE

## 2021-04-17 DIAGNOSIS — Z23 ENCOUNTER FOR VACCINATION: Primary | ICD-10-CM

## 2021-04-17 PROCEDURE — 0012A MODERNA SARS-COV-2 VACCINE: CPT

## 2021-04-17 PROCEDURE — 91301 MODERNA SARS-COV-2 VACCINE: CPT

## 2021-04-26 ENCOUNTER — PATIENT OUTREACH (OUTPATIENT)
Dept: HEALTH INFORMATION MANAGEMENT | Facility: OTHER | Age: 71
End: 2021-04-26

## 2021-04-26 NOTE — PROGRESS NOTES
Outcome: no longer has scp     Please transfer to Patient Outreach Team at [phone] when patient returns call.      Attempt #2

## 2021-04-27 ENCOUNTER — APPOINTMENT (OUTPATIENT)
Dept: MEDICAL GROUP | Facility: LAB | Age: 71
End: 2021-04-27
Payer: MEDICARE

## 2021-05-03 ENCOUNTER — HOSPITAL ENCOUNTER (OUTPATIENT)
Dept: LAB | Facility: MEDICAL CENTER | Age: 71
End: 2021-05-03
Attending: NURSE PRACTITIONER
Payer: MEDICARE

## 2021-05-03 DIAGNOSIS — R44.3 HALLUCINATIONS: ICD-10-CM

## 2021-05-03 DIAGNOSIS — R73.01 IMPAIRED FASTING GLUCOSE: ICD-10-CM

## 2021-05-03 DIAGNOSIS — I10 ESSENTIAL HYPERTENSION: ICD-10-CM

## 2021-05-03 DIAGNOSIS — E03.9 ACQUIRED HYPOTHYROIDISM: ICD-10-CM

## 2021-05-03 DIAGNOSIS — E66.01 MORBID OBESITY WITH BMI OF 40.0-44.9, ADULT (HCC): ICD-10-CM

## 2021-05-03 LAB
ALBUMIN SERPL BCP-MCNC: 4 G/DL (ref 3.2–4.9)
ALBUMIN/GLOB SERPL: 1.3 G/DL
ALP SERPL-CCNC: 90 U/L (ref 30–99)
ALT SERPL-CCNC: 9 U/L (ref 2–50)
ANION GAP SERPL CALC-SCNC: 12 MMOL/L (ref 7–16)
AST SERPL-CCNC: 9 U/L (ref 12–45)
BASOPHILS # BLD AUTO: 0.6 % (ref 0–1.8)
BASOPHILS # BLD: 0.07 K/UL (ref 0–0.12)
BILIRUB SERPL-MCNC: 0.4 MG/DL (ref 0.1–1.5)
BUN SERPL-MCNC: 12 MG/DL (ref 8–22)
CALCIUM SERPL-MCNC: 9.1 MG/DL (ref 8.5–10.5)
CHLORIDE SERPL-SCNC: 106 MMOL/L (ref 96–112)
CO2 SERPL-SCNC: 24 MMOL/L (ref 20–33)
CREAT SERPL-MCNC: 0.51 MG/DL (ref 0.5–1.4)
EOSINOPHIL # BLD AUTO: 0.44 K/UL (ref 0–0.51)
EOSINOPHIL NFR BLD: 3.8 % (ref 0–6.9)
ERYTHROCYTE [DISTWIDTH] IN BLOOD BY AUTOMATED COUNT: 43.8 FL (ref 35.9–50)
EST. AVERAGE GLUCOSE BLD GHB EST-MCNC: 114 MG/DL
GLOBULIN SER CALC-MCNC: 3.1 G/DL (ref 1.9–3.5)
GLUCOSE SERPL-MCNC: 95 MG/DL (ref 65–99)
HBA1C MFR BLD: 5.6 % (ref 4–5.6)
HCT VFR BLD AUTO: 46.8 % (ref 37–47)
HGB BLD-MCNC: 15.4 G/DL (ref 12–16)
IMM GRANULOCYTES # BLD AUTO: 0.03 K/UL (ref 0–0.11)
IMM GRANULOCYTES NFR BLD AUTO: 0.3 % (ref 0–0.9)
LYMPHOCYTES # BLD AUTO: 2.36 K/UL (ref 1–4.8)
LYMPHOCYTES NFR BLD: 20.2 % (ref 22–41)
MCH RBC QN AUTO: 30.9 PG (ref 27–33)
MCHC RBC AUTO-ENTMCNC: 32.9 G/DL (ref 33.6–35)
MCV RBC AUTO: 93.8 FL (ref 81.4–97.8)
MONOCYTES # BLD AUTO: 0.79 K/UL (ref 0–0.85)
MONOCYTES NFR BLD AUTO: 6.8 % (ref 0–13.4)
NEUTROPHILS # BLD AUTO: 7.97 K/UL (ref 2–7.15)
NEUTROPHILS NFR BLD: 68.3 % (ref 44–72)
NRBC # BLD AUTO: 0 K/UL
NRBC BLD-RTO: 0 /100 WBC
PLATELET # BLD AUTO: 278 K/UL (ref 164–446)
PMV BLD AUTO: 10 FL (ref 9–12.9)
POTASSIUM SERPL-SCNC: 4 MMOL/L (ref 3.6–5.5)
PROT SERPL-MCNC: 7.1 G/DL (ref 6–8.2)
RBC # BLD AUTO: 4.99 M/UL (ref 4.2–5.4)
SODIUM SERPL-SCNC: 142 MMOL/L (ref 135–145)
T4 FREE SERPL-MCNC: 1.2 NG/DL (ref 0.93–1.7)
TSH SERPL DL<=0.005 MIU/L-ACNC: 2.49 UIU/ML (ref 0.38–5.33)
WBC # BLD AUTO: 11.7 K/UL (ref 4.8–10.8)

## 2021-05-03 PROCEDURE — 83036 HEMOGLOBIN GLYCOSYLATED A1C: CPT | Mod: GA

## 2021-05-03 PROCEDURE — 80053 COMPREHEN METABOLIC PANEL: CPT

## 2021-05-03 PROCEDURE — 85025 COMPLETE CBC W/AUTO DIFF WBC: CPT

## 2021-05-03 PROCEDURE — 36415 COLL VENOUS BLD VENIPUNCTURE: CPT | Mod: GA

## 2021-05-03 PROCEDURE — 84443 ASSAY THYROID STIM HORMONE: CPT

## 2021-05-03 PROCEDURE — 84439 ASSAY OF FREE THYROXINE: CPT

## 2021-05-05 ENCOUNTER — HOSPITAL ENCOUNTER (OUTPATIENT)
Facility: MEDICAL CENTER | Age: 71
End: 2021-05-05
Attending: NURSE PRACTITIONER
Payer: MEDICARE

## 2021-05-05 ENCOUNTER — OFFICE VISIT (OUTPATIENT)
Dept: MEDICAL GROUP | Facility: LAB | Age: 71
End: 2021-05-05
Payer: MEDICARE

## 2021-05-05 VITALS
OXYGEN SATURATION: 91 % | HEIGHT: 69 IN | BODY MASS INDEX: 37.66 KG/M2 | DIASTOLIC BLOOD PRESSURE: 80 MMHG | HEART RATE: 92 BPM | SYSTOLIC BLOOD PRESSURE: 132 MMHG | RESPIRATION RATE: 16 BRPM | TEMPERATURE: 98.4 F

## 2021-05-05 DIAGNOSIS — R53.81 DEBILITY: ICD-10-CM

## 2021-05-05 DIAGNOSIS — E66.01 MORBID OBESITY WITH BMI OF 40.0-44.9, ADULT (HCC): ICD-10-CM

## 2021-05-05 DIAGNOSIS — E03.9 ACQUIRED HYPOTHYROIDISM: ICD-10-CM

## 2021-05-05 DIAGNOSIS — M79.7 FIBROMYALGIA: ICD-10-CM

## 2021-05-05 DIAGNOSIS — G47.33 OSA (OBSTRUCTIVE SLEEP APNEA): ICD-10-CM

## 2021-05-05 DIAGNOSIS — I10 ESSENTIAL HYPERTENSION: ICD-10-CM

## 2021-05-05 DIAGNOSIS — J45.40 MODERATE PERSISTENT ASTHMA WITHOUT COMPLICATION: ICD-10-CM

## 2021-05-05 LAB
APPEARANCE UR: ABNORMAL
BACTERIA #/AREA URNS HPF: ABNORMAL /HPF
BILIRUB UR QL STRIP.AUTO: NEGATIVE
COLOR UR: YELLOW
EPI CELLS #/AREA URNS HPF: ABNORMAL /HPF
GLUCOSE UR STRIP.AUTO-MCNC: NEGATIVE MG/DL
HYALINE CASTS #/AREA URNS LPF: ABNORMAL /LPF
KETONES UR STRIP.AUTO-MCNC: NEGATIVE MG/DL
LEUKOCYTE ESTERASE UR QL STRIP.AUTO: ABNORMAL
MICRO URNS: ABNORMAL
NITRITE UR QL STRIP.AUTO: POSITIVE
PH UR STRIP.AUTO: 5.5 [PH] (ref 5–8)
PROT UR QL STRIP: NEGATIVE MG/DL
RBC # URNS HPF: ABNORMAL /HPF
RBC UR QL AUTO: ABNORMAL
SP GR UR STRIP.AUTO: 1.02
SPERM #/AREA URNS HPF: ABNORMAL /HPF
UROBILINOGEN UR STRIP.AUTO-MCNC: 0.2 MG/DL
WBC #/AREA URNS HPF: ABNORMAL /HPF

## 2021-05-05 PROCEDURE — 87077 CULTURE AEROBIC IDENTIFY: CPT

## 2021-05-05 PROCEDURE — 99214 OFFICE O/P EST MOD 30 MIN: CPT | Performed by: NURSE PRACTITIONER

## 2021-05-05 PROCEDURE — 87186 SC STD MICRODIL/AGAR DIL: CPT

## 2021-05-05 PROCEDURE — 87086 URINE CULTURE/COLONY COUNT: CPT

## 2021-05-05 PROCEDURE — 81001 URINALYSIS AUTO W/SCOPE: CPT

## 2021-05-05 RX ORDER — OLANZAPINE 2.5 MG/1
TABLET, FILM COATED ORAL
COMMUNITY
Start: 2021-04-12 | End: 2021-08-05

## 2021-05-05 NOTE — PROGRESS NOTES
"CC  H&P for MRI sedation.     HPI  Sheyla is a 69 yo est female here for H&P to have MRI with sedation.  Patient's psychiatrist, Dr. Eloise Lino, requested a brain MRI to rule out other etiologies of the patient's current psychological problems.  Her main medical issue is chronic pain  - currently plans on f/u with Dr. Jenifer cook for emg repeat and possible interventions. Takes norco or percocet for pain depending on pain level.  Other pmhx is KIRA, obesity, asthma, hypothyroidism, fibromyalgia, and HTN.    Denies a hx of problems with sedation during surgery.  She does tell me that it takes a lot of medication to put her under.    No recent URI & covid vaccines are UTD.   Denies trouble swallowing.    Denies chronic n/v.   Has chronic diarrhea.    Occasional SOB with allergies relieved by albuterol.   Denies chest pain or headaches beyond chronic migraines which been alleviated by avoiding caffeine.   Blood pressure is well controlled with 10 mg of lisinopril.  Lab work is up-to-date and within normal limits in terms of kidney and liver function.    Past medical, surgical, family, and social history is reviewed and updated in Epic chart by me today.   Medications and allergies reviewed and updated in Epic chart by me today.     ROS:   As documented in history of present illness above    Exam:  /80 (BP Location: Left arm, Patient Position: Sitting, BP Cuff Size: Large adult)   Pulse 92   Temp 36.9 °C (98.4 °F)   Resp 16   Ht 1.753 m (5' 9\")   SpO2 91%   Constitutional: Overweight female, alert, no distress, plus 3 vital signs  Skin:  Warm, dry, no rashes invisible areas  Eye: Equal, round and reactive, conjunctiva clear  ENMT: Lips without lesions, good dentition, oropharynx clear    Neck: Trachea midline, no masses, no thyromegaly  Respiratory: Unlabored respiration, lungs clear to auscultation, no wheezes, no rhonchi  Cardiovascular: Normal rate and rhythm, no murmur.  Abdomen: Soft, " "nontender.  Musculoskeletal: She is in a wheelchair although able to get up on the exam table if needed.  Psych: Alert, pleasant, well-groomed, normal affect    Assessment / Plan / Medical Decision making:   \"  1. Debility     2. Essential hypertension     3. Fibromyalgia     4. Acquired hypothyroidism     5. Moderate persistent asthma without complication     6. Morbid obesity with BMI of 40.0-44.9, adult (HCC)     7. KIRA (obstructive sleep apnea)     \"  All medications are stable with the exception of her chronic pain and she plans on following up with Dr. Burgess for this in the near future.  Blood pressure is controlled.  Reviewed all labs which are within normal limits with the exception of a slightly elevated white blood cell count at 11 although she received her Covid vaccine 2 weeks ago.  Normal kidney and liver function.  Encouraged her to reduce her carb intake as her A1c is climbing towards prediabetes at 5.6.  She is cleared for sedation to have an MRI.  F/u as needed.     "

## 2021-05-07 ENCOUNTER — TELEPHONE (OUTPATIENT)
Dept: MEDICAL GROUP | Facility: LAB | Age: 71
End: 2021-05-07

## 2021-05-07 RX ORDER — SULFAMETHOXAZOLE AND TRIMETHOPRIM 800; 160 MG/1; MG/1
1 TABLET ORAL 2 TIMES DAILY
Qty: 10 TABLET | Refills: 0 | Status: ON HOLD
Start: 2021-05-07 | End: 2021-08-06

## 2021-05-07 NOTE — TELEPHONE ENCOUNTER
Please fax my clinical note to [facility] - they needed an H&P in office to clear her for sedation to have MRI.  thanks

## 2021-05-12 DIAGNOSIS — J45.20 MILD INTERMITTENT ASTHMA, UNSPECIFIED WHETHER COMPLICATED: ICD-10-CM

## 2021-05-12 RX ORDER — PREDNISONE 10 MG/1
TABLET ORAL
Qty: 14 TABLET | Refills: 0 | Status: ON HOLD
Start: 2021-05-12 | End: 2021-08-06

## 2021-05-19 RX ORDER — METHOCARBAMOL 750 MG/1
750 TABLET, FILM COATED ORAL 3 TIMES DAILY PRN
Qty: 120 TABLET | Refills: 1 | Status: SHIPPED | OUTPATIENT
Start: 2021-05-19 | End: 2021-10-18 | Stop reason: SDUPTHER

## 2021-05-19 NOTE — TELEPHONE ENCOUNTER
Received request via: Pharmacy    Was the patient seen in the last year in this department? Yes  5/5/2021  Does the patient have an active prescription (recently filled or refills available) for medication(s) requested? No

## 2021-05-24 ENCOUNTER — TELEPHONE (OUTPATIENT)
Dept: MEDICAL GROUP | Facility: LAB | Age: 71
End: 2021-05-24

## 2021-05-24 NOTE — TELEPHONE ENCOUNTER
Started prior auth for methocarbamol (ROBAXIN) 750 MG Tab.  Wants to know if covered alternative Cyclobenzaprine has been tried and failed, is likely to cause an adverse reaction or be ineffective? I do not see this in her med list. Please advise

## 2021-05-25 NOTE — TELEPHONE ENCOUNTER
Patient states that she has tried and failed flexeril and other other alternatives. This is all that works for her.. will proceed with prior auth

## 2021-05-25 NOTE — TELEPHONE ENCOUNTER
DOCUMENTATION OF PAR STATUS:    1. Name of Medication & Dose:  methocarbamol (ROBAXIN) 750 MG Tab    2. Name of Prescription Coverage Company & phone #: cover my meds/ [id number]    3. Date Prior Auth Submitted: 5/25/21    4. What information was given to obtain insurance decision? Q's answered    5. Prior Auth Status? Approved through 12/31/21    6. Patient Notified: yes

## 2021-06-09 ENCOUNTER — NON-PROVIDER VISIT (OUTPATIENT)
Dept: NEUROLOGY | Facility: MEDICAL CENTER | Age: 71
End: 2021-06-09
Attending: NURSE PRACTITIONER
Payer: MEDICARE

## 2021-06-09 DIAGNOSIS — R44.3 HALLUCINATIONS: ICD-10-CM

## 2021-06-09 PROCEDURE — 95819 EEG AWAKE AND ASLEEP: CPT | Performed by: PSYCHIATRY & NEUROLOGY

## 2021-06-09 PROCEDURE — 95819 EEG AWAKE AND ASLEEP: CPT | Mod: 26 | Performed by: PSYCHIATRY & NEUROLOGY

## 2021-06-09 NOTE — PROCEDURES
VIDEO ELECTROENCEPHALOGRAM REPORT      Referring provider: Dr. Swenson     DOS: 06/09/21 (total recording of 25 minutes).     INDICATION:  Sheyla Garcia 70 y.o. female presenting with hallucination     CURRENT ANTIEPILEPTIC REGIMEN: none     TECHNIQUE: 30 channel video electroencephalogram (EEG) was performed in accordance with the international 10-20 system. The study was reviewed in bipolar and referential montages. The recording examined the patient during   awake and drowsy states    DESCRIPTION OF THE RECORD:  During the wakefulness, the background showed a symmetrical 9 Hz alpha activity posteriorly with amplitude of 70 mV.  There was reactivity to eye closure/opening.  A normal anterior-posterior gradient was noted with faster beta frequencies seen anteriorly.  During drowsiness, theta/delta frequencies were seen. No well-formed sleep pattern seen.     ACTIVATION PROCEDURES:     Hyperventilation was performed by the patient for a total of 3 minutes. The technician performing the test noted good effort. No physiological build up seen.     Intermittent Photic stimulation was performed in a stepwise fashion from 1 to 30 Hz and elicited a normal response (photic driving), most noticeable in the posterior leads.    ICTAL AND/OR INTERICTAL FINDINGS:   No focal or generalized epileptiform activity noted. Intermittent right hemisphere focal delta slowing was seen during this routine study. Bursts of frontally predominant intermittent delta activity was noted more predomiannt during drowsiness.  No clinical events or seizures were reported or recorded during the study.     EKG: sampling of the EKG recording demonstrated sinus rhythm.     EVENTS: none     INTERPRETATION:    This is an abnormal video EEG recording in the awake and drowsy states. The presence of intermittent right hemisphere focal delta slowing suggests focal neuronal dysfunction. No epileptiform discharge or seizure seen. This does not rule out  epilepsy.  If the clinical suspicion remains high for seizures, a prolonged recording to capture clinical or subclinical events may be helpful.    Fili Chris MD  Diplomate in Neurology&Epilepsy  Office: [phone]  Fax: [phone]

## 2021-06-28 ENCOUNTER — ANESTHESIA (OUTPATIENT)
Dept: RADIOLOGY | Facility: MEDICAL CENTER | Age: 71
End: 2021-06-28
Payer: MEDICARE

## 2021-06-28 ENCOUNTER — HOSPITAL ENCOUNTER (OUTPATIENT)
Dept: RADIOLOGY | Facility: MEDICAL CENTER | Age: 71
End: 2021-06-28
Attending: PAIN MEDICINE
Payer: MEDICARE

## 2021-06-28 ENCOUNTER — HOSPITAL ENCOUNTER (OUTPATIENT)
Dept: RADIOLOGY | Facility: MEDICAL CENTER | Age: 71
End: 2021-06-28
Attending: NURSE PRACTITIONER
Payer: MEDICARE

## 2021-06-28 ENCOUNTER — ANESTHESIA EVENT (OUTPATIENT)
Dept: RADIOLOGY | Facility: MEDICAL CENTER | Age: 71
End: 2021-06-28
Payer: MEDICARE

## 2021-06-28 VITALS
RESPIRATION RATE: 18 BRPM | SYSTOLIC BLOOD PRESSURE: 138 MMHG | OXYGEN SATURATION: 91 % | DIASTOLIC BLOOD PRESSURE: 66 MMHG | HEART RATE: 70 BPM | TEMPERATURE: 98.4 F | BODY MASS INDEX: 37.87 KG/M2 | WEIGHT: 264.5 LBS | HEIGHT: 70 IN

## 2021-06-28 DIAGNOSIS — M47.816 LUMBAR SPONDYLOSIS: ICD-10-CM

## 2021-06-28 DIAGNOSIS — R41.3 MEMORY LOSS: ICD-10-CM

## 2021-06-28 DIAGNOSIS — R44.3 HALLUCINATIONS: ICD-10-CM

## 2021-06-28 PROCEDURE — 700117 HCHG RX CONTRAST REV CODE 255: Performed by: NURSE PRACTITIONER

## 2021-06-28 PROCEDURE — A9576 INJ PROHANCE MULTIPACK: HCPCS | Performed by: NURSE PRACTITIONER

## 2021-06-28 PROCEDURE — 700111 HCHG RX REV CODE 636 W/ 250 OVERRIDE (IP): Performed by: ANESTHESIOLOGY

## 2021-06-28 PROCEDURE — 700101 HCHG RX REV CODE 250: Performed by: ANESTHESIOLOGY

## 2021-06-28 PROCEDURE — 72148 MRI LUMBAR SPINE W/O DYE: CPT | Mod: MH

## 2021-06-28 PROCEDURE — 700105 HCHG RX REV CODE 258: Performed by: ANESTHESIOLOGY

## 2021-06-28 PROCEDURE — 4410588 MR-BRAIN-WITH & W/O

## 2021-06-28 RX ORDER — HALOPERIDOL 5 MG/ML
1 INJECTION INTRAMUSCULAR
Status: DISCONTINUED | OUTPATIENT
Start: 2021-06-28 | End: 2021-06-29 | Stop reason: HOSPADM

## 2021-06-28 RX ORDER — LIDOCAINE HYDROCHLORIDE 20 MG/ML
INJECTION, SOLUTION EPIDURAL; INFILTRATION; INTRACAUDAL; PERINEURAL PRN
Status: DISCONTINUED | OUTPATIENT
Start: 2021-06-28 | End: 2021-06-28 | Stop reason: SURG

## 2021-06-28 RX ORDER — SODIUM CHLORIDE, SODIUM LACTATE, POTASSIUM CHLORIDE, CALCIUM CHLORIDE 600; 310; 30; 20 MG/100ML; MG/100ML; MG/100ML; MG/100ML
INJECTION, SOLUTION INTRAVENOUS
Status: DISCONTINUED | OUTPATIENT
Start: 2021-06-28 | End: 2021-06-28 | Stop reason: SURG

## 2021-06-28 RX ORDER — DIPHENHYDRAMINE HYDROCHLORIDE 50 MG/ML
12.5 INJECTION INTRAMUSCULAR; INTRAVENOUS
Status: DISCONTINUED | OUTPATIENT
Start: 2021-06-28 | End: 2021-06-29 | Stop reason: HOSPADM

## 2021-06-28 RX ORDER — ONDANSETRON 2 MG/ML
4 INJECTION INTRAMUSCULAR; INTRAVENOUS
Status: DISCONTINUED | OUTPATIENT
Start: 2021-06-28 | End: 2021-06-29 | Stop reason: HOSPADM

## 2021-06-28 RX ORDER — MIDAZOLAM HYDROCHLORIDE 1 MG/ML
INJECTION INTRAMUSCULAR; INTRAVENOUS
Status: COMPLETED
Start: 2021-06-28 | End: 2021-06-28

## 2021-06-28 RX ADMIN — SODIUM CHLORIDE, POTASSIUM CHLORIDE, SODIUM LACTATE AND CALCIUM CHLORIDE: 600; 310; 30; 20 INJECTION, SOLUTION INTRAVENOUS at 12:33

## 2021-06-28 RX ADMIN — Medication 40 MG: at 12:37

## 2021-06-28 RX ADMIN — GADOTERIDOL 20 ML: 279.3 INJECTION, SOLUTION INTRAVENOUS at 13:34

## 2021-06-28 RX ADMIN — PROPOFOL 50 MG: 10 INJECTION, EMULSION INTRAVENOUS at 13:02

## 2021-06-28 RX ADMIN — LIDOCAINE HYDROCHLORIDE 20 MG: 20 INJECTION, SOLUTION EPIDURAL; INFILTRATION; INTRACAUDAL at 12:35

## 2021-06-28 RX ADMIN — Medication 5 MG: at 12:35

## 2021-06-28 RX ADMIN — PROPOFOL 40 MG: 10 INJECTION, EMULSION INTRAVENOUS at 12:37

## 2021-06-28 RX ADMIN — PROPOFOL 20 MG: 10 INJECTION, EMULSION INTRAVENOUS at 12:35

## 2021-06-28 RX ADMIN — MIDAZOLAM 2 MG: 1 INJECTION INTRAMUSCULAR; INTRAVENOUS at 12:35

## 2021-06-28 ASSESSMENT — FIBROSIS 4 INDEX: FIB4 SCORE: 0.73

## 2021-06-28 ASSESSMENT — PAIN SCALES - GENERAL: PAIN_LEVEL: 0

## 2021-06-28 ASSESSMENT — PAIN DESCRIPTION - PAIN TYPE: TYPE: CHRONIC PAIN

## 2021-06-28 NOTE — ANESTHESIA TIME REPORT
Anesthesia Start and Stop Event Times     Date Time Event    6/28/2021 1210 Ready for Procedure     1233 Anesthesia Start     1351 Anesthesia Stop        Responsible Staff  06/28/21    Name Role Begin End    Leonel Beckett M.D. Anesth 1233 1351        Preop Diagnosis (Free Text):  Pre-op Diagnosis             Preop Diagnosis (Codes):    Post op Diagnosis  DDD (degenerative disc disease), lumbosacral      Premium Reason  Non-Premium    Comments:

## 2021-06-28 NOTE — ANESTHESIA PREPROCEDURE EVALUATION
Relevant Problems   ANESTHESIA   (positive) KIRA (obstructive sleep apnea)      PULMONARY   (positive) Moderate persistent asthma without complication      CARDIAC   (positive) Essential hypertension      ENDO   (positive) Hypothyroidism       Physical Exam    Airway   Mallampati: II  TM distance: >3 FB  Neck ROM: full       Cardiovascular - normal exam  Rhythm: regular  Rate: normal     Dental - normal exam           Pulmonary - normal exam  Breath sounds clear to auscultation  (+) wheezes     Abdominal   (+) obese     Neurological - normal exam                 Anesthesia Plan    ASA 3   ASA physical status 3 criteria: other (comment) and morbid obesity - BMI greater than or equal to 40    Plan - general       Airway plan will be LMA          Induction: intravenous    Postoperative Plan: Postoperative administration of opioids is intended.    Pertinent diagnostic labs and testing reviewed    Informed Consent:    Anesthetic plan and risks discussed with patient.    Use of blood products discussed with: patient whom consented to blood products.

## 2021-06-28 NOTE — ANESTHESIA PROCEDURE NOTES
Airway    Date/Time: 6/28/2021 12:39 PM  Performed by: Leonel Beckett M.D.  Authorized by: Leonel Beckett M.D.         Final Airway Type:  Supraglottic airway  Final Supraglottic Airway:  Standard LMA    SGA Size:  4

## 2021-06-28 NOTE — ANESTHESIA POSTPROCEDURE EVALUATION
Patient: Sheyla Garcia    Procedure Summary     Date: 06/28/21 Room / Location: [facility] MRI - [address]    Anesthesia Start: 1233 Anesthesia Stop: 1351    Procedure: MR-BRAIN-WITH & W/O Diagnosis:       Memory loss      Hallucinations      (CC results to Dr. Eloise Lino, fax [phone])    Scheduled Providers:  Responsible Provider: Leonel Beckett M.D.    Anesthesia Type: general ASA Status: 3          Final Anesthesia Type: general  Last vitals  BP   Blood Pressure : (!) 162/72    Temp   36.8 °C (98.3 °F)    Pulse   77   Resp   20    SpO2   92 %      Anesthesia Post Evaluation    Patient location during evaluation: PACU  Patient participation: complete - patient participated  Level of consciousness: awake and alert  Pain score: 0    Airway patency: patent  Anesthetic complications: no  Cardiovascular status: hemodynamically stable  Respiratory status: acceptable  Hydration status: euvolemic    PONV: none          No complications documented.     Nurse Pain Score: 8 (NPRS)

## 2021-06-28 NOTE — DISCHARGE INSTRUCTIONS
MRI ADULT DISCHARGE INSTRUCTIONS    You have been medicated today for your scan. Please follow the instructions below to ensure your safe recovery. If you have any questions or problems, feel free to call us at [phone] or [phone].     1.   Have someone stay with you to assist you as needed.    2.   Do not drive or operate any mechanical devices.    3.   Do not perform any activity that requires concentration. Make no major decisions over the next 24 hours.     4.   Be careful changing positions from laying down to sitting or standing, as you may become dizzy.     5.   Do not drink alcohol for 48 hours.    6.   There are no restrictions for eating your normal meals. Drink fluids.    7.   You may continue your usual medications for pain, tranquilizers, muscle relaxants or sedatives when awake.     8.   Tomorrow, you may continue your normal daily activities.     9.   Pressure dressing on 10 - 15 minutes. If swelling or bleeding occurs when removed, continue placing direct pressure on injection site for another 5 minutes, or until bleeding stops.     I have been informed of and understand the above discharge instructions.

## 2021-06-30 ENCOUNTER — TELEPHONE (OUTPATIENT)
Dept: MEDICAL GROUP | Facility: LAB | Age: 71
End: 2021-06-30

## 2021-06-30 DIAGNOSIS — G91.2 IDIOPATHIC NORMAL PRESSURE HYDROCEPHALUS (HCC): ICD-10-CM

## 2021-06-30 NOTE — TELEPHONE ENCOUNTER
Neurosurgery referral pended per PCP request. See MRI Brain result note.    Thank you,  BERLIN Figueroa

## 2021-07-08 RX ORDER — ESTRADIOL 1 MG/1
TABLET ORAL
Qty: 90 TABLET | Refills: 4 | Status: SHIPPED | OUTPATIENT
Start: 2021-07-08 | End: 2022-01-10 | Stop reason: SDUPTHER

## 2021-07-16 RX ORDER — SUMATRIPTAN 50 MG/1
50 TABLET, FILM COATED ORAL
Qty: 9 TABLET | Refills: 3 | Status: SHIPPED | OUTPATIENT
Start: 2021-07-16 | End: 2021-08-15

## 2021-07-16 NOTE — TELEPHONE ENCOUNTER
Received request via: Patient    Was the patient seen in the last year in this department? Yes    Does the patient have an active prescription (recently filled or refills available) for medication(s) requested? No     SUMAtriptan (IMITREX) 50 MG Tab

## 2021-07-22 ENCOUNTER — HOSPITAL ENCOUNTER (OUTPATIENT)
Dept: LAB | Facility: MEDICAL CENTER | Age: 71
End: 2021-07-22
Attending: NEUROLOGICAL SURGERY
Payer: MEDICARE

## 2021-07-22 LAB
INR PPP: 0.95 (ref 0.87–1.13)
PROTHROMBIN TIME: 12.4 SEC (ref 12–14.6)

## 2021-07-22 PROCEDURE — 85025 COMPLETE CBC W/AUTO DIFF WBC: CPT

## 2021-07-22 PROCEDURE — 36415 COLL VENOUS BLD VENIPUNCTURE: CPT

## 2021-07-22 PROCEDURE — 85610 PROTHROMBIN TIME: CPT | Mod: GA

## 2021-07-23 LAB
BASOPHILS # BLD AUTO: 0.4 % (ref 0–1.8)
BASOPHILS # BLD: 0.04 K/UL (ref 0–0.12)
EOSINOPHIL # BLD AUTO: 0.42 K/UL (ref 0–0.51)
EOSINOPHIL NFR BLD: 4.6 % (ref 0–6.9)
ERYTHROCYTE [DISTWIDTH] IN BLOOD BY AUTOMATED COUNT: 48 FL (ref 35.9–50)
HCT VFR BLD AUTO: 47.9 % (ref 37–47)
HGB BLD-MCNC: 15.1 G/DL (ref 12–16)
IMM GRANULOCYTES # BLD AUTO: 0.02 K/UL (ref 0–0.11)
IMM GRANULOCYTES NFR BLD AUTO: 0.2 % (ref 0–0.9)
LYMPHOCYTES # BLD AUTO: 2.45 K/UL (ref 1–4.8)
LYMPHOCYTES NFR BLD: 26.9 % (ref 22–41)
MCH RBC QN AUTO: 30.1 PG (ref 27–33)
MCHC RBC AUTO-ENTMCNC: 31.5 G/DL (ref 33.6–35)
MCV RBC AUTO: 95.4 FL (ref 81.4–97.8)
MONOCYTES # BLD AUTO: 0.68 K/UL (ref 0–0.85)
MONOCYTES NFR BLD AUTO: 7.5 % (ref 0–13.4)
NEUTROPHILS # BLD AUTO: 5.5 K/UL (ref 2–7.15)
NEUTROPHILS NFR BLD: 60.4 % (ref 44–72)
NRBC # BLD AUTO: 0 K/UL
NRBC BLD-RTO: 0 /100 WBC
PLATELET # BLD AUTO: 254 K/UL (ref 164–446)
PMV BLD AUTO: 11.1 FL (ref 9–12.9)
RBC # BLD AUTO: 5.02 M/UL (ref 4.2–5.4)
WBC # BLD AUTO: 9.1 K/UL (ref 4.8–10.8)

## 2021-08-02 ENCOUNTER — APPOINTMENT (OUTPATIENT)
Dept: RADIOLOGY | Facility: MEDICAL CENTER | Age: 71
End: 2021-08-02
Attending: NEUROLOGICAL SURGERY
Payer: MEDICARE

## 2021-08-05 ENCOUNTER — PRE-ADMISSION TESTING (OUTPATIENT)
Dept: ADMISSIONS | Facility: MEDICAL CENTER | Age: 71
End: 2021-08-05
Attending: NURSE PRACTITIONER
Payer: MEDICARE

## 2021-08-05 RX ORDER — DULOXETIN HYDROCHLORIDE 30 MG/1
30 CAPSULE, DELAYED RELEASE ORAL
COMMUNITY
End: 2022-12-02

## 2021-08-05 RX ORDER — TOPIRAMATE 25 MG/1
75 TABLET ORAL
COMMUNITY
End: 2022-08-22

## 2021-08-05 RX ORDER — RISPERIDONE 0.5 MG/1
0.5 TABLET ORAL 2 TIMES DAILY
COMMUNITY
End: 2021-11-03

## 2021-08-05 RX ORDER — CYANOCOBALAMIN (VITAMIN B-12) 500 MCG
1 LOZENGE ORAL DAILY
COMMUNITY
End: 2022-12-02

## 2021-08-06 ENCOUNTER — HOSPITAL ENCOUNTER (OUTPATIENT)
Facility: MEDICAL CENTER | Age: 71
End: 2021-08-06
Attending: NEUROLOGICAL SURGERY | Admitting: RADIOLOGY
Payer: MEDICARE

## 2021-08-06 ENCOUNTER — APPOINTMENT (OUTPATIENT)
Dept: RADIOLOGY | Facility: MEDICAL CENTER | Age: 71
End: 2021-08-06
Attending: NEUROLOGICAL SURGERY
Payer: MEDICARE

## 2021-08-06 VITALS
BODY MASS INDEX: 37.5 KG/M2 | TEMPERATURE: 97.2 F | HEART RATE: 70 BPM | RESPIRATION RATE: 16 BRPM | HEIGHT: 70 IN | WEIGHT: 261.91 LBS | OXYGEN SATURATION: 94 % | DIASTOLIC BLOOD PRESSURE: 72 MMHG | SYSTOLIC BLOOD PRESSURE: 159 MMHG

## 2021-08-06 DIAGNOSIS — G93.89 OTHER SPECIFIED DISORDERS OF BRAIN: ICD-10-CM

## 2021-08-06 PROCEDURE — 160002 HCHG RECOVERY MINUTES (STAT)

## 2021-08-06 PROCEDURE — 700102 HCHG RX REV CODE 250 W/ 637 OVERRIDE(OP): Performed by: RADIOLOGY

## 2021-08-06 PROCEDURE — 306637 DX-LUMBAR PUNCTURE FOR DIAGNOSIS

## 2021-08-06 PROCEDURE — 62270 DX LMBR SPI PNXR: CPT

## 2021-08-06 PROCEDURE — 62328 DX LMBR SPI PNXR W/FLUOR/CT: CPT

## 2021-08-06 PROCEDURE — 72100 X-RAY EXAM L-S SPINE 2/3 VWS: CPT

## 2021-08-06 PROCEDURE — A9270 NON-COVERED ITEM OR SERVICE: HCPCS | Performed by: RADIOLOGY

## 2021-08-06 RX ORDER — OXYCODONE HYDROCHLORIDE AND ACETAMINOPHEN 5; 325 MG/1; MG/1
1 TABLET ORAL 2 TIMES DAILY PRN
COMMUNITY
Start: 2021-07-01 | End: 2021-11-03

## 2021-08-06 RX ORDER — OXYCODONE HYDROCHLORIDE AND ACETAMINOPHEN 5; 325 MG/1; MG/1
1 TABLET ORAL ONCE
Status: COMPLETED | OUTPATIENT
Start: 2021-08-06 | End: 2021-08-06

## 2021-08-06 RX ORDER — TRAZODONE HYDROCHLORIDE 50 MG/1
150 TABLET ORAL EVERY EVENING
COMMUNITY
Start: 2021-07-28 | End: 2022-06-16

## 2021-08-06 RX ADMIN — OXYCODONE HYDROCHLORIDE AND ACETAMINOPHEN 1 TABLET: 5; 325 TABLET ORAL at 13:12

## 2021-08-06 ASSESSMENT — PAIN DESCRIPTION - PAIN TYPE: TYPE: CHRONIC PAIN

## 2021-08-06 ASSESSMENT — FIBROSIS 4 INDEX: FIB4 SCORE: 0.8

## 2021-08-06 NOTE — OR NURSING
Pt unable to leave d/t severe lower back pain, which is chronic, but exacerbated by post procedure best rest. Dr Saini notified and order received for home dose Percocet. Will give medication and discharge once pain is tolerable.

## 2021-08-06 NOTE — OR NURSING
@1129 Pt arrived to Phase 2. Pt has complaints of 8/10 lower back pain. Pt states this is previously existing and from laying flat position. Pt has no complaints of nausea. Plan of care discussed with patient regarding strict bedrest laying flat. Pt agreeable with plan. Pts spouse brought back to discharge area.    @1221 Dr. Saini updated on patients status in discharge area. Orders received to discharge patient at this time.    @1233 Discharge instructions discussed with patient at bedside. Pt verbalizes understanding. Pt assisted with getting dressed by pts  at bedside.     @1240 Pt unable to mobilize from gurney to wheelchair due to low back pain.

## 2021-08-06 NOTE — DISCHARGE INSTRUCTIONS
Lumbar Puncture, Care After  This sheet gives you information about how to care for yourself after your procedure. Your health care provider may also give you more specific instructions. If you have problems or questions, contact your health care provider.  What can I expect after the procedure?  After the procedure, it is common to have:  · Mild discomfort or pain at the puncture site.  · A mild headache that is relieved with pain medicines.  Follow these instructions at home:  Activity    · Lie down flat or rest for as long as directed by your health care provider.  · Return to your normal activities as told by your health care provider. Ask your health care provider what activities are safe for you.  · Avoid lifting anything heavier than 10 lb (4.5 kg) for at least 12 hours after the procedure.  · Do not drive for 24 hours if you were given a medicine to help you relax (sedative) during your procedure.  · Do not drive or use heavy machinery while taking prescription pain medicine.  Puncture site care  · Remove or change your bandage (dressing) as told by your health care provider.  · Check your puncture area every day for signs of infection. Check for:  ? More pain.  ? Redness or swelling.  ? Fluid or blood leaking from the puncture site.  ? Warmth.  ? Pus or a bad smell.  General instructions  · Take over-the-counter and prescription medicines only as told by your health care provider.  · Drink enough fluids to keep your urine clear or pale yellow. Your health care provider may recommend drinking caffeine to prevent a headache.  · Keep all follow-up visits as told by your health care provider. This is important.  Contact a health care provider if:  · You have fever or chills.  · You have nausea or vomiting.  · You have a headache that lasts for more than 2 days or does not get better with medicine.  Get help right away if:  · You develop any of the following in your  legs:  ? Weakness.  ? Numbness.  ? Tingling.  · You are unable to control when you urinate or have a bowel movement (incontinence).  · You have signs of infection around your puncture site, such as:  ? More pain.  ? Redness or swelling.  ? Fluid or blood leakage.  ? Warmth.  ? Pus or a bad smell.  · You are dizzy or you feel like you might faint.  · You have a severe headache, especially when you sit or stand.  Summary  · A lumbar puncture is a procedure in which a small needle is inserted into the lower back to remove fluid that surrounds the brain and spinal cord.  · After this procedure, it is common to have a headache and pain around the needle insertion area.  · Lying flat, staying hydrated, and drinking caffeine can help prevent headaches.  · Monitor your needle insertion site for signs of infection, including warmth, fluid, or more pain.  · Get help right away if you develop leg weakness, leg numbness, incontinence, or severe headaches.  This information is not intended to replace advice given to you by your health care provider. Make sure you discuss any questions you have with your health care provider.  Document Released: 12/23/2014 Document Revised: 01/31/2018 Document Reviewed: 01/31/2018  Elsevier Patient Education © 2020 [employer]

## 2021-09-19 ENCOUNTER — HOSPITAL ENCOUNTER (EMERGENCY)
Dept: HOSPITAL 8 - ED | Age: 71
Discharge: HOME | End: 2021-09-19
Payer: MEDICARE

## 2021-09-19 VITALS — BODY MASS INDEX: 25.25 KG/M2 | WEIGHT: 176.37 LBS | HEIGHT: 70 IN

## 2021-09-19 VITALS — DIASTOLIC BLOOD PRESSURE: 77 MMHG | SYSTOLIC BLOOD PRESSURE: 149 MMHG

## 2021-09-19 DIAGNOSIS — M79.7: ICD-10-CM

## 2021-09-19 DIAGNOSIS — J45.901: Primary | ICD-10-CM

## 2021-09-19 LAB
ALBUMIN SERPL-MCNC: 3.5 G/DL (ref 3.4–5)
ANION GAP SERPL CALC-SCNC: 8 MMOL/L (ref 5–15)
BASOPHILS # BLD AUTO: 0 X10^3/UL (ref 0–0.1)
BASOPHILS NFR BLD AUTO: 0 % (ref 0–1)
CALCIUM SERPL-MCNC: 9.5 MG/DL (ref 8.5–10.1)
CHLORIDE SERPL-SCNC: 107 MMOL/L (ref 98–107)
CREAT SERPL-MCNC: 0.58 MG/DL (ref 0.55–1.02)
EOSINOPHIL # BLD AUTO: 0.2 X10^3/UL (ref 0–0.4)
EOSINOPHIL NFR BLD AUTO: 2 % (ref 1–7)
ERYTHROCYTE [DISTWIDTH] IN BLOOD BY AUTOMATED COUNT: 13.8 % (ref 9.6–15.2)
LYMPHOCYTES # BLD AUTO: 2 X10^3/UL (ref 1–3.4)
LYMPHOCYTES NFR BLD AUTO: 19 % (ref 22–44)
MCH RBC QN AUTO: 30.6 PG (ref 27–34.8)
MCHC RBC AUTO-ENTMCNC: 33.9 G/DL (ref 32.4–35.8)
MONOCYTES # BLD AUTO: 0.7 X10^3/UL (ref 0.2–0.8)
MONOCYTES NFR BLD AUTO: 7 % (ref 2–9)
NEUTROPHILS # BLD AUTO: 7.7 X10^3/UL (ref 1.8–6.8)
NEUTROPHILS NFR BLD AUTO: 72 % (ref 42–75)
PLATELET # BLD AUTO: 258 X10^3/UL (ref 130–400)
PMV BLD AUTO: 8.1 FL (ref 7.4–10.4)
RBC # BLD AUTO: 4.96 X10^6/UL (ref 3.82–5.3)
TROPONIN I SERPL-MCNC: < 0.015 NG/ML (ref 0–0.04)

## 2021-09-19 PROCEDURE — 93005 ELECTROCARDIOGRAM TRACING: CPT

## 2021-09-19 PROCEDURE — 71045 X-RAY EXAM CHEST 1 VIEW: CPT

## 2021-09-19 PROCEDURE — 99284 EMERGENCY DEPT VISIT MOD MDM: CPT

## 2021-09-19 PROCEDURE — 80048 BASIC METABOLIC PNL TOTAL CA: CPT

## 2021-09-19 PROCEDURE — 94640 AIRWAY INHALATION TREATMENT: CPT

## 2021-09-19 PROCEDURE — 82040 ASSAY OF SERUM ALBUMIN: CPT

## 2021-09-19 PROCEDURE — 85025 COMPLETE CBC W/AUTO DIFF WBC: CPT

## 2021-09-19 PROCEDURE — 36415 COLL VENOUS BLD VENIPUNCTURE: CPT

## 2021-09-19 PROCEDURE — 84484 ASSAY OF TROPONIN QUANT: CPT

## 2021-09-19 PROCEDURE — 83880 ASSAY OF NATRIURETIC PEPTIDE: CPT

## 2021-09-19 NOTE — NUR
PATIENT REQUESTING TO GO HOME AT THIS TIME STATING THAT THE BED HERE IS 
UNCOMFORTABLE AND SHE HAS A BETTER ONE AT HOME. ERP AWARE.

## 2021-09-22 ENCOUNTER — APPOINTMENT (OUTPATIENT)
Dept: MEDICAL GROUP | Facility: LAB | Age: 71
End: 2021-09-22
Payer: COMMERCIAL

## 2021-10-06 ENCOUNTER — PRE-ADMISSION TESTING (OUTPATIENT)
Dept: ADMISSIONS | Facility: MEDICAL CENTER | Age: 71
DRG: 033 | End: 2021-10-06
Attending: NEUROLOGICAL SURGERY
Payer: MEDICARE

## 2021-10-06 DIAGNOSIS — Z01.812 PRE-OPERATIVE LABORATORY EXAMINATION: ICD-10-CM

## 2021-10-06 DIAGNOSIS — Z01.810 PRE-OPERATIVE CARDIOVASCULAR EXAMINATION: ICD-10-CM

## 2021-10-06 LAB
ABO GROUP BLD: NORMAL
ANION GAP SERPL CALC-SCNC: 12 MMOL/L (ref 7–16)
APTT PPP: 32.9 SEC (ref 24.7–36)
BASOPHILS # BLD AUTO: 0.3 % (ref 0–1.8)
BASOPHILS # BLD: 0.03 K/UL (ref 0–0.12)
BLD GP AB SCN SERPL QL: NORMAL
BUN SERPL-MCNC: 13 MG/DL (ref 8–22)
CALCIUM SERPL-MCNC: 9.5 MG/DL (ref 8.5–10.5)
CHLORIDE SERPL-SCNC: 106 MMOL/L (ref 96–112)
CO2 SERPL-SCNC: 23 MMOL/L (ref 20–33)
CREAT SERPL-MCNC: 0.48 MG/DL (ref 0.5–1.4)
EKG IMPRESSION: NORMAL
EOSINOPHIL # BLD AUTO: 0.31 K/UL (ref 0–0.51)
EOSINOPHIL NFR BLD: 3.5 % (ref 0–6.9)
ERYTHROCYTE [DISTWIDTH] IN BLOOD BY AUTOMATED COUNT: 46 FL (ref 35.9–50)
GLUCOSE SERPL-MCNC: 108 MG/DL (ref 65–99)
HCT VFR BLD AUTO: 47.3 % (ref 37–47)
HGB BLD-MCNC: 15.3 G/DL (ref 12–16)
IMM GRANULOCYTES # BLD AUTO: 0.02 K/UL (ref 0–0.11)
IMM GRANULOCYTES NFR BLD AUTO: 0.2 % (ref 0–0.9)
INR PPP: 0.97 (ref 0.87–1.13)
LYMPHOCYTES # BLD AUTO: 2.15 K/UL (ref 1–4.8)
LYMPHOCYTES NFR BLD: 24.3 % (ref 22–41)
MCH RBC QN AUTO: 30.2 PG (ref 27–33)
MCHC RBC AUTO-ENTMCNC: 32.3 G/DL (ref 33.6–35)
MCV RBC AUTO: 93.3 FL (ref 81.4–97.8)
MONOCYTES # BLD AUTO: 0.82 K/UL (ref 0–0.85)
MONOCYTES NFR BLD AUTO: 9.3 % (ref 0–13.4)
NEUTROPHILS # BLD AUTO: 5.5 K/UL (ref 2–7.15)
NEUTROPHILS NFR BLD: 62.4 % (ref 44–72)
NRBC # BLD AUTO: 0 K/UL
NRBC BLD-RTO: 0 /100 WBC
PLATELET # BLD AUTO: 259 K/UL (ref 164–446)
PMV BLD AUTO: 10.7 FL (ref 9–12.9)
POTASSIUM SERPL-SCNC: 4 MMOL/L (ref 3.6–5.5)
PROTHROMBIN TIME: 12.6 SEC (ref 12–14.6)
RBC # BLD AUTO: 5.07 M/UL (ref 4.2–5.4)
RH BLD: NORMAL
SODIUM SERPL-SCNC: 141 MMOL/L (ref 135–145)
WBC # BLD AUTO: 8.8 K/UL (ref 4.8–10.8)

## 2021-10-06 PROCEDURE — U0005 INFEC AGEN DETEC AMPLI PROBE: HCPCS

## 2021-10-06 PROCEDURE — U0003 INFECTIOUS AGENT DETECTION BY NUCLEIC ACID (DNA OR RNA); SEVERE ACUTE RESPIRATORY SYNDROME CORONAVIRUS 2 (SARS-COV-2) (CORONAVIRUS DISEASE [COVID-19]), AMPLIFIED PROBE TECHNIQUE, MAKING USE OF HIGH THROUGHPUT TECHNOLOGIES AS DESCRIBED BY CMS-2020-01-R: HCPCS

## 2021-10-06 PROCEDURE — 86901 BLOOD TYPING SEROLOGIC RH(D): CPT

## 2021-10-06 PROCEDURE — 86900 BLOOD TYPING SEROLOGIC ABO: CPT

## 2021-10-06 PROCEDURE — 85730 THROMBOPLASTIN TIME PARTIAL: CPT

## 2021-10-06 PROCEDURE — 86850 RBC ANTIBODY SCREEN: CPT

## 2021-10-06 PROCEDURE — 80048 BASIC METABOLIC PNL TOTAL CA: CPT

## 2021-10-06 PROCEDURE — 93005 ELECTROCARDIOGRAM TRACING: CPT

## 2021-10-06 PROCEDURE — C9803 HOPD COVID-19 SPEC COLLECT: HCPCS

## 2021-10-06 PROCEDURE — 36415 COLL VENOUS BLD VENIPUNCTURE: CPT

## 2021-10-06 PROCEDURE — 93010 ELECTROCARDIOGRAM REPORT: CPT | Performed by: INTERNAL MEDICINE

## 2021-10-06 PROCEDURE — 85610 PROTHROMBIN TIME: CPT

## 2021-10-06 PROCEDURE — 85025 COMPLETE CBC W/AUTO DIFF WBC: CPT

## 2021-10-06 ASSESSMENT — FIBROSIS 4 INDEX: FIB4 SCORE: 0.8

## 2021-10-07 LAB
SARS-COV-2 RNA RESP QL NAA+PROBE: NOTDETECTED
SPECIMEN SOURCE: NORMAL

## 2021-10-12 ENCOUNTER — ANESTHESIA EVENT (OUTPATIENT)
Dept: SURGERY | Facility: MEDICAL CENTER | Age: 71
DRG: 033 | End: 2021-10-12
Payer: MEDICARE

## 2021-10-12 NOTE — OR NURSING
COVID-19 Pre-surgery screenin. Do you have an undiagnosed respiratory illness or symptoms such as coughing or sneezing? No  a. Onset of Sx  n/a  b. Acute vs. chronic respiratory illness  n/a    2. Do you have an unexplained fever greater than 100.4 degrees Fahrenheit or 38 degrees Celsius?  No        3. Have you had direct exposure to a patient who tested positive for Covid-19?  No        4. Have you had any loss of your sense of taste or smell, N/V or sore throat?  No    Patient has been informed of 2 visitor policy and asked to wear a mask at all times.   Yes

## 2021-10-13 ENCOUNTER — ANESTHESIA (OUTPATIENT)
Dept: SURGERY | Facility: MEDICAL CENTER | Age: 71
DRG: 033 | End: 2021-10-13
Payer: MEDICARE

## 2021-10-13 ENCOUNTER — HOSPITAL ENCOUNTER (INPATIENT)
Facility: MEDICAL CENTER | Age: 71
LOS: 1 days | DRG: 033 | End: 2021-10-14
Attending: NEUROLOGICAL SURGERY | Admitting: NEUROLOGICAL SURGERY
Payer: MEDICARE

## 2021-10-13 ENCOUNTER — APPOINTMENT (OUTPATIENT)
Dept: RADIOLOGY | Facility: MEDICAL CENTER | Age: 71
DRG: 033 | End: 2021-10-13
Attending: NEUROLOGICAL SURGERY
Payer: MEDICARE

## 2021-10-13 DIAGNOSIS — G91.2 NORMAL PRESSURE HYDROCEPHALUS (HCC): ICD-10-CM

## 2021-10-13 LAB — ABO + RH BLD: NORMAL

## 2021-10-13 PROCEDURE — A9270 NON-COVERED ITEM OR SERVICE: HCPCS | Performed by: NEUROLOGICAL SURGERY

## 2021-10-13 PROCEDURE — 500075 HCHG BLADE, CLIPPER NEURO: Performed by: NEUROLOGICAL SURGERY

## 2021-10-13 PROCEDURE — 770001 HCHG ROOM/CARE - MED/SURG/GYN PRIV*

## 2021-10-13 PROCEDURE — 160031 HCHG SURGERY MINUTES - 1ST 30 MINS LEVEL 5: Performed by: NEUROLOGICAL SURGERY

## 2021-10-13 PROCEDURE — 8E09XBG COMPUTER ASSISTED PROCEDURE OF HEAD AND NECK REGION, WITH COMPUTERIZED TOMOGRAPHY: ICD-10-PCS | Performed by: NEUROLOGICAL SURGERY

## 2021-10-13 PROCEDURE — 160002 HCHG RECOVERY MINUTES (STAT): Performed by: NEUROLOGICAL SURGERY

## 2021-10-13 PROCEDURE — 110454 HCHG SHELL REV 250: Performed by: NEUROLOGICAL SURGERY

## 2021-10-13 PROCEDURE — 70450 CT HEAD/BRAIN W/O DYE: CPT | Mod: MG

## 2021-10-13 PROCEDURE — 700111 HCHG RX REV CODE 636 W/ 250 OVERRIDE (IP): Performed by: NEUROLOGICAL SURGERY

## 2021-10-13 PROCEDURE — 700111 HCHG RX REV CODE 636 W/ 250 OVERRIDE (IP): Performed by: ANESTHESIOLOGY

## 2021-10-13 PROCEDURE — 700102 HCHG RX REV CODE 250 W/ 637 OVERRIDE(OP): Performed by: NEUROLOGICAL SURGERY

## 2021-10-13 PROCEDURE — 500002 HCHG ADHESIVE, DERMABOND: Performed by: NEUROLOGICAL SURGERY

## 2021-10-13 PROCEDURE — 00163J6 BYPASS CEREBRAL VENTRICLE TO PERITONEAL CAVITY WITH SYNTHETIC SUBSTITUTE, PERCUTANEOUS APPROACH: ICD-10-PCS | Performed by: NEUROLOGICAL SURGERY

## 2021-10-13 PROCEDURE — 501327 HCHG SET, CAPD TRANSFER: Performed by: NEUROLOGICAL SURGERY

## 2021-10-13 PROCEDURE — 501570 HCHG TROCAR, SEPARATOR: Performed by: NEUROLOGICAL SURGERY

## 2021-10-13 PROCEDURE — 700105 HCHG RX REV CODE 258: Performed by: NEUROLOGICAL SURGERY

## 2021-10-13 PROCEDURE — 501838 HCHG SUTURE GENERAL: Performed by: NEUROLOGICAL SURGERY

## 2021-10-13 PROCEDURE — 160035 HCHG PACU - 1ST 60 MINS PHASE I: Performed by: NEUROLOGICAL SURGERY

## 2021-10-13 PROCEDURE — 0WJG4ZZ INSPECTION OF PERITONEAL CAVITY, PERCUTANEOUS ENDOSCOPIC APPROACH: ICD-10-PCS | Performed by: SURGERY

## 2021-10-13 PROCEDURE — 502571 HCHG PACK, LAP CHOLE: Performed by: NEUROLOGICAL SURGERY

## 2021-10-13 PROCEDURE — 500160 HCHG CATH KIT, Y TEC SYSTEM: Performed by: NEUROLOGICAL SURGERY

## 2021-10-13 PROCEDURE — 700101 HCHG RX REV CODE 250: Performed by: NEUROLOGICAL SURGERY

## 2021-10-13 PROCEDURE — 500868 HCHG NEEDLE, SURGI(VARES): Performed by: NEUROLOGICAL SURGERY

## 2021-10-13 PROCEDURE — 160009 HCHG ANES TIME/MIN: Performed by: NEUROLOGICAL SURGERY

## 2021-10-13 PROCEDURE — 160048 HCHG OR STATISTICAL LEVEL 1-5: Performed by: NEUROLOGICAL SURGERY

## 2021-10-13 PROCEDURE — 160036 HCHG PACU - EA ADDL 30 MINS PHASE I: Performed by: NEUROLOGICAL SURGERY

## 2021-10-13 PROCEDURE — 110372 HCHG SHELL REV 278: Performed by: NEUROLOGICAL SURGERY

## 2021-10-13 PROCEDURE — 700101 HCHG RX REV CODE 250: Performed by: ANESTHESIOLOGY

## 2021-10-13 PROCEDURE — A9270 NON-COVERED ITEM OR SERVICE: HCPCS | Performed by: ANESTHESIOLOGY

## 2021-10-13 PROCEDURE — 160042 HCHG SURGERY MINUTES - EA ADDL 1 MIN LEVEL 5: Performed by: NEUROLOGICAL SURGERY

## 2021-10-13 PROCEDURE — 700111 HCHG RX REV CODE 636 W/ 250 OVERRIDE (IP): Performed by: NURSE PRACTITIONER

## 2021-10-13 PROCEDURE — 700102 HCHG RX REV CODE 250 W/ 637 OVERRIDE(OP): Performed by: ANESTHESIOLOGY

## 2021-10-13 PROCEDURE — 501583 HCHG TROCAR, THRD CAN&SEAL 5X100: Performed by: NEUROLOGICAL SURGERY

## 2021-10-13 DEVICE — CATHETER VENTRICULAR ANTIMICROBIAL IMPREGNATED WITH RIFAMPICIN AND CLINDAMYCIN: Type: IMPLANTABLE DEVICE | Status: FUNCTIONAL

## 2021-10-13 DEVICE — SHUNT VALVE MEDTRONIC: Type: IMPLANTABLE DEVICE | Status: FUNCTIONAL

## 2021-10-13 DEVICE — CATHETER DISTAL ANTIMICROBIAL: Type: IMPLANTABLE DEVICE | Status: FUNCTIONAL

## 2021-10-13 RX ORDER — BISACODYL 10 MG
10 SUPPOSITORY, RECTAL RECTAL
Status: DISCONTINUED | OUTPATIENT
Start: 2021-10-13 | End: 2021-10-14 | Stop reason: HOSPADM

## 2021-10-13 RX ORDER — LISINOPRIL 10 MG/1
10 TABLET ORAL DAILY
Status: DISCONTINUED | OUTPATIENT
Start: 2021-10-13 | End: 2021-10-14 | Stop reason: HOSPADM

## 2021-10-13 RX ORDER — MEPERIDINE HYDROCHLORIDE 25 MG/ML
5 INJECTION INTRAMUSCULAR; INTRAVENOUS; SUBCUTANEOUS
Status: DISCONTINUED | OUTPATIENT
Start: 2021-10-13 | End: 2021-10-13 | Stop reason: HOSPADM

## 2021-10-13 RX ORDER — CALCIUM CARBONATE 500 MG/1
500 TABLET, CHEWABLE ORAL 2 TIMES DAILY
Status: DISCONTINUED | OUTPATIENT
Start: 2021-10-13 | End: 2021-10-14 | Stop reason: HOSPADM

## 2021-10-13 RX ORDER — IBUPROFEN 200 MG
600 TABLET ORAL EVERY 8 HOURS PRN
COMMUNITY
End: 2023-03-13

## 2021-10-13 RX ORDER — AMOXICILLIN 250 MG
1 CAPSULE ORAL NIGHTLY
Status: DISCONTINUED | OUTPATIENT
Start: 2021-10-13 | End: 2021-10-14 | Stop reason: HOSPADM

## 2021-10-13 RX ORDER — DIPHENHYDRAMINE HYDROCHLORIDE 50 MG/ML
25 INJECTION INTRAMUSCULAR; INTRAVENOUS EVERY 6 HOURS PRN
Status: DISCONTINUED | OUTPATIENT
Start: 2021-10-13 | End: 2021-10-14 | Stop reason: HOSPADM

## 2021-10-13 RX ORDER — OXYCODONE HCL 5 MG/5 ML
10 SOLUTION, ORAL ORAL
Status: COMPLETED | OUTPATIENT
Start: 2021-10-13 | End: 2021-10-13

## 2021-10-13 RX ORDER — AMOXICILLIN 250 MG
1 CAPSULE ORAL
Status: DISCONTINUED | OUTPATIENT
Start: 2021-10-13 | End: 2021-10-14 | Stop reason: HOSPADM

## 2021-10-13 RX ORDER — BUPIVACAINE HYDROCHLORIDE AND EPINEPHRINE 5; 5 MG/ML; UG/ML
INJECTION, SOLUTION EPIDURAL; INTRACAUDAL; PERINEURAL
Status: DISCONTINUED | OUTPATIENT
Start: 2021-10-13 | End: 2021-10-13 | Stop reason: HOSPADM

## 2021-10-13 RX ORDER — HYDROMORPHONE HYDROCHLORIDE 1 MG/ML
0.2 INJECTION, SOLUTION INTRAMUSCULAR; INTRAVENOUS; SUBCUTANEOUS
Status: DISCONTINUED | OUTPATIENT
Start: 2021-10-13 | End: 2021-10-13 | Stop reason: HOSPADM

## 2021-10-13 RX ORDER — METHOCARBAMOL 750 MG/1
750 TABLET, FILM COATED ORAL 3 TIMES DAILY PRN
Status: DISCONTINUED | OUTPATIENT
Start: 2021-10-13 | End: 2021-10-14 | Stop reason: HOSPADM

## 2021-10-13 RX ORDER — ACETAMINOPHEN 500 MG
1000 TABLET ORAL EVERY 6 HOURS PRN
Status: DISCONTINUED | OUTPATIENT
Start: 2021-10-18 | End: 2021-10-14 | Stop reason: HOSPADM

## 2021-10-13 RX ORDER — ACETAMINOPHEN 500 MG
1000 TABLET ORAL EVERY 6 HOURS
Status: DISCONTINUED | OUTPATIENT
Start: 2021-10-13 | End: 2021-10-14 | Stop reason: HOSPADM

## 2021-10-13 RX ORDER — ESTRADIOL 1 MG/1
1 TABLET ORAL DAILY
Status: DISCONTINUED | OUTPATIENT
Start: 2021-10-13 | End: 2021-10-13

## 2021-10-13 RX ORDER — RISPERIDONE 0.5 MG/1
0.5 TABLET ORAL 2 TIMES DAILY
Status: DISCONTINUED | OUTPATIENT
Start: 2021-10-13 | End: 2021-10-14 | Stop reason: HOSPADM

## 2021-10-13 RX ORDER — OXYCODONE HCL 5 MG/5 ML
5 SOLUTION, ORAL ORAL
Status: COMPLETED | OUTPATIENT
Start: 2021-10-13 | End: 2021-10-13

## 2021-10-13 RX ORDER — SODIUM CHLORIDE, SODIUM LACTATE, POTASSIUM CHLORIDE, CALCIUM CHLORIDE 600; 310; 30; 20 MG/100ML; MG/100ML; MG/100ML; MG/100ML
INJECTION, SOLUTION INTRAVENOUS CONTINUOUS
Status: DISCONTINUED | OUTPATIENT
Start: 2021-10-13 | End: 2021-10-13 | Stop reason: HOSPADM

## 2021-10-13 RX ORDER — DULOXETIN HYDROCHLORIDE 30 MG/1
30 CAPSULE, DELAYED RELEASE ORAL
Status: DISCONTINUED | OUTPATIENT
Start: 2021-10-13 | End: 2021-10-14 | Stop reason: HOSPADM

## 2021-10-13 RX ORDER — ESTRADIOL 1 MG/1
1 TABLET ORAL
Status: DISCONTINUED | OUTPATIENT
Start: 2021-10-13 | End: 2021-10-14 | Stop reason: HOSPADM

## 2021-10-13 RX ORDER — TRAZODONE HYDROCHLORIDE 150 MG/1
150 TABLET ORAL EVERY EVENING
Status: DISCONTINUED | OUTPATIENT
Start: 2021-10-13 | End: 2021-10-13

## 2021-10-13 RX ORDER — ROCURONIUM BROMIDE 10 MG/ML
INJECTION, SOLUTION INTRAVENOUS PRN
Status: DISCONTINUED | OUTPATIENT
Start: 2021-10-13 | End: 2021-10-13 | Stop reason: SURG

## 2021-10-13 RX ORDER — ACETAMINOPHEN 500 MG
1000 TABLET ORAL ONCE
Status: COMPLETED | OUTPATIENT
Start: 2021-10-13 | End: 2021-10-13

## 2021-10-13 RX ORDER — ENEMA 19; 7 G/133ML; G/133ML
1 ENEMA RECTAL
Status: DISCONTINUED | OUTPATIENT
Start: 2021-10-13 | End: 2021-10-14 | Stop reason: HOSPADM

## 2021-10-13 RX ORDER — HYDRALAZINE HYDROCHLORIDE 20 MG/ML
5 INJECTION INTRAMUSCULAR; INTRAVENOUS
Status: DISCONTINUED | OUTPATIENT
Start: 2021-10-13 | End: 2021-10-13 | Stop reason: HOSPADM

## 2021-10-13 RX ORDER — DOCUSATE SODIUM 100 MG/1
100 CAPSULE, LIQUID FILLED ORAL 2 TIMES DAILY
Status: DISCONTINUED | OUTPATIENT
Start: 2021-10-13 | End: 2021-10-14 | Stop reason: HOSPADM

## 2021-10-13 RX ORDER — TRAZODONE HYDROCHLORIDE 150 MG/1
150 TABLET ORAL NIGHTLY
Status: DISCONTINUED | OUTPATIENT
Start: 2021-10-13 | End: 2021-10-14 | Stop reason: HOSPADM

## 2021-10-13 RX ORDER — SUCCINYLCHOLINE CHLORIDE 20 MG/ML
INJECTION INTRAMUSCULAR; INTRAVENOUS PRN
Status: DISCONTINUED | OUTPATIENT
Start: 2021-10-13 | End: 2021-10-13 | Stop reason: SURG

## 2021-10-13 RX ORDER — DIPHENHYDRAMINE HYDROCHLORIDE 50 MG/ML
12.5 INJECTION INTRAMUSCULAR; INTRAVENOUS
Status: DISCONTINUED | OUTPATIENT
Start: 2021-10-13 | End: 2021-10-13 | Stop reason: HOSPADM

## 2021-10-13 RX ORDER — CEFAZOLIN SODIUM 1 G/3ML
INJECTION, POWDER, FOR SOLUTION INTRAMUSCULAR; INTRAVENOUS
Status: DISCONTINUED | OUTPATIENT
Start: 2021-10-13 | End: 2021-10-13 | Stop reason: HOSPADM

## 2021-10-13 RX ORDER — HYDROMORPHONE HYDROCHLORIDE 1 MG/ML
0.1 INJECTION, SOLUTION INTRAMUSCULAR; INTRAVENOUS; SUBCUTANEOUS
Status: DISCONTINUED | OUTPATIENT
Start: 2021-10-13 | End: 2021-10-13 | Stop reason: HOSPADM

## 2021-10-13 RX ORDER — LABETALOL HYDROCHLORIDE 5 MG/ML
INJECTION, SOLUTION INTRAVENOUS PRN
Status: DISCONTINUED | OUTPATIENT
Start: 2021-10-13 | End: 2021-10-13 | Stop reason: SURG

## 2021-10-13 RX ORDER — ONDANSETRON 2 MG/ML
4 INJECTION INTRAMUSCULAR; INTRAVENOUS EVERY 4 HOURS PRN
Status: DISCONTINUED | OUTPATIENT
Start: 2021-10-13 | End: 2021-10-14 | Stop reason: HOSPADM

## 2021-10-13 RX ORDER — POLYETHYLENE GLYCOL 3350 17 G/17G
1 POWDER, FOR SOLUTION ORAL 2 TIMES DAILY PRN
Status: DISCONTINUED | OUTPATIENT
Start: 2021-10-13 | End: 2021-10-14 | Stop reason: HOSPADM

## 2021-10-13 RX ORDER — ONDANSETRON 2 MG/ML
4 INJECTION INTRAMUSCULAR; INTRAVENOUS
Status: COMPLETED | OUTPATIENT
Start: 2021-10-13 | End: 2021-10-13

## 2021-10-13 RX ORDER — LABETALOL HYDROCHLORIDE 5 MG/ML
5 INJECTION, SOLUTION INTRAVENOUS
Status: DISCONTINUED | OUTPATIENT
Start: 2021-10-13 | End: 2021-10-13 | Stop reason: HOSPADM

## 2021-10-13 RX ORDER — HYDROMORPHONE HYDROCHLORIDE 1 MG/ML
0.4 INJECTION, SOLUTION INTRAMUSCULAR; INTRAVENOUS; SUBCUTANEOUS
Status: DISCONTINUED | OUTPATIENT
Start: 2021-10-13 | End: 2021-10-13 | Stop reason: HOSPADM

## 2021-10-13 RX ORDER — TOPIRAMATE 25 MG/1
75 TABLET ORAL
Status: DISCONTINUED | OUTPATIENT
Start: 2021-10-13 | End: 2021-10-14 | Stop reason: HOSPADM

## 2021-10-13 RX ORDER — OXYCODONE HYDROCHLORIDE 10 MG/1
10 TABLET ORAL
Status: DISCONTINUED | OUTPATIENT
Start: 2021-10-13 | End: 2021-10-14 | Stop reason: HOSPADM

## 2021-10-13 RX ORDER — DEXAMETHASONE SODIUM PHOSPHATE 4 MG/ML
INJECTION, SOLUTION INTRA-ARTICULAR; INTRALESIONAL; INTRAMUSCULAR; INTRAVENOUS; SOFT TISSUE PRN
Status: DISCONTINUED | OUTPATIENT
Start: 2021-10-13 | End: 2021-10-13 | Stop reason: SURG

## 2021-10-13 RX ORDER — CEFAZOLIN SODIUM 2 G/100ML
2 INJECTION, SOLUTION INTRAVENOUS EVERY 8 HOURS
Status: COMPLETED | OUTPATIENT
Start: 2021-10-13 | End: 2021-10-13

## 2021-10-13 RX ORDER — DOCUSATE SODIUM 100 MG/1
100 CAPSULE, LIQUID FILLED ORAL
COMMUNITY
End: 2023-03-13

## 2021-10-13 RX ORDER — SODIUM CHLORIDE, SODIUM LACTATE, POTASSIUM CHLORIDE, CALCIUM CHLORIDE 600; 310; 30; 20 MG/100ML; MG/100ML; MG/100ML; MG/100ML
INJECTION, SOLUTION INTRAVENOUS CONTINUOUS
Status: ACTIVE | OUTPATIENT
Start: 2021-10-13 | End: 2021-10-13

## 2021-10-13 RX ORDER — OXYCODONE HYDROCHLORIDE 5 MG/1
5 TABLET ORAL
Status: DISCONTINUED | OUTPATIENT
Start: 2021-10-13 | End: 2021-10-14 | Stop reason: HOSPADM

## 2021-10-13 RX ORDER — DIPHENHYDRAMINE HCL 25 MG
25 TABLET ORAL EVERY 6 HOURS PRN
Status: DISCONTINUED | OUTPATIENT
Start: 2021-10-13 | End: 2021-10-14 | Stop reason: HOSPADM

## 2021-10-13 RX ORDER — FAMOTIDINE 20 MG/1
20 TABLET, FILM COATED ORAL DAILY
Status: DISCONTINUED | OUTPATIENT
Start: 2021-10-14 | End: 2021-10-14 | Stop reason: HOSPADM

## 2021-10-13 RX ORDER — HALOPERIDOL 5 MG/ML
1 INJECTION INTRAMUSCULAR
Status: DISCONTINUED | OUTPATIENT
Start: 2021-10-13 | End: 2021-10-13 | Stop reason: HOSPADM

## 2021-10-13 RX ORDER — CEFAZOLIN SODIUM 1 G/3ML
INJECTION, POWDER, FOR SOLUTION INTRAMUSCULAR; INTRAVENOUS PRN
Status: DISCONTINUED | OUTPATIENT
Start: 2021-10-13 | End: 2021-10-13 | Stop reason: SURG

## 2021-10-13 RX ORDER — MORPHINE SULFATE 4 MG/ML
4 INJECTION, SOLUTION INTRAMUSCULAR; INTRAVENOUS
Status: DISCONTINUED | OUTPATIENT
Start: 2021-10-13 | End: 2021-10-14 | Stop reason: HOSPADM

## 2021-10-13 RX ORDER — ONDANSETRON 2 MG/ML
INJECTION INTRAMUSCULAR; INTRAVENOUS PRN
Status: DISCONTINUED | OUTPATIENT
Start: 2021-10-13 | End: 2021-10-13 | Stop reason: SURG

## 2021-10-13 RX ADMIN — HYDRALAZINE HYDROCHLORIDE 5 MG: 20 INJECTION INTRAMUSCULAR; INTRAVENOUS at 10:01

## 2021-10-13 RX ADMIN — ONDANSETRON 4 MG: 2 INJECTION INTRAMUSCULAR; INTRAVENOUS at 15:37

## 2021-10-13 RX ADMIN — ACETAMINOPHEN 1000 MG: 500 TABLET ORAL at 06:18

## 2021-10-13 RX ADMIN — ROCURONIUM BROMIDE 20 MG: 10 INJECTION, SOLUTION INTRAVENOUS at 08:10

## 2021-10-13 RX ADMIN — DOCUSATE SODIUM 100 MG: 100 CAPSULE ORAL at 17:25

## 2021-10-13 RX ADMIN — ROCURONIUM BROMIDE 10 MG: 10 INJECTION, SOLUTION INTRAVENOUS at 08:34

## 2021-10-13 RX ADMIN — FENTANYL CITRATE 50 MCG: 50 INJECTION INTRAMUSCULAR; INTRAVENOUS at 10:05

## 2021-10-13 RX ADMIN — CALCIUM CARBONATE 500 MG: 500 TABLET, CHEWABLE ORAL at 17:26

## 2021-10-13 RX ADMIN — ONDANSETRON 4 MG: 2 INJECTION INTRAMUSCULAR; INTRAVENOUS at 09:46

## 2021-10-13 RX ADMIN — FENTANYL CITRATE 250 MCG: 50 INJECTION, SOLUTION INTRAMUSCULAR; INTRAVENOUS at 08:32

## 2021-10-13 RX ADMIN — TOPIRAMATE 75 MG: 25 TABLET, FILM COATED ORAL at 20:14

## 2021-10-13 RX ADMIN — LABETALOL HYDROCHLORIDE 20 MG: 5 INJECTION, SOLUTION INTRAVENOUS at 08:34

## 2021-10-13 RX ADMIN — OXYCODONE 5 MG: 5 TABLET ORAL at 22:25

## 2021-10-13 RX ADMIN — HYDROMORPHONE HYDROCHLORIDE 0.4 MG: 1 INJECTION, SOLUTION INTRAMUSCULAR; INTRAVENOUS; SUBCUTANEOUS at 10:13

## 2021-10-13 RX ADMIN — CEFAZOLIN SODIUM 2 G: 2 INJECTION, SOLUTION INTRAVENOUS at 22:23

## 2021-10-13 RX ADMIN — DEXAMETHASONE SODIUM PHOSPHATE 4 MG: 4 INJECTION, SOLUTION INTRA-ARTICULAR; INTRALESIONAL; INTRAMUSCULAR; INTRAVENOUS; SOFT TISSUE at 07:55

## 2021-10-13 RX ADMIN — ACETAMINOPHEN 1000 MG: 500 TABLET ORAL at 17:26

## 2021-10-13 RX ADMIN — ACETAMINOPHEN 1000 MG: 500 TABLET ORAL at 22:23

## 2021-10-13 RX ADMIN — SODIUM CHLORIDE, POTASSIUM CHLORIDE, SODIUM LACTATE AND CALCIUM CHLORIDE: 600; 310; 30; 20 INJECTION, SOLUTION INTRAVENOUS at 06:11

## 2021-10-13 RX ADMIN — ONDANSETRON 4 MG: 2 INJECTION INTRAMUSCULAR; INTRAVENOUS at 20:12

## 2021-10-13 RX ADMIN — FENTANYL CITRATE 50 MCG: 50 INJECTION, SOLUTION INTRAMUSCULAR; INTRAVENOUS at 07:50

## 2021-10-13 RX ADMIN — HYDRALAZINE HYDROCHLORIDE 5 MG: 20 INJECTION INTRAMUSCULAR; INTRAVENOUS at 09:43

## 2021-10-13 RX ADMIN — OXYCODONE HYDROCHLORIDE 10 MG: 5 SOLUTION ORAL at 09:51

## 2021-10-13 RX ADMIN — FENTANYL CITRATE 50 MCG: 50 INJECTION INTRAMUSCULAR; INTRAVENOUS at 09:46

## 2021-10-13 RX ADMIN — HALOPERIDOL LACTATE 1 MG: 5 INJECTION, SOLUTION INTRAMUSCULAR at 11:12

## 2021-10-13 RX ADMIN — HYDROMORPHONE HYDROCHLORIDE 0.2 MG: 1 INJECTION, SOLUTION INTRAMUSCULAR; INTRAVENOUS; SUBCUTANEOUS at 10:26

## 2021-10-13 RX ADMIN — ONDANSETRON 4 MG: 2 INJECTION INTRAMUSCULAR; INTRAVENOUS at 08:12

## 2021-10-13 RX ADMIN — CEFAZOLIN SODIUM 2 G: 2 INJECTION, SOLUTION INTRAVENOUS at 15:15

## 2021-10-13 RX ADMIN — SUGAMMADEX 200 MG: 100 INJECTION, SOLUTION INTRAVENOUS at 09:12

## 2021-10-13 RX ADMIN — LABETALOL HYDROCHLORIDE 5 MG: 5 INJECTION, SOLUTION INTRAVENOUS at 09:25

## 2021-10-13 RX ADMIN — PROPOFOL 200 MG: 10 INJECTION, EMULSION INTRAVENOUS at 07:54

## 2021-10-13 RX ADMIN — FENTANYL CITRATE 100 MCG: 50 INJECTION, SOLUTION INTRAMUSCULAR; INTRAVENOUS at 08:00

## 2021-10-13 RX ADMIN — DULOXETINE HYDROCHLORIDE 30 MG: 30 CAPSULE, DELAYED RELEASE ORAL at 20:12

## 2021-10-13 RX ADMIN — HYDROMORPHONE HYDROCHLORIDE 0.4 MG: 1 INJECTION, SOLUTION INTRAMUSCULAR; INTRAVENOUS; SUBCUTANEOUS at 10:20

## 2021-10-13 RX ADMIN — CEFAZOLIN 2 G: 330 INJECTION, POWDER, FOR SOLUTION INTRAMUSCULAR; INTRAVENOUS at 07:55

## 2021-10-13 RX ADMIN — FENTANYL CITRATE 50 MCG: 50 INJECTION INTRAMUSCULAR; INTRAVENOUS at 11:01

## 2021-10-13 RX ADMIN — SUCCINYLCHOLINE CHLORIDE 80 MG: 20 INJECTION, SOLUTION INTRAMUSCULAR; INTRAVENOUS; PARENTERAL at 07:52

## 2021-10-13 RX ADMIN — RISPERIDONE 0.5 MG: 0.5 TABLET ORAL at 17:25

## 2021-10-13 RX ADMIN — FENTANYL CITRATE 100 MCG: 50 INJECTION, SOLUTION INTRAMUSCULAR; INTRAVENOUS at 07:54

## 2021-10-13 RX ADMIN — ESTRADIOL 1 MG: 1 TABLET ORAL at 20:13

## 2021-10-13 RX ADMIN — TRAZODONE HYDROCHLORIDE 150 MG: 150 TABLET ORAL at 20:13

## 2021-10-13 RX ADMIN — SUCCINYLCHOLINE CHLORIDE 120 MG: 20 INJECTION, SOLUTION INTRAMUSCULAR; INTRAVENOUS; PARENTERAL at 07:50

## 2021-10-13 ASSESSMENT — PAIN DESCRIPTION - PAIN TYPE
TYPE: SURGICAL PAIN

## 2021-10-13 ASSESSMENT — COGNITIVE AND FUNCTIONAL STATUS - GENERAL
DRESSING REGULAR UPPER BODY CLOTHING: A LITTLE
SUGGESTED CMS G CODE MODIFIER DAILY ACTIVITY: CK
MOVING FROM LYING ON BACK TO SITTING ON SIDE OF FLAT BED: A LOT
STANDING UP FROM CHAIR USING ARMS: A LOT
WALKING IN HOSPITAL ROOM: A LOT
MOVING TO AND FROM BED TO CHAIR: A LOT
HELP NEEDED FOR BATHING: A LITTLE
PERSONAL GROOMING: A LITTLE
DRESSING REGULAR LOWER BODY CLOTHING: A LOT
TURNING FROM BACK TO SIDE WHILE IN FLAT BAD: A LOT
SUGGESTED CMS G CODE MODIFIER MOBILITY: CL
DAILY ACTIVITIY SCORE: 17
TOILETING: A LOT
CLIMB 3 TO 5 STEPS WITH RAILING: A LOT
MOBILITY SCORE: 12

## 2021-10-13 ASSESSMENT — PATIENT HEALTH QUESTIONNAIRE - PHQ9
1. LITTLE INTEREST OR PLEASURE IN DOING THINGS: NOT AT ALL
SUM OF ALL RESPONSES TO PHQ9 QUESTIONS 1 AND 2: 0
SUM OF ALL RESPONSES TO PHQ9 QUESTIONS 1 AND 2: 0
2. FEELING DOWN, DEPRESSED, IRRITABLE, OR HOPELESS: NOT AT ALL
2. FEELING DOWN, DEPRESSED, IRRITABLE, OR HOPELESS: NOT AT ALL
1. LITTLE INTEREST OR PLEASURE IN DOING THINGS: NOT AT ALL

## 2021-10-13 ASSESSMENT — LIFESTYLE VARIABLES
AVERAGE NUMBER OF DAYS PER WEEK YOU HAVE A DRINK CONTAINING ALCOHOL: 3
EVER FELT BAD OR GUILTY ABOUT YOUR DRINKING: NO
ON A TYPICAL DAY WHEN YOU DRINK ALCOHOL HOW MANY DRINKS DO YOU HAVE: 2
HAVE YOU EVER FELT YOU SHOULD CUT DOWN ON YOUR DRINKING: NO
HAVE PEOPLE ANNOYED YOU BY CRITICIZING YOUR DRINKING: NO
HOW MANY TIMES IN THE PAST YEAR HAVE YOU HAD 5 OR MORE DRINKS IN A DAY: 0
EVER HAD A DRINK FIRST THING IN THE MORNING TO STEADY YOUR NERVES TO GET RID OF A HANGOVER: NO
ALCOHOL_USE: YES
DOES PATIENT WANT TO STOP DRINKING: NO
TOTAL SCORE: 0
CONSUMPTION TOTAL: NEGATIVE

## 2021-10-13 ASSESSMENT — FIBROSIS 4 INDEX: FIB4 SCORE: 0.79

## 2021-10-13 ASSESSMENT — PAIN SCALES - GENERAL: PAIN_LEVEL: 6

## 2021-10-13 NOTE — ANESTHESIA TIME REPORT
Anesthesia Start and Stop Event Times     Date Time Event    10/13/2021 0708 Ready for Procedure     0749 Anesthesia Start     0944 Anesthesia Stop        Responsible Staff  10/13/21    Name Role Begin End    August W REINALDO Shelton. Anesth 0749 0944        Preop Diagnosis (Free Text):  Pre-op Diagnosis     NORMAL PRESSURE HYDROCEPHALUS        Preop Diagnosis (Codes):    Premium Reason  Non-Premium    Comments:

## 2021-10-13 NOTE — ANESTHESIA POSTPROCEDURE EVALUATION
Patient: Sheyla Garcia    Procedure Summary     Date: 10/13/21 Room / Location: [facility] 05 / SURGERY [facility]    Anesthesia Start: 0749 Anesthesia Stop: 0944    Procedures:       INSERTION, SHUNT, VENTRICULOPERITONEAL, USING FRAMELESS STEREOTAXY - FRONTAL (Right Head)      INSERTION, CATHETER, CAPD (N/A Abdomen) Diagnosis: (NORMAL PRESSURE HYDROCEPHALUS)    Surgeons: Shady Glynn M.D. Responsible Provider: Barber Shelton M.D.    Anesthesia Type: general ASA Status: 3          Final Anesthesia Type: general  Last vitals  BP   Blood Pressure : (!) 187/89 (MD notified)    Temp   36.9 °C (98.5 °F)    Pulse   83   Resp   16    SpO2   91 %      Anesthesia Post Evaluation    Patient location during evaluation: PACU  Patient participation: complete - patient participated  Level of consciousness: awake  Pain score: 6    Airway patency: patent  Anesthetic complications: no  Cardiovascular status: hemodynamically stable  Respiratory status: acceptable, face mask and nasal airway  Hydration status: euvolemic    PONV: none          No complications documented.     Nurse Pain Score: 10 (NPRS)

## 2021-10-13 NOTE — ANESTHESIA PROCEDURE NOTES
Airway    Date/Time: 10/13/2021 7:52 AM  Performed by: Barber Shelton M.D.  Authorized by: Barber Shelton M.D.     Location:  OR  Urgency:  Elective  Difficult Airway: Yes     Anterior larynx, chairez 2 w/ cricoid, grade III view. glidescope 3 required for intubation  Indications for Airway Management:  Anesthesia      Spontaneous Ventilation: absent    Sedation Level:  Deep  Preoxygenated: Yes    Patient Position:  Sniffing  Mask Difficulty Assessment:  1 - vent by mask  Final Airway Type:  Endotracheal airway  Final Endotracheal Airway:  ETT  Cuffed: Yes    Technique Used for Successful ETT Placement:  Direct laryngoscopy  Devices/Methods Used in Placement:  Intubating stylet and cricoid pressure    Insertion Site:  Oral  Blade Type:  Glide  Laryngoscope Blade/Videolaryngoscope Blade Size:  3  ETT Size (mm):  7.0  Measured from:  Teeth  ETT to Teeth (cm):  22  Placement Verified by: auscultation and capnometry    Cormack-Lehane Classification:  Grade I - full view of glottis  Number of Attempts at Approach:  2  Ventilation Between Attempts:  BVM  Number of Other Approaches Attempted:  1  Unsuccessful Approach(es) for ETT:  Direct laryngoscopy

## 2021-10-13 NOTE — ANESTHESIA PREPROCEDURE EVALUATION
Relevant Problems   ANESTHESIA   (positive) KIRA (obstructive sleep apnea)      PULMONARY   (positive) Moderate persistent asthma without complication      CARDIAC   (positive) Essential hypertension      ENDO   (positive) Hypothyroidism       Physical Exam    Airway   Mallampati: III  TM distance: >3 FB  Neck ROM: limited       Cardiovascular - normal exam  Rhythm: regular  Rate: normal  (-) murmur     Dental - normal exam           Pulmonary - normal exam  Breath sounds clear to auscultation     Abdominal   (+) obese     Neurological - normal exam                 Anesthesia Plan    ASA 3       Plan - general       Airway plan will be ETT          Induction: intravenous    Postoperative Plan: Postoperative administration of opioids is intended.    Pertinent diagnostic labs and testing reviewed    Informed Consent:    Anesthetic plan and risks discussed with patient.    Use of blood products discussed with: patient whom consented to blood products.

## 2021-10-13 NOTE — OR NURSING
0939 Arrived from OR via bed. Report given by anesthesia and RN. Sleeping, npa, 6L mask, even non labored breathing, lungs clear, airway patent. SR with occasional pvc. Skin pink warm dry. PIV patent. Head dressing intact, sanguinous drainage, Abd x2 lap sites R chest x1 lap site dressings cdi, cold pack to abd. Dressing to R neck, cdi. PERRLA.     0943 treated for htn.     0946, 0951 arousable. c/o pain and nausea treated per mar. Given quease ease.    1001 treated for htn     1005, 1013 continue to treat for pain per mar. PERRLA, neuro intact, cms intact.     1015 npa removed. Awake and alert. Spontaneous even non labored breathing. Head dressing no new drainage, tremaine placed, pillow case changed     1020, 1026 continue to treat for pain.     1045 resting with eyes closed, face relaxed, even non labored breathing. Skin pink warm dry.     1101 c/o pain treated matheus par    1106 updated Ed, spouse. O2 tank at 80% for transfer.     1101, 1112 c/o pain and nausea treated per mar. Given emesis bag, repositioned in bed with pillows, quease ease. Alert and oriented x4, PERRLA, neuro intact. Head dressing no new drainage. Lap sites cdi, no changes, no drainage, no hematoma.      1115 pain tolerable. Nausea resolved.     1130 resting comfortably with eyes closed, face relaxed, even non labored breathing. Skin pin warm dry.     1150 MD Glynn at bedside    1215 awake and alert. Pain tolerable. Denies nausea. Neuro intact. Cms intact. PERRLA. Head dressing no new drainage, intact. R neck, abd and R chest cdi.     1226 Report given to killian Cuello T301 1. Personal belongings with pt. VSS pt ready for transfer.

## 2021-10-13 NOTE — CARE PLAN
The patient is Stable - Low risk of patient condition declining or worsening    Shift Goals  Clinical Goals: pain control  Patient Goals: pain control, decrease nausea  Family Goals: decrease nausea    Progress made toward(s) clinical / shift goals:        Problem: Pain - Standard  Goal: Alleviation of pain or a reduction in pain to the patient’s comfort goal  Outcome: Progressing  Note: Educated on pain scale. Encouraged to verbalize pain. Will medicate as per MAR.     Problem: Fall Risk  Goal: Patient will remain free from falls  Outcome: Progressing  Note: Fall protocol in effect. Call light within reach. Reminded patient to call for assist.      Problem: Knowledge Deficit - Standard  Goal: Patient and family/care givers will demonstrate understanding of plan of care, disease process/condition, diagnostic tests and medications  Outcome: Progressing  Note: POC discussed with the patient and spouse. PT & OT Eval in am. Questions answered. Verbalized understanding.       Patient is not progressing towards the following goals:

## 2021-10-13 NOTE — OP REPORT
NEUROSURGERY OPERATIVE NOTE    DATE:  9:35 AM   11/13/2021    Patient name:  Sheyla Garcia  MRN:  [id number]    Procedure:  Right frontal ventriculoperitoneal shunt placement with STEALTH intraoperative guidance, laparoscopic peritoneal catheter placement (Strata 2, performance level 1.0, [employer])    Surgeon:  Shady Glynn MD PhD  CoSurgeon: Dr. Henry MD General Surgery.  Utilization of co-surgeon was critical for the performance of this procedure.  He provided laparoscopic placement of the peritoneal catheter with direct visualization of the distal catheter with good flow noted.    Anesthesia:  GETA    Diagnosis: NPH    Indication: 70-year-old female with progressive ambulatory difficulty memory issues.  She underwent high-volume lumbar puncture after imaging demonstrated significant ventriculomegaly.  Her ambulatory status improved significantly, she had minimal improvement in her memory.  Given this,  shunt placement is planned.    Procedure:  The patient was identified in the holding area; the operative site was marked, and consent obtained.  They were intubated by anesthesia service.  Two gram Ancef was administered intravenously.  Her head was registered to the Greetz intraoperative guidance system.  Her head, neck and torso were prepped with hair clipping, chlorhexidine scrub, betadine scrub, and Chloroprep.  Sterile drapes were applied including a layer of Ioban.  The preplanned entry point in her midpupillary line just anterior to the ear, was identified.  The scalp was infiltrated with 0.5% Marcaine with epinephrine.  A curved incision was created using the [place] tip cautery and dissection carried to the pericranium in like manner.  A subcutaneous pouch was created posteriorly using curved Susan forceps.  A disposable tunneler was passed from the scalp incision to the abdomen, using a counter incision in her lower lateral neck.  The peritoneal catheter was brought subcutaneously in  this manner to the abdomen.  Laparoscopic intraperitoneal access had already been obtained by Dr. Figueroa; the catheter was placed over the liver.  A bur hole was created in the right frontal bone with the high speed drill/AM8 bit.  The bone edges were waxed.  The dural was cauterized with bipolar cautery and opened sharply with an 11 blade scalpel in a cruciate manner.  The pete was cauterized and opened in like manner.  A 23 cm ventricular catheter was advanced over the STEALTH Axium stylet, under neuronavigation, and positioned at the right foramen on [place].  Brisk flow of clear CSF was obtained.  The ventricular catheter was secured to a regular STRATA II valve using 2-0 Neurolon ties.  The valve was set to Performance Level 1.0  The slack in the catheter was taken up intraperitoneally, and the valve positioned within the subcutaneous pouch.  CSF flow was noted intraperitoneally.  Gelfoam was placed in the bur hole.  Gelfoam was placed in the bur hole.  The ninety degree catheter anchor was secured to the pericranium using 4-0 Neurolon suture.  The dermis was closed with 3-0 Vicryl suture in an inverted interrupted manner.  The skin was reapproximated with 3-0 Nylon suture in a running manner.  The neck counter incision was closed in like manner.  Bacitracin ointment, Xeroform, and a sterile dressing were placed.  The abdominal incisions were closed by Dr. Figueroa.  Final counts were correct.    EBL:  Minimal  Specimen:  None  Drains:  None  Complications:  None apparent at end of procedure.

## 2021-10-13 NOTE — PROGRESS NOTES
4 Eyes Skin Assessment Completed by BERLIN Schwarz and BERLIN Jaramillo.    Head Incision dressing to head with dried blood  Ears right behind ear, dressing CD&I  Nose WDL  Mouth WDL  Neck WDL  Breast/Chest Redness to right underneath breast, dressing to right side of chest, CD&I  Shoulder Blades WDL  Spine Bruising right upper back  (R) Arm/Elbow/Hand WDL  (L) Arm/Elbow/Hand WDL  Abdomen Incision x2 lap stabs, dressing CD&I, redness to right side pannus area.  Groin WDL  Scrotum/Coccyx/Buttocks WDL  (R) Leg WDL  (L) Leg WDL  (R) Heel/Foot/Toe WDL  (L) Heel/Foot/Toe WDL          Devices In Places Pulse Ox and SCD's, TEDS      Interventions In Place Pillows, q 2 turn    Possible Skin Injury No    Pictures Uploaded Into Epic N/A  Wound Consult Placed N/A  RN Wound Prevention Protocol Ordered No

## 2021-10-14 ENCOUNTER — PATIENT OUTREACH (OUTPATIENT)
Dept: HEALTH INFORMATION MANAGEMENT | Facility: OTHER | Age: 71
End: 2021-10-14

## 2021-10-14 VITALS
SYSTOLIC BLOOD PRESSURE: 106 MMHG | DIASTOLIC BLOOD PRESSURE: 63 MMHG | RESPIRATION RATE: 16 BRPM | BODY MASS INDEX: 36.64 KG/M2 | OXYGEN SATURATION: 93 % | HEART RATE: 72 BPM | TEMPERATURE: 98.1 F | WEIGHT: 255.95 LBS | HEIGHT: 70 IN

## 2021-10-14 LAB
ANION GAP SERPL CALC-SCNC: 10 MMOL/L (ref 7–16)
BUN SERPL-MCNC: 9 MG/DL (ref 8–22)
CALCIUM SERPL-MCNC: 8.8 MG/DL (ref 8.5–10.5)
CHLORIDE SERPL-SCNC: 106 MMOL/L (ref 96–112)
CO2 SERPL-SCNC: 25 MMOL/L (ref 20–33)
CREAT SERPL-MCNC: 0.65 MG/DL (ref 0.5–1.4)
ERYTHROCYTE [DISTWIDTH] IN BLOOD BY AUTOMATED COUNT: 47 FL (ref 35.9–50)
GLUCOSE SERPL-MCNC: 106 MG/DL (ref 65–99)
HCT VFR BLD AUTO: 41.2 % (ref 37–47)
HGB BLD-MCNC: 13.3 G/DL (ref 12–16)
MCH RBC QN AUTO: 30.4 PG (ref 27–33)
MCHC RBC AUTO-ENTMCNC: 32.3 G/DL (ref 33.6–35)
MCV RBC AUTO: 94.1 FL (ref 81.4–97.8)
PLATELET # BLD AUTO: 209 K/UL (ref 164–446)
PMV BLD AUTO: 10.6 FL (ref 9–12.9)
POTASSIUM SERPL-SCNC: 3.7 MMOL/L (ref 3.6–5.5)
RBC # BLD AUTO: 4.38 M/UL (ref 4.2–5.4)
SODIUM SERPL-SCNC: 141 MMOL/L (ref 135–145)
WBC # BLD AUTO: 13.3 K/UL (ref 4.8–10.8)

## 2021-10-14 PROCEDURE — 97165 OT EVAL LOW COMPLEX 30 MIN: CPT

## 2021-10-14 PROCEDURE — 85027 COMPLETE CBC AUTOMATED: CPT

## 2021-10-14 PROCEDURE — 97162 PT EVAL MOD COMPLEX 30 MIN: CPT

## 2021-10-14 PROCEDURE — A9270 NON-COVERED ITEM OR SERVICE: HCPCS | Performed by: NEUROLOGICAL SURGERY

## 2021-10-14 PROCEDURE — 700102 HCHG RX REV CODE 250 W/ 637 OVERRIDE(OP): Performed by: NEUROLOGICAL SURGERY

## 2021-10-14 PROCEDURE — 97535 SELF CARE MNGMENT TRAINING: CPT

## 2021-10-14 PROCEDURE — 80048 BASIC METABOLIC PNL TOTAL CA: CPT

## 2021-10-14 PROCEDURE — 36415 COLL VENOUS BLD VENIPUNCTURE: CPT

## 2021-10-14 RX ADMIN — RISPERIDONE 0.5 MG: 0.5 TABLET ORAL at 06:13

## 2021-10-14 RX ADMIN — DOCUSATE SODIUM 100 MG: 100 CAPSULE ORAL at 06:13

## 2021-10-14 RX ADMIN — ACETAMINOPHEN 1000 MG: 500 TABLET ORAL at 11:35

## 2021-10-14 RX ADMIN — FAMOTIDINE 20 MG: 20 TABLET, FILM COATED ORAL at 06:12

## 2021-10-14 RX ADMIN — OXYCODONE 5 MG: 5 TABLET ORAL at 07:48

## 2021-10-14 RX ADMIN — ACETAMINOPHEN 1000 MG: 500 TABLET ORAL at 06:13

## 2021-10-14 RX ADMIN — CALCIUM CARBONATE 500 MG: 500 TABLET, CHEWABLE ORAL at 06:13

## 2021-10-14 ASSESSMENT — COGNITIVE AND FUNCTIONAL STATUS - GENERAL
DRESSING REGULAR LOWER BODY CLOTHING: A LITTLE
DRESSING REGULAR UPPER BODY CLOTHING: A LITTLE
PERSONAL GROOMING: A LITTLE
SUGGESTED CMS G CODE MODIFIER MOBILITY: CL
CLIMB 3 TO 5 STEPS WITH RAILING: A LITTLE
MOBILITY SCORE: 14
TOILETING: A LITTLE
DAILY ACTIVITIY SCORE: 18
WALKING IN HOSPITAL ROOM: A LITTLE
MOVING FROM LYING ON BACK TO SITTING ON SIDE OF FLAT BED: UNABLE
HELP NEEDED FOR BATHING: A LITTLE
SUGGESTED CMS G CODE MODIFIER DAILY ACTIVITY: CK
STANDING UP FROM CHAIR USING ARMS: A LITTLE
TURNING FROM BACK TO SIDE WHILE IN FLAT BAD: A LOT
EATING MEALS: A LITTLE
MOVING TO AND FROM BED TO CHAIR: A LOT

## 2021-10-14 ASSESSMENT — GAIT ASSESSMENTS
ASSISTIVE DEVICE: FRONT WHEEL WALKER
GAIT LEVEL OF ASSIST: MINIMAL ASSIST
DEVIATION: OTHER (COMMENT);INCREASED BASE OF SUPPORT;DECREASED HEEL STRIKE;DECREASED TOE OFF
DISTANCE (FEET): 20

## 2021-10-14 ASSESSMENT — ACTIVITIES OF DAILY LIVING (ADL): TOILETING: REQUIRES ASSIST

## 2021-10-14 ASSESSMENT — PAIN DESCRIPTION - PAIN TYPE: TYPE: ACUTE PAIN;SURGICAL PAIN

## 2021-10-14 NOTE — DISCHARGE PLANNING
Received Choice form at 0900  Agency/Facility Name: [facility]  Referral sent per Choice form @ 0907    LSW informed

## 2021-10-14 NOTE — THERAPY
Physical Therapy   Initial Evaluation     Patient Name: Sheyla Garcia  Age:  70 y.o., Sex:  female  Medical Record #: [id number]  Today's Date: 10/14/2021     Precautions  Precautions: Fall Risk (s/p  shunt)    Assessment  After initial evaluation and pt education, pt has no further acute PT needs. Note that she does have notable improvements with motor control, function and sensation at LLE post procedure and has excellent potential for improvements with balance and gait mechanics (and to reduce assist levels needed; currently spouse provides assist with activities/mobility). Spouse present and pt and spouse report she has improved post procedure and report no functional concerns with dc to home once medically cleared and appears to have appropriate ADs and support from spouse to facilitate dc home. Given potential would strongly recommend continued PT after dc to home given potential. See below for details/recs.     Plan    Recommend Physical Therapy for Evaluation only     DC Equipment Recommendations: None  Discharge Recommendations: Recommend home health for continued physical therapy services        10/14/21 1008   Prior Living Situation   Prior Services Continuous (24 Hour) Care Giving Family   Housing / Facility 1 Story House  (2 small platform steps to enter)   Bathroom Set up Walk In Shower;Shower Chair   Equipment Owned Front-Wheel Walker;Single Point Cane;Wheelchair   Lives with - Patient's Self Care Capacity Spouse   Comments spouse is well versed with pt care with re: ADLs and assisting with mobility    Prior Level of Functional Mobility   Bed Mobility Required Assist   Transfer Status Required Assist   Ambulation Required Assist   Distance Ambulation (Feet)   (community)   Assistive Devices Used Front-Wheel Walker  (often with wc follow from spoues)   Stairs Required Assist  (spouse reports assists with stairs to enter)   Comments spoues providing much information; noted he appears to be well versed  with how to assist as noted above; given current functional status, spouse and pt report no concerns with dc to home and reports she is better than prior to admit    Cognition    Cognition / Consciousness X   Level of Consciousness Alert   Comments generally delayed and odd affect that appears likely to be baseline bsaed on spouse   Passive ROM Lower Body   Passive ROM Lower Body WDL   Active ROM Lower Body    Active ROM Lower Body  WDL   Strength Lower Body   Lower Body Strength  X   Gross Strength Generalized Weakness, Equal Bilaterally   Sensation Lower Body   Lower Extremity Sensation   X   Comments reports improved sensation at LLE as compared to prior (may still be somewhat diminished)   Neurological Concerns   Neurological Concerns Yes   Comments given dx and hx of deficits   Balance Assessment   Sitting Balance (Static) Fair   Sitting Balance (Dynamic) Fair   Standing Balance (Static) Poor +   Standing Balance (Dynamic) Poor +   Weight Shift Sitting Fair   Weight Shift Standing Poor   Comments pt has 1 LOB initially with standing/ambulation and required min A to correct (however at baseline spoues assists with CGA/min A and wc follow); spoue and pt report noted improvements post procedure with re: LLE foot clearance and motor control; see gait   Gait Analysis   Gait Level Of Assist Minimal Assist  (mostly CGA and VC's)   Assistive Device Front Wheel Walker   Distance (Feet) 20   # of Times Distance was Traveled 1   Deviation Other (Comment);Increased Base Of Support;Decreased Heel Strike;Decreased Toe Off  (dec clearance; see below)   # of Stairs Climbed 0   Weight Bearing Status no restrictions   Comments inconsistent plcaement of LLE though pt and spouse report improved clearance and motor fnx (as prior was dragging/no clearance at LLE);   Bed Mobility    Supine to Sit   (found on toilet (had take wc to BR))   Sit to Supine Minimal Assist  (spouse demo'd assist )   Scooting Minimal Assist   Comments HOB  flat; at baseline pt requied assist at times per pt/spoues   Functional Mobility   Sit to Stand Minimal Assist  (mostly CGA/Jorje to steady; baseline per spouse)   Bed, Chair, Wheelchair Transfer Minimal Assist  (mostly CGA and cueing)   Transfer Method Stand Step   Mobility with FWW   How much difficulty does the patient currently have...   Turning over in bed (including adjusting bedclothes, sheets and blankets)? 2   Sitting down on and standing up from a chair with arms (e.g., wheelchair, bedside commode, etc.) 1   Moving from lying on back to sitting on the side of the bed? 2   How much help from another person does the patient currently need...   Moving to and from a bed to a chair (including a wheelchair)? 3   Need to walk in a hospital room? 3   Climbing 3-5 steps with a railing? 3   6 clicks Mobility Score 14   Activity Tolerance   Sitting in Chair at least 7 mins   Sitting Edge of Bed at least 4 mins   Standing ~5-6 mins    Comments general fatigue   Edema / Skin Assessment   Edema / Skin  Not Assessed   Education Group   Education Provided Role of Physical Therapist;Use of Assistive Device;Gait Training   Role of Physical Therapist Patient Response Patient;Acceptance;Explanation;Verbal Demonstration   Gait Training Patient Response Patient;Significant Other;Acceptance;Explanation;Teach Back;Verbal Demonstration;Action Demonstration   Use of Assistive Device Patient Response Patient;Significant Other;Acceptance;Explanation;Verbal Demonstration;Action Demonstration   Additional Comments education re: recs for dc planing considerations, strong recs for continued PT after dc to home as pt has noted potential for improvements with gait/balance post intervention given improvements at LLE

## 2021-10-14 NOTE — DISCHARGE INSTRUCTIONS
Discharge Instructions    Discharged to home by car with relative. Discharged via wheelchair, hospital escort: Yes.  Special equipment needed: Not Applicable    Be sure to schedule a follow-up appointment with your primary care doctor or any specialists as instructed.     Discharge Plan:   Influenza Vaccine Indication: Patient Refuses    I understand that a diet low in cholesterol, fat, and sodium is recommended for good health. Unless I have been given specific instructions below for another diet, I accept this instruction as my diet prescription.   Other diet: Regular    Special Instructions:   Keep incision clean and dry. No dressing required over incision.   OK to shower and pat dry.   Do not soak incision in bath, hot tub, etc.   Take over the counter stool softener daily with pain medication.   Do not lift anything over 10 pounds.   No Aspirin or anticoagulants until cleared by Neurosurgery.   No driving if taking narcotic medication.   Follow up with [facility] Neurosurgery in 2 weeks as scheduled,    Or call for an appointment [phone]    Home Health to follow        Ventriculoperitoneal Shunt Placement, Care After  This sheet gives you information about how to care for yourself after your procedure. Your health care provider may also give you more specific instructions. If you have problems or questions, contact your health care provider.  What can I expect after the procedure?  After the procedure, it is common to have some swelling and soreness:  · Around your scalp incision.  · Around your abdominal incision.  · In your neck and chest on the side of your shunt.  Follow these instructions at home:  Medicines  · Take over-the-counter and prescription medicines only as told by your health care provider.  · If you were prescribed an antibiotic medicine, take it as told by your health care provider. Do not stop taking the antibiotic even if you start to feel better.  Driving  · Do not drive or use heavy  machinery while taking prescription pain medicine.  · Do not drive for 24 hours if you were given a medicine to help you relax (sedative) during your procedure.  Bathing  · Do not take baths, swim, or use a hot tub until your health care provider approves. Ask your health care provider if you may take showers. You may only be allowed to take sponge baths.  Incision care    · Follow instructions from your health care provider about how to take care of your incisions. Make sure you:  ? Wash your hands with soap and water before you change your bandages (dressings). If soap and water are not available, use hand .  ? Change your dressings as told by your health care provider.  ? Leave stitches (sutures), skin glue, or adhesive strips in place. These skin closures may need to stay in place for 2 weeks or longer. If adhesive strip edges start to loosen and curl up, you may trim the loose edges. Do not remove adhesive strips completely unless your health care provider tells you to do that.  · Check your incision areas every day for signs of infection. Check for:  ? Redness, swelling, or pain.  ? Fluid or blood.  ? Warmth.  ? Pus or a bad smell.  Activity  · Do not lift anything that is heavier than 10 lb (4.5 kg), or the limit that you are told, until your health care provider says that it is safe.  · Rest and return to your normal activities as told by your health care provider. Ask your health care provider what activities are safe for you.  General instructions  · Before you have any type of procedure, tell all health care providers that you have a ventriculoperitoneal () shunt.  · Make sure you know what kind of  shunt you have. Some shunt settings cannot be changed after they have been set (nonprogrammable shunt). Others can be adjusted (programmable shunt) by your health care provider.  ? If you have a programmable shunt and you need an MRI, it is very important to talk to your surgeon before you have  the MRI. Many programmable shunts are sensitive to the magnets used during MRIs.  · If you are taking prescription pain medicine, take actions to prevent or treat constipation. Your health care provider may recommend that you:  ? Drink enough fluid to keep your urine pale yellow.  ? Eat foods that are high in fiber, such as fresh fruits and vegetables, whole grains, and beans.  ? Limit foods that are high in fat and processed sugars, such as fried or sweet foods.  ? Take an over-the-counter or prescription medicine for constipation.  · Follow instructions from your health care provider about any eating or drinking restrictions.  · Keep all follow-up visits as told by your health care provider. This is important.  Contact a health care provider if you:  · Have a poor appetite.  · Have low energy.  · Feel restless, confused, or irritable.  Get help right away if you have:  · A fever.  · Redness, more swelling, or pain around an incision.  · Fluid or blood coming from an incision.  · An incision that feels warm to the touch.  · Pus or a bad smell coming from an incision.  · An incision that opens up.  · Any signs or symptoms that your shunt is not working properly (malfunctioning). These include:  ? Headaches.  ? Nausea and vomiting.  ? Swelling along where the  shunt is.  ? Changes in your vision.  · No control over when you urinate (bladder incontinence).  · A seizure.  · Trouble walking.  Summary  · Follow all of your health care provider's recommendations for medicines, activity restrictions, and incision care.  · Rarely, serious side effects can occur. Contact your health care provider immediately if you develop a fever, signs of infection around an incision, headache, changes in vision, or if you have a seizure.  · Keep all follow-up visits as told by your health care provider. This is important.  This information is not intended to replace advice given to you by your health care provider. Make sure you discuss  any questions you have with your health care provider.  Document Released: 05/15/2012 Document Revised: 11/30/2018 Document Reviewed: 11/28/2018  [employer] Patient Education © 2020 [employer]    · Is patient discharged on Warfarin / Coumadin?   No     Depression / Suicide Risk    As you are discharged from this [facility] facility, it is important to learn how to keep safe from harming yourself.    Recognize the warning signs:  · Abrupt changes in personality, positive or negative- including increase in energy   · Giving away possessions  · Change in eating patterns- significant weight changes-  positive or negative  · Change in sleeping patterns- unable to sleep or sleeping all the time   · Unwillingness or inability to communicate  · Depression  · Unusual sadness, discouragement and loneliness  · Talk of wanting to die  · Neglect of personal appearance   · Rebelliousness- reckless behavior  · Withdrawal from people/activities they love  · Confusion- inability to concentrate     If you or a loved one observes any of these behaviors or has concerns about self-harm, here's what you can do:  · Talk about it- your feelings and reasons for harming yourself  · Remove any means that you might use to hurt yourself (examples: pills, rope, extension cords, firearm)  · Get professional help from the community (Mental Health, Substance Abuse, psychological counseling)  · Do not be alone:Call your Safe Contact- someone whom you trust who will be there for you.  · Call your local CRISIS HOTLINE 565-8863 or 540-404-4079  · Call your local Children's Mobile Crisis Response Team [place] (630) 044-9395 or www.Jamalon  · Call the toll free National Suicide Prevention Hotlines   · National Suicide Prevention Lifeline 793-835-UXQB (9617)  · National Hope Line Network 800-SUICIDE (020-8455)

## 2021-10-14 NOTE — DISCHARGE PLANNING
Anticipated Discharge Disposition: Home with home health and spouse.    Action: LSW met with patient and spouse, Ed, at bedside to complete assessment and to discuss discharge plan. Patient resides with spouse in a single story home and stated she dependent on spouse with ADLs and IADLs with a SPC, w/c and FWW available to her at home. Patient uses the [facility] off of [address]. Patient's spouse to assist with transportation home. Patient and spouse agreeable to sending HH referral to [facility] and spouse signed choice form. DPA tasked with sending out HH referral.    Barriers to Discharge: HH acceptance.    Plan: CM to continue to follow for discharge needs.    Care Transition Team Assessment    Information Source  Orientation Level: Oriented X4  Information Given By: Patient, Spouse  Informant's Name:  (Sheyla Garcia and Jasiel Garcia)  Who is responsible for making decisions for patient? : Patient    Readmission Evaluation  Is this a readmission?: No    Elopement Risk  Legal Hold: No  Ambulatory or Self Mobile in Wheelchair: No-Not an Elopement Risk  Elopement Risk: Not at Risk for Elopement    Interdisciplinary Discharge Planning  Does Admitting Nurse Feel This Could be a Complex Discharge?: No  Primary Care Physician:  (Zaira Swenson, 3 months.)  Lives with - Patient's Self Care Capacity: Spouse  Patient or legal guardian wants to designate a caregiver: No  Support Systems: Children, Spouse / Significant Other  Housing / Facility: 1 Story House (2 steps to enter.)  Do You Take your Prescribed Medications Regularly: Yes  Able to Return to Previous ADL's: Future Time w/Therapy  Mobility Issues: Yes  Prior Services: Home-Independent, Skilled Home Health Services, Continuous (24 Hour) Care Giving Per Service (Hx of [facility] and hx of [facility].)  Patient Prefers to be Discharged to::  (Home with home health and spouse.)  Assistance Needed: Yes  Durable Medical Equipment: Walker, Other - Specify (Has SPC,  FWW and w/c.)    Discharge Preparedness  What is your plan after discharge?: Home with help, Home health care  What are your discharge supports?: Spouse  Prior Functional Level: Independent with Activities of Daily Living  Difficulity with ADLs: Walking  Difficulity with IADLs: Cooking, Driving, Keeping track of finances, Laundry, Managing medication, Shopping    Functional Assesment  Prior Functional Level: Independent with Activities of Daily Living    Finances  Financial Barriers to Discharge: No  Prescription Coverage: Yes    Vision / Hearing Impairment  Vision Impairment : Yes  Right Eye Vision: Wears Glasses  Left Eye Vision: Wears Glasses  Hearing Impairment : No    Values / Beliefs / Concerns  Values / Beliefs Concerns : No    Advance Directive  Advance Directive?: None  Advance Directive offered?: AD Booklet given    Domestic Abuse  Have you ever been the victim of abuse or violence?: No  Physical Abuse or Sexual Abuse: No  Verbal Abuse or Emotional Abuse: No  Possible Abuse/Neglect Reported to:: Not Applicable    Psychological Assessment  History of Substance Abuse: None  History of Psychiatric Problems: No  Non-compliant with Treatment: No    Discharge Risks or Barriers  Discharge risks or barriers?: No    Anticipated Discharge Information  Discharge Disposition: D/T to home under A care in anticipation of covered skilled care (06)  Discharge Address:  ([address] [place] [address])  Discharge Contact Phone Number:  ([phone])

## 2021-10-14 NOTE — THERAPY
Occupational Therapy   Initial Evaluation     Patient Name: Sheyla Garcia  Age:  70 y.o., Sex:  female  Medical Record #: [id number]  Today's Date: 10/14/2021     Precautions  Precautions: Fall Risk  Comments: s/p  shunt    Assessment  Patient is 70 y.o. female s/p  shunt placement. Pt w/ baseline cog and balance deficits. Pt edu on compensatory strategies during ADLs as well as appropriate AE/DME to maximize independence and safety. Questionable new learning and carryover.  reports he has been caregiving for her PRN. Appears to be near baseline.      Plan    Patient will not be actively followed for occupational therapy services at this time, however may be seen if requested by physician for 1 more visit within 30 days to address any discharge or equipment needs.       DC Equipment Recommendations: None  Discharge Recommendations: Recommend home health for continued occupational therapy services     Subjective    Pleasant and cooperative     Objective       10/14/21 1044   Prior Living Situation   Prior Services Continuous (24 Hour) Care Giving Family   Housing / Facility 1 Story House   Bathroom Set up Shower Chair   Equipment Owned Front-Wheel Walker;Wheelchair;Tub / Shower Seat;Reacher;Single Point Cane   Lives with - Patient's Self Care Capacity Spouse   Comments Spouse present, reports he has been assisting with ADLs but she used to be more independent. Wanting OT jurgen to see if she is able to increase independence   Prior Level of ADL Function   Self Feeding Independent   Grooming / Hygiene Independent   Bathing Requires Assist   Dressing Requires Assist   Toileting Requires Assist  (wears briefs)   Prior Level of IADL Function   Prior Level Of Mobility Uses Wheel Chair for Community Mobility;Uses Wheel Chair for in Home Mobility;Supervision With Device in Home   Comments  does IADLs   Precautions   Precautions Fall Risk   Comments s/p  shunt   Pain 0 - 10 Group   Therapist Pain Assessment  Nurse Notified;0   Cognition    Cognition / Consciousness X   Level of Consciousness Alert   Safety Awareness Impaired   New Learning Impaired   Comments appears to be at baseline cog, impaired new learning and carryover   Active ROM Upper Body   Comments WFL   Strength Upper Body   Comments WFL   Balance Assessment   Sitting Balance (Static) Fair +   Sitting Balance (Dynamic) Fair   Standing Balance (Static) Fair -   Standing Balance (Dynamic) Poor +   Weight Shift Sitting Fair   Weight Shift Standing Fair   Comments w/ FWW   Bed Mobility    Supine to Sit Supervised   Scooting Supervised   ADL Assessment   Upper Body Dressing Supervision   Lower Body Dressing Minimal Assist  (don underwear, pants, don/doff socks w/ AE)   Comments pt trained and educated on use of AE for LB dressing   How much help from another person does the patient currently need...   Putting on and taking off regular lower body clothing? 3   Bathing (including washing, rinsing, and drying)? 3   Toileting, which includes using a toilet, bedpan, or urinal? 3   Putting on and taking off regular upper body clothing? 3   Taking care of personal grooming such as brushing teeth? 3   Eating meals? 3   6 Clicks Daily Activity Score 18   Functional Mobility   Sit to Stand Supervised   Bed, Chair, Wheelchair Transfer Minimal Assist  (CGA)   Transfer Method Stand Step   Mobility EOB>chair   Comments w/ FWW

## 2021-10-14 NOTE — PROGRESS NOTES
Neurosurgery Progress Note    Subjective:  Vomitting yesterday evening  Feeling better this am, no more n/v  C/o incisional pain   at bedside    Exam:  AAOx3, fluent speech    4mm PERRL, conjugate   Incision CDI   Eating   Drinking     BP  Min: 102/64  Max: 194/96  Pulse  Av.2  Min: 63  Max: 88  Resp  Av.4  Min: 12  Max: 21  Temp  Av.3 °C (97.4 °F)  Min: 35.8 °C (96.5 °F)  Max: 36.8 °C (98.2 °F)  SpO2  Av.4 %  Min: 91 %  Max: 100 %    No data recorded    Recent Labs     10/14/21  0425   WBC 13.3*   RBC 4.38   HEMOGLOBIN 13.3   HEMATOCRIT 41.2   MCV 94.1   MCH 30.4   MCHC 32.3*   RDW 47.0   PLATELETCT 209   MPV 10.6     Recent Labs     10/14/21  0425   SODIUM 141   POTASSIUM 3.7   CHLORIDE 106   CO2 25   GLUCOSE 106*   BUN 9   CREATININE 0.65   CALCIUM 8.8               Intake/Output                       10/13/21 0700 - 10/14/21 0659 10/14/21 0700 - 10/15/21 0659     [id number] 4150-7934 Total 1314-9663 2092-0441 Total                 Intake    I.V.  2500  -- 2500  --  -- --    Volume (mL) (lactated ringers infusion) 2500 -- 2500 -- -- --    IV Piggyback  100  -- 100  --  -- --    Volume (mL) (ceFAZolin in dextrose (ANCEF) IVPB premix 2 g) 100 -- 100 -- -- --    Total Intake 2600 -- 2600 -- -- --       Output    Urine  --  250 250  --  -- --    Number of Times Voided 2 x -- 2 x -- -- --    Urine Void (mL) -- 250 250 -- -- --    Emesis  --  -- --  --  -- --    Emesis - Number of Times 1 x 1 x 2 x -- -- --    Blood  50  -- 50  --  -- --    Est. Blood Loss 50 -- 50 -- -- --    Total Output 50 250 300 -- -- --       Net I/O     2550 -250 2300 -- -- --            Intake/Output Summary (Last 24 hours) at 10/14/2021 0923  Last data filed at 10/14/2021 0600  Gross per 24 hour   Intake 1850 ml   Output 275 ml   Net 1575 ml            • DULoxetine  30 mg QHS   • famotidine  20 mg DAILY   • lisinopril  10 mg DAILY   • methocarbamol  750 mg TID PRN   • risperiDONE  0.5 mg BID   • topiramate  75 mg QHS    • Pharmacy Consult Request  1 Each PHARMACY TO DOSE   • MD ALERT...DO NOT ADMINISTER NSAIDS or ASPIRIN unless ORDERED By Neurosurgery  1 Each PRN   • docusate sodium  100 mg BID   • senna-docusate  1 Tablet Nightly   • senna-docusate  1 Tablet Q24HRS PRN   • polyethylene glycol/lytes  1 Packet BID PRN   • magnesium hydroxide  30 mL QDAY PRN   • bisacodyl  10 mg Q24HRS PRN   • sodium phosphate  1 Each Once PRN   • acetaminophen  1,000 mg Q6HRS    Followed by   • [START ON 10/18/2021] acetaminophen  1,000 mg Q6HRS PRN   • oxyCODONE immediate-release  5 mg Q3HRS PRN    Or   • oxyCODONE immediate-release  10 mg Q3HRS PRN    Or   • morphine injection  4 mg Q3HRS PRN   • diphenhydrAMINE  25 mg Q6HRS PRN    Or   • diphenhydrAMINE  25 mg Q6HRS PRN   • benzocaine-menthol  1 Lozenge Q2HRS PRN   • calcium carbonate  500 mg BID   • traZODone  150 mg Nightly   • estradiol  1 mg QHS   • ondansetron  4 mg Q4HRS PRN       Assessment and Plan:  Hospital day #2  POD #1  Prophylactic anticoagulation: NO          Start date/time: TBD       Plan:  Stable neuro   PT/OT eval, Patient's baseline is using FWW/wheelchair with assistance from  at home   Okay to DC home  Scripts sent [facility]   Keep incision clean, dry, intact     Keep incision clean and dry. No dressing required over incision.   OK to shower and pat dry.   Do not soak incision in bath, hot tub, etc.   Take over the counter stool softener daily with pain medication.   Do not lift anything over 10 pounds. No repetitive bending, lifting, twisting, movements.   No Aspirin or anticoagulants until cleared by Neurosurgery.   No driving if taking narcotic medication.   Follow up with [facility] Neurosurgery in 2 weeks as scheduled,       Or call for an appointment [phone]

## 2021-10-14 NOTE — DISCHARGE PLANNING
Agency/Facility Name: [facility]   Spoke To: Mattie  Outcome: Pt accepted. Doctor added skilled nursing as requested.      LSW informed

## 2021-10-14 NOTE — FACE TO FACE
Face to Face Supporting Documentation - Home Health    The encounter with this patient was in whole or in part the primary reason for home health admission.    Date of encounter:   Patient:                    MRN:                       YOB: 2019  Sheyla Garcia  [id number]  1950     Home health to see patient for:  Home health aide    Skilled need for:  Surgical Aftercare with mobility     Skilled nursing interventions to include:  Wound Care    Homebound status evidenced by:  Needs the assistance of another person in order to leave the home. Leaving home requires a considerable and taxing effort. There is a normal inability to leave the home.    Community Physician to provide follow up care: ALANNA Childers     Optional Interventions? No      I certify the face to face encounter for this home health care referral meets the CMS requirements and the encounter/clinical assessment with the patient was, in whole, or in part, for the medical condition(s) listed above, which is the primary reason for home health care. Based on my clinical findings: the service(s) are medically necessary, support the need for home health care, and the homebound criteria are met.  I certify that this patient has had a face to face encounter by myself.  Damaris Araujo A.P.R.N. - NPI: [id number]

## 2021-10-14 NOTE — PROGRESS NOTES
0655 Patinet's in bed. Bedside report received from LEONCIO Walker RN at the beginning of the shift..     0725 Patient's sitting up in bed. Educated patient on the importance/use of IS at least 10x every hour while awake, able to reach 1500. Assessment completed. No distress noted. Plan of care reviewed with the patient.  Verbalized understanding.    0748 Medicate with Oxycodone (see MAR) for c/o's headache, rates pain 6/10.    0915  Patient's sitting up in bed, having Breakfast. COLLIN Ocampo visited. POC discussed with the patient and spouse.    0950 CHAPARRO Villar worked with the patient.    1015 PADDY Jimenez worked with the patient.    1030 Placed a call to COLLIN Ocampo, awaiting for response.    1105 Received a call from COLLIN Oneill. New order received from the said APN that it's OK for the patient to be discharged even if patient hasn't been accepted by Home Health.      1200  Discharge instructions given to the patient and spouse. Questions answered. Verbalized understanding. Patient was discharged with e prescriptions, belongings via w/c accompanied by one female CNA and spouse via Private car. Home Health to follow.

## 2021-10-15 NOTE — OP REPORT
OPERATIVE NOTE    10/13/21      PRE-OPERATIVE DIAGNOSIS - Hydrocephalus    POST-OPERATIVE DIAGNOSIS - Same    PROCEDURE PERFORMED -      Right frontal ventriculoperitoneal shunt placement with STEALTH intraoperative guidance, laparoscopic peritoneal catheter placement (Strata 2, performance level 1.0, [employer])    SURGEON - Daniel Link M.D.     Co-Surgeon - Shady Glynn MD     ASSISTANT - None    TYPE OF ANESTHESIA -  General     ANESTHESIOLOGIST  - Anesthesiologist: Barber Shelton M.D.     SPECIMENS - None    Indication: 70-year-old female with progressive ambulatory difficulty memory issues.  She underwent high-volume lumbar puncture after imaging demonstrated significant ventriculomegaly.  Her ambulatory status improved significantly, she had minimal improvement in her memory.  Given this,  shunt placement is planned.       PROCEDURE IN DETAIL -     Patient was taken to the operating suite placed in the supine position.  The patient's abdomen was prepped and draped in sterile fashion along with the chest and the patient's scalp.  Dr. Glynn then proceeded to perform his portion of procedure and I infiltrated half percent Marcaine with epinephrine into the left subcostal region.  Small incision was made and a varies needle was inserted into the peritoneal cavity.  CO2 was insufflated to a pressure of 15 mmHg and a 5 mm trocar was inserted.  Camera was inserted.  Dr. Glynn then tunneled the ventriculoperitoneal shunt to subcutaneously along the right aspect of the patient's body onto the abdominal cavity.  A second incision was made in the mid abdomen.  Using a breast each instrument the catheter was inserted into the peritoneal cavity and was positioned in the abdomen.  The tip of the catheter was visualized and cerebrospinal fluid was noted to be dripping through the end of the catheter demonstrating patency of the shunt.  Once this was confirmed the ports were retrieved.  The skin was  reapproximated using 4-0 Monocryl suture.  Benzoin Steri-Strips sterile dressings were applied.      _______________________  Daniel Link M.D.

## 2021-10-18 ENCOUNTER — TELEPHONE (OUTPATIENT)
Dept: MEDICAL GROUP | Facility: LAB | Age: 71
End: 2021-10-18

## 2021-10-18 RX ORDER — METHOCARBAMOL 750 MG/1
TABLET, FILM COATED ORAL
Qty: 120 TABLET | Refills: 0 | Status: SHIPPED | OUTPATIENT
Start: 2021-10-18 | End: 2022-01-24

## 2021-10-18 NOTE — TELEPHONE ENCOUNTER
Received request via: Pharmacy    Was the patient seen in the last year in this department? Yes  5/5/21  Does the patient have an active prescription (recently filled or refills available) for medication(s) requested? No

## 2021-10-18 NOTE — TELEPHONE ENCOUNTER
Shannon called and has started her on Home health and PT. She did state that her O2 was running between 87-90 on room air. She will have her practice her exercises to bring it home and notify us if not improving.

## 2021-10-20 NOTE — H&P
"Surgery Neurosurgery History & Physical Note    Date  10/13/2021    Primary Care Physician  ALANNA Childers    CC  NPH    HPI  This is a 70 y.o. female with NPH.   shunt placement is planned.    Past Medical History:   Diagnosis Date   • Asthma 08/05/2021    Inhaler use daily and as needed.  \"Allergy induced\".   • Bowel habit changes     diarrhea   • Breath shortness 08/05/2021    \"Sometimes hard time catching my breath, usually from allergies\".   • Emphysema of lung (HCC) 08/05/2021    6-7 years ago, oxygen for about 4-5 months.  No longer needs O2.   • Fibromyalgia    • Heart burn    • Hypertension    • Hypothyroidism 2008   • Indigestion    • Migraine    • Obesity    • Pneumonia 2012   • Psychiatric problem     Anxiety   • Renal disorder 08/05/2021    Kidney stones - 20 years ago.   • Seizure ([facility]) 2017    \"Caused from gabapentin\"   • Urinary incontinence        Past Surgical History:   Procedure Laterality Date   • THORACIC FUSION O-ARM Right 10/13/2021    Procedure: INSERTION, SHUNT, VENTRICULOPERITONEAL, USING FRAMELESS STEREOTAXY - FRONTAL;  Surgeon: Shady Glynn M.D.;  Location: [facility];  Service: Neurosurgery   • CATH PLACEMENT CAPD N/A 10/13/2021    Procedure: INSERTION, CATHETER, CAPD;  Surgeon: Shady Glynn M.D.;  Location: [facility];  Service: Neurosurgery   • KNEE ORIF  1/3/08    tibial plateau fracture   • BLADDER SLING FEMALE     • GYN SURGERY      Hysterectomy   • NASAL SEPTAL RECONSTRUCTION      age 26   • OTHER ORTHOPEDIC SURGERY      Bilateral tibial fracture   • OTHER SURGICAL PROCEDURE      SI joint fusion - Dr. Pineda 1/2019   • TONSILLECTOMY         No current facility-administered medications for this encounter.     Current Outpatient Medications   Medication Sig Dispense Refill   • Famotidine (PEPCID PO) Take 1 Tablet by mouth every day.     • ibuprofen (MOTRIN) 200 MG Tab Take 600 mg by mouth every 8 hours as needed for Mild Pain.     • " Pseudoeph-Doxylamine-DM-APAP (NYQUIL PO) Take 30 mL by mouth as needed (Cold and flu symptoms).     • docusate sodium (COLACE) 100 MG Cap Take 100 mg by mouth 1 time a day as needed for Constipation.     • methocarbamol (ROBAXIN) 750 MG Tab TAKE ONE TABLET BY MOUTH THREE TIMES DAILY AS NEEDED (MUSCLE SPASMS) 120 Tablet 0   • oxyCODONE-acetaminophen (PERCOCET) 5-325 MG Tab Take 1 tablet by mouth 2 times a day as needed for Severe Pain.     • traZODone (DESYREL) 50 MG Tab Take 150 mg by mouth every evening.     • risperiDONE (RISPERDAL) 0.5 MG Tab Take 0.5 mg by mouth 2 times a day.     • DULoxetine (CYMBALTA) 30 MG Cap DR Particles Take 30 mg by mouth at bedtime.     • topiramate (TOPAMAX) 25 MG Tab Take 75 mg by mouth at bedtime.     • Vitamin E 400 UNIT Tab Take 1 tablet by mouth every day.     • estradiol (ESTRACE) 1 MG Tab TAKE 1 TABLET BY MOUTH EVERY DAY 90 tablet 4   • albuterol (PROVENTIL) 2.5mg/3ml Nebu Soln solution for nebulization Take 3 mL by nebulization every four hours as needed for Shortness of Breath. 75 mL 3   • albuterol (PROAIR HFA) 108 (90 Base) MCG/ACT Aero Soln inhalation aerosol Inhale 2 Puffs every 6 hours as needed for Shortness of Breath. 8.5 g 3   • lisinopril (PRINIVIL) 10 MG Tab Take 1 tablet by mouth every day. 100 tablet 2   • lidocaine (LIDODERM) 5 % Patch Apply 3 Patches to skin as directed every 24 hours. 90 Patch 1   • therapeutic multivitamin-minerals (THERAGRAN-M) Tab Take 1 Tab by mouth every day.     • DULERA 200-5 MCG/ACT Aerosol USE 2 INHALATIONS TWICE A DAY 39 g 3   • B Complex-C-Folic Acid (SUPER B-COMPLEX/VIT C/FA PO) Take 1 Tab by mouth every day.     • BETA CAROTENE PO Take 1 Dose by mouth every day. Unknown OTC Strength.         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:  , realtor   Other Topics Concern   • Not on file   Social History Narrative   • Not on file     Social Determinants of Health     Financial Resource Strain:    • Difficulty of Paying Living Expenses:    Food Insecurity:    • Worried About Running Out of Food in the Last Year:    • Ran Out of Food in the Last Year:    Transportation Needs:    • Lack of Transportation (Medical):    • Lack of Transportation (Non-Medical):    Physical Activity:    • Days of Exercise per Week:    • Minutes of Exercise per Session:    Stress:    • Feeling of Stress :    Social Connections:    • Frequency of Communication with Friends and Family:    • Frequency of Social Gatherings with Friends and Family:    • Attends Hinduism Services:    • Active Member of Clubs or Organizations:    • Attends Club or Organization Meetings:    • Marital Status:    Intimate Partner Violence:    • Fear of Current or Ex-Partner:    • Emotionally Abused:    • Physically Abused:    • Sexually Abused:        Family History   Problem Relation Age of Onset   • Heart Disease Mother         chf   • Cancer Father         lung, berrylium exposure   • Stroke Father         tia's   • Cancer Maternal Grandfather         lung   • Diabetes Neg Hx        Allergies  Gabapentin    Review of Systems  Per H&P    Physical Exam    PHYSICAL EXAMINATION:    Awake, alert   Speech fluent appropriate  In no apparent distress  Affect, mood appropriate  Oriented x 3  Pupils 3 mm midline, reactive.  Conjugate gaze.  Visual fields full to confrontation  Face symmetric  Tongue midline without fasciculation  Facial sensation intact light  touch  Hearing intact to conversation, light finger rub bilaterally  Motor:  Bilateral SCM, shoulder shrug, deltoid, bicep, tricep, , wrist extension, hand intrinsics                IP, thigh adduction, thigh abduction, quadriceps, hamstrings, dorsiflexion,                Plantar flexion, foot inversion, foot eversion, EHL 5/5 no pronator drift  Sensation:  Intact touch face, neck, torso, four extremities  Reflex:  No Reyes's no clonus  Finger-nose-finger, SHAILESH unremarkable      Vital Signs  Blood Pressure : 106/63   Temperature: 36.7 °C (98.1 °F)   Pulse: 72   Respiration: 16   Pulse Oximetry: 93 %       Labs:                    Radiology:  [facility] W/O   Final Result      1.  Ventriculomegaly is again noted.      2.  No intracranial hemorrhage identified. No brain swelling or edema is appreciated.      3.  Atrophy is again likely present.            Assessment/Plan:  70-year-old female with NPH  shunt planned

## 2021-10-21 ENCOUNTER — TELEPHONE (OUTPATIENT)
Dept: MEDICAL GROUP | Facility: LAB | Age: 71
End: 2021-10-21

## 2021-10-21 NOTE — TELEPHONE ENCOUNTER
"· paperwork received from [facility] requiring provider signature.     · All appropriate fields completed by Medical Assistant: N/A CMA printed and distributed to MA    · Paperwork placed in \"MA to Provider\" folder/basket. Awaiting provider completion/signature.  "

## 2021-10-25 ENCOUNTER — TELEPHONE (OUTPATIENT)
Dept: MEDICAL GROUP | Facility: LAB | Age: 71
End: 2021-10-25

## 2021-10-25 NOTE — TELEPHONE ENCOUNTER
"· Home Health paperwork received from [facility] requiring provider signature.     · All appropriate fields completed by Medical Assistant: N/A CMA printed and distributed to MA    · Paperwork placed in \"MA to Provider\" folder/basket. Awaiting provider completion/signature.  "

## 2021-11-02 ENCOUNTER — TELEPHONE (OUTPATIENT)
Dept: MEDICAL GROUP | Facility: LAB | Age: 71
End: 2021-11-02

## 2021-11-02 NOTE — TELEPHONE ENCOUNTER
"· Home Health paperwork received from [facility] requiring provider signature.     · All appropriate fields completed by Medical Assistant: N/A CMA printed and distributed to MA    · Paperwork placed in \"MA to Provider\" folder/basket. Awaiting provider completion/signature.  "

## 2021-11-03 ENCOUNTER — TELEMEDICINE (OUTPATIENT)
Dept: MEDICAL GROUP | Facility: LAB | Age: 71
End: 2021-11-03
Payer: MEDICARE

## 2021-11-03 VITALS — BODY MASS INDEX: 35.79 KG/M2 | WEIGHT: 250 LBS | HEIGHT: 70 IN

## 2021-11-03 DIAGNOSIS — G93.89 CEREBRAL VENTRICULOMEGALY: ICD-10-CM

## 2021-11-03 DIAGNOSIS — G89.29 CHRONIC BILATERAL LOW BACK PAIN WITH LEFT-SIDED SCIATICA: ICD-10-CM

## 2021-11-03 DIAGNOSIS — M25.552 BILATERAL HIP PAIN: ICD-10-CM

## 2021-11-03 DIAGNOSIS — Z79.891 CHRONIC USE OF OPIATE DRUG FOR THERAPEUTIC PURPOSE: ICD-10-CM

## 2021-11-03 DIAGNOSIS — M54.42 CHRONIC BILATERAL LOW BACK PAIN WITH LEFT-SIDED SCIATICA: ICD-10-CM

## 2021-11-03 DIAGNOSIS — G89.29 CHRONIC LEFT-SIDED LOW BACK PAIN WITH LEFT-SIDED SCIATICA: ICD-10-CM

## 2021-11-03 DIAGNOSIS — M79.7 FIBROMYALGIA: ICD-10-CM

## 2021-11-03 DIAGNOSIS — M54.42 CHRONIC LEFT-SIDED LOW BACK PAIN WITH LEFT-SIDED SCIATICA: ICD-10-CM

## 2021-11-03 DIAGNOSIS — Z98.2 S/P VENTRICULOPERITONEAL SHUNT: ICD-10-CM

## 2021-11-03 DIAGNOSIS — M25.551 BILATERAL HIP PAIN: ICD-10-CM

## 2021-11-03 PROCEDURE — 99214 OFFICE O/P EST MOD 30 MIN: CPT | Mod: 95 | Performed by: NURSE PRACTITIONER

## 2021-11-03 RX ORDER — OXYCODONE HYDROCHLORIDE AND ACETAMINOPHEN 5; 325 MG/1; MG/1
1 TABLET ORAL EVERY 8 HOURS PRN
Qty: 30 TABLET | Refills: 0 | Status: SHIPPED | OUTPATIENT
Start: 2021-11-03 | End: 2021-11-23 | Stop reason: SDUPTHER

## 2021-11-03 RX ORDER — LIDOCAINE 50 MG/G
1 PATCH TOPICAL EVERY 24 HOURS
Qty: 30 PATCH | Refills: 5 | Status: SHIPPED | OUTPATIENT
Start: 2021-11-03 | End: 2021-12-13 | Stop reason: SDUPTHER

## 2021-11-03 ASSESSMENT — FIBROSIS 4 INDEX: FIB4 SCORE: 0.98

## 2021-11-03 NOTE — PROGRESS NOTES
Virtual Visit: Established Patient   This visit was conducted via Zoom using secure and encrypted videoconferencing technology.   The patient was in a private location in the state of [place].    The patient's identity was confirmed and verbal consent was obtained for this virtual visit.    Subjective:   CC:   F/u    HPI:  Sheyla Garcia is a 70 y.o. female presenting for evaluation and management of chronic pain and to follow-up on recent neurosurgery.    #1-ventriculomegaly: Found via MRI after she was hallucinating and having memory loss as well as severe headaches.  Recently went through ventriculoperitoneal shut placement with Dr. Glynn and this went fantastic.  Overall feeling almost back to her normal self.  Memory and migraines have dramatically improved.  Denies HA.  Back to working at the home.  Sleep is wonderful and she is no longer sleep talking- 150 mg trazodone.  Appetite is coming back but was lower than normal after surgery.    Denies SOB or chest pain.      #2-chronic low back pain:   This has been an issue for her for many years.  She has seen numerous physiatrist and neurosurgeons and never feels that she can really get any efficient help.  She prefers to refrain from taking medication for this but is in severe pain frequently and resorts to oxycodone when she has to.  She also does well with lidocaine patches and is requesting a refill.  Wants to have percocet on hand but has stopped taking them regularly.  Last rx was #28 percocet through [facility] with Dr. Glynn - does not have any left over.   Has chronic pain in hip and low back.  Taking methocarbamol prn for low back spasms / pain.   Taking 2-3 ibuprofen per day as needed.    Takes colace prn with oxycodone.    ROS   Negative except for HPI    Current medicines (including changes today)  Current Outpatient Medications   Medication Sig Dispense Refill   • methocarbamol (ROBAXIN) 750 MG Tab TAKE ONE TABLET BY MOUTH THREE TIMES DAILY  AS NEEDED (MUSCLE SPASMS) 120 Tablet 0   • Famotidine (PEPCID PO) Take 1 Tablet by mouth every day.     • ibuprofen (MOTRIN) 200 MG Tab Take 600 mg by mouth every 8 hours as needed for Mild Pain.     • Pseudoeph-Doxylamine-DM-APAP (NYQUIL PO) Take 30 mL by mouth as needed (Cold and flu symptoms).     • docusate sodium (COLACE) 100 MG Cap Take 100 mg by mouth 1 time a day as needed for Constipation.     • oxyCODONE-acetaminophen (PERCOCET) 5-325 MG Tab Take 1 tablet by mouth 2 times a day as needed for Severe Pain.     • traZODone (DESYREL) 50 MG Tab Take 150 mg by mouth every evening.     • risperiDONE (RISPERDAL) 0.5 MG Tab Take 0.5 mg by mouth 2 times a day.     • DULoxetine (CYMBALTA) 30 MG Cap DR Particles Take 30 mg by mouth at bedtime.     • topiramate (TOPAMAX) 25 MG Tab Take 75 mg by mouth at bedtime.     • Vitamin E 400 UNIT Tab Take 1 tablet by mouth every day.     • estradiol (ESTRACE) 1 MG Tab TAKE 1 TABLET BY MOUTH EVERY DAY 90 tablet 4   • albuterol (PROVENTIL) 2.5mg/3ml Nebu Soln solution for nebulization Take 3 mL by nebulization every four hours as needed for Shortness of Breath. 75 mL 3   • albuterol (PROAIR HFA) 108 (90 Base) MCG/ACT Aero Soln inhalation aerosol Inhale 2 Puffs every 6 hours as needed for Shortness of Breath. 8.5 g 3   • lisinopril (PRINIVIL) 10 MG Tab Take 1 tablet by mouth every day. 100 tablet 2   • lidocaine (LIDODERM) 5 % Patch Apply 3 Patches to skin as directed every 24 hours. 90 Patch 1   • therapeutic multivitamin-minerals (THERAGRAN-M) Tab Take 1 Tab by mouth every day.     • DULERA 200-5 MCG/ACT Aerosol USE 2 INHALATIONS TWICE A DAY 39 g 3   • B Complex-C-Folic Acid (SUPER B-COMPLEX/VIT C/FA PO) Take 1 Tab by mouth every day.     • BETA CAROTENE PO Take 1 Dose by mouth every day. Unknown OTC Strength.       No current facility-administered medications for this visit.       Patient Active Problem List    Diagnosis Date Noted   • Fibromyalgia 06/02/2020   • Closed left  ankle fracture 11/23/2019   • Debility 11/23/2019   • Moderate persistent asthma without complication 02/15/2018   • Morbid obesity with BMI of 40.0-44.9, adult (HCC) 01/03/2018   • Chronic use of opiate drug for therapeutic purpose 03/07/2017   • KIRA (obstructive sleep apnea) 12/06/2014   • Scoliosis 12/06/2014   • Essential hypertension    • Obesity    • Hypothyroidism         Objective:     Physical Exam:  Gen: NAD  Resp: nonlabored.  Able to speak in full sentences  Psy: pleasant / cooperative.   Neuro:  Alert and oriented x 3    Assessment and Plan:   The following treatment plan was discussed:     1. Chronic bilateral low back pain with left-sided sciatica  lidocaine (LIDODERM) 5 % Patch    oxyCODONE-acetaminophen (PERCOCET) 5-325 MG Tab   2. Bilateral hip pain  lidocaine (LIDODERM) 5 % Patch    oxyCODONE-acetaminophen (PERCOCET) 5-325 MG Tab   3. Chronic left-sided low back pain with left-sided sciatica     4. Fibromyalgia     5. Chronic use of opiate drug for therapeutic purpose     6. Cerebral ventriculomegaly     7. S/P ventriculoperitoneal shunt - Dr. Glynn 10/2021       Overall has had dramatic improvement in her mental and physical health.  Fortunately migraines have resolved and she has no longer hallucinating.  Her  is still controlling all medications and is identical part of her health.  I did refill her oxycodone for number 30 pills that she states that she may need up to 1/day on bad days in which her chronic low back pain flares up on her.  Also refilled lidocaine patches which help with her fibromyalgia, chronic pelvic, back and hip pain.  Her  told me that he stopped her duloxetine a few days ago but plans on restarting it and I encouraged this as it does help with fibromyalgia and chronic pain.    In prescribing controlled substances to this patient, I certify that I have obtained and reviewed the medical history of Sheyla Garcia. I have also made a good marcy effort to  obtain applicable records from other providers who have treated the patient and records did not demonstrate any increased risk of substance abuse that would prevent me from prescribing controlled substances.     I have conducted a physical exam and documented it. I have reviewed Ms. Garcia’s prescription history as maintained by the [place] Prescription Monitoring Program.     I have assessed the patient’s risk for abuse, dependency, and addiction using the validated Opioid Risk Tool available at https://www.[employer].com/fdpwsu-qchr-hznn-ort-narcotic-abuse.     Given the above, I believe the benefits of controlled substance therapy outweigh the risks. The reasons for prescribing controlled substances include non-narcotic, oral analgesic alternatives have been inadequate for pain control. Accordingly, I have discussed the risk and benefits, treatment plan, and alternative therapies with the patient.     Will request millenium from 3/2021.  csa done 3/2021.     F/u prn need for med refills.

## 2021-11-04 ENCOUNTER — TELEPHONE (OUTPATIENT)
Dept: MEDICAL GROUP | Facility: LAB | Age: 71
End: 2021-11-04

## 2021-11-05 NOTE — TELEPHONE ENCOUNTER
DOCUMENTATION OF PAR STATUS:    1. Name of Medication & Dose:  lidocaine (LIDODERM) 5 % Patch    2. Name of Prescription Coverage Company & phone #: cover my meds/ [id number]    3. Date Prior Auth Submitted: 11/4/21    4. What information was given to obtain insurance decision? Ov notes faxed    5. Prior Auth Status? denied    6. Patient Notified: yes

## 2021-11-09 ENCOUNTER — TELEPHONE (OUTPATIENT)
Dept: MEDICAL GROUP | Facility: LAB | Age: 71
End: 2021-11-09

## 2021-11-09 NOTE — TELEPHONE ENCOUNTER
If she is asking if she can go out of her house once a week for 6 weeks, sounds fine to me.  Not quite sure the specifics of this question in terms of what is pertaining to-will you ask if she is asking about dangers with Covid or something else?

## 2021-11-09 NOTE — TELEPHONE ENCOUNTER
This is a call from Rita to get the OK to do Occupational Health once a week for 6 weeks. Ok'd via phone to Rita

## 2021-11-09 NOTE — TELEPHONE ENCOUNTER
Received call stating if it was okay for patient to go out once a week for 6 weeks .   1. Caller Name: [facility]                         Call Back Number:[phone]      How would the patient prefer to be contacted with a response: Phone call OK to leave a detailed message

## 2021-11-17 ENCOUNTER — TELEPHONE (OUTPATIENT)
Dept: MEDICAL GROUP | Facility: LAB | Age: 71
End: 2021-11-17

## 2021-11-17 NOTE — TELEPHONE ENCOUNTER
"· Home Health paperwork received from [facility] requiring provider signature.     · All appropriate fields completed by Medical Assistant: N/A CMA printed and distributed to MA    · Paperwork placed in \"MA to Provider\" folder/basket. Awaiting provider completion/signature.  "

## 2021-11-23 DIAGNOSIS — M25.552 BILATERAL HIP PAIN: ICD-10-CM

## 2021-11-23 DIAGNOSIS — M25.551 BILATERAL HIP PAIN: ICD-10-CM

## 2021-11-23 DIAGNOSIS — G89.29 CHRONIC BILATERAL LOW BACK PAIN WITH LEFT-SIDED SCIATICA: ICD-10-CM

## 2021-11-23 DIAGNOSIS — M54.42 CHRONIC BILATERAL LOW BACK PAIN WITH LEFT-SIDED SCIATICA: ICD-10-CM

## 2021-11-23 RX ORDER — OXYCODONE HYDROCHLORIDE AND ACETAMINOPHEN 5; 325 MG/1; MG/1
1 TABLET ORAL 2 TIMES DAILY PRN
Qty: 60 TABLET | Refills: 0 | Status: SHIPPED | OUTPATIENT
Start: 2021-11-23 | End: 2021-12-27 | Stop reason: SDUPTHER

## 2021-11-30 ENCOUNTER — TELEPHONE (OUTPATIENT)
Dept: MEDICAL GROUP | Facility: LAB | Age: 71
End: 2021-11-30

## 2021-11-30 NOTE — TELEPHONE ENCOUNTER
Received a VM today from Beatriz Blake -[phone]  [facility] states that she cancelled her appt today. Her current PT will be up on the 13th and she states that she will be discharging her. She states that she has low activity tolerance and not getting a lot done. She says that at her current level her  can work with her. She did believe that the  will be giving pushback about this and wanted to give you the heads up.

## 2021-12-13 ENCOUNTER — PATIENT MESSAGE (OUTPATIENT)
Dept: MEDICAL GROUP | Facility: LAB | Age: 71
End: 2021-12-13

## 2021-12-13 DIAGNOSIS — M25.552 BILATERAL HIP PAIN: ICD-10-CM

## 2021-12-13 DIAGNOSIS — G89.29 CHRONIC BILATERAL LOW BACK PAIN WITH LEFT-SIDED SCIATICA: ICD-10-CM

## 2021-12-13 DIAGNOSIS — M25.551 BILATERAL HIP PAIN: ICD-10-CM

## 2021-12-13 DIAGNOSIS — M54.42 CHRONIC BILATERAL LOW BACK PAIN WITH LEFT-SIDED SCIATICA: ICD-10-CM

## 2021-12-13 RX ORDER — LIDOCAINE 50 MG/G
1 PATCH TOPICAL EVERY 24 HOURS
Qty: 30 PATCH | Refills: 5 | Status: SHIPPED | OUTPATIENT
Start: 2021-12-13 | End: 2023-03-13

## 2021-12-21 ENCOUNTER — TELEPHONE (OUTPATIENT)
Dept: MEDICAL GROUP | Facility: LAB | Age: 71
End: 2021-12-21

## 2021-12-21 NOTE — TELEPHONE ENCOUNTER
"· Home Health paperwork received from [facility] requiring provider signature.     · All appropriate fields completed by Medical Assistant: N/A CMA printed and distributed to MA    · Paperwork placed in \"MA to Provider\" folder/basket. Awaiting provider completion/signature.  "

## 2021-12-27 DIAGNOSIS — G89.29 CHRONIC BILATERAL LOW BACK PAIN WITH LEFT-SIDED SCIATICA: ICD-10-CM

## 2021-12-27 DIAGNOSIS — M25.551 BILATERAL HIP PAIN: ICD-10-CM

## 2021-12-27 DIAGNOSIS — M54.42 CHRONIC BILATERAL LOW BACK PAIN WITH LEFT-SIDED SCIATICA: ICD-10-CM

## 2021-12-27 DIAGNOSIS — M25.552 BILATERAL HIP PAIN: ICD-10-CM

## 2021-12-28 RX ORDER — OXYCODONE HYDROCHLORIDE AND ACETAMINOPHEN 5; 325 MG/1; MG/1
1 TABLET ORAL 2 TIMES DAILY PRN
Qty: 60 TABLET | Refills: 0 | Status: SHIPPED | OUTPATIENT
Start: 2021-12-28 | End: 2022-01-27

## 2021-12-28 NOTE — TELEPHONE ENCOUNTER
Received request via: Pharmacy    Was the patient seen in the last year in this department? Yes  11/3/21  Does the patient have an active prescription (recently filled or refills available) for medication(s) requested? No

## 2022-01-10 RX ORDER — ESTRADIOL 1 MG/1
1 TABLET ORAL
Qty: 90 TABLET | Refills: 4 | Status: ON HOLD | OUTPATIENT
Start: 2022-01-10 | End: 2023-03-22

## 2022-01-10 NOTE — TELEPHONE ENCOUNTER
Received request via: Patient    Was the patient seen in the last year in this department? Yes  11/3/21  Does the patient have an active prescription (recently filled or refills available) for medication(s) requested? No

## 2022-01-10 NOTE — TELEPHONE ENCOUNTER
----- Message from Sheyla Garcia sent at 1/9/2022  9:34 PM PST -----  Regarding: Estradiol Refill  Looking for a refill for my Estadiol sent over to [facility]. It is currently going to [facility] and they charge way too much.

## 2022-01-24 RX ORDER — METHOCARBAMOL 750 MG/1
TABLET, FILM COATED ORAL
Qty: 120 TABLET | Refills: 0 | Status: SHIPPED
Start: 2022-01-24 | End: 2022-12-02

## 2022-01-24 NOTE — TELEPHONE ENCOUNTER
Received request via: Pharmacy  11/3/2021lov  Was the patient seen in the last year in this department? Yes    Does the patient have an active prescription (recently filled or refills available) for medication(s) requested? No

## 2022-01-27 ENCOUNTER — PATIENT MESSAGE (OUTPATIENT)
Dept: MEDICAL GROUP | Facility: LAB | Age: 72
End: 2022-01-27

## 2022-01-27 RX ORDER — SULFAMETHOXAZOLE AND TRIMETHOPRIM 800; 160 MG/1; MG/1
1 TABLET ORAL 2 TIMES DAILY
Qty: 6 TABLET | Refills: 0 | Status: SHIPPED
Start: 2022-01-27 | End: 2022-08-22

## 2022-02-03 DIAGNOSIS — J45.20 MILD INTERMITTENT ASTHMA, UNSPECIFIED WHETHER COMPLICATED: ICD-10-CM

## 2022-02-03 RX ORDER — PREDNISONE 10 MG/1
TABLET ORAL
Qty: 14 TABLET | Refills: 0 | Status: SHIPPED
Start: 2022-02-03 | End: 2022-08-22

## 2022-02-06 DIAGNOSIS — J45.41 ASTHMA IN ADULT, MODERATE PERSISTENT, WITH ACUTE EXACERBATION: ICD-10-CM

## 2022-02-07 RX ORDER — ALBUTEROL SULFATE 2.5 MG/3ML
2.5 SOLUTION RESPIRATORY (INHALATION) EVERY 4 HOURS PRN
Qty: 75 ML | Refills: 3 | Status: SHIPPED | OUTPATIENT
Start: 2022-02-07 | End: 2023-03-13

## 2022-02-16 ENCOUNTER — TELEPHONE (OUTPATIENT)
Dept: MEDICAL GROUP | Facility: LAB | Age: 72
End: 2022-02-16
Payer: MEDICARE

## 2022-02-16 NOTE — TELEPHONE ENCOUNTER
"· Home Health paperwork received from [facility] requiring provider signature.     · All appropriate fields completed by Medical Assistant: N/A CMA printed and distributed to MA    · Paperwork placed in \"MA to Provider\" folder/basket. Awaiting provider completion/signature.  "

## 2022-03-14 ENCOUNTER — TELEPHONE (OUTPATIENT)
Dept: MEDICAL GROUP | Facility: LAB | Age: 72
End: 2022-03-14
Payer: MEDICARE

## 2022-03-14 NOTE — TELEPHONE ENCOUNTER
1. Caller Name: Carleen [facility]                        Call Back Number: [phone]      How would the patient prefer to be contacted with a response: Phone call OK to leave a detailed message    Pt had vertigo during physical therapy today.  Sheyla told her that her ear feels like it has fluid in it.  She is requesting a med for an ear infection.

## 2022-03-15 NOTE — TELEPHONE ENCOUNTER
Let her know that it is most likely not infected, that there is inner ear fluid from allergies or fighting off a cold but only 100% way to know would be coming for exam or going to urgent care.  Instead of antibiotics, she should use two sprays of flonase in each nostril daily for a week.  thanks

## 2022-03-15 NOTE — TELEPHONE ENCOUNTER
Pt is feeling much better.  She had already started a decongestant and nose spray.  Her  wanted you to know that she gets diarrhea pretty bad.  She has been battling this for a couple of months, it'll get almost solid then right back to diarrhea.  I offered an appt, they want to wait until after an upcoming sleep study.

## 2022-03-23 ENCOUNTER — TELEPHONE (OUTPATIENT)
Dept: MEDICAL GROUP | Facility: LAB | Age: 72
End: 2022-03-23
Payer: MEDICARE

## 2022-03-23 DIAGNOSIS — R19.7 DIARRHEA, UNSPECIFIED TYPE: ICD-10-CM

## 2022-03-23 NOTE — TELEPHONE ENCOUNTER
----- Message from Sheyla Garcia sent at 3/23/2022 12:20 PM PDT -----  Regarding: diarrhea  I am trying to get Sheyla setup with a GI Doctor, can you please send in a referral as the last GI doctor she had shows up as retired. I just spent an hour on hold and then was disconnected? I was calling [facility] and I want to make sure Medicare covers so they may need it to go out as a referral.    Thanks,  Jasiel Garcia

## 2022-03-27 ENCOUNTER — HOSPITAL ENCOUNTER (EMERGENCY)
Facility: MEDICAL CENTER | Age: 72
End: 2022-03-27
Attending: EMERGENCY MEDICINE
Payer: MEDICARE

## 2022-03-27 VITALS
HEART RATE: 69 BPM | OXYGEN SATURATION: 91 % | TEMPERATURE: 97.1 F | SYSTOLIC BLOOD PRESSURE: 177 MMHG | HEIGHT: 70 IN | DIASTOLIC BLOOD PRESSURE: 84 MMHG | RESPIRATION RATE: 19 BRPM | BODY MASS INDEX: 37.94 KG/M2 | WEIGHT: 265 LBS

## 2022-03-27 DIAGNOSIS — R19.7 DIARRHEA, UNSPECIFIED TYPE: ICD-10-CM

## 2022-03-27 LAB
ALBUMIN SERPL BCP-MCNC: 4.4 G/DL (ref 3.2–4.9)
ALBUMIN/GLOB SERPL: 1.6 G/DL
ALP SERPL-CCNC: 70 U/L (ref 30–99)
ALT SERPL-CCNC: 9 U/L (ref 2–50)
ANION GAP SERPL CALC-SCNC: 13 MMOL/L (ref 7–16)
AST SERPL-CCNC: 19 U/L (ref 12–45)
BASOPHILS # BLD AUTO: 0.4 % (ref 0–1.8)
BASOPHILS # BLD: 0.03 K/UL (ref 0–0.12)
BILIRUB SERPL-MCNC: 0.8 MG/DL (ref 0.1–1.5)
BUN SERPL-MCNC: 8 MG/DL (ref 8–22)
CALCIUM SERPL-MCNC: 9.2 MG/DL (ref 8.5–10.5)
CHLORIDE SERPL-SCNC: 106 MMOL/L (ref 96–112)
CO2 SERPL-SCNC: 22 MMOL/L (ref 20–33)
CREAT SERPL-MCNC: 0.49 MG/DL (ref 0.5–1.4)
EOSINOPHIL # BLD AUTO: 0.42 K/UL (ref 0–0.51)
EOSINOPHIL NFR BLD: 5.1 % (ref 0–6.9)
ERYTHROCYTE [DISTWIDTH] IN BLOOD BY AUTOMATED COUNT: 46.9 FL (ref 35.9–50)
GFR SERPLBLD CREATININE-BSD FMLA CKD-EPI: 102 ML/MIN/1.73 M 2
GLOBULIN SER CALC-MCNC: 2.7 G/DL (ref 1.9–3.5)
GLUCOSE SERPL-MCNC: 111 MG/DL (ref 65–99)
HCT VFR BLD AUTO: 47.5 % (ref 37–47)
HGB BLD-MCNC: 15.5 G/DL (ref 12–16)
IMM GRANULOCYTES # BLD AUTO: 0.03 K/UL (ref 0–0.11)
IMM GRANULOCYTES NFR BLD AUTO: 0.4 % (ref 0–0.9)
LIPASE SERPL-CCNC: 19 U/L (ref 11–82)
LYMPHOCYTES # BLD AUTO: 2.3 K/UL (ref 1–4.8)
LYMPHOCYTES NFR BLD: 27.7 % (ref 22–41)
MCH RBC QN AUTO: 30.3 PG (ref 27–33)
MCHC RBC AUTO-ENTMCNC: 32.6 G/DL (ref 33.6–35)
MCV RBC AUTO: 92.8 FL (ref 81.4–97.8)
MONOCYTES # BLD AUTO: 0.68 K/UL (ref 0–0.85)
MONOCYTES NFR BLD AUTO: 8.2 % (ref 0–13.4)
NEUTROPHILS # BLD AUTO: 4.84 K/UL (ref 2–7.15)
NEUTROPHILS NFR BLD: 58.2 % (ref 44–72)
NRBC # BLD AUTO: 0 K/UL
NRBC BLD-RTO: 0 /100 WBC
PLATELET # BLD AUTO: 246 K/UL (ref 164–446)
PMV BLD AUTO: 10 FL (ref 9–12.9)
POTASSIUM SERPL-SCNC: 3.5 MMOL/L (ref 3.6–5.5)
PROT SERPL-MCNC: 7.1 G/DL (ref 6–8.2)
RBC # BLD AUTO: 5.12 M/UL (ref 4.2–5.4)
SODIUM SERPL-SCNC: 141 MMOL/L (ref 135–145)
WBC # BLD AUTO: 8.3 K/UL (ref 4.8–10.8)

## 2022-03-27 PROCEDURE — 83690 ASSAY OF LIPASE: CPT

## 2022-03-27 PROCEDURE — 85025 COMPLETE CBC W/AUTO DIFF WBC: CPT

## 2022-03-27 PROCEDURE — 99284 EMERGENCY DEPT VISIT MOD MDM: CPT

## 2022-03-27 PROCEDURE — 36415 COLL VENOUS BLD VENIPUNCTURE: CPT

## 2022-03-27 PROCEDURE — 80053 COMPREHEN METABOLIC PANEL: CPT

## 2022-03-27 RX ORDER — DIPHENOXYLATE HYDROCHLORIDE AND ATROPINE SULFATE 2.5; .025 MG/1; MG/1
1 TABLET ORAL 4 TIMES DAILY PRN
Qty: 20 TABLET | Refills: 0 | Status: SHIPPED | OUTPATIENT
Start: 2022-03-27 | End: 2022-04-01

## 2022-03-27 ASSESSMENT — FIBROSIS 4 INDEX: FIB4 SCORE: 0.99

## 2022-03-27 NOTE — DISCHARGE INSTRUCTIONS
Dr. Saldana from [facility] will arrange an office follow-up appointment for you this week.  If you have not heard from the office by noon on Monday call and confirm your follow-up appointment.

## 2022-03-27 NOTE — ED PROVIDER NOTES
"ED Provider Note    CHIEF COMPLAINT  Chief Complaint   Patient presents with   • Diarrhea     Pt states she has been having persistent diarrhea for 4 weeks. Pt states symptoms are not relieved by imodium.        HPI  Sheyla Garcia is a 71 y.o. female who presents to the emergency department brought in by her  with a complaint of diarrhea.  The patient has a long history of bowel problems including alternating constipation and diarrhea.  For at least the last 4 weeks she has been having multiple episodes of watery nonbloody diarrhea every day.  She has not been constipated over the last couple of months.  She has tried Imodium and Pepto-Bismol which has not been helpful at all.  The patient has communicated with her primary care doctor and a referral was sent for GI follow-up but the  has not received any instructions and says he attempted to call [facility] but was unable to get through and schedule an appointment.  The patient was seen at [facility] in 2019 and had colonoscopy and was told that she had some polyps.  She does not recognize any precipitating events or exacerbating or alleviating factors there is been no recent antibiotics or traveling.  Apparently a visiting nurse has come to the patient's home and collected stool studies and her  says these were sent to the laboratory but not sure which lab or which studies were ordered.  In the triage area the patient's oxygen saturation was noted to be borderline at 88% and the patient and her  tell me that she is always between 88 and 92% on room air and that is her baseline they have seen their doctor about this but have declined further interventions at this time.    REVIEW OF SYSTEMS no fever or chills no black or bloody stool or emesis.    PAST MEDICAL HISTORY  Past Medical History:   Diagnosis Date   • Asthma 08/05/2021    Inhaler use daily and as needed.  \"Allergy induced\".   • Bowel habit changes     diarrhea   • " "Breath shortness 08/05/2021    \"Sometimes hard time catching my breath, usually from allergies\".   • Emphysema of lung (HCC) 08/05/2021    6-7 years ago, oxygen for about 4-5 months.  No longer needs O2.   • Fibromyalgia    • Heart burn    • Hypertension    • Hypothyroidism 2008   • Indigestion    • Migraine    • Obesity    • Pneumonia 2012   • Psychiatric problem     Anxiety   • Renal disorder 08/05/2021    Kidney stones - 20 years ago.   • Seizure (HCC) 2017    \"Caused from gabapentin\"   • Urinary incontinence        FAMILY HISTORY  Family History   Problem Relation Age of Onset   • Heart Disease Mother         chf   • Cancer Father         lung, berrylium exposure   • Stroke Father         tia's   • Cancer Maternal Grandfather         lung   • Diabetes Neg Hx        SOCIAL HISTORY  Social History     Socioeconomic History   • Marital status:    Tobacco Use   • Smoking status: Former Smoker     Years: 2.00     Types: Cigarettes   • Smokeless tobacco: Never Used   • Tobacco comment: Age [age]    Vaping Use   • Vaping Use: Never used   Substance and Sexual Activity   • Alcohol use: Yes     Comment: Wine - daily   • Drug use: Never       SURGICAL HISTORY  Past Surgical History:   Procedure Laterality Date   • THORACIC FUSION O-ARM Right 10/13/2021    Procedure: INSERTION, SHUNT, VENTRICULOPERITONEAL, USING FRAMELESS STEREOTAXY - FRONTAL;  Surgeon: Shady Glynn M.D.;  Location: SURGERY [facility];  Service: Neurosurgery   • CATH PLACEMENT CAPD N/A 10/13/2021    Procedure: INSERTION, CATHETER, CAPD;  Surgeon: Shady Glynn M.D.;  Location: SURGERY [facility];  Service: Neurosurgery   • ORIF, KNEE  1/3/08    tibial plateau fracture   • BLADDER SLING FEMALE     • GYN SURGERY      Hysterectomy   • NASAL SEPTAL RECONSTRUCTION      age 26   • OTHER ORTHOPEDIC SURGERY      Bilateral tibial fracture   • OTHER SURGICAL PROCEDURE      SI joint fusion - Dr. Pineda 1/2019   • TONSILLECTOMY         CURRENT " "MEDICATIONS  Home Medications     Reviewed by Bernice Castano R.N. (Registered Nurse) on 03/27/22 at 1414  Med List Status: <None>   Medication Last Dose Status   albuterol (PROAIR HFA) 108 (90 Base) MCG/ACT Aero Soln inhalation aerosol  Active   albuterol (PROVENTIL) 2.5mg/3ml Nebu Soln solution for nebulization  Active   B Complex-C-Folic Acid (SUPER B-COMPLEX/VIT C/FA PO)  Active   BETA CAROTENE PO  Active   docusate sodium (COLACE) 100 MG Cap  Active   DULERA 200-5 MCG/ACT Aerosol  Active   DULoxetine (CYMBALTA) 30 MG Cap DR Particles  Active   estradiol (ESTRACE) 1 MG Tab  Active   Famotidine (PEPCID PO)  Active   ibuprofen (MOTRIN) 200 MG Tab  Active   lidocaine (LIDODERM) 5 % Patch  Active   lisinopril (PRINIVIL) 10 MG Tab  Active   methocarbamol (ROBAXIN) 750 MG Tab  Active   predniSONE (DELTASONE) 10 MG Tab  Active   Pseudoeph-Doxylamine-DM-APAP (NYQUIL PO)  Active   sulfamethoxazole-trimethoprim (BACTRIM DS) 800-160 MG tablet  Active   therapeutic multivitamin-minerals (THERAGRAN-M) Tab  Active   topiramate (TOPAMAX) 25 MG Tab  Active   traZODone (DESYREL) 50 MG Tab  Active   Vitamin E 400 UNIT Tab  Active                ALLERGIES  Allergies   Allergen Reactions   • Gabapentin Unspecified     seizure       PHYSICAL EXAM  VITAL SIGNS: BP (!) 183/93   Pulse 80   Temp 37 °C (98.6 °F) (Temporal)   Resp 14   Ht 1.778 m (5' 10\")   Wt 120 kg (265 lb)   SpO2 93% Comment: 88 RA  BMI 38.02 kg/m²    Oxygen saturation is interpreted as borderline hypoxia  Constitutional: Awake verbal she does not appear toxic or distressed  HENT: Mucous membranes are moist  Eyes: No erythema discharge or jaundice  Neck: Trachea midline no JVD  Cardiovascular: Regular rate and rhythm  Lungs: Clear and equal bilaterally with no apparent difficulty breathing  Abdomen/Back: Soft nondistended nontender no rebound guarding or peritoneal findings bowel sounds are present  Skin: Warm and dry  Musculoskeletal: No leg " edema  Neurologic: Awake verbal moving her extremities    CHART REVIEW  Chart review shows that there are multitude of telephone notes over the last several days with her primary care doctor and on March 24 the notes indicate the patient will be referred to gastroenterology.  In addition on May 3 of 2021 TSH and free T4 were measured and they were normal at that time.  I do not see any stool studies underway in the [facility] laboratory system.    Laboratory  Laboratory testing was done per protocol in the triage area I have reviewed these results and the CBC shows a normal white blood cell count of 8.3 hemoglobin is adequate at 15.5 basic metabolic panel is unremarkable LFTs and lipase are normal    MEDICAL DECISION MAKING and DISPOSITION  In the emergency department the patient generally appears nontoxic and it appears that this has been going on for quite some time.  I reviewed the case with Dr. Saldana who is on-call for digestive health today and he says that the next intervention would be office follow-up and colonoscopy and he will arrange for office follow-up this week for this patient.  Since Imodium and Pepto-Bismol have failed to relieve her symptoms Dr. Saldana recommends Lomotil and I have written a prescription for 5-day course.  At this point in time I think it is safe for the patient to go home and continue her evaluation on an outpatient basis I have advised her to call the digestive health office if she has not heard about her follow-up appointment by noon on Monday and she should inquire about the timing of her follow-up appointment.    IMPRESSION  1.  Chronic diarrhea of unknown etiology      Electronically signed by: Matthew Bender M.D., 3/27/2022 3:49 PM

## 2022-03-27 NOTE — ED TRIAGE NOTES
"Chief Complaint   Patient presents with   • Diarrhea     Pt states she has been having persistent diarrhea for 4 weeks. Pt states symptoms are not relieved by imodium.        Wheelchair to triage for above complaint.   Educated on triage process, encourage to inform staff of any changes.     BP (!) 183/93   Pulse 80   Temp 37 °C (98.6 °F) (Temporal)   Resp 14   Ht 1.778 m (5' 10\")   Wt 120 kg (265 lb)   SpO2 93% Comment: 88 RA  BMI 38.02 kg/m²     "

## 2022-03-27 NOTE — ED NOTES
Discharge instructions and follow up care discussed with patient. Patient given time to ask questions and verbalized understanding. Patient given 1 new prescription. Patient AOx4 at discharge and escorted to lobby in WC by .

## 2022-03-31 ENCOUNTER — TELEPHONE (OUTPATIENT)
Dept: MEDICAL GROUP | Facility: LAB | Age: 72
End: 2022-03-31
Payer: MEDICARE

## 2022-03-31 NOTE — TELEPHONE ENCOUNTER
"· Home Health paperwork received from [facility] requiring provider signature.     · All appropriate fields completed by Medical Assistant: N/A CMA printed and distributed to MA    · Paperwork placed in \"MA to Provider\" folder/basket. Awaiting provider completion/signature.  "

## 2022-06-14 ENCOUNTER — TELEPHONE (OUTPATIENT)
Dept: MEDICAL GROUP | Facility: LAB | Age: 72
End: 2022-06-14
Payer: MEDICARE

## 2022-06-14 RX ORDER — OXYCODONE HYDROCHLORIDE AND ACETAMINOPHEN 5; 325 MG/1; MG/1
TABLET ORAL
COMMUNITY
Start: 2022-05-10 | End: 2022-08-22

## 2022-06-14 RX ORDER — DICYCLOMINE HCL 20 MG
TABLET ORAL
COMMUNITY
Start: 2022-04-26 | End: 2022-12-02

## 2022-06-14 RX ORDER — MONTELUKAST SODIUM 4 MG/1
1 TABLET, CHEWABLE ORAL 2 TIMES DAILY
COMMUNITY
Start: 2022-05-31 | End: 2022-12-02

## 2022-06-14 NOTE — TELEPHONE ENCOUNTER
ANNUAL WELLNESS VISIT PRE-VISIT PLANNING    1.  Reviewed notes from the last office visit: Yes    2.  If any orders were ordered or intended to be done prior to visit (i.e. 6 mos follow-up), do we have results/consult notes or has patient scheduled?        •  Labs - Labs were not ordered at last office visit.  Note: If patient appointment is for lab review and patient did not complete labs, check with provider if OK to reschedule patient until labs completed.       •  Imaging - Imaging was not ordered at last office visit.       •  Referrals - Referral ordered, patient was seen and consult notes are in chart. Care Teams updated  YES.    3.  Immunizations were updated in Epic using Reconcile Outside Information activity? Yes    4.  Patient is due for the following Health Maintenance Topics:   Health Maintenance Due   Topic Date Due   • HEPATITIS C SCREENING  Never done   • IMM DTaP/Tdap/Td Vaccine (1 - Tdap) Never done   • IMM ZOSTER VACCINES (1 of 2) Never done   • MAMMOGRAM  02/13/2015   • BONE DENSITY  Never done   • IMM PNEUMOCOCCAL VACCINE: 65+ Years (3 - PPSV23 or PCV20) 12/12/2019   • URINE DRUG SCREEN  09/12/2020   • Annual Wellness Visit  09/18/2020   • COVID-19 Vaccine (3 - Booster for Moderna series) 09/17/2021   5.  Reviewed/Updated the following with patient:       •   Preferred Pharmacy? Yes       •   Preferred Lab? Yes       •   Preferred Communication? Yes       •   Allergies? Yes       •   Medications? YES. Was Abstract Encounter opened and chart updated? YES     6.  Care Team Updated:       •   DME Company (gait device, O2, CPAP, etc.): N\A       •   Other Specialists (eye doctor, derm, GYN, cardiology, endo, etc): YES    7.  Patient was advised: “This is a free wellness visit. The provider will screen for medical conditions to help you stay healthy. If you have other concerns to address you may be asked to discuss these at a separate visit or there may be an additional fee.”     8.  AHA (Puls8)  form printed for Provider? N/A

## 2022-06-16 ENCOUNTER — TELEPHONE (OUTPATIENT)
Dept: MEDICAL GROUP | Facility: LAB | Age: 72
End: 2022-06-16

## 2022-06-16 ENCOUNTER — OFFICE VISIT (OUTPATIENT)
Dept: MEDICAL GROUP | Facility: LAB | Age: 72
End: 2022-06-16
Payer: MEDICARE

## 2022-06-16 VITALS
RESPIRATION RATE: 20 BRPM | WEIGHT: 250 LBS | DIASTOLIC BLOOD PRESSURE: 70 MMHG | TEMPERATURE: 98 F | OXYGEN SATURATION: 92 % | HEIGHT: 70 IN | BODY MASS INDEX: 35.79 KG/M2 | SYSTOLIC BLOOD PRESSURE: 120 MMHG | HEART RATE: 80 BPM

## 2022-06-16 DIAGNOSIS — J45.41 ASTHMA IN ADULT, MODERATE PERSISTENT, WITH ACUTE EXACERBATION: ICD-10-CM

## 2022-06-16 PROCEDURE — 99213 OFFICE O/P EST LOW 20 MIN: CPT | Performed by: NURSE PRACTITIONER

## 2022-06-16 RX ORDER — PREDNISONE 20 MG/1
TABLET ORAL
Qty: 20 TABLET | Refills: 1 | Status: SHIPPED
Start: 2022-06-16 | End: 2022-06-16

## 2022-06-16 RX ORDER — PREDNISONE 20 MG/1
TABLET ORAL
Qty: 20 TABLET | Refills: 1 | Status: SHIPPED | OUTPATIENT
Start: 2022-06-16 | End: 2022-08-22

## 2022-06-16 SDOH — ECONOMIC STABILITY: INCOME INSECURITY: IN THE LAST 12 MONTHS, WAS THERE A TIME WHEN YOU WERE NOT ABLE TO PAY THE MORTGAGE OR RENT ON TIME?: NO

## 2022-06-16 SDOH — ECONOMIC STABILITY: FOOD INSECURITY: WITHIN THE PAST 12 MONTHS, THE FOOD YOU BOUGHT JUST DIDN'T LAST AND YOU DIDN'T HAVE MONEY TO GET MORE.: PATIENT DECLINED

## 2022-06-16 SDOH — ECONOMIC STABILITY: HOUSING INSECURITY
IN THE LAST 12 MONTHS, WAS THERE A TIME WHEN YOU DID NOT HAVE A STEADY PLACE TO SLEEP OR SLEPT IN A SHELTER (INCLUDING NOW)?: NO

## 2022-06-16 SDOH — ECONOMIC STABILITY: FOOD INSECURITY: WITHIN THE PAST 12 MONTHS, YOU WORRIED THAT YOUR FOOD WOULD RUN OUT BEFORE YOU GOT MONEY TO BUY MORE.: SOMETIMES TRUE

## 2022-06-16 SDOH — ECONOMIC STABILITY: TRANSPORTATION INSECURITY
IN THE PAST 12 MONTHS, HAS LACK OF TRANSPORTATION KEPT YOU FROM MEETINGS, WORK, OR FROM GETTING THINGS NEEDED FOR DAILY LIVING?: NO

## 2022-06-16 SDOH — ECONOMIC STABILITY: HOUSING INSECURITY: IN THE LAST 12 MONTHS, HOW MANY PLACES HAVE YOU LIVED?: 1

## 2022-06-16 SDOH — HEALTH STABILITY: PHYSICAL HEALTH: ON AVERAGE, HOW MANY DAYS PER WEEK DO YOU ENGAGE IN MODERATE TO STRENUOUS EXERCISE (LIKE A BRISK WALK)?: 3 DAYS

## 2022-06-16 SDOH — HEALTH STABILITY: MENTAL HEALTH
STRESS IS WHEN SOMEONE FEELS TENSE, NERVOUS, ANXIOUS, OR CAN'T SLEEP AT NIGHT BECAUSE THEIR MIND IS TROUBLED. HOW STRESSED ARE YOU?: TO SOME EXTENT

## 2022-06-16 SDOH — ECONOMIC STABILITY: INCOME INSECURITY: HOW HARD IS IT FOR YOU TO PAY FOR THE VERY BASICS LIKE FOOD, HOUSING, MEDICAL CARE, AND HEATING?: SOMEWHAT HARD

## 2022-06-16 SDOH — HEALTH STABILITY: PHYSICAL HEALTH: ON AVERAGE, HOW MANY MINUTES DO YOU ENGAGE IN EXERCISE AT THIS LEVEL?: 10 MIN

## 2022-06-16 SDOH — ECONOMIC STABILITY: TRANSPORTATION INSECURITY
IN THE PAST 12 MONTHS, HAS THE LACK OF TRANSPORTATION KEPT YOU FROM MEDICAL APPOINTMENTS OR FROM GETTING MEDICATIONS?: NO

## 2022-06-16 SDOH — ECONOMIC STABILITY: TRANSPORTATION INSECURITY
IN THE PAST 12 MONTHS, HAS LACK OF RELIABLE TRANSPORTATION KEPT YOU FROM MEDICAL APPOINTMENTS, MEETINGS, WORK OR FROM GETTING THINGS NEEDED FOR DAILY LIVING?: NO

## 2022-06-16 ASSESSMENT — LIFESTYLE VARIABLES
AUDIT-C TOTAL SCORE: -1
HOW MANY STANDARD DRINKS CONTAINING ALCOHOL DO YOU HAVE ON A TYPICAL DAY: PATIENT DECLINED
HOW OFTEN DO YOU HAVE SIX OR MORE DRINKS ON ONE OCCASION: PATIENT DECLINED
HOW OFTEN DO YOU HAVE A DRINK CONTAINING ALCOHOL: PATIENT DECLINED
SKIP TO QUESTIONS 9-10: 0

## 2022-06-16 ASSESSMENT — SOCIAL DETERMINANTS OF HEALTH (SDOH)
IN A TYPICAL WEEK, HOW MANY TIMES DO YOU TALK ON THE PHONE WITH FAMILY, FRIENDS, OR NEIGHBORS?: TWICE A WEEK
WITHIN THE PAST 12 MONTHS, YOU WORRIED THAT YOUR FOOD WOULD RUN OUT BEFORE YOU GOT THE MONEY TO BUY MORE: SOMETIMES TRUE
HOW OFTEN DO YOU ATTENT MEETINGS OF THE CLUB OR ORGANIZATION YOU BELONG TO?: NEVER
IN A TYPICAL WEEK, HOW MANY TIMES DO YOU TALK ON THE PHONE WITH FAMILY, FRIENDS, OR NEIGHBORS?: TWICE A WEEK
HOW OFTEN DO YOU HAVE SIX OR MORE DRINKS ON ONE OCCASION: PATIENT DECLINED
HOW OFTEN DO YOU GET TOGETHER WITH FRIENDS OR RELATIVES?: ONCE A WEEK
HOW HARD IS IT FOR YOU TO PAY FOR THE VERY BASICS LIKE FOOD, HOUSING, MEDICAL CARE, AND HEATING?: SOMEWHAT HARD
HOW OFTEN DO YOU ATTEND CHURCH OR RELIGIOUS SERVICES?: NEVER
HOW MANY DRINKS CONTAINING ALCOHOL DO YOU HAVE ON A TYPICAL DAY WHEN YOU ARE DRINKING: PATIENT DECLINED
DO YOU BELONG TO ANY CLUBS OR ORGANIZATIONS SUCH AS CHURCH GROUPS UNIONS, FRATERNAL OR ATHLETIC GROUPS, OR SCHOOL GROUPS?: NO
HOW OFTEN DO YOU HAVE A DRINK CONTAINING ALCOHOL: PATIENT DECLINED
DO YOU BELONG TO ANY CLUBS OR ORGANIZATIONS SUCH AS CHURCH GROUPS UNIONS, FRATERNAL OR ATHLETIC GROUPS, OR SCHOOL GROUPS?: NO
HOW OFTEN DO YOU ATTEND CHURCH OR RELIGIOUS SERVICES?: NEVER
HOW OFTEN DO YOU ATTENT MEETINGS OF THE CLUB OR ORGANIZATION YOU BELONG TO?: NEVER
HOW OFTEN DO YOU GET TOGETHER WITH FRIENDS OR RELATIVES?: ONCE A WEEK

## 2022-06-16 ASSESSMENT — PATIENT HEALTH QUESTIONNAIRE - PHQ9: CLINICAL INTERPRETATION OF PHQ2 SCORE: 0

## 2022-06-16 ASSESSMENT — FIBROSIS 4 INDEX: FIB4 SCORE: 1.78

## 2022-06-16 NOTE — PROGRESS NOTES
"CC  f/u      HPI:  71-year-old established female here with complaint of wheezing, cough and shortness of breath.  Tells me that she has a chronic history of asthma induced allergies and when allergies flareup she gets wheezy/produces a lot of mucus and cannot breathe well.  She would like prednisone which is always helpful for her.  Has Dulera and albuterol at home which she states she only uses as needed.  Has not smoked since she was a teenager.  Complaining of excessive phlegm production:  Choking her.  Propping self up to sleep.  Taking benadryl at night    Exam:  /70 (BP Location: Left arm, Patient Position: Sitting, BP Cuff Size: Large adult)   Pulse 80   Temp 36.7 °C (98 °F)   Resp 20   Ht 1.778 m (5' 10\")   Wt 113 kg (250 lb)   SpO2 92%   Gen. no distress.  Vital signs are within normal limits although SPO2 is 92%.  Skin dry, particularly to her feet.  Neck trachea is midline  Lungs expiratory wheezing and rhonchi throughout.  She is able to speak in complete sentences and does not appear to be in any distress sitting on the exam table.  Heart regular rate and rhythm  Neuro gait is very slowed and dependent on a cane.  Psych appropriate, calm, interactive, well-groomed    A/P:  1. Asthma in adult, moderate persistent, with acute exacerbation  predniSONE (DELTASONE) 20 MG Tab    DISCONTINUED: predniSONE (DELTASONE) 20 MG Tab    DISCONTINUED: Mometasone Furo-Formoterol Fum (DULERA) 200-5 MCG/ACT Aerosol   She was placed on a prednisone taper which she feels familiar with in terms of side effects and how to take this.  Educated regarding the need to use Dulera every day, twice daily and rinse her mouth after use and then albuterol only as needed which she certainly needs at this point and we discussed using 2 puffs every 4-6 hours as needed for wheezing or shortness of breath.  Discussed ER precautions with worsening symptoms such as increased work of breathing, fevers greater than 102 or overall " respiratory distress.  She was able to walk out of our office without any respiratory distress and her  is driving her.

## 2022-06-17 NOTE — TELEPHONE ENCOUNTER
DOCUMENTATION OF PAR STATUS:    1. Name of Medication & Dose: Dulera 200-5 mcg/act aerosol     2. Name of Prescription Coverage Company & phone #: [employer] Electronic PA Form    3. Date Prior Auth Submitted: 6/16/22    4. What information was given to obtain insurance decision?   Has patient tried and failed the following covered formulary alternatives:  Advair Diskus -no  Advair HFA -np  Breo Ellipta -no  Symbicort -no  Trelegy Ellipta -no    5. Prior Auth Status? Pending    6. Patient Notified: no

## 2022-06-20 ENCOUNTER — TELEPHONE (OUTPATIENT)
Dept: MEDICAL GROUP | Facility: LAB | Age: 72
End: 2022-06-20
Payer: MEDICARE

## 2022-06-20 DIAGNOSIS — J45.41 ASTHMA IN ADULT, MODERATE PERSISTENT, WITH ACUTE EXACERBATION: ICD-10-CM

## 2022-06-20 NOTE — TELEPHONE ENCOUNTER
Prior auth for Dulera was denied.  Can patient do the formulary meds  Below    Has patient tried and failed the following covered formulary alternatives:  Advair Diskus -no  Advair HFA -np  Breo Ellipta -no  Symbicort -no  Trelegy Ellipta -no

## 2022-06-21 RX ORDER — ALBUTEROL SULFATE 90 UG/1
2 AEROSOL, METERED RESPIRATORY (INHALATION) EVERY 6 HOURS PRN
Qty: 8.5 G | Refills: 3 | Status: SHIPPED | OUTPATIENT
Start: 2022-06-21 | End: 2022-10-12

## 2022-06-21 RX ORDER — LISINOPRIL 10 MG/1
10 TABLET ORAL DAILY
Qty: 100 TABLET | Refills: 2 | Status: ON HOLD | OUTPATIENT
Start: 2022-06-21 | End: 2023-08-22

## 2022-06-21 NOTE — TELEPHONE ENCOUNTER
Received request via: Pharmacy    Was the patient seen in the last year in this department? Yes  6/16/2022  Does the patient have an active prescription (recently filled or refills available) for medication(s) requested? No

## 2022-06-21 NOTE — TELEPHONE ENCOUNTER
Received request via: Pharmacy    Was the patient seen in the last year in this department? Yes  6/16/22  Does the patient have an active prescription (recently filled or refills available) for medication(s) requested? No

## 2022-08-22 ENCOUNTER — TELEMEDICINE (OUTPATIENT)
Dept: MEDICAL GROUP | Facility: LAB | Age: 72
End: 2022-08-22
Payer: MEDICARE

## 2022-08-22 DIAGNOSIS — M54.42 CHRONIC BILATERAL LOW BACK PAIN WITH LEFT-SIDED SCIATICA: ICD-10-CM

## 2022-08-22 DIAGNOSIS — G91.2 IDIOPATHIC NORMAL PRESSURE HYDROCEPHALUS (HCC): ICD-10-CM

## 2022-08-22 DIAGNOSIS — G47.9 SLEEP DISTURBANCE: ICD-10-CM

## 2022-08-22 DIAGNOSIS — F41.9 ANXIETY: ICD-10-CM

## 2022-08-22 DIAGNOSIS — G89.29 CHRONIC BILATERAL LOW BACK PAIN WITH LEFT-SIDED SCIATICA: ICD-10-CM

## 2022-08-22 PROBLEM — G91.9 HYDROCEPHALUS (HCC): Status: ACTIVE | Noted: 2022-08-22

## 2022-08-22 PROCEDURE — 99214 OFFICE O/P EST MOD 30 MIN: CPT | Mod: 95 | Performed by: NURSE PRACTITIONER

## 2022-08-22 RX ORDER — ALPRAZOLAM 0.5 MG/1
.25-.5 TABLET ORAL NIGHTLY PRN
Qty: 15 TABLET | Refills: 0 | Status: SHIPPED | OUTPATIENT
Start: 2022-08-22 | End: 2022-09-06

## 2022-08-22 RX ORDER — TOPIRAMATE 25 MG/1
75 TABLET ORAL
Qty: 60 TABLET | Refills: 6
Start: 2022-08-22 | End: 2022-11-08

## 2022-08-22 NOTE — PROGRESS NOTES
"Virtual Visit: Established Patient   This visit was conducted via Zoom using secure and encrypted videoconferencing technology.   The patient was in their home in the state of [place].    The patient's identity was confirmed and verbal consent was obtained for this virtual visit.    Subjective:   CC:   F/u    HPI:  Sheyla is a 71 y.o. female presenting for evaluation and management of:    Sleep disturbance: Newly feels like her \"mind goes crazy with angry / sleep talk,\" when she is falling asleep.  States that she says mean things about and to her  which is obviously upsetting to him.  Contacted Dr. Glynn today, neurosurgery, in regards to hydrocephalus hx and shunt but hasn't heard back from them yet.   Has had a headache x 2 d - \"top of head spreading down but feels like chronic migraine.\"  Denies vomiting.  Denies vision / speech problems.  Not taking anything to sleep and states that she prefers not to take anything specifically for sleep as these have left her feeling groggy the next day in the past.  Getting 4-5 hours of sleep at night - broken.  Napping during the day but does not have issue with mind \"going crazy / angry sleep talk.\"    Avoids alcohol.   No recent falls / hasn't hit head.    Seeing Dr. Burgess for chronic pain and doesn't like to take opiate therapy on a regular basis b/c of dizziness although was prescribed 90 oxycodone at the end of July.     Suffers from chronic pain in her back, hips and legs.  Wants to restart PT - needs home health PT b/c she doesn't drive often / struggles to leave the home b/c of her pain level / hip / spine issues.  Beatriz Blake, PT - has helped in the past - [facility].       Current medicines (including changes today)    Current Outpatient Medications:     topiramate, 75 mg, Oral, QHS    ALPRAZolam, 0.25-0.5 mg, Oral, HS PRN    DULoxetine, 30 mg, Oral, QHS    albuterol, 2 Puff, Inhalation, Q6HRS PRN    lisinopril, 10 mg, Oral, DAILY    " Fluticasone Furoate-Vilanterol, 1 Puff, Inhalation, DAILY    colestipol, 1 g, Oral, BID    dicyclomine,  (Patient not taking: Reported on 6/14/2022)    albuterol, 2.5 mg, Nebulization, Q4HRS PRN    methocarbamol, TAKE ONE TABLET BY MOUTH THREE TIMES DAILY AS NEEDED  FOR MUSCLE SPASMS    estradiol, 1 mg, Oral, QDAY    lidocaine, 1 Patch, Transdermal, Q24HRS    Famotidine (PEPCID PO), 1 Tablet, Oral, DAILY    ibuprofen, 600 mg, Oral, Q8HRS PRN    Pseudoeph-Doxylamine-DM-APAP (NYQUIL PO), 30 mL, Oral, PRN    docusate sodium, 100 mg, Oral, QDAY PRN    Vitamin E, 1 Tablet, Oral, DAILY    therapeutic multivitamin-minerals, 1 Tablet, Oral, DAILY    B Complex-C-Folic Acid (SUPER B-COMPLEX/VIT C/FA PO), 1 Tablet, Oral, DAILY    BETA CAROTENE PO, 1 Dose, Oral, DAILY        Patient Active Problem List    Diagnosis Date Noted    Fibromyalgia 06/02/2020    Cerebral ventriculomegaly 11/24/2019    Closed left ankle fracture 11/23/2019    Debility 11/23/2019    Moderate persistent asthma without complication 02/15/2018    Morbid obesity with BMI of 40.0-44.9, adult (HCC) 01/03/2018    Chronic use of opiate drug for therapeutic purpose 03/07/2017    KIRA (obstructive sleep apnea) 12/06/2014    Scoliosis 12/06/2014    Essential hypertension     Obesity     Hypothyroidism         Objective:   There were no vitals taken for this visit.    Physical Exam:  Gen: NAD  Resp: nonlabored.  Able to speak in full sentences  Psy: pleasant / cooperative.   Neuro:  Alert and oriented x 3      Assessment and Plan:   The following treatment plan was discussed:   1. Sleep disturbance        2. Anxiety  ALPRAZolam (XANAX) 0.5 MG Tab      3. Idiopathic normal pressure hydrocephalus (HCC)           Uncertain etiology.  Certainly needs to discuss this with Dr. Glynn and hopefully she will get a call back from their office within the next 24 hours/be seen there to check on possibility of hydrocephalus causing her sleep disturbance/anger when she falls  asleep.  Fortunately she is able to nap during the day without having any sort of irrational type speech/symptoms.  She was given a small prescription of alprazolam to take immediately prior to bedtime to try to prevent these sleep terror type outbursts.  She understands that she should not take alprazolam within 4 hours of an opiate or drink any alcohol with alprazolam.  Reviewed her  profile with her in regards to concurrently being prescribed opiate therapy by Dr. Burgess's office and she certainly will not be on alprazolam long-term.  She will let me know tomorrow when she hears back from Dr. Glynn's office.  I encouraged her to go the emergency department with the worst headache of her life or overall worsening symptoms.    Agree that she would benefit from restarting home physical therapy in terms of her chronic back pain and debility, referral was placed.       Face to Face Supporting Documentation - Home Health    The encounter with this patient was in whole or in part the primary reason for home health admission.    Date of encounter:   Patient:                    MRN:                       YOB: 2020  Sheyla Garcia  [id number]  1950     Home health to see patient for:  Physical Therapy evaluation and treatment    Skilled need for:  Exacerbation of Chronic Disease State LBP    Homebound status evidenced by:  Need the aid of supportive devices such as crutches, canes, wheelchairs or walkers or Needs the assistance of another person in order to leave the home. Leaving home requires a considerable and taxing effort. There is a normal inability to leave the home.    Community Physician to provide follow up care: JIMMY ChildersN.     Optional Interventions? No    I certify the face to face encounter for this home health care referral meets the CMS requirements and the encounter/clinical assessment with the patient was, in whole, or in part, for the medical condition(s)  listed above, which is the primary reason for home health care. Based on my clinical findings: the service(s) are medically necessary, support the need for home health care, and the homebound criteria are met.  I certify that this patient has had a face to face encounter by myself.  JENNIFER Childers. - NPI: [id number]

## 2022-09-07 DIAGNOSIS — F41.9 ANXIETY: ICD-10-CM

## 2022-09-07 RX ORDER — LORAZEPAM 0.5 MG/1
0.5 TABLET ORAL NIGHTLY PRN
Qty: 30 TABLET | Refills: 0 | Status: SHIPPED | OUTPATIENT
Start: 2022-09-07 | End: 2022-10-04

## 2022-09-07 RX ORDER — ALPRAZOLAM 0.5 MG/1
.25-.5 TABLET ORAL NIGHTLY PRN
Refills: 0 | Status: CANCELLED | OUTPATIENT
Start: 2022-09-07 | End: 2022-10-07

## 2022-09-15 ENCOUNTER — TELEPHONE (OUTPATIENT)
Dept: MEDICAL GROUP | Facility: LAB | Age: 72
End: 2022-09-15
Payer: MEDICARE

## 2022-09-15 NOTE — TELEPHONE ENCOUNTER
"Home Health paperwork received from [facility] requiring provider signature.     All appropriate fields completed by Medical Assistant: N/A CMA printed and distributed to MA    Paperwork placed in \"MA to Provider\" folder/basket. Awaiting provider completion/signature.    "

## 2022-09-26 ENCOUNTER — TELEPHONE (OUTPATIENT)
Dept: MEDICAL GROUP | Facility: LAB | Age: 72
End: 2022-09-26
Payer: MEDICARE

## 2022-09-26 NOTE — TELEPHONE ENCOUNTER
Left a message for patient's  after home health called concerned about patient thinking that she was getting a divorce,  may be cai, etc.  In the past this has indicated that her shunt is malfunctioning.  I do see that she had a follow-up with Dr. Glynn to check out her shunt at the end of August.  Will await a call back from her .

## 2022-09-26 NOTE — TELEPHONE ENCOUNTER
1. Caller Name: Rafaela PT                        Call Back Number: [phone]      How would the patient prefer to be contacted with a response:     Voicemail left regarding pt. Pt is having marital trouble. Moving forward with divorce. May need a .   LVM with Rafaela requesting more information.

## 2022-09-27 NOTE — TELEPHONE ENCOUNTER
He does say that she is hearing voices all day and that it is his voice when he is not even home. She did have the shunt checked 3 weeks ago and it was fine. Has a call in to Renard's office and they are trying to schedule some kind of Xray or imaging to get a better look at the shunt. Also follow up appt with Renard 10-19

## 2022-09-28 ENCOUNTER — TELEPHONE (OUTPATIENT)
Dept: MEDICAL GROUP | Facility: LAB | Age: 72
End: 2022-09-28
Payer: MEDICARE

## 2022-09-28 NOTE — TELEPHONE ENCOUNTER
"Home Health paperwork received from [facility] requiring provider signature.     All appropriate fields completed by Medical Assistant: N/A CMA printed and distributed to MA    Paperwork placed in \"MA to Provider\" folder/basket. Awaiting provider completion/signature.    "

## 2022-09-30 ENCOUNTER — TELEPHONE (OUTPATIENT)
Dept: MEDICAL GROUP | Facility: LAB | Age: 72
End: 2022-09-30
Payer: MEDICARE

## 2022-09-30 NOTE — TELEPHONE ENCOUNTER
1. Caller Name: karine                         Call Back Number: [phone]      How would the patient prefer to be contacted with a response:      was out for a visit. She would like to report  and wife are getting along nothing to report. No wrong doing.

## 2022-10-03 ENCOUNTER — TELEPHONE (OUTPATIENT)
Dept: MEDICAL GROUP | Facility: LAB | Age: 72
End: 2022-10-03
Payer: MEDICARE

## 2022-10-03 DIAGNOSIS — F41.9 ANXIETY: ICD-10-CM

## 2022-10-03 NOTE — TELEPHONE ENCOUNTER
"Home Health paperwork received from [facility] requiring provider signature.     All appropriate fields completed by Medical Assistant: N/A CMA printed and distributed to MA    Paperwork placed in \"MA to Provider\" folder/basket. Awaiting provider completion/signature.    "

## 2022-10-04 RX ORDER — LORAZEPAM 0.5 MG/1
TABLET ORAL
Qty: 30 TABLET | Refills: 0 | Status: SHIPPED | OUTPATIENT
Start: 2022-10-04 | End: 2022-10-27 | Stop reason: SDUPTHER

## 2022-10-04 NOTE — TELEPHONE ENCOUNTER
Received request via: Pharmacy    Was the patient seen in the last year in this department? Yes  LOV 08/22/2022 - Telemedicine  Does the patient have an active prescription (recently filled or refills available) for medication(s) requested? No

## 2022-10-12 DIAGNOSIS — J45.41 ASTHMA IN ADULT, MODERATE PERSISTENT, WITH ACUTE EXACERBATION: ICD-10-CM

## 2022-10-12 RX ORDER — ALBUTEROL SULFATE 90 UG/1
AEROSOL, METERED RESPIRATORY (INHALATION)
Qty: 6.7 G | Refills: 0 | Status: SHIPPED | OUTPATIENT
Start: 2022-10-12 | End: 2022-11-29

## 2022-10-12 NOTE — TELEPHONE ENCOUNTER
Received request via: Pharmacy    Was the patient seen in the last year in this department? Yes  8/22/22  Does the patient have an active prescription (recently filled or refills available) for medication(s) requested? No

## 2022-10-13 ENCOUNTER — HOSPITAL ENCOUNTER (OUTPATIENT)
Dept: RADIOLOGY | Facility: MEDICAL CENTER | Age: 72
End: 2022-10-13
Attending: NEUROLOGICAL SURGERY
Payer: MEDICARE

## 2022-10-13 ENCOUNTER — OFFICE VISIT (OUTPATIENT)
Dept: MEDICAL GROUP | Facility: LAB | Age: 72
End: 2022-10-13
Payer: MEDICARE

## 2022-10-13 ENCOUNTER — APPOINTMENT (OUTPATIENT)
Dept: RADIOLOGY | Facility: MEDICAL CENTER | Age: 72
End: 2022-10-13
Attending: NURSE PRACTITIONER
Payer: COMMERCIAL

## 2022-10-13 VITALS
HEART RATE: 86 BPM | RESPIRATION RATE: 16 BRPM | DIASTOLIC BLOOD PRESSURE: 74 MMHG | HEIGHT: 70 IN | WEIGHT: 261 LBS | OXYGEN SATURATION: 95 % | BODY MASS INDEX: 37.37 KG/M2 | TEMPERATURE: 98.3 F | SYSTOLIC BLOOD PRESSURE: 150 MMHG

## 2022-10-13 DIAGNOSIS — Z97.8 FOLEY CATHETER IN PLACE: ICD-10-CM

## 2022-10-13 DIAGNOSIS — R44.0 HEARING VOICES: ICD-10-CM

## 2022-10-13 DIAGNOSIS — G91.2 IDIOPATHIC NORMAL PRESSURE HYDROCEPHALUS (HCC): ICD-10-CM

## 2022-10-13 DIAGNOSIS — G93.89 CEREBRAL VENTRICULOMEGALY: ICD-10-CM

## 2022-10-13 DIAGNOSIS — R44.3 HALLUCINATIONS: ICD-10-CM

## 2022-10-13 PROBLEM — S82.892A CLOSED LEFT ANKLE FRACTURE: Status: RESOLVED | Noted: 2019-11-23 | Resolved: 2022-10-13

## 2022-10-13 PROBLEM — Z98.2 S/P VP SHUNT: Status: ACTIVE | Noted: 2022-01-13

## 2022-10-13 PROCEDURE — 70360 X-RAY EXAM OF NECK: CPT

## 2022-10-13 PROCEDURE — 70250 X-RAY EXAM OF SKULL: CPT

## 2022-10-13 PROCEDURE — 99214 OFFICE O/P EST MOD 30 MIN: CPT | Performed by: NURSE PRACTITIONER

## 2022-10-13 PROCEDURE — 71046 X-RAY EXAM CHEST 2 VIEWS: CPT

## 2022-10-13 PROCEDURE — 74018 RADEX ABDOMEN 1 VIEW: CPT

## 2022-10-13 RX ORDER — RISPERIDONE 0.5 MG/1
0.5 TABLET ORAL 2 TIMES DAILY
Qty: 60 TABLET | Refills: 3 | Status: SHIPPED | OUTPATIENT
Start: 2022-10-13 | End: 2022-10-27 | Stop reason: SDUPTHER

## 2022-10-13 ASSESSMENT — FIBROSIS 4 INDEX: FIB4 SCORE: 1.78

## 2022-10-13 NOTE — PROGRESS NOTES
"CC  Mental update      HPI:  Sheyla is a 72 yo est female here with her .  For the past few months she has been hearing voices, hallucinating, easily tearful, has little control over what she says.   tries to remind her that voices / hallucinations are not real and she often tells him to be quiet b/c she needs to hear the voices.   Has good and bad days.  She was previously followed by [facility] but got frustrated with seeing different psychiatrists/most recent psychiatrist left the practice and she needs a new psychiatrist.    Has hydrocephalus/cerebral ventriculomegaly and  shut in place - doing new x-rays and following up with neurosurgery next week.    Has lorazepam and takes this every bedtime b/c of difficulty sleeping.      Initially hallucinations / hearing voices started several years ago after hospital stay, uti, orthopedic surgery.  She did come off all pain meds in the past few months.      Fortunately Sheyla's voices in her head are not telling her to hurt herself or others - most of the voices are in regards to paranoia with  poisoning or leaving her.    Migraines are starting creep back.      Exam:  BP (!) 150/74 (BP Location: Left arm, Patient Position: Sitting, BP Cuff Size: Adult)   Pulse 86   Temp 36.8 °C (98.3 °F)   Resp 16   Ht 1.778 m (5' 10\")   Wt 118 kg (261 lb)   SpO2 95%   Gen. appears healthy in no distress.  Seated comfortably on the exam table.  Skin appropriate for sex and age   Neck trachea is midline  Lungs unlabored breathing  Heart regular rate and rhythm  Neuro gait slowed although she is able to get off the exam table and make her way to the wheelchair.  Psych appropriate, calm, interactive, well-groomed.  She is fully aware today that she hears voices that are not real and periodically hallucinates, telling me today is a good day.    A/P:  \"  1. Hallucinations  risperiDONE (RISPERDAL) 0.5 MG Tab    Referral to Psychiatry      2. Hearing " voices  risperiDONE (RISPERDAL) 0.5 MG Tab    Referral to Psychiatry      3. Idiopathic normal pressure hydrocephalus (HCC)        4. Cerebral ventriculomegaly          Going for an x-ray today through [facility] neurosurgery to assess peritoneal aspect of shunt and follows up with neurosurgery early next week.  In the meantime, refilled risperidone which has been helping although  has not been giving it to her twice daily, only as needed.  I encouraged him to give her 0.5 mg of risperidone a.m./p.m. until they are able to get the results back from  shunt x-ray, meet with neurosurgery next week and get established with a psychiatrist.  She does have lorazepam to take at night for sleep.  Fortunately hallucinations and voices are not dangerous, specifically she is not hearing voices to hurt anyone or herself but we had a good discussion, especially her  Ed and I, in regards to calling 911 if there is any sign that voices are dangerous such as asking her to hurt someone or herself.  Her  will email me through [employer] in the next few days in regards to how she is doing as well as how the appointment goes with neurosurgery and results of  shunt x-ray.

## 2022-10-21 ENCOUNTER — TELEPHONE (OUTPATIENT)
Dept: MEDICAL GROUP | Facility: LAB | Age: 72
End: 2022-10-21
Payer: MEDICARE

## 2022-10-21 NOTE — TELEPHONE ENCOUNTER
1. Caller Name: LIAN                        Call Back Number: [phone]      How would the patient prefer to be contacted with a response:     RE CERTIFY   Voicemail left from Lian stating she was going to discharge pt. Pt talked her into continuing care. Pt was doing well. The last visit pt could barley move. She was wobbly.

## 2022-10-26 ENCOUNTER — PATIENT MESSAGE (OUTPATIENT)
Dept: MEDICAL GROUP | Facility: LAB | Age: 72
End: 2022-10-26
Payer: MEDICARE

## 2022-10-26 DIAGNOSIS — J45.41 ASTHMA IN ADULT, MODERATE PERSISTENT, WITH ACUTE EXACERBATION: ICD-10-CM

## 2022-10-27 DIAGNOSIS — R44.0 HEARING VOICES: ICD-10-CM

## 2022-10-27 DIAGNOSIS — F41.9 ANXIETY: ICD-10-CM

## 2022-10-27 DIAGNOSIS — R44.3 HALLUCINATIONS: ICD-10-CM

## 2022-10-27 DIAGNOSIS — J45.41 ASTHMA IN ADULT, MODERATE PERSISTENT, WITH ACUTE EXACERBATION: ICD-10-CM

## 2022-10-27 RX ORDER — RISPERIDONE 0.5 MG/1
.5-1 TABLET ORAL 3 TIMES DAILY PRN
Qty: 180 TABLET | Refills: 2 | Status: SHIPPED
Start: 2022-10-27 | End: 2022-11-08

## 2022-10-27 RX ORDER — PREDNISONE 20 MG/1
TABLET ORAL
Qty: 20 TABLET | Refills: 1 | OUTPATIENT
Start: 2022-10-27

## 2022-10-27 RX ORDER — LORAZEPAM 0.5 MG/1
0.5 TABLET ORAL NIGHTLY PRN
Qty: 30 TABLET | Refills: 2 | Status: SHIPPED
Start: 2022-11-03 | End: 2022-11-08

## 2022-10-27 NOTE — TELEPHONE ENCOUNTER
Needed to take 6 per day so they didn't last , in regards to the Risperidone    Received request via: Patient    Was the patient seen in the last year in this department? Yes  10/13/2022  Does the patient have an active prescription (recently filled or refills available) for medication(s) requested? No

## 2022-10-27 NOTE — TELEPHONE ENCOUNTER
Please let the patient / her  know that prednisone can exacerbate agitation.  Is she breathing ok?

## 2022-10-31 DIAGNOSIS — J45.41 ASTHMA IN ADULT, MODERATE PERSISTENT, WITH ACUTE EXACERBATION: ICD-10-CM

## 2022-10-31 RX ORDER — PREDNISONE 20 MG/1
TABLET ORAL
Qty: 15 TABLET | Refills: 1 | Status: SHIPPED | OUTPATIENT
Start: 2022-10-31 | End: 2022-10-31 | Stop reason: SDUPTHER

## 2022-10-31 RX ORDER — PREDNISONE 20 MG/1
TABLET ORAL
Qty: 15 TABLET | Refills: 1 | Status: SHIPPED
Start: 2022-10-31 | End: 2022-11-08

## 2022-11-01 ENCOUNTER — TELEPHONE (OUTPATIENT)
Dept: MEDICAL GROUP | Facility: LAB | Age: 72
End: 2022-11-01
Payer: MEDICARE

## 2022-11-01 NOTE — TELEPHONE ENCOUNTER
DOCUMENTATION OF PAR STATUS:    1. Name of Medication & Dose:      2. Name of Prescription Coverage Company & phone #: cover my meds/ [id number]    3. Date Prior Auth Submitted: 11/1/22    4. What information was given to obtain insurance decision? NO NOTES NEEDED/ SAID IT WOULD PROLONG THE REQUEST AND QUESTIONS WERE ENOUGH    5. Prior Auth Status? approved

## 2022-11-08 ENCOUNTER — TELEMEDICINE (OUTPATIENT)
Dept: MEDICAL GROUP | Facility: LAB | Age: 72
End: 2022-11-08
Payer: MEDICARE

## 2022-11-08 DIAGNOSIS — G89.29 CHRONIC BILATERAL LOW BACK PAIN WITH LEFT-SIDED SCIATICA: ICD-10-CM

## 2022-11-08 DIAGNOSIS — Z79.891 CHRONIC USE OF OPIATE DRUG FOR THERAPEUTIC PURPOSE: ICD-10-CM

## 2022-11-08 DIAGNOSIS — N39.45 CONTINUOUS LEAKAGE OF URINE: ICD-10-CM

## 2022-11-08 DIAGNOSIS — M54.42 CHRONIC BILATERAL LOW BACK PAIN WITH LEFT-SIDED SCIATICA: ICD-10-CM

## 2022-11-08 PROCEDURE — 99214 OFFICE O/P EST MOD 30 MIN: CPT | Mod: 95 | Performed by: NURSE PRACTITIONER

## 2022-11-08 RX ORDER — ARIPIPRAZOLE 10 MG/1
10 TABLET ORAL EVERY EVENING
COMMUNITY
Start: 2022-11-07 | End: 2022-12-08 | Stop reason: SDUPTHER

## 2022-11-08 RX ORDER — HYDROCODONE BITARTRATE AND ACETAMINOPHEN 5; 325 MG/1; MG/1
1 TABLET ORAL 2 TIMES DAILY PRN
Qty: 60 TABLET | Refills: 0 | Status: SHIPPED | OUTPATIENT
Start: 2022-11-08 | End: 2022-12-08

## 2022-11-08 RX ORDER — LAMOTRIGINE 25 MG/1
25 TABLET ORAL EVERY EVENING
COMMUNITY
Start: 2022-11-07 | End: 2022-12-08

## 2022-11-08 RX ORDER — AMITRIPTYLINE HYDROCHLORIDE 25 MG/1
25 TABLET, FILM COATED ORAL
COMMUNITY
Start: 2022-11-07 | End: 2023-03-13

## 2022-11-08 RX ORDER — PROPRANOLOL HYDROCHLORIDE 10 MG/1
10 TABLET ORAL
Status: ON HOLD | COMMUNITY
Start: 2022-11-07 | End: 2023-08-22

## 2022-11-09 ENCOUNTER — TELEPHONE (OUTPATIENT)
Dept: MEDICAL GROUP | Facility: LAB | Age: 72
End: 2022-11-09
Payer: MEDICARE

## 2022-11-09 NOTE — TELEPHONE ENCOUNTER
"Home Health paperwork received from [facility] requiring provider signature.     All appropriate fields completed by Medical Assistant: N/A CMA printed and distributed to MA    Paperwork placed in \"MA to Provider\" folder/basket. Awaiting provider completion/signature.    "

## 2022-11-09 NOTE — PROGRESS NOTES
"Virtual Visit: Established Patient   This visit was conducted via Zoom using secure and encrypted videoconferencing technology.   The patient was in their home in the [facility] [place].    The patient's identity was confirmed and verbal consent was obtained for this virtual visit.    Subjective:   CC:   Sheyla  is a 71 y.o. female presenting for evaluation and management of:    Chronic pain:  chronic pain to entire left side - arm, hand, back, hip and leg.  Weaned self off pain medications months ago, last refill of oxycodone was 7/2022 through Dr. Glynn's office.  Last took a pain pill today - found an old hydrocodone and would like to go back on this for pain control.  Last rx 10/2022 for lorazepam and she has completely discontinued benzodiazepine use.    States psychologically she is doing better - can lie down in bed, go to sleep and not wake up  / yell.  Temperament has improved.  Seeing psychiatry - first appt last week - Dr.Shaheen Spring - rx amitriptyline, abilify, lamictal, propranolol while stopping Risperdal and Topamax.      Urinary incontinence:  chronic issue, margot with lying down at night - urine \"pours out.\"  Had bladder sling years ago that she feels is failing.  Has sensation of pressure to \"bladder area.\"  Denies hematuria.  Would like to see urogynecology.    Patient Active Problem List    Diagnosis Date Noted    Hydrocephalus ([facility]) 08/22/2022    S/P  shunt 01/13/2022    Normal pressure hydrocephalus ([facility]) 01/13/2022    Fibromyalgia 06/02/2020    Cerebral ventriculomegaly 11/24/2019    Debility 11/23/2019    Moderate persistent asthma without complication 02/15/2018    Morbid obesity with BMI of 40.0-44.9, adult ([facility]) 01/03/2018    Chronic use of opiate drug for therapeutic purpose 03/07/2017    KIRA (obstructive sleep apnea) 12/06/2014    Scoliosis 12/06/2014    Essential hypertension     Obesity     Hypothyroidism         Objective:       Physical Exam:  Gen: NAD  Resp: nonlabored.  " "Able to speak in full sentences  Psy: pleasant / cooperative.   Neuro:  Alert and oriented x 3     Assessment and Plan:   The following treatment plan was discussed:   \"  1. Chronic bilateral low back pain with left-sided sciatica  HYDROcodone-acetaminophen (NORCO) 5-325 MG Tab per tablet      2. Chronic use of opiate drug for therapeutic purpose  Controlled Substance Treatment Agreement      3. Continuous leakage of urine  Referral to Urogynecology      \"  Discussed that she should completely discontinue any benzodiazepine use when concurrently taking opioids and she verbalized understanding.  Discussed increased risk of overdose and death if she takes a benzodiazepine with an opiate and that I am not legally responsible for this as I have warned her to completely avoid benzodiazepines with opiates.  She was prescribed 5/325 hydrocodone to take up to twice daily for severe pain.  Discussed constipating side effects of hydrocodone.  I will see her in 1 month for urine drug screen and controlled substance agreement in the office if she needs to continue on hydrocodone.  She has been through numerous consults in the past including physical therapy, physiatry and neurosurgery and does not want to return to the specialties with the exception of neurosurgery and she is followed closely by Dr. Glynn's office for hydrocephalus and a  shunt.  Fortunately psychologically she is doing better and is followed by psychiatry now with medication changes and has a follow-up there.  In prescribing controlled substances to this patient, I certify that I have obtained and reviewed the medical history of Sheyla Garcia. I have also made a good marcy effort to obtain applicable records from other providers who have treated the patient and records did not demonstrate any increased risk of substance abuse that would prevent me from prescribing controlled substances.     I have conducted a physical exam and documented it. I have " reviewed Ms. Garcia’s prescription history as maintained by the [place] Prescription Monitoring Program.     I have assessed the patient’s risk for abuse, dependency, and addiction using the validated Opioid Risk Tool available at https://www.mdcalc.com/hvsteo-evoy-xzeh-ort-narcotic-abuse.     Given the above, I believe the benefits of controlled substance therapy outweigh the risks. The reasons for prescribing controlled substances include non-narcotic, oral analgesic alternatives have been inadequate for pain control. Accordingly, I have discussed the risk and benefits, treatment plan, and alternative therapies with the patient.      She was also referred to meet with urogynecology regarding urinary incontinence.    Follow-up 1 month.

## 2022-11-11 ENCOUNTER — PATIENT MESSAGE (OUTPATIENT)
Dept: MEDICAL GROUP | Facility: LAB | Age: 72
End: 2022-11-11
Payer: MEDICARE

## 2022-11-11 ENCOUNTER — PATIENT MESSAGE (OUTPATIENT)
Dept: HEALTH INFORMATION MANAGEMENT | Facility: OTHER | Age: 72
End: 2022-11-11

## 2022-11-11 RX ORDER — SUMATRIPTAN 50 MG/1
50 TABLET, FILM COATED ORAL
Qty: 10 TABLET | Refills: 3 | Status: SHIPPED | OUTPATIENT
Start: 2022-11-11 | End: 2023-03-13

## 2022-11-15 ENCOUNTER — TELEPHONE (OUTPATIENT)
Dept: MEDICAL GROUP | Facility: LAB | Age: 72
End: 2022-11-15
Payer: MEDICARE

## 2022-11-15 NOTE — TELEPHONE ENCOUNTER
Received a call from Beatriz from [facility]. She will only be seeing her for 1 more visit and then will discharge her. You can send another PT home health in the future but she will need to participate more and was told this. Also wanting to alert you if the drug interaction level 2 severity of Lamotrigine and Estradiol.   No

## 2022-11-16 ENCOUNTER — APPOINTMENT (OUTPATIENT)
Dept: RADIOLOGY | Facility: MEDICAL CENTER | Age: 72
End: 2022-11-16
Attending: NEUROLOGICAL SURGERY
Payer: COMMERCIAL

## 2022-11-17 ENCOUNTER — TELEPHONE (OUTPATIENT)
Dept: MEDICAL GROUP | Facility: LAB | Age: 72
End: 2022-11-17
Payer: MEDICARE

## 2022-11-17 NOTE — TELEPHONE ENCOUNTER
"Home Health paperwork received from [facility] requiring provider signature.     All appropriate fields completed by Medical Assistant: N/A CMA printed and distributed to MA    Paperwork placed in \"MA to Provider\" folder/basket. Awaiting provider completion/signature.    "

## 2022-11-28 DIAGNOSIS — G89.29 CHRONIC BILATERAL LOW BACK PAIN WITH LEFT-SIDED SCIATICA: ICD-10-CM

## 2022-11-28 DIAGNOSIS — M25.551 BILATERAL HIP PAIN: ICD-10-CM

## 2022-11-28 DIAGNOSIS — M54.42 CHRONIC BILATERAL LOW BACK PAIN WITH LEFT-SIDED SCIATICA: ICD-10-CM

## 2022-11-28 DIAGNOSIS — J45.41 ASTHMA IN ADULT, MODERATE PERSISTENT, WITH ACUTE EXACERBATION: ICD-10-CM

## 2022-11-28 DIAGNOSIS — M25.552 BILATERAL HIP PAIN: ICD-10-CM

## 2022-11-29 RX ORDER — ALBUTEROL SULFATE 90 UG/1
AEROSOL, METERED RESPIRATORY (INHALATION)
Qty: 6.7 G | Refills: 0 | Status: ON HOLD
Start: 2022-11-29 | End: 2023-08-22

## 2022-11-29 RX ORDER — OXYCODONE HYDROCHLORIDE AND ACETAMINOPHEN 5; 325 MG/1; MG/1
1 TABLET ORAL 2 TIMES DAILY PRN
Qty: 60 TABLET | Refills: 0 | OUTPATIENT
Start: 2022-11-29 | End: 2022-12-29

## 2022-11-29 NOTE — TELEPHONE ENCOUNTER
Received request via: Patient    Was the patient seen in the last year in this department? Yes  LOV : 11/8/2022 - TELEMEDICINE   Does the patient have an active prescription (recently filled or refills available) for medication(s) requested? No    Does the patient have FDC Plus and need 100 day supply (blood pressure, diabetes and cholesterol meds only)? Patient does not have SCP

## 2022-11-29 NOTE — TELEPHONE ENCOUNTER
Received request via: Pharmacy  11/8/2022lov  Was the patient seen in the last year in this department? Yes    Does the patient have an active prescription (recently filled or refills available) for medication(s) requested? No    Does the patient have skilled nursing Plus and need 100 day supply (blood pressure, diabetes and cholesterol meds only)? Patient does not have SCP

## 2022-12-02 ENCOUNTER — APPOINTMENT (OUTPATIENT)
Dept: RADIOLOGY | Facility: MEDICAL CENTER | Age: 72
DRG: 189 | End: 2022-12-02
Attending: EMERGENCY MEDICINE
Payer: MEDICARE

## 2022-12-02 ENCOUNTER — HOSPITAL ENCOUNTER (INPATIENT)
Facility: MEDICAL CENTER | Age: 72
LOS: 3 days | DRG: 189 | End: 2022-12-05
Attending: EMERGENCY MEDICINE | Admitting: INTERNAL MEDICINE
Payer: MEDICARE

## 2022-12-02 DIAGNOSIS — N30.00 ACUTE CYSTITIS WITHOUT HEMATURIA: ICD-10-CM

## 2022-12-02 DIAGNOSIS — J96.01 ACUTE RESPIRATORY FAILURE WITH HYPOXIA (HCC): ICD-10-CM

## 2022-12-02 DIAGNOSIS — R09.02 HYPOXIA: ICD-10-CM
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
## 2022-12-02 PROBLEM — N39.0 UTI (URINARY TRACT INFECTION): Status: ACTIVE | Noted: 2022-12-02

## 2022-12-02 PROBLEM — G93.40 ENCEPHALOPATHY: Status: ACTIVE | Noted: 2022-08-22

## 2022-12-02 LAB
ALBUMIN SERPL BCP-MCNC: 4 G/DL (ref 3.2–4.9)
ALBUMIN/GLOB SERPL: 1.3 G/DL
ALP SERPL-CCNC: 81 U/L (ref 30–99)
ALT SERPL-CCNC: 7 U/L (ref 2–50)
ANION GAP SERPL CALC-SCNC: 11 MMOL/L (ref 7–16)
APPEARANCE UR: ABNORMAL
AST SERPL-CCNC: 19 U/L (ref 12–45)
BACTERIA #/AREA URNS HPF: ABNORMAL /HPF
BASOPHILS # BLD AUTO: 0.3 % (ref 0–1.8)
BASOPHILS # BLD: 0.04 K/UL (ref 0–0.12)
BILIRUB SERPL-MCNC: 0.6 MG/DL (ref 0.1–1.5)
BILIRUB UR QL STRIP.AUTO: ABNORMAL
BUN SERPL-MCNC: 10 MG/DL (ref 8–22)
CALCIUM SERPL-MCNC: 9.4 MG/DL (ref 8.5–10.5)
CHLORIDE SERPL-SCNC: 105 MMOL/L (ref 96–112)
CK SERPL-CCNC: 65 U/L (ref 0–154)
CO2 SERPL-SCNC: 25 MMOL/L (ref 20–33)
COLOR UR: YELLOW
CREAT SERPL-MCNC: 0.42 MG/DL (ref 0.5–1.4)
D DIMER PPP IA.FEU-MCNC: 0.83 UG/ML (FEU) (ref 0–0.5)
EKG IMPRESSION: NORMAL
EOSINOPHIL # BLD AUTO: 0.21 K/UL (ref 0–0.51)
EOSINOPHIL NFR BLD: 1.6 % (ref 0–6.9)
EPI CELLS #/AREA URNS HPF: NEGATIVE /HPF
ERYTHROCYTE [DISTWIDTH] IN BLOOD BY AUTOMATED COUNT: 43.1 FL (ref 35.9–50)
GFR SERPLBLD CREATININE-BSD FMLA CKD-EPI: 105 ML/MIN/1.73 M 2
GLOBULIN SER CALC-MCNC: 3.1 G/DL (ref 1.9–3.5)
GLUCOSE SERPL-MCNC: 91 MG/DL (ref 65–99)
GLUCOSE UR STRIP.AUTO-MCNC: NEGATIVE MG/DL
HCT VFR BLD AUTO: 47.7 % (ref 37–47)
HGB BLD-MCNC: 15.8 G/DL (ref 12–16)
HYALINE CASTS #/AREA URNS LPF: ABNORMAL /LPF
IMM GRANULOCYTES # BLD AUTO: 0.08 K/UL (ref 0–0.11)
IMM GRANULOCYTES NFR BLD AUTO: 0.6 % (ref 0–0.9)
KETONES UR STRIP.AUTO-MCNC: 40 MG/DL
LEUKOCYTE ESTERASE UR QL STRIP.AUTO: ABNORMAL
LYMPHOCYTES # BLD AUTO: 1.89 K/UL (ref 1–4.8)
LYMPHOCYTES NFR BLD: 14.8 % (ref 22–41)
MCH RBC QN AUTO: 30.6 PG (ref 27–33)
MCHC RBC AUTO-ENTMCNC: 33.1 G/DL (ref 33.6–35)
MCV RBC AUTO: 92.4 FL (ref 81.4–97.8)
MICRO URNS: ABNORMAL
MONOCYTES # BLD AUTO: 1.06 K/UL (ref 0–0.85)
MONOCYTES NFR BLD AUTO: 8.3 % (ref 0–13.4)
MUCOUS THREADS #/AREA URNS HPF: ABNORMAL /HPF
NEUTROPHILS # BLD AUTO: 9.46 K/UL (ref 2–7.15)
NEUTROPHILS NFR BLD: 74.4 % (ref 44–72)
NITRITE UR QL STRIP.AUTO: NEGATIVE
NRBC # BLD AUTO: 0 K/UL
NRBC BLD-RTO: 0 /100 WBC
NT-PROBNP SERPL IA-MCNC: 476 PG/ML (ref 0–125)
PH UR STRIP.AUTO: 6 [PH] (ref 5–8)
PLATELET # BLD AUTO: 248 K/UL (ref 164–446)
PMV BLD AUTO: 9.7 FL (ref 9–12.9)
POTASSIUM SERPL-SCNC: 3.4 MMOL/L (ref 3.6–5.5)
PROT SERPL-MCNC: 7.1 G/DL (ref 6–8.2)
PROT UR QL STRIP: NEGATIVE MG/DL
RBC # BLD AUTO: 5.16 M/UL (ref 4.2–5.4)
RBC # URNS HPF: ABNORMAL /HPF
RBC UR QL AUTO: ABNORMAL
SODIUM SERPL-SCNC: 141 MMOL/L (ref 135–145)
SP GR UR STRIP.AUTO: 1.02
TROPONIN T SERPL-MCNC: 11 NG/L (ref 6–19)
UROBILINOGEN UR STRIP.AUTO-MCNC: 0.2 MG/DL
WBC # BLD AUTO: 12.7 K/UL (ref 4.8–10.8)
WBC #/AREA URNS HPF: ABNORMAL /HPF

## 2022-12-02 PROCEDURE — 700101 HCHG RX REV CODE 250: Performed by: EMERGENCY MEDICINE

## 2022-12-02 PROCEDURE — 99223 1ST HOSP IP/OBS HIGH 75: CPT | Mod: AI | Performed by: INTERNAL MEDICINE

## 2022-12-02 PROCEDURE — 96375 TX/PRO/DX INJ NEW DRUG ADDON: CPT

## 2022-12-02 PROCEDURE — 99285 EMERGENCY DEPT VISIT HI MDM: CPT

## 2022-12-02 PROCEDURE — 700117 HCHG RX CONTRAST REV CODE 255: Performed by: EMERGENCY MEDICINE

## 2022-12-02 PROCEDURE — 85379 FIBRIN DEGRADATION QUANT: CPT

## 2022-12-02 PROCEDURE — 700111 HCHG RX REV CODE 636 W/ 250 OVERRIDE (IP): Performed by: INTERNAL MEDICINE

## 2022-12-02 PROCEDURE — 73562 X-RAY EXAM OF KNEE 3: CPT | Mod: LT

## 2022-12-02 PROCEDURE — 93005 ELECTROCARDIOGRAM TRACING: CPT | Performed by: EMERGENCY MEDICINE

## 2022-12-02 PROCEDURE — 83880 ASSAY OF NATRIURETIC PEPTIDE: CPT

## 2022-12-02 PROCEDURE — 770001 HCHG ROOM/CARE - MED/SURG/GYN PRIV*

## 2022-12-02 PROCEDURE — 73562 X-RAY EXAM OF KNEE 3: CPT | Mod: RT

## 2022-12-02 PROCEDURE — 80053 COMPREHEN METABOLIC PANEL: CPT

## 2022-12-02 PROCEDURE — A9270 NON-COVERED ITEM OR SERVICE: HCPCS | Performed by: EMERGENCY MEDICINE

## 2022-12-02 PROCEDURE — 84484 ASSAY OF TROPONIN QUANT: CPT

## 2022-12-02 PROCEDURE — 700111 HCHG RX REV CODE 636 W/ 250 OVERRIDE (IP): Performed by: EMERGENCY MEDICINE

## 2022-12-02 PROCEDURE — A9270 NON-COVERED ITEM OR SERVICE: HCPCS | Performed by: INTERNAL MEDICINE

## 2022-12-02 PROCEDURE — 700102 HCHG RX REV CODE 250 W/ 637 OVERRIDE(OP): Performed by: EMERGENCY MEDICINE

## 2022-12-02 PROCEDURE — 82550 ASSAY OF CK (CPK): CPT

## 2022-12-02 PROCEDURE — 85025 COMPLETE CBC W/AUTO DIFF WBC: CPT

## 2022-12-02 PROCEDURE — 71045 X-RAY EXAM CHEST 1 VIEW: CPT

## 2022-12-02 PROCEDURE — 700102 HCHG RX REV CODE 250 W/ 637 OVERRIDE(OP): Performed by: INTERNAL MEDICINE

## 2022-12-02 PROCEDURE — 71275 CT ANGIOGRAPHY CHEST: CPT

## 2022-12-02 PROCEDURE — 96374 THER/PROPH/DIAG INJ IV PUSH: CPT

## 2022-12-02 PROCEDURE — 87077 CULTURE AEROBIC IDENTIFY: CPT

## 2022-12-02 PROCEDURE — 94640 AIRWAY INHALATION TREATMENT: CPT

## 2022-12-02 PROCEDURE — 87086 URINE CULTURE/COLONY COUNT: CPT

## 2022-12-02 PROCEDURE — 36415 COLL VENOUS BLD VENIPUNCTURE: CPT

## 2022-12-02 PROCEDURE — 81001 URINALYSIS AUTO W/SCOPE: CPT

## 2022-12-02 PROCEDURE — 87186 SC STD MICRODIL/AGAR DIL: CPT

## 2022-12-02 PROCEDURE — 72131 CT LUMBAR SPINE W/O DYE: CPT

## 2022-12-02 RX ORDER — METHOCARBAMOL 750 MG/1
750 TABLET, FILM COATED ORAL
COMMUNITY
End: 2023-03-13

## 2022-12-02 RX ORDER — POTASSIUM CHLORIDE 20 MEQ/1
40 TABLET, EXTENDED RELEASE ORAL ONCE
Status: COMPLETED | OUTPATIENT
Start: 2022-12-02 | End: 2022-12-02

## 2022-12-02 RX ORDER — FUROSEMIDE 10 MG/ML
40 INJECTION INTRAMUSCULAR; INTRAVENOUS ONCE
Status: COMPLETED | OUTPATIENT
Start: 2022-12-02 | End: 2022-12-02

## 2022-12-02 RX ORDER — ONDANSETRON 2 MG/ML
4 INJECTION INTRAMUSCULAR; INTRAVENOUS ONCE
Status: COMPLETED | OUTPATIENT
Start: 2022-12-02 | End: 2022-12-02

## 2022-12-02 RX ORDER — HYDRALAZINE HYDROCHLORIDE 20 MG/ML
20 INJECTION INTRAMUSCULAR; INTRAVENOUS ONCE
Status: COMPLETED | OUTPATIENT
Start: 2022-12-02 | End: 2022-12-02

## 2022-12-02 RX ORDER — OXYCODONE HYDROCHLORIDE 10 MG/1
10 TABLET ORAL
Status: DISCONTINUED | OUTPATIENT
Start: 2022-12-02 | End: 2022-12-05 | Stop reason: HOSPADM

## 2022-12-02 RX ORDER — HYDROMORPHONE HYDROCHLORIDE 1 MG/ML
0.3 INJECTION, SOLUTION INTRAMUSCULAR; INTRAVENOUS; SUBCUTANEOUS ONCE
Status: COMPLETED | OUTPATIENT
Start: 2022-12-02 | End: 2022-12-02

## 2022-12-02 RX ORDER — MONTELUKAST SODIUM 4 MG/1
1 TABLET, CHEWABLE ORAL 2 TIMES DAILY
Status: CANCELLED | OUTPATIENT
Start: 2022-12-02

## 2022-12-02 RX ORDER — HYDROMORPHONE HYDROCHLORIDE 1 MG/ML
0.5 INJECTION, SOLUTION INTRAMUSCULAR; INTRAVENOUS; SUBCUTANEOUS
Status: DISCONTINUED | OUTPATIENT
Start: 2022-12-02 | End: 2022-12-05 | Stop reason: HOSPADM

## 2022-12-02 RX ORDER — LISINOPRIL 10 MG/1
10 TABLET ORAL ONCE
Status: COMPLETED | OUTPATIENT
Start: 2022-12-02 | End: 2022-12-02

## 2022-12-02 RX ORDER — IPRATROPIUM BROMIDE AND ALBUTEROL SULFATE 2.5; .5 MG/3ML; MG/3ML
3 SOLUTION RESPIRATORY (INHALATION) ONCE
Status: COMPLETED | OUTPATIENT
Start: 2022-12-02 | End: 2022-12-02

## 2022-12-02 RX ORDER — AMOXICILLIN 250 MG
2 CAPSULE ORAL 2 TIMES DAILY
Status: DISCONTINUED | OUTPATIENT
Start: 2022-12-03 | End: 2022-12-05 | Stop reason: HOSPADM

## 2022-12-02 RX ORDER — ENOXAPARIN SODIUM 100 MG/ML
40 INJECTION SUBCUTANEOUS DAILY
Status: DISCONTINUED | OUTPATIENT
Start: 2022-12-02 | End: 2022-12-05 | Stop reason: HOSPADM

## 2022-12-02 RX ORDER — POLYETHYLENE GLYCOL 3350 17 G/17G
1 POWDER, FOR SOLUTION ORAL
Status: DISCONTINUED | OUTPATIENT
Start: 2022-12-02 | End: 2022-12-05 | Stop reason: HOSPADM

## 2022-12-02 RX ORDER — ALBUTEROL SULFATE 90 UG/1
2 AEROSOL, METERED RESPIRATORY (INHALATION) EVERY 6 HOURS PRN
Status: CANCELLED | OUTPATIENT
Start: 2022-12-02

## 2022-12-02 RX ORDER — OXYCODONE HYDROCHLORIDE 5 MG/1
5 TABLET ORAL
Status: DISCONTINUED | OUTPATIENT
Start: 2022-12-02 | End: 2022-12-05 | Stop reason: HOSPADM

## 2022-12-02 RX ORDER — LIDOCAINE 50 MG/G
1 PATCH TOPICAL EVERY 24 HOURS
Status: CANCELLED | OUTPATIENT
Start: 2022-12-03

## 2022-12-02 RX ORDER — FAMOTIDINE 20 MG/1
20 TABLET, FILM COATED ORAL DAILY
Status: CANCELLED | OUTPATIENT
Start: 2022-12-03

## 2022-12-02 RX ORDER — LISINOPRIL 10 MG/1
10 TABLET ORAL DAILY
Status: CANCELLED | OUTPATIENT
Start: 2022-12-03

## 2022-12-02 RX ORDER — METHOCARBAMOL 750 MG/1
750 TABLET, FILM COATED ORAL 3 TIMES DAILY PRN
Status: CANCELLED | OUTPATIENT
Start: 2022-12-02

## 2022-12-02 RX ORDER — LABETALOL HYDROCHLORIDE 5 MG/ML
10 INJECTION, SOLUTION INTRAVENOUS EVERY 4 HOURS PRN
Status: DISCONTINUED | OUTPATIENT
Start: 2022-12-02 | End: 2022-12-05 | Stop reason: HOSPADM

## 2022-12-02 RX ORDER — ACETAMINOPHEN 325 MG/1
650 TABLET ORAL EVERY 6 HOURS PRN
Status: DISCONTINUED | OUTPATIENT
Start: 2022-12-02 | End: 2022-12-05 | Stop reason: HOSPADM

## 2022-12-02 RX ORDER — DULOXETIN HYDROCHLORIDE 30 MG/1
30 CAPSULE, DELAYED RELEASE ORAL
Status: CANCELLED | OUTPATIENT
Start: 2022-12-02

## 2022-12-02 RX ORDER — ONDANSETRON 2 MG/ML
4 INJECTION INTRAMUSCULAR; INTRAVENOUS EVERY 4 HOURS PRN
Status: DISCONTINUED | OUTPATIENT
Start: 2022-12-02 | End: 2022-12-05 | Stop reason: HOSPADM

## 2022-12-02 RX ORDER — SUMATRIPTAN 50 MG/1
50 TABLET, FILM COATED ORAL
Status: CANCELLED | OUTPATIENT
Start: 2022-12-02

## 2022-12-02 RX ORDER — ONDANSETRON 4 MG/1
4 TABLET, ORALLY DISINTEGRATING ORAL EVERY 4 HOURS PRN
Status: DISCONTINUED | OUTPATIENT
Start: 2022-12-02 | End: 2022-12-05 | Stop reason: HOSPADM

## 2022-12-02 RX ORDER — OXYCODONE AND ACETAMINOPHEN 10; 325 MG/1; MG/1
1 TABLET ORAL 3 TIMES DAILY PRN
COMMUNITY
End: 2023-01-17 | Stop reason: SDUPTHER

## 2022-12-02 RX ORDER — BISACODYL 10 MG
10 SUPPOSITORY, RECTAL RECTAL
Status: DISCONTINUED | OUTPATIENT
Start: 2022-12-02 | End: 2022-12-05 | Stop reason: HOSPADM

## 2022-12-02 RX ADMIN — LISINOPRIL 10 MG: 10 TABLET ORAL at 17:39

## 2022-12-02 RX ADMIN — HYDRALAZINE HYDROCHLORIDE 20 MG: 20 INJECTION INTRAMUSCULAR; INTRAVENOUS at 22:59

## 2022-12-02 RX ADMIN — IPRATROPIUM BROMIDE AND ALBUTEROL SULFATE 3 ML: .5; 2.5 SOLUTION RESPIRATORY (INHALATION) at 20:26

## 2022-12-02 RX ADMIN — IOHEXOL 80 ML: 350 INJECTION, SOLUTION INTRAVENOUS at 17:14

## 2022-12-02 RX ADMIN — ONDANSETRON 4 MG: 2 INJECTION INTRAMUSCULAR; INTRAVENOUS at 15:46

## 2022-12-02 RX ADMIN — ENOXAPARIN SODIUM 40 MG: 40 INJECTION SUBCUTANEOUS at 22:59

## 2022-12-02 RX ADMIN — OXYCODONE 5 MG: 5 TABLET ORAL at 23:01

## 2022-12-02 RX ADMIN — HYDROMORPHONE HYDROCHLORIDE 0.3 MG: 1 INJECTION, SOLUTION INTRAMUSCULAR; INTRAVENOUS; SUBCUTANEOUS at 15:46

## 2022-12-02 RX ADMIN — CEFTRIAXONE SODIUM 1000 MG: 10 INJECTION, POWDER, FOR SOLUTION INTRAVENOUS at 23:31

## 2022-12-02 RX ADMIN — FUROSEMIDE 40 MG: 10 INJECTION, SOLUTION INTRAMUSCULAR; INTRAVENOUS at 22:59

## 2022-12-02 RX ADMIN — POTASSIUM CHLORIDE 40 MEQ: 1500 TABLET, EXTENDED RELEASE ORAL at 22:58

## 2022-12-02 ASSESSMENT — ENCOUNTER SYMPTOMS
FLANK PAIN: 0
HEADACHES: 0
BLURRED VISION: 0
SPUTUM PRODUCTION: 0
SPEECH CHANGE: 0
PHOTOPHOBIA: 0
TREMORS: 0
NAUSEA: 0
WEIGHT LOSS: 0
COUGH: 0
FEVER: 0
BACK PAIN: 1
FALLS: 1
HALLUCINATIONS: 0
POLYDIPSIA: 0
HEARTBURN: 0
HEMOPTYSIS: 0
CHILLS: 0
FOCAL WEAKNESS: 0
DOUBLE VISION: 0
VOMITING: 0
NECK PAIN: 0
NERVOUS/ANXIOUS: 0
ORTHOPNEA: 0
BRUISES/BLEEDS EASILY: 0
PALPITATIONS: 0

## 2022-12-02 ASSESSMENT — LIFESTYLE VARIABLES
ON A TYPICAL DAY WHEN YOU DRINK ALCOHOL HOW MANY DRINKS DO YOU HAVE: 0
AVERAGE NUMBER OF DAYS PER WEEK YOU HAVE A DRINK CONTAINING ALCOHOL: 0
HOW MANY TIMES IN THE PAST YEAR HAVE YOU HAD 5 OR MORE DRINKS IN A DAY: 0
TOTAL SCORE: 0
EVER FELT BAD OR GUILTY ABOUT YOUR DRINKING: NO
CONSUMPTION TOTAL: NEGATIVE
ALCOHOL_USE: NO
EVER HAD A DRINK FIRST THING IN THE MORNING TO STEADY YOUR NERVES TO GET RID OF A HANGOVER: NO
HAVE YOU EVER FELT YOU SHOULD CUT DOWN ON YOUR DRINKING: NO
DOES PATIENT WANT TO STOP DRINKING: NO
SUBSTANCE_ABUSE: 0
HAVE PEOPLE ANNOYED YOU BY CRITICIZING YOUR DRINKING: NO

## 2022-12-02 ASSESSMENT — PATIENT HEALTH QUESTIONNAIRE - PHQ9
1. LITTLE INTEREST OR PLEASURE IN DOING THINGS: NOT AT ALL
SUM OF ALL RESPONSES TO PHQ9 QUESTIONS 1 AND 2: 0
2. FEELING DOWN, DEPRESSED, IRRITABLE, OR HOPELESS: NOT AT ALL

## 2022-12-02 ASSESSMENT — COGNITIVE AND FUNCTIONAL STATUS - GENERAL
PERSONAL GROOMING: TOTAL
TOILETING: TOTAL
SUGGESTED CMS G CODE MODIFIER MOBILITY: CN
DAILY ACTIVITIY SCORE: 6
MOVING FROM LYING ON BACK TO SITTING ON SIDE OF FLAT BED: UNABLE
MOBILITY SCORE: 6
STANDING UP FROM CHAIR USING ARMS: TOTAL
DRESSING REGULAR UPPER BODY CLOTHING: TOTAL
MOVING TO AND FROM BED TO CHAIR: UNABLE
HELP NEEDED FOR BATHING: TOTAL
DRESSING REGULAR LOWER BODY CLOTHING: TOTAL
EATING MEALS: TOTAL
SUGGESTED CMS G CODE MODIFIER DAILY ACTIVITY: CN
WALKING IN HOSPITAL ROOM: TOTAL
CLIMB 3 TO 5 STEPS WITH RAILING: TOTAL
TURNING FROM BACK TO SIDE WHILE IN FLAT BAD: UNABLE

## 2022-12-02 ASSESSMENT — FIBROSIS 4 INDEX: FIB4 SCORE: 1.8

## 2022-12-02 ASSESSMENT — PAIN DESCRIPTION - PAIN TYPE: TYPE: ACUTE PAIN

## 2022-12-02 NOTE — ED TRIAGE NOTES
Chief Complaint   Patient presents with   • Low Back Pain   • T-5000 GLF     Vitals:    12/02/22 1215   BP: (!) 171/88   Pulse: 78   Resp: 16   Temp: 36.9 °C (98.4 °F)   SpO2: 97%     Pt was normally can walk around her home but today was getting up and felt right knee weakness resulting in a GLF, pt c/o low back pain. She denies hitting her head, no thinners.     EMS was called because her  couldn't get her up. She was given 100 mcg intranasal fentanyl and 4mg IM zofran. Pt does not normally wear oxygen. She was placed on 4L s/p med admin. RA 70%.

## 2022-12-02 NOTE — ED PROVIDER NOTES
"ED Provider Note    Scribed for Jordan Alvarez M.D. by Cielo Walker on 12/2/2022 at 5:27 PM.     Means of arrival: EMS  History obtained from: Patient  History limited by: None     CHIEF COMPLAINT  Chief Complaint   Patient presents with    Low Back Pain    T-5000 GLF     HPI  Sheyla Garcia is a 72 y.o. female who presents after a fall when her right knee \"gave out\" on her.  She has a history of hypertension, obesity, seizures, asthma.  She was noted to be hypoxic around 70% room air, although was not clear whether this was after fentanyl was administered.  She states her normal oxygen saturation around 90%.  She denies any recent travel surgery or leg swelling.  She denies shortness of breath.  No vomiting or diarrhea.  She also complains of back pain after her fall.  She does state that the fall was essentially a slide down to the ground.  She did not hit her head or have LOC.  No chest pain.    REVIEW OF SYSTEMS  Pertinent positives include lower back pain. Pertinent negatives include no shortness of breath, vomiting, diarrhea, or chest pain. All other systems are negative.     PAST MEDICAL HISTORY  Past Medical History:   Diagnosis Date    Asthma 08/05/2021    Inhaler use daily and as needed.  \"Allergy induced\".    Bowel habit changes     diarrhea    Breath shortness 08/05/2021    \"Sometimes hard time catching my breath, usually from allergies\".    Emphysema of lung (HCC) 08/05/2021    6-7 years ago, oxygen for about 4-5 months.  No longer needs O2.    Fibromyalgia     Heart burn     Hypertension     Hypothyroidism 2008    Indigestion     Migraine     Obesity     Pneumonia 2012    Psychiatric problem     Anxiety    Renal disorder 08/05/2021    Kidney stones - 20 years ago.    Seizure (HCC) 2017    \"Caused from gabapentin\"    Urinary incontinence      FAMILY HISTORY  Family History   Problem Relation Age of Onset    Heart Disease Mother         chf    Cancer Father         lung, berrylium exposure    Stroke " Father         tia's    Cancer Maternal Grandfather         lung    Diabetes Neg Hx      SOCIAL HISTORY  Social History     Socioeconomic History    Marital status:    Tobacco Use    Smoking status: Former     Years: 2.00     Types: Cigarettes    Smokeless tobacco: Never    Tobacco comments:     Age [age]    Vaping Use    Vaping Use: Never used   Substance and Sexual Activity    Alcohol use: Yes     Comment: Wine - daily    Drug use: Never     Social Determinants of Health     Financial Resource Strain: Medium Risk    Difficulty of Paying Living Expenses: Somewhat hard   Food Insecurity: Food Insecurity Present    Worried About Running Out of Food in the Last Year: Sometimes true    Ran Out of Food in the Last Year: Patient refused   Transportation Needs: No Transportation Needs    Lack of Transportation (Medical): No    Lack of Transportation (Non-Medical): No   Physical Activity: Insufficiently Active    Days of Exercise per Week: 3 days    Minutes of Exercise per Session: 10 min   Stress: Stress Concern Present    Feeling of Stress : To some extent   Social Connections: Moderately Isolated    Frequency of Communication with Friends and Family: Twice a week    Frequency of Social Gatherings with Friends and Family: Once a week    Attends Mosque Services: Never    Active Member of Clubs or Organizations: No    Attends Club or Organization Meetings: Never    Marital Status:    Housing Stability: Low Risk     Unable to Pay for Housing in the Last Year: No    Number of Places Lived in the Last Year: 1    Unstable Housing in the Last Year: No     SURGICAL HISTORY  Past Surgical History:   Procedure Laterality Date    THORACIC FUSION O-ARM Right 10/13/2021    Procedure: INSERTION, SHUNT, VENTRICULOPERITONEAL, USING FRAMELESS STEREOTAXY - FRONTAL;  Surgeon: Shady Glynn M.D.;  Location: SURGERY [facility];  Service: Neurosurgery    CATH PLACEMENT CAPD N/A 10/13/2021    Procedure: INSERTION,  CATHETER, CAPD;  Surgeon: Shady Glynn M.D.;  Location: SURGERY [facility];  Service: Neurosurgery    ORIF, KNEE  1/3/08    tibial plateau fracture    BLADDER SLING FEMALE      GYN SURGERY      Hysterectomy    NASAL SEPTAL RECONSTRUCTION      age 26    OTHER ORTHOPEDIC SURGERY      Bilateral tibial fracture    OTHER SURGICAL PROCEDURE      SI joint fusion - Dr. Pineda 1/2019    TONSILLECTOMY       CURRENT MEDICATIONS  Home Medications       Reviewed by Kenzie Yuen R.N. (Registered Nurse) on 12/02/22 at 1208  Med List Status: <None>     Medication Last Dose Status   albuterol (PROVENTIL) 2.5mg/3ml Nebu Soln solution for nebulization  Active   albuterol 108 (90 Base) MCG/ACT Aero Soln inhalation aerosol  Active   amitriptyline (ELAVIL) 25 MG Tab  Active   ARIPiprazole (ABILIFY) 10 MG Tab  Active   B Complex-C-Folic Acid (SUPER B-COMPLEX/VIT C/FA PO)  Active   BETA CAROTENE PO  Active   colestipol (COLESTID) 1 GM Tab  Active   dicyclomine (BENTYL) 20 MG Tab  Active   docusate sodium (COLACE) 100 MG Cap  Active   DULoxetine (CYMBALTA) 30 MG Cap DR Particles  Active   estradiol (ESTRACE) 1 MG Tab  Active   Famotidine (PEPCID PO)  Active   Fluticasone Furoate-Vilanterol (BREO ELLIPTA) 200-25 MCG/INH AEROSOL POWDER, BREATH ACTIVATED  Active   HYDROcodone-acetaminophen (NORCO) 5-325 MG Tab per tablet  Active   ibuprofen (MOTRIN) 200 MG Tab  Active   lamoTRIgine (LAMICTAL) 25 MG Tab  Active   lidocaine (LIDODERM) 5 % Patch  Active   lisinopril (PRINIVIL) 10 MG Tab  Active   methocarbamol (ROBAXIN) 750 MG Tab  Active   propranolol (INDERAL) 10 MG Tab  Active   Pseudoeph-Doxylamine-DM-APAP (NYQUIL PO)  Active   SUMAtriptan (IMITREX) 50 MG Tab  Active   therapeutic multivitamin-minerals (THERAGRAN-M) Tab  Active   Vitamin E 400 UNIT Tab  Active                  ALLERGIES  Allergies   Allergen Reactions    Gabapentin Unspecified     seizure     PHYSICAL EXAM  VITAL SIGNS: BP (!) 167/88   Pulse 81   Temp 36.9 °C  "(98.4 °F) (Temporal)   Resp 16   Ht 1.778 m (5' 10\")   Wt 118 kg (261 lb)   SpO2 96%   BMI 37.45 kg/m²      Constitutional: Well developed, Well nourished, No acute distress, Non-toxic appearance.   HENT: Normocephalic, Atraumatic, mucous membranes moist, no erythema, exudates, swelling, or masses, nares patent  Eyes: nonicteric  Neck: Supple, no meningismus  Lymphatic: No lymphadenopathy noted.   Cardiovascular: Regular rate and rhythm, no gallops rubs or murmurs  Lungs: Clear bilaterally   Abdomen: Soft and nontender throughout  Skin: Warm, Dry, no rash  Back: Mild tenderness in the lower back, mostly paraspinally, no point tenderness noted  Genitalia: Deferred  Rectal: Deferred  Extremities: Full range of motion of both lower extremities with tenderness diffusely throughout the right knee primarily, drawer testing is negative bilaterally, distal pulses 2+, motor and sensory function grossly intact  Neurologic: Alert, appropriate, follows commands, moving all extremities, normal speech   Psychiatric: Affect normal    EKG  Obtained at 2:14 PM  Normal Sinus rhythm   Rate 84  Axis normal   Intervals normal   No ST segment elevation or depression.     RADIOLOGY/PROCEDURES  Results for orders placed or performed during the hospital encounter of 12/02/22   CBC WITH DIFFERENTIAL   Result Value Ref Range    WBC 12.7 (H) 4.8 - 10.8 K/uL    RBC 5.16 4.20 - 5.40 M/uL    Hemoglobin 15.8 12.0 - 16.0 g/dL    Hematocrit 47.7 (H) 37.0 - 47.0 %    MCV 92.4 81.4 - 97.8 fL    MCH 30.6 27.0 - 33.0 pg    MCHC 33.1 (L) 33.6 - 35.0 g/dL    RDW 43.1 35.9 - 50.0 fL    Platelet Count 248 164 - 446 K/uL    MPV 9.7 9.0 - 12.9 fL    Neutrophils-Polys 74.40 (H) 44.00 - 72.00 %    Lymphocytes 14.80 (L) 22.00 - 41.00 %    Monocytes 8.30 0.00 - 13.40 %    Eosinophils 1.60 0.00 - 6.90 %    Basophils 0.30 0.00 - 1.80 %    Immature Granulocytes 0.60 0.00 - 0.90 %    Nucleated RBC 0.00 /100 WBC    Neutrophils (Absolute) 9.46 (H) 2.00 - 7.15 K/uL "    Lymphs (Absolute) 1.89 1.00 - 4.80 K/uL    Monos (Absolute) 1.06 (H) 0.00 - 0.85 K/uL    Eos (Absolute) 0.21 0.00 - 0.51 K/uL    Baso (Absolute) 0.04 0.00 - 0.12 K/uL    Immature Granulocytes (abs) 0.08 0.00 - 0.11 K/uL    NRBC (Absolute) 0.00 K/uL   COMP METABOLIC PANEL   Result Value Ref Range    Sodium 141 135 - 145 mmol/L    Potassium 3.4 (L) 3.6 - 5.5 mmol/L    Chloride 105 96 - 112 mmol/L    Co2 25 20 - 33 mmol/L    Anion Gap 11.0 7.0 - 16.0    Glucose 91 65 - 99 mg/dL    Bun 10 8 - 22 mg/dL    Creatinine 0.42 (L) 0.50 - 1.40 mg/dL    Calcium 9.4 8.5 - 10.5 mg/dL    AST(SGOT) 19 12 - 45 U/L    ALT(SGPT) 7 2 - 50 U/L    Alkaline Phosphatase 81 30 - 99 U/L    Total Bilirubin 0.6 0.1 - 1.5 mg/dL    Albumin 4.0 3.2 - 4.9 g/dL    Total Protein 7.1 6.0 - 8.2 g/dL    Globulin 3.1 1.9 - 3.5 g/dL    A-G Ratio 1.3 g/dL   TROPONIN   Result Value Ref Range    Troponin T 11 6 - 19 ng/L   proBrain Natriuretic Peptide, NT   Result Value Ref Range    NT-proBNP 476 (H) 0 - 125 pg/mL   URINALYSIS (UA)    Specimen: Urine   Result Value Ref Range    Color Yellow     Character Hazy (A)     Specific Gravity 1.020 <1.035    Ph 6.0 5.0 - 8.0    Glucose Negative Negative mg/dL    Ketones 40 (A) Negative mg/dL    Protein Negative Negative mg/dL    Bilirubin Small (A) Negative    Urobilinogen, Urine 0.2 Negative    Nitrite Negative Negative    Leukocyte Esterase Small (A) Negative    Occult Blood Trace (A) Negative    Micro Urine Req Microscopic    D-DIMER   Result Value Ref Range    D-Dimer Screen 0.83 (H) 0.00 - 0.50 ug/mL (FEU)   ESTIMATED GFR   Result Value Ref Range    GFR (CKD-EPI) 105 >60 mL/min/1.73 m 2   URINE MICROSCOPIC (W/UA)   Result Value Ref Range    WBC 10-20 (A) /hpf    RBC 0-2 /hpf    Bacteria Moderate (A) None /hpf    Epithelial Cells Negative /hpf    Mucous Threads Moderate /hpf    Hyaline Cast 3-5 (A) /lpf   EKG (NOW)   Result Value Ref Range    Report       [facility] Emergency Dept.    Test  Date:  2022  Pt Name:    MENDOZA ROMAN                 Department: ER  MRN:        [id number]                      Room:       BL 21 H  Gender:     Female                       Technician:  :        1950                   Requested By:SANA BRANHAM  Order #:    [id number]                    Reading MD:    Measurements  Intervals                                Axis  Rate:       84                           P:          14  NM:         180                          QRS:        3  QRSD:       104                          T:          46  QT:         388  QTc:        459    Interpretive Statements  Sinus rhythm  Compared to ECG 10/06/2021 15:47:53  No significant changes       CT-CTA CHEST PULMONARY ARTERY W/ RECONS   Final Result      1.  Negative for pulmonary embolism      2.  Mild left basilar atelectasis      3.  Hepatic steatosis and hepatomegaly            CT-LSPINE W/O PLUS RECONS   Final Result      1.  Negative for lumbar spine fracture      2.  Multilevel facet arthropathy      3.  Mild scoliosis      4.  Fusion across the left sacroiliac joint      5.  Peritoneal catheter present      DX-KNEE 3 VIEWS RIGHT   Final Result         1.   No radiographic evidence of acute injury.      2.  Moderate osteoarthritic changes of the patellofemoral joint and lateral compartment.      DX-KNEE 3 VIEWS LEFT   Final Result      1.  No radiographic evidence of acute traumatic injury.   2.  Mild degenerative changes of the medial and patellofemoral compartments.      DX-CHEST-PORTABLE (1 VIEW)   Final Result      No acute cardiac or pulmonary abnormalities are identified.        COURSE & MEDICAL DECISION MAKING  Pertinent Labs & Imaging studies reviewed. (See chart for details)    1:59 PM - Patient seen and examined at bedside. Discussed plan of care, including labs and radiology. Patient verbalizes understanding and agreement to this plan of care. Ordered for CT-Lspine w/o plus recons, DX-Chest Portable, DX-Knee Left 3  views, DX-Knee Right 3 views, CT-CTA Chest Pulmonary Artery w/ Recons, D-Dimer, UA, proBNP, Troponin, CMP, CBC w/ Diff, and Estimated GFR. Patient will be treated with Dilaudid injection 0.3 mg and Zofran injection 4 mg.    5:23 PM - Patient will be treated with lisinopril tablet 10 mg. Ordered Urine Microscopic w/ UA to evaluate.     6:33 PM - Patient was reevaluated at bedside. Discussed lab and radiology results with the patient and informed them that they are negative for PE or other acute cardiac or pulmonary abnormalities. Patient's O2 in mid 80s, but usually in upper 80s or low 90s. Will see about providing the patient with at home oxygen.    8:03 PM - Unable to provide patient with at home oxygen. Will admit for further monitoring. [place] hospitalist.    2016 - I discussed the patient's case and the above findings with (Hospitalist) who agrees to evaluate the patient for hospitalization.    Decision Making  This is a 72 y.o. female who presents with a apparent mechanical fall.  She is hypoxic on arrival and was placed on about 3 L.  It is unclear whether this was related to fentanyl given in route.  However she has remained on oxygen here and we cannot titrate her off.  She did receive a very low dose of hydromorphone on arrival for pain.  Notably she is on pain management at home.  Patient has no history of blood clots-she was evaluated for that here and there is no evidence of PE.  BNP is borderline elevated but there is no evidence of edema on imaging of the chest.  The patient is not wheezing on exam-she has clear lungs to my auscultation.  However, she desats to 85% on room air here.  She does state that she has oxygens in the 88-90 range normally.  I attempted to arrange home oxygen but cannot do that overnight.  The patient will be admitted for hypoxia.    The patient will return for new or worsening symptoms and is stable at the time of discharge.    DISPOSITION:  Patient will be hospitalized in  guarded condition.    FINAL IMPRESSION  1. Hypoxia      Electronically signed by: Jordan Alvarez M.D., 12/2/2022 1:59 PM

## 2022-12-03 PROBLEM — N30.00 ACUTE CYSTITIS WITHOUT HEMATURIA: Status: ACTIVE | Noted: 2022-12-02

## 2022-12-03 LAB
ALBUMIN SERPL BCP-MCNC: 4.3 G/DL (ref 3.2–4.9)
ALBUMIN/GLOB SERPL: 1.4 G/DL
ALP SERPL-CCNC: 84 U/L (ref 30–99)
ALT SERPL-CCNC: 8 U/L (ref 2–50)
ANION GAP SERPL CALC-SCNC: 14 MMOL/L (ref 7–16)
AST SERPL-CCNC: 18 U/L (ref 12–45)
BASOPHILS # BLD AUTO: 0.3 % (ref 0–1.8)
BASOPHILS # BLD: 0.03 K/UL (ref 0–0.12)
BILIRUB SERPL-MCNC: 0.5 MG/DL (ref 0.1–1.5)
BUN SERPL-MCNC: 7 MG/DL (ref 8–22)
CALCIUM SERPL-MCNC: 9.5 MG/DL (ref 8.5–10.5)
CHLORIDE SERPL-SCNC: 104 MMOL/L (ref 96–112)
CO2 SERPL-SCNC: 26 MMOL/L (ref 20–33)
CREAT SERPL-MCNC: 0.38 MG/DL (ref 0.5–1.4)
EOSINOPHIL # BLD AUTO: 0.03 K/UL (ref 0–0.51)
EOSINOPHIL NFR BLD: 0.3 % (ref 0–6.9)
ERYTHROCYTE [DISTWIDTH] IN BLOOD BY AUTOMATED COUNT: 43.3 FL (ref 35.9–50)
GFR SERPLBLD CREATININE-BSD FMLA CKD-EPI: 108 ML/MIN/1.73 M 2
GLOBULIN SER CALC-MCNC: 3.1 G/DL (ref 1.9–3.5)
GLUCOSE SERPL-MCNC: 111 MG/DL (ref 65–99)
HCT VFR BLD AUTO: 50.2 % (ref 37–47)
HGB BLD-MCNC: 16.4 G/DL (ref 12–16)
IMM GRANULOCYTES # BLD AUTO: 0.09 K/UL (ref 0–0.11)
IMM GRANULOCYTES NFR BLD AUTO: 0.8 % (ref 0–0.9)
LYMPHOCYTES # BLD AUTO: 1.4 K/UL (ref 1–4.8)
LYMPHOCYTES NFR BLD: 11.9 % (ref 22–41)
MCH RBC QN AUTO: 30.2 PG (ref 27–33)
MCHC RBC AUTO-ENTMCNC: 32.7 G/DL (ref 33.6–35)
MCV RBC AUTO: 92.4 FL (ref 81.4–97.8)
MONOCYTES # BLD AUTO: 0.89 K/UL (ref 0–0.85)
MONOCYTES NFR BLD AUTO: 7.6 % (ref 0–13.4)
NEUTROPHILS # BLD AUTO: 9.29 K/UL (ref 2–7.15)
NEUTROPHILS NFR BLD: 79.1 % (ref 44–72)
NRBC # BLD AUTO: 0 K/UL
NRBC BLD-RTO: 0 /100 WBC
PLATELET # BLD AUTO: 264 K/UL (ref 164–446)
PMV BLD AUTO: 9.4 FL (ref 9–12.9)
POTASSIUM SERPL-SCNC: 3.4 MMOL/L (ref 3.6–5.5)
PROCALCITONIN SERPL-MCNC: 0.06 NG/ML
PROT SERPL-MCNC: 7.4 G/DL (ref 6–8.2)
RBC # BLD AUTO: 5.43 M/UL (ref 4.2–5.4)
SODIUM SERPL-SCNC: 144 MMOL/L (ref 135–145)
TSH SERPL DL<=0.005 MIU/L-ACNC: 1.23 UIU/ML (ref 0.38–5.33)
WBC # BLD AUTO: 11.7 K/UL (ref 4.8–10.8)

## 2022-12-03 PROCEDURE — 700102 HCHG RX REV CODE 250 W/ 637 OVERRIDE(OP): Performed by: NURSE PRACTITIONER

## 2022-12-03 PROCEDURE — 97163 PT EVAL HIGH COMPLEX 45 MIN: CPT

## 2022-12-03 PROCEDURE — A9270 NON-COVERED ITEM OR SERVICE: HCPCS | Performed by: NURSE PRACTITIONER

## 2022-12-03 PROCEDURE — 99232 SBSQ HOSP IP/OBS MODERATE 35: CPT | Performed by: NURSE PRACTITIONER

## 2022-12-03 PROCEDURE — 94664 DEMO&/EVAL PT USE INHALER: CPT

## 2022-12-03 PROCEDURE — 770001 HCHG ROOM/CARE - MED/SURG/GYN PRIV*

## 2022-12-03 PROCEDURE — 80053 COMPREHEN METABOLIC PANEL: CPT

## 2022-12-03 PROCEDURE — 97535 SELF CARE MNGMENT TRAINING: CPT

## 2022-12-03 PROCEDURE — 700102 HCHG RX REV CODE 250 W/ 637 OVERRIDE(OP): Performed by: INTERNAL MEDICINE

## 2022-12-03 PROCEDURE — 85025 COMPLETE CBC W/AUTO DIFF WBC: CPT

## 2022-12-03 PROCEDURE — 84145 PROCALCITONIN (PCT): CPT

## 2022-12-03 PROCEDURE — 84443 ASSAY THYROID STIM HORMONE: CPT

## 2022-12-03 PROCEDURE — 700111 HCHG RX REV CODE 636 W/ 250 OVERRIDE (IP): Performed by: INTERNAL MEDICINE

## 2022-12-03 PROCEDURE — 36415 COLL VENOUS BLD VENIPUNCTURE: CPT

## 2022-12-03 PROCEDURE — A9270 NON-COVERED ITEM OR SERVICE: HCPCS | Performed by: INTERNAL MEDICINE

## 2022-12-03 PROCEDURE — 97602 WOUND(S) CARE NON-SELECTIVE: CPT

## 2022-12-03 RX ORDER — METHOCARBAMOL 750 MG/1
750 TABLET, FILM COATED ORAL 3 TIMES DAILY PRN
Status: DISCONTINUED | OUTPATIENT
Start: 2022-12-03 | End: 2022-12-05 | Stop reason: HOSPADM

## 2022-12-03 RX ORDER — SUMATRIPTAN 50 MG/1
50 TABLET, FILM COATED ORAL
Status: DISCONTINUED | OUTPATIENT
Start: 2022-12-03 | End: 2022-12-05 | Stop reason: HOSPADM

## 2022-12-03 RX ORDER — IPRATROPIUM BROMIDE AND ALBUTEROL SULFATE 2.5; .5 MG/3ML; MG/3ML
3 SOLUTION RESPIRATORY (INHALATION)
Status: DISCONTINUED | OUTPATIENT
Start: 2022-12-03 | End: 2022-12-05 | Stop reason: HOSPADM

## 2022-12-03 RX ORDER — LISINOPRIL 10 MG/1
10 TABLET ORAL DAILY
Status: DISCONTINUED | OUTPATIENT
Start: 2022-12-03 | End: 2022-12-05 | Stop reason: HOSPADM

## 2022-12-03 RX ORDER — NYSTATIN 100000 [USP'U]/G
POWDER TOPICAL 3 TIMES DAILY
Status: DISCONTINUED | OUTPATIENT
Start: 2022-12-03 | End: 2022-12-05 | Stop reason: HOSPADM

## 2022-12-03 RX ORDER — ALBUTEROL SULFATE 90 UG/1
2 AEROSOL, METERED RESPIRATORY (INHALATION) EVERY 6 HOURS PRN
Status: DISCONTINUED | OUTPATIENT
Start: 2022-12-03 | End: 2022-12-05 | Stop reason: HOSPADM

## 2022-12-03 RX ORDER — POTASSIUM CHLORIDE 20 MEQ/1
20 TABLET, EXTENDED RELEASE ORAL 2 TIMES DAILY
Status: COMPLETED | OUTPATIENT
Start: 2022-12-03 | End: 2022-12-03

## 2022-12-03 RX ORDER — PROPRANOLOL HYDROCHLORIDE 10 MG/1
10 TABLET ORAL 2 TIMES DAILY
Status: DISCONTINUED | OUTPATIENT
Start: 2022-12-03 | End: 2022-12-05 | Stop reason: HOSPADM

## 2022-12-03 RX ORDER — LAMOTRIGINE 100 MG/1
25 TABLET ORAL EVERY EVENING
Status: DISCONTINUED | OUTPATIENT
Start: 2022-12-03 | End: 2022-12-05 | Stop reason: HOSPADM

## 2022-12-03 RX ORDER — AMITRIPTYLINE HYDROCHLORIDE 25 MG/1
25 TABLET, FILM COATED ORAL
Status: DISCONTINUED | OUTPATIENT
Start: 2022-12-03 | End: 2022-12-05 | Stop reason: HOSPADM

## 2022-12-03 RX ORDER — ARIPIPRAZOLE 10 MG/1
10 TABLET ORAL EVERY EVENING
Status: DISCONTINUED | OUTPATIENT
Start: 2022-12-03 | End: 2022-12-05 | Stop reason: HOSPADM

## 2022-12-03 RX ORDER — BUDESONIDE AND FORMOTEROL FUMARATE DIHYDRATE 160; 4.5 UG/1; UG/1
2 AEROSOL RESPIRATORY (INHALATION)
Status: DISCONTINUED | OUTPATIENT
Start: 2022-12-04 | End: 2022-12-05 | Stop reason: HOSPADM

## 2022-12-03 RX ADMIN — OXYCODONE HYDROCHLORIDE 10 MG: 10 TABLET ORAL at 10:52

## 2022-12-03 RX ADMIN — DOCUSATE SODIUM 50 MG AND SENNOSIDES 8.6 MG 2 TABLET: 8.6; 5 TABLET, FILM COATED ORAL at 18:00

## 2022-12-03 RX ADMIN — ENOXAPARIN SODIUM 40 MG: 40 INJECTION SUBCUTANEOUS at 18:09

## 2022-12-03 RX ADMIN — CEFTRIAXONE SODIUM 1000 MG: 10 INJECTION, POWDER, FOR SOLUTION INTRAVENOUS at 20:10

## 2022-12-03 RX ADMIN — OXYCODONE HYDROCHLORIDE 10 MG: 10 TABLET ORAL at 18:17

## 2022-12-03 RX ADMIN — AMITRIPTYLINE HYDROCHLORIDE 25 MG: 25 TABLET, FILM COATED ORAL at 20:10

## 2022-12-03 RX ADMIN — LAMOTRIGINE 25 MG: 100 TABLET ORAL at 18:09

## 2022-12-03 RX ADMIN — NYSTATIN: 100000 POWDER TOPICAL at 18:09

## 2022-12-03 RX ADMIN — PROPRANOLOL HYDROCHLORIDE 10 MG: 10 TABLET ORAL at 18:09

## 2022-12-03 RX ADMIN — POTASSIUM CHLORIDE 20 MEQ: 1500 TABLET, EXTENDED RELEASE ORAL at 18:09

## 2022-12-03 RX ADMIN — LISINOPRIL 10 MG: 10 TABLET ORAL at 12:11

## 2022-12-03 RX ADMIN — ARIPIPRAZOLE 10 MG: 10 TABLET ORAL at 18:09

## 2022-12-03 RX ADMIN — POTASSIUM CHLORIDE 20 MEQ: 1500 TABLET, EXTENDED RELEASE ORAL at 08:25

## 2022-12-03 ASSESSMENT — PAIN DESCRIPTION - PAIN TYPE
TYPE: ACUTE PAIN
TYPE: ACUTE PAIN

## 2022-12-03 ASSESSMENT — PATIENT HEALTH QUESTIONNAIRE - PHQ9
2. FEELING DOWN, DEPRESSED, IRRITABLE, OR HOPELESS: NOT AT ALL
1. LITTLE INTEREST OR PLEASURE IN DOING THINGS: NOT AT ALL
SUM OF ALL RESPONSES TO PHQ9 QUESTIONS 1 AND 2: 0

## 2022-12-03 ASSESSMENT — ENCOUNTER SYMPTOMS: FALLS: 1

## 2022-12-03 ASSESSMENT — GAIT ASSESSMENTS: GAIT LEVEL OF ASSIST: UNABLE TO PARTICIPATE

## 2022-12-03 ASSESSMENT — COGNITIVE AND FUNCTIONAL STATUS - GENERAL
WALKING IN HOSPITAL ROOM: TOTAL
SUGGESTED CMS G CODE MODIFIER MOBILITY: CN
MOVING TO AND FROM BED TO CHAIR: UNABLE
MOVING FROM LYING ON BACK TO SITTING ON SIDE OF FLAT BED: UNABLE
MOBILITY SCORE: 6
STANDING UP FROM CHAIR USING ARMS: TOTAL
TURNING FROM BACK TO SIDE WHILE IN FLAT BAD: UNABLE
CLIMB 3 TO 5 STEPS WITH RAILING: TOTAL

## 2022-12-03 NOTE — ED NOTES
PT moved from [facility] to  21. Attached to Gro and provided fresh gown.

## 2022-12-03 NOTE — ASSESSMENT & PLAN NOTE
History of COPD, and reportedly SPO2 88% at baseline on room air, does not use oxygen at home.  History of COPD and sleep apnea.  Was given fentanyl 100 mcg in route to the hospital and  on chronic opiate therapy  CT of pulmonary artery negative for PE, proBNP elevated at 400, EKG negative for acute findings  Plan: Will try DuoNeb and Lasix.  Continue home inhalers.  RT protocol, pulse oximetry, supplemental oxygen, incentive spirometry  Likely some chronic hypoxia secondary to patient's body habitus and decreased mobility with history of COPD and sleep apnea  Plan for discharge home with home oxygen, with case management and patient

## 2022-12-03 NOTE — ASSESSMENT & PLAN NOTE
UA positive for pyuria, bacteriuria, leukocyte esterase  Patient denies urinary symptoms but she is somewhat confused, WBC elevated at 13  Darted on ceftriaxone empirically for 5 days, may need to extend, patient has history of chronic cystitis with confusion

## 2022-12-03 NOTE — DISCHARGE PLANNING
Received Choice form at 1233  Agency/Facility Name: [facility]  Referral sent per Choice form @ 3085     @[phone]  Agency/Facility Name: [facility]  Spoke To: Sharon  Outcome: Per Sharon, he will have the on call  follow up with DPA with an ETA for the O2.  RN CM notified

## 2022-12-03 NOTE — FACE TO FACE
"Face to Face Note  -  Durable Medical Equipment    Jordan Alvarez M.D. - NPI: [id number]  I certify that this patient is under my care and that they had a durable medical equipment(DME)face to face encounter by myself that meets the physician DME face-to-face encounter requirements with this patient on:    Date of encounter:   Patient:                    MRN:                       YOB: 2020  Sheyla Garcia  [id number]  1950     The encounter with the patient was in whole, or in part, for the following medical condition, which is the primary reason for durable medical equipment:  Other - hypoxia    I certify that, based on my findings, the following durable medical equipment is medically necessary:    Oxygen   HOME O2 Saturation Measurements:(Values must be present for Home Oxygen orders)      85%RA     If patient feels more short of breath, they can go up to 6 liters per minute and contact healthcare provider.    Supporting Symptoms: The patient requires supplemental oxygen, as the following interventions have been tried with limited or no improvement: \"Incentive spirometry.    My Clinical findings support the need for the above equipment due to:  Hypoxia  "

## 2022-12-03 NOTE — CARE PLAN
The patient is Stable - Low risk of patient condition declining or worsening    Shift Goals  Clinical Goals: manage pain  Patient Goals: manage pain  Family Goals: manage pain    Progress made toward(s) clinical / shift goals:  pt states pain is under control. Pt using call light when needs help, bed alarm on.  Problem: Fall Risk  Goal: Patient will remain free from falls  Outcome: Progressing

## 2022-12-03 NOTE — ASSESSMENT & PLAN NOTE
Uncontrolled, systolic blood pressure of 200, that may be related to pain  Patient states she is not taking any blood pressure medications  Will give hydralazine 20 mg, IV Lasix 40 mg for possible mild fluid overload  Monitor blood pressure

## 2022-12-03 NOTE — HOSPITAL COURSE
Sheyla Garcia is a 72 y.o. female with past medical history of obesity, COPD, GERD, fibromyalgia, chronic pain, debility, seizure, hypertension who presented 12/2/2022 with complaints of lower back pain up to 9 out of 10 worsening with movement after she slide down to the ground, after she was trying to get up and her knee gave up.  Denies head trauma or loss of consciousness.  On route to the hospital, patient was given fentanyl 100 mcg intravenously by EMS.  Her room air SPO2 was 70%, therefore she was placed on 4 L nasal cannula.  According to the patient and her , SPO2 is usually at 88% on room air at baseline.  Patient denies currently any cough.  Blood pressure noted to be elevated up to 218/91.  At this time patient on 3 L nasal cannula.  She appears to be slightly confused, disoriented in time and place.  UA did show moderate bacteria, WBC 20, hyaline casts 3-5, ketones 40.  D-dimer was elevated at 0.83, proBNP 476, troponin 11.  EKG negative for acute ischemia.  WBC 12.7.  Patient was evaluated with CTA of pulmonary artery which was negative for PE, showing mild left basilar atelectasis, hepatomegaly with steatosis.  CT of lumbar spine negative for acute fracture, showing fusion across the left sacroiliac joint, mild scoliosis, DDD, left and right knee x-ray showed no acute fracture or dislocation.     During hospitalization was noted to be persistently hypoxic.  She does have a history of COPD, and sleep apnea.  However most likely related to morbid obesity and difficulty with ambulation.  Will be discharged on continuous home oxygen therapy.  Also noted to have a urinary tract infection with Klebsiella vera cola growing out, patient responded well to 3 days of IV ceftriaxone, transition for additional 4 days of cefdinir for total of 7 days of therapy.    With persistent weakness, physical therapy worked with patient and she was barely able to stand with two-person assist.  Not believe patient is  safe for discharge as result of her weakness and difficulty with ambulation, especially since she presented with ground-level fall and inability to stand up.  Patient is insisting on taking patient home, and understands that he will have to take her AGAINST MEDICAL ADVICE.  Reviewed other medical issues including antibiotics for continued treatment of urinary tract infection.

## 2022-12-03 NOTE — WOUND TEAM
[facility]  Inpatient Services  Initial Wound and Skin Care Evaluation    Admission Date: 12/2/2022     Last order of IP CONSULT TO WOUND CARE was found on 12/2/2022 from Hospital Encounter on 12/2/2022     HPI, PMH, SH: Reviewed    Past Surgical History:   Procedure Laterality Date    THORACIC FUSION O-ARM Right 10/13/2021    Procedure: INSERTION, SHUNT, VENTRICULOPERITONEAL, USING FRAMELESS STEREOTAXY - FRONTAL;  Surgeon: Shady Glynn M.D.;  Location: SURGERY [facility];  Service: Neurosurgery    CATH PLACEMENT CAPD N/A 10/13/2021    Procedure: INSERTION, CATHETER, CAPD;  Surgeon: Shady Glynn M.D.;  Location: SURGERY [facility];  Service: Neurosurgery    ORIF, KNEE  1/3/08    tibial plateau fracture    BLADDER SLING FEMALE      GYN SURGERY      Hysterectomy    NASAL SEPTAL RECONSTRUCTION      age 26    OTHER ORTHOPEDIC SURGERY      Bilateral tibial fracture    OTHER SURGICAL PROCEDURE      SI joint fusion - Dr. Pineda 1/2019    TONSILLECTOMY       Social History     Tobacco Use    Smoking status: Former     Years: 2.00     Types: Cigarettes    Smokeless tobacco: Never    Tobacco comments:     Age [age]    Substance Use Topics    Alcohol use: Yes     Comment: Wine - daily     Chief Complaint   Patient presents with    Low Back Pain    T-5000 GLF     Diagnosis: Hypoxia [R09.02]    Unit where seen by Wound Team: S625/01     WOUND CONSULT/FOLLOW UP RELATED TO:  BL breast, pannus    WOUND HISTORY:  Moist, reddened skin folds due to body habitus.    WOUND ASSESSMENT/LDA    Moisture Associated Skin Damage 12/03/22 Panus;Breast (Active)   Wound Image     12/03/22 1000   NEXT Weekly Photo (Inpatient Only) 12/10/22 12/03/22 1000   Drainage Amount Scant 12/03/22 1000   Drainage Description Serosanguineous 12/03/22 1000   Periwound Assessment Satellite lesions 12/03/22 1000   IAD Cleansing Foam Cleanser/Washcloth 12/03/22 1000   Periwound Protectant Moisture Barrier;Antifungal Therapy 12/03/22 1000    IAD Containment Device Interdry Cloth 12/03/22 1000   WOUND NURSE ONLY - Time Spent with Patient (mins) 20 12/03/22 1000      Vascular:    RIVER:   No results found.    Lab Values:    Lab Results   Component Value Date/Time    WBC 11.7 (H) 12/03/2022 07:01 AM    RBC 5.43 (H) 12/03/2022 07:01 AM    HEMOGLOBIN 16.4 (H) 12/03/2022 07:01 AM    HEMATOCRIT 50.2 (H) 12/03/2022 07:01 AM    HBA1C 5.6 05/03/2021 04:08 PM        Culture Results show:  No results found for this or any previous visit (from the past 720 hour(s)).    Pain Level/Medicated:  Denies pain    INTERVENTIONS BY WOUND TEAM:  Chart and images reviewed. Discussed with bedside RN. All areas of concern (based on picture review, LDA review and discussion with bedside RN) have been thoroughly assessed. Documentation of areas based on significant findings. This RN in to assess patient. Performed standard wound care which includes appropriate positioning, dressing removal and non-selective debridement. Pictures and measurements obtained weekly if/when required.    BL breast skinfolds  Preparation for Dressing removal: NA  Non-selectively Debrided with:  Foam soap and moist washcloth  Sharp debridement: NA  Shannan wound: Cleansed with foam soap and moist washcloth, nystatin powder   Primary Dressing: interdry cloth  Secondary (Outer) Dressing:     Pannus - interdry    Interdisciplinary consultation: Patient, Bedside RN (Anne)    EVALUATION / RATIONALE FOR TREATMENT:  Most Recent Date:  12/3/11: Left pannus moist with redness. Interdry applied to wick moisture. Bilateral breast skinfolds moist with yeast-like appearing rash. Nystatin powder ordered for antifungal therapy. Interdry cloth applied.     Goals: Steady decrease in wound area and depth weekly.    WOUND TEAM PLAN OF CARE ([X] for frequency of wound follow up,):   Nursing to follow dressing orders written for wound care. Contact wound team if area fails to progress, deteriorates or with any  questions/concerns if something comes up before next scheduled follow up (See below as to whether wound is following and frequency of wound follow up)  Dressing changes by wound team:                   Follow up 3 times weekly:                NPWT change 3 times weekly:     Follow up 1-2 times weekly:      Follow up Bi-Monthly:           Follow up Monthly (High Risk):                        Follow up as needed:   X  Other (explain):     NURSING PLAN OF CARE ORDERS (X):  Dressing changes: See Dressing Care orders: X  Skin care: See Skin Care orders: X  RN Prevention Protocol: X  Rectal tube care: See Rectal Tube Care orders:   Other orders:    RSKIN:   CURRENTLY IN PLACE (X), APPLIED THIS VISIT (A), ORDERED (O):   Q shift Taye:  X  Q shift pressure point assessments:  X    Surface/Positioning  Pressure redistribution mattress   X         Low Airloss          ICU Low Airloss   Bariatric KIRBY     Waffle cushion        Waffle Overlay          Reposition q 2 hours    X  TAPs Turning system     Z Sin Pillow     Offloading/Redistribution   Sacral Mepilex (Silicone dressing)     Heel Mepilex (Silicone dressing)         Heel float boots (Prevalon boot)             Float Heels off Bed with Pillows           Respiratory NC  Silicone O2 tubing         Gray Foam Ear protectors     Cannula fixation Device (Tender )          High flow offloading Clip    Elastic head band offloading device      Anchorfast                                                         Trach with Optifoam split foam             Containment/Moisture Prevention   Rectal tube or BMS    Purwick/Condom Cath        Monson Catheter    Barrier wipes           Barrier paste       Antifungal tx      Interdry        Mobilization    Up to chair        Ambulate      PT/OT      Nutrition   Dietician        Diabetes Education      PO    X TF     TPN     NPO   # days     Other        Anticipated discharge plans: TBD  LTACH:        SNF/Rehab:                  Home  Health Care:           Outpatient Wound Center:            Self/Family Care:        Other:                  Vac Discharge Needs:   Not Applicable Pt not on a wound vac:     X  Regular Vac while inpatient, alternative dressing at DC:        Regular Vac in use and continued at DC:            Reg. Vac w/ Skin Sub/Biologic in use. Will need to be changed 2x wkly:      Veraflo Vac while inpatient, ok to transition to Regular Vac on Discharge:           Veraflo Vac while inpatient, will need to remain on Veraflo Vac upon discharge:

## 2022-12-03 NOTE — DISCHARGE PLANNING
Case Management Discharge Planning    Admission Date: 12/2/2022  GMLOS: 3.5  ALOS: 1    6-Clicks ADL Score: 6  6-Clicks Mobility Score: 6  PT and/or OT Eval ordered: No  Post-acute Referrals Ordered: No  Post-acute Choice Obtained: no  Has referral(s) been sent to post-acute provider:  No      Anticipated Discharge Dispo: Discharge Disposition: Discharged to home/self care (01)    DME Needed: Yes    DME Ordered: Yes    Action(s) Taken: Updated Provider/Nurse on Discharge Plan, Choice obtained, Referral(s) sent, and DME Face to Face     Escalations Completed: None    Medically Clear: No    Next Steps: CM to bedside to speak with patient and spouse. Patient had just woke up from nap.  states he wants to take his wife home, declines any other services except for home 02. Choice obtained and sent to [facility] to send to [facility] medical. CM to follow for dc planning and needs.  Possible discharge home tomorrow, unable to ambulate well today.   [facility] delivered pt's oxygen to her room today.     Barriers to Discharge: Medical clearance and Oxygen Delivery

## 2022-12-03 NOTE — ASSESSMENT & PLAN NOTE
History of hydrocephalus noted.  On chronic opiates, muscle relaxants, Lamictal  I suspect patient has chronic encephalopathy that is worse after given fentanyl IV  No focal deficit  Fall precautions   at bedside states that patient often has been increased confusion with urinary tract infections    Mentation does seem slightly improved today however still confused, will need to show that she is able to ambulate with front wheel walker prior to discharge, has 2 stairs to enter home.

## 2022-12-03 NOTE — RESPIRATORY CARE
" COPD EDUCATION by COPD CLINICAL EDUCATOR  12/3/2022 at 11:22 AM by Maryjo Whyte, RRT     Patient and  interviewed at bedside to review medications. They deny diagnosis/history of COPD. Acknowledge seasonal symptoms and she is on medications noted below. Encouraged follow up with her PCP and completion of Pulmonary Function testing when back to her baseline.  She has not smoked since she was 16 years old.    COPD/LUNG Assessment  COPD Clinical Specialists ONLY  COPD Education Initiated: Yes--Short Intervention (met with pt and  at bedside denies diagnosis of COPD and KIRA no PFT but is aware of need to omplete (BMI 37); on medications: Dulera two puffs twice daily Albuterol MDI and nebulized prn; No action plan)  DME Company: Choice pending for discharge need  DME Equipment Type: nebulizer plan to discharge on O2 2lpm  Physician Name: COLLIN Armstrong  Pulmonologist Name: provided list for Pulmonary offices.  Is this a COPD exacerbation patient?: No  $ Demo/Eval of SVN's, MDI's and Aerosols: Yes (care and cleaning of equipment)    PFT Results  No results found for: PFT    Meds to Beds  Would the patient like to opt in for Bedside Medication Delivery at Discharge?: No     MY COPD ACTION PLAN     It is recommended that patients and physicians /healthcare providers complete this action plan together. This plan should be discussed at each physician visit and updated as needed.    The green, yellow and red zones show groups of symptoms of COPD. This list of symptoms is not comprehensive, and you may experience other symptoms. In the \"Actions\" column, your healthcare provider has recommended actions for you to take based on your symptoms.    Patient Name: Sheyla Garcia   YOB: 1950   Last Updated on:     Green Zone:  I am doing well today Actions   •  Usual activitiy and exercise level •  Take daily medications   •  Usual amounts of cough and phlegm/mucus •  Use oxygen as prescribed   •  " "Sleep well at night •  Continue regular exercise/diet plan   •  Appetite is good •  At all times avoid cigarette smoke, inhaled irritants     Daily Medications (these medications are taken every day):                Yellow Zone:  I am having a bad day or a COPD flare Actions   •  More breathless than usual •  Continue daily medications   •  I have less energy for my daily activities •  Use quick relief inhaler as ordered   •  Increased or thicker phlegm/mucus •  Use oxygen as prescribed   •  Using quick relief inhaler/nebulizer more often •  Get plenty of rest   •  Swelling of ankles more than usual •  Use pursed lip breathing   •  More coughing than usual •  At all times avoid cigarette smoke, inhaled irritants   •  I feel like I have a \"chest cold\"   •  Poor sleep and my symptoms woke me up   •  My appetite is not good   •  My medicine is not helping    •  Call provider immediately if symptoms don’t improve     Continue daily medications, add rescue medications:               Medications to be used during a flare up, (as Discussed with Provider):              Red Zone:  I need urgent medical care Actions   •  Severe shortness of breath even at rest •  Call 911 or seek medical care immediately   •  Not able to do any activity because of breathing    •  Fever or shaking chills    •  Feeling confused or very drowsy     •  Chest pains    •  Coughing up blood                  "

## 2022-12-03 NOTE — FACE TO FACE
"Face to Face Note  -  Durable Medical Equipment    OTTONIEL Abraham - NPI: [id number]  I certify that this patient is under my care and that they had a durable medical equipment(DME)face to face encounter by myself that meets the physician DME face-to-face encounter requirements with this patient on:    Date of encounter:   Patient:                    MRN:                       YOB: 2020  Sheyla Garcia  [id number]  1950     The encounter with the patient was in whole, or in part, for the following medical condition, which is the primary reason for durable medical equipment:  Other - chronic debility    I certify that, based on my findings, the following durable medical equipment is medically necessary:    Oxygen   HOME O2 Saturation Measurements:(Values must be present for Home Oxygen orders)  Room air sat at rest: 85  Room air sat with amb: 85  With liters of O2: 2, O2 sat at rest with O2: 96  With Liters of O2: 96, O2 sat with amb with O2 : 85  Is the patient mobile?: No  If patient feels more short of breath, they can go up to 6 liters per minute and contact healthcare provider.    Supporting Symptoms: The patient requires supplemental oxygen, as the following interventions have been tried with limited or no improvement: \"Positive expiratory pressure therapies, \"Ambulation with oximetry, and \"Incentive spirometry.    My Clinical findings support the need for the above equipment due to:  Hypoxia  "

## 2022-12-03 NOTE — PROGRESS NOTES
Assumed care of pt. A&ox4, noted elevated BP, meds given, reassesed, meds effective. Pt educated on plan of care, pt verbalized understanding. Bed locked and in the lowest position, call light within reach, all needs met at this time.

## 2022-12-03 NOTE — H&P
Hospital Medicine History & Physical Note    Date of Service  12/2/2022    Primary Care Physician  ALANNA Childers    Code Status  Full Code    Chief Complaint  Chief Complaint   Patient presents with    Low Back Pain    T-5000 GLF       History of Presenting Illness  Sheyla Garcia is a 72 y.o. female with past medical history of obesity, COPD, GERD, fibromyalgia, chronic pain, debility, seizure, hypertension who presented 12/2/2022 with complaints of lower back pain up to 9 out of 10 worsening with movement after she slide down to the ground, after she was trying to get up and her knee gave up.  Denies head trauma or loss of consciousness.  On route to the hospital, patient was given fentanyl 100 mcg intravenously by EMS.  Her room air SPO2 was 70%, therefore she was placed on 4 L nasal cannula.  According to the patient and her , SPO2 is usually at 88% on room air at baseline.  Patient denies currently any cough.  Blood pressure noted to be elevated up to 218/91.  At this time patient on 3 L nasal cannula.  She appears to be slightly confused, disoriented in time and place.  UA did show moderate bacteria, WBC 20, hyaline casts 3-5, ketones 40.  D-dimer was elevated at 0.83, proBNP 476, troponin 11.  EKG negative for acute ischemia.  WBC 12.7.  Patient was evaluated with CTA of pulmonary artery which was negative for PE, showing mild left basilar atelectasis, hepatomegaly with steatosis.  CT of lumbar spine negative for acute fracture, showing fusion across the left sacroiliac joint, mild scoliosis, DDD, left and right knee x-ray showed no acute fracture or dislocation.  Patient has been admitted due to hypoxia, unable to arrange for oxygen in ED.    I discussed the plan of care with patient.    Review of Systems  Review of Systems   Constitutional:  Negative for chills, fever and weight loss.   HENT:  Negative for ear pain, hearing loss and tinnitus.    Eyes:  Negative for blurred vision,  double vision and photophobia.   Respiratory:  Negative for cough, hemoptysis and sputum production.    Cardiovascular:  Negative for chest pain, palpitations and orthopnea.   Gastrointestinal:  Negative for heartburn, nausea and vomiting.   Genitourinary:  Negative for dysuria, flank pain, frequency and hematuria.   Musculoskeletal:  Positive for back pain, falls and joint pain. Negative for neck pain.   Skin:  Negative for itching and rash.   Neurological:  Negative for tremors, speech change, focal weakness and headaches.   Endo/Heme/Allergies:  Negative for environmental allergies and polydipsia. Does not bruise/bleed easily.   Psychiatric/Behavioral:  Negative for hallucinations and substance abuse. The patient is not nervous/anxious.      Past Medical History   has a past medical history of Asthma (08/05/2021), Bowel habit changes, Breath shortness (08/05/2021), Emphysema of lung ([facility]) (08/05/2021), Fibromyalgia, Heart burn, Hypertension, Hypothyroidism (2008), Indigestion, Migraine, Obesity, Pneumonia (2012), Psychiatric problem, Renal disorder (08/05/2021), Seizure ([facility]) (2017), and Urinary incontinence.    Surgical History   has a past surgical history that includes nasal septal reconstruction; tonsillectomy; bladder sling female; other surgical procedure; orif, knee (1/3/08); other orthopedic surgery; gyn surgery; thoracic fusion o-arm (Right, 10/13/2021); and cath placement capd (N/A, 10/13/2021).     Family History  family history includes Cancer in her father and maternal grandfather; Heart Disease in her mother; Stroke in her father.   Family history reviewed with patient. There is no family history that is pertinent to the chief complaint.     Social History   reports that she has quit smoking. Her smoking use included cigarettes. She has never used smokeless tobacco. She reports current alcohol use. She reports that she does not use drugs.    Allergies  Allergies   Allergen Reactions    Gabapentin  Unspecified     seizure       Medications  Prior to Admission Medications   Prescriptions Last Dose Informant Patient Reported? Taking?   ARIPiprazole (ABILIFY) 10 MG Tab   Yes No   B Complex-C-Folic Acid (SUPER B-COMPLEX/VIT C/FA PO)  Family Member Yes No   Sig: Take 1 Tab by mouth every day.   BETA CAROTENE PO  Family Member Yes No   Sig: Take 1 Dose by mouth every day. Unknown OTC Strength.   DULoxetine (CYMBALTA) 30 MG Cap DR Particles  Family Member Yes No   Sig: Take 30 mg by mouth at bedtime.   Famotidine (PEPCID PO)  Family Member Yes No   Sig: Take 1 Tablet by mouth every day.   Fluticasone Furoate-Vilanterol (BREO ELLIPTA) 200-25 MCG/INH AEROSOL POWDER, BREATH ACTIVATED   No No   Sig: Inhale 1 Puff every day.   HYDROcodone-acetaminophen (NORCO) 5-325 MG Tab per tablet   No No   Sig: Take 1 Tablet by mouth 2 times a day as needed (severe pain) for up to 30 days.   Pseudoeph-Doxylamine-DM-APAP (NYQUIL PO)  Family Member Yes No   Sig: Take 30 mL by mouth as needed (Cold and flu symptoms).   SUMAtriptan (IMITREX) 50 MG Tab   No No   Sig: Take 1 Tablet by mouth one time as needed for Migraine for up to 1 dose.   Vitamin E 400 UNIT Tab  Family Member Yes No   Sig: Take 1 tablet by mouth every day.   albuterol (PROVENTIL) 2.5mg/3ml Nebu Soln solution for nebulization   No No   Sig: Take 3 mL by nebulization every four hours as needed for Shortness of Breath.   albuterol 108 (90 Base) MCG/ACT Aero Soln inhalation aerosol   No No   Sig: INHALE TWO PUFFS BY MOUTH EVERY SIX HOURS AS NEEDED FOR SHORTNESS OF BREATH   amitriptyline (ELAVIL) 25 MG Tab   Yes No   colestipol (COLESTID) 1 GM Tab   Yes No   Sig: Take 1 g by mouth 2 times a day.   dicyclomine (BENTYL) 20 MG Tab   Yes No   Patient not taking: Reported on 6/14/2022   docusate sodium (COLACE) 100 MG Cap  Family Member Yes No   Sig: Take 100 mg by mouth 1 time a day as needed for Constipation.   estradiol (ESTRACE) 1 MG Tab   No No   Sig: Take 1 Tablet by mouth  every day.   ibuprofen (MOTRIN) 200 MG Tab  Family Member Yes No   Sig: Take 600 mg by mouth every 8 hours as needed for Mild Pain.   lamoTRIgine (LAMICTAL) 25 MG Tab   Yes No   lidocaine (LIDODERM) 5 % Patch   No No   Sig: Place 1 Patch on the skin every 24 hours.   lisinopril (PRINIVIL) 10 MG Tab   No No   Sig: Take 1 Tablet by mouth every day.   methocarbamol (ROBAXIN) 750 MG Tab   No No   Sig: TAKE ONE TABLET BY MOUTH THREE TIMES DAILY AS NEEDED  FOR MUSCLE SPASMS   propranolol (INDERAL) 10 MG Tab   Yes No   therapeutic multivitamin-minerals (THERAGRAN-M) Tab  Family Member Yes No   Sig: Take 1 Tab by mouth every day.      Facility-Administered Medications: None       Physical Exam  Temp:  [36.9 °C (98.4 °F)] 36.9 °C (98.4 °F)  Pulse:  [78-96] 80  Resp:  [15-28] 28  BP: (167-218)/() 205/85  SpO2:  [70 %-98 %] 94 %  Blood Pressure : (!) 205/85   Temperature: 36.9 °C (98.4 °F)   Pulse: 80   Respiration: (!) 28   Pulse Oximetry: 94 %       Physical Exam  Vitals and nursing note reviewed.   Constitutional:       General: She is not in acute distress.     Appearance: Normal appearance.   HENT:      Head: Normocephalic and atraumatic.      Nose: Nose normal.      Mouth/Throat:      Mouth: Mucous membranes are moist.   Eyes:      Extraocular Movements: Extraocular movements intact.      Pupils: Pupils are equal, round, and reactive to light.   Cardiovascular:      Rate and Rhythm: Normal rate and regular rhythm.   Pulmonary:      Effort: Pulmonary effort is normal.      Breath sounds: Examination of the right-lower field reveals decreased breath sounds. Examination of the left-lower field reveals decreased breath sounds. Decreased breath sounds present.   Abdominal:      General: Abdomen is flat. There is no distension.      Tenderness: There is no abdominal tenderness.   Musculoskeletal:         General: No swelling or deformity. Normal range of motion.      Cervical back: Normal range of motion and neck supple.       Right lower leg: Edema (Trace) present.      Left lower leg: Edema (Trace) present.   Skin:     General: Skin is warm and dry.   Neurological:      General: No focal deficit present.      Mental Status: She is alert.      Motor: Weakness (Generalized weakness) present.      Comments: Oriented in situation, partly oriented in place, disoriented in time   Psychiatric:         Mood and Affect: Mood normal.         Behavior: Behavior normal.       Laboratory:  Recent Labs     12/02/22  1459   WBC 12.7*   RBC 5.16   HEMOGLOBIN 15.8   HEMATOCRIT 47.7*   MCV 92.4   MCH 30.6   MCHC 33.1*   RDW 43.1   PLATELETCT 248   MPV 9.7     Recent Labs     12/02/22  1459   SODIUM 141   POTASSIUM 3.4*   CHLORIDE 105   CO2 25   GLUCOSE 91   BUN 10   CREATININE 0.42*   CALCIUM 9.4     Recent Labs     12/02/22  1459   ALTSGPT 7   ASTSGOT 19   ALKPHOSPHAT 81   TBILIRUBIN 0.6   GLUCOSE 91         Recent Labs     12/02/22  1459   NTPROBNP 476*         Recent Labs     12/02/22  1459   TROPONINT 11       Imaging:  CT-CTA CHEST PULMONARY ARTERY W/ RECONS   Final Result      1.  Negative for pulmonary embolism      2.  Mild left basilar atelectasis      3.  Hepatic steatosis and hepatomegaly            CT-LSPINE W/O PLUS RECONS   Final Result      1.  Negative for lumbar spine fracture      2.  Multilevel facet arthropathy      3.  Mild scoliosis      4.  Fusion across the left sacroiliac joint      5.  Peritoneal catheter present      DX-KNEE 3 VIEWS RIGHT   Final Result         1.   No radiographic evidence of acute injury.      2.  Moderate osteoarthritic changes of the patellofemoral joint and lateral compartment.      DX-KNEE 3 VIEWS LEFT   Final Result      1.  No radiographic evidence of acute traumatic injury.   2.  Mild degenerative changes of the medial and patellofemoral compartments.      DX-CHEST-PORTABLE (1 VIEW)   Final Result      No acute cardiac or pulmonary abnormalities are identified.          X-Ray:  I have personally  reviewed the images and compared with prior images.    Assessment/Plan:  Justification for Admission Status  I anticipate this patient will require at least two midnights for appropriate medical management, necessitating inpatient admission because acute respiratory failure with hypoxia    Patient will need a Med/Surg bed on MEDICAL service .  The need is secondary to acute respiratory failure with hypoxia.    Acute respiratory failure with hypoxia (HCC)  Assessment & Plan  History of COPD, and reportedly SPO2 88% at baseline on room air, does not use oxygen at home.  History of COPD and sleep apnea.  Was given fentanyl 100 mcg in route to the hospital and  on chronic opiate therapy  CT of pulmonary artery negative for PE, proBNP elevated at 400, EKG negative for acute findings  Plan: Will try DuoNeb and Lasix.  Continue home inhalers.  RT protocol, pulse oximetry, supplemental oxygen, incentive spirometry  Wean down oxygen as able  Home O2 evaluation    Fall from ground level  Assessment & Plan  PT OT evaluation  Fall precautions    UTI (urinary tract infection)  Assessment & Plan  UA positive for pyuria, bacteriuria, leukocyte esterase  Patient denies urinary symptoms but she is somewhat confused, WBC elevated at 13  Will order ceftriaxone empirically for 3 days    Encephalopathy  Assessment & Plan  History of hydrocephalus noted.  On chronic opiates, muscle relaxants, Lamictal  I suspect patient has chronic encephalopathy that is worse after given fentanyl IV  No focal deficit  Fall precautions  Reassess tomorrow    Hypokalemia  Assessment & Plan  Replacement ordered    Hypothyroidism- (present on admission)  Assessment & Plan  History of.  Apparently not on supplementation.  We will check TSH    Essential hypertension- (present on admission)  Assessment & Plan  Uncontrolled, systolic blood pressure of 200, that may be related to pain  Patient states she is not taking any blood pressure medications  Will give  hydralazine 20 mg, IV Lasix 40 mg for possible mild fluid overload  Monitor blood pressure      VTE prophylaxis: enoxaparin ppx

## 2022-12-03 NOTE — PROGRESS NOTES
4 Eyes Skin Assessment Completed by BERLIN York and BERLIN Dowling.    Head WDL  Ears WDL  Nose WDL  Mouth WDL  Neck WDL  Breast/Chest Redness and Excoriation  Shoulder Blades WDL  Spine WDL  (R) Arm/Elbow/Hand WDL  (L) Arm/Elbow/Hand WDL  Abdomen Redness  Groin WDL  Scrotum/Coccyx/Buttocks WDL  (R) Leg WDL  (L) Leg WDL  (R) Heel/Foot/Toe WDL  (L) Heel/Foot/Toe WDL          Devices In Places Pulse Ox and Nasal Cannula      Interventions In Place NC W/Ear Foams, InterDry, Sacral Mepilex, Pillows, Q2 Turns, and Barrier Cream    Possible Skin Injury Yes    Pictures Uploaded Into Epic Yes  Wound Consult Placed Yes  RN Wound Prevention Protocol Ordered Yes

## 2022-12-03 NOTE — ED NOTES
PT straight catheterized using sterile technique for urine sample, 150mL of dark zaina, cloudy urine obtained

## 2022-12-03 NOTE — PROGRESS NOTES
[facility] Medicine Daily Progress Note    Date of Service  12/3/2022    Chief Complaint  Sheyla Garcia is a 72 y.o. female admitted 12/2/2022 with back pain following a ground-level fall    Hospital Course  Sheyla Garcia is a 72 y.o. female with past medical history of obesity, COPD, GERD, fibromyalgia, chronic pain, debility, seizure, hypertension who presented 12/2/2022 with complaints of lower back pain up to 9 out of 10 worsening with movement after she slide down to the ground, after she was trying to get up and her knee gave up.  Denies head trauma or loss of consciousness.  On route to the hospital, patient was given fentanyl 100 mcg intravenously by EMS.  Her room air SPO2 was 70%, therefore she was placed on 4 L nasal cannula.  According to the patient and her , SPO2 is usually at 88% on room air at baseline.  Patient denies currently any cough.  Blood pressure noted to be elevated up to 218/91.  At this time patient on 3 L nasal cannula.  She appears to be slightly confused, disoriented in time and place.  UA did show moderate bacteria, WBC 20, hyaline casts 3-5, ketones 40.  D-dimer was elevated at 0.83, proBNP 476, troponin 11.  EKG negative for acute ischemia.  WBC 12.7.  Patient was evaluated with CTA of pulmonary artery which was negative for PE, showing mild left basilar atelectasis, hepatomegaly with steatosis.  CT of lumbar spine negative for acute fracture, showing fusion across the left sacroiliac joint, mild scoliosis, DDD, left and right knee x-ray showed no acute fracture or dislocation.     Interval Problem Update  Patient remains confused, is awake and can answer simple questions appropriately however is rambling and does not have a accurate memory.   at the bedside, says patient is more confused than her normal.  -Patient noted to be hypoxic, likely chronic component related to patient's obesity and decreased mobility, discussed with  will order oxygen for  discharge  -Suspect worsening confusion is related to acute urinary tract infection, per  at the bedside this has happened in the past  -Continue IV ceftriaxone  -Physical therapy worked with patient, patient able to stand however unable to ambulate, not safe for discharge, discussed with   -When patient's mentation improves can reconsult physical therapy and plan for discharge    I have discussed this patient's plan of care and discharge plan at IDT rounds today with Case Management, Nursing, Nursing leadership, and other members of the IDT team.    Consultants/Specialty  None    Code Status  Full Code    Disposition  Patient is not medically cleared for discharge.   Anticipate discharge to to home with close outpatient follow-up.  I have placed the appropriate orders for post-discharge needs.    Review of Systems  Review of Systems   Unable to perform ROS: Mental acuity   Musculoskeletal:  Positive for falls and joint pain.   All other systems reviewed and are negative.     Physical Exam  Temp:  [36.2 °C (97.2 °F)-36.6 °C (97.8 °F)] 36.6 °C (97.8 °F)  Pulse:  [78-96] 90  Resp:  [15-28] 17  BP: (137-218)/() 144/81  SpO2:  [86 %-98 %] 86 %    Physical Exam  Vitals and nursing note reviewed.   Constitutional:       General: She is awake. She is not in acute distress.     Appearance: She is overweight. She is ill-appearing.   HENT:      Head: Normocephalic.   Eyes:      Pupils: Pupils are equal, round, and reactive to light.   Cardiovascular:      Rate and Rhythm: Normal rate and regular rhythm.      Pulses: Normal pulses.      Heart sounds: Normal heart sounds. No murmur heard.  Pulmonary:      Effort: Pulmonary effort is normal.      Breath sounds: Decreased air movement present. Examination of the right-lower field reveals decreased breath sounds. Examination of the left-lower field reveals decreased breath sounds. Decreased breath sounds present. No wheezing.   Abdominal:      General: Bowel  sounds are normal.      Palpations: Abdomen is soft.      Tenderness: There is no abdominal tenderness.   Musculoskeletal:      Right lower leg: No edema.      Left lower leg: No edema.   Skin:     General: Skin is warm.          Neurological:      General: No focal deficit present.      Cranial Nerves: No cranial nerve deficit.      Motor: Weakness present.   Psychiatric:         Speech: Speech is delayed.         Cognition and Memory: Memory is impaired.       Fluids    Intake/Output Summary (Last 24 hours) at 12/3/2022 1412  Last data filed at 12/3/2022 0819  Gross per 24 hour   Intake 120 ml   Output 1100 ml   Net -980 ml       Laboratory  Recent Labs     12/02/22  1459 12/03/22  0701   WBC 12.7* 11.7*   RBC 5.16 5.43*   HEMOGLOBIN 15.8 16.4*   HEMATOCRIT 47.7* 50.2*   MCV 92.4 92.4   MCH 30.6 30.2   MCHC 33.1* 32.7*   RDW 43.1 43.3   PLATELETCT 248 264   MPV 9.7 9.4     Recent Labs     12/02/22  1459 12/03/22  0701   SODIUM 141 144   POTASSIUM 3.4* 3.4*   CHLORIDE 105 104   CO2 25 26   GLUCOSE 91 111*   BUN 10 7*   CREATININE 0.42* 0.38*   CALCIUM 9.4 9.5                   Imaging  CT-CTA CHEST PULMONARY ARTERY W/ RECONS   Final Result      1.  Negative for pulmonary embolism      2.  Mild left basilar atelectasis      3.  Hepatic steatosis and hepatomegaly            CT-LSPINE W/O PLUS RECONS   Final Result      1.  Negative for lumbar spine fracture      2.  Multilevel facet arthropathy      3.  Mild scoliosis      4.  Fusion across the left sacroiliac joint      5.  Peritoneal catheter present      DX-KNEE 3 VIEWS RIGHT   Final Result         1.   No radiographic evidence of acute injury.      2.  Moderate osteoarthritic changes of the patellofemoral joint and lateral compartment.      DX-KNEE 3 VIEWS LEFT   Final Result      1.  No radiographic evidence of acute traumatic injury.   2.  Mild degenerative changes of the medial and patellofemoral compartments.      DX-CHEST-PORTABLE (1 VIEW)   Final Result       No acute cardiac or pulmonary abnormalities are identified.           Assessment/Plan  * Acute respiratory failure with hypoxia (HCC)  Assessment & Plan  History of COPD, and reportedly SPO2 88% at baseline on room air, does not use oxygen at home.  History of COPD and sleep apnea.  Was given fentanyl 100 mcg in route to the hospital and  on chronic opiate therapy  CT of pulmonary artery negative for PE, proBNP elevated at 400, EKG negative for acute findings  Plan: Will try DuoNeb and Lasix.  Continue home inhalers.  RT protocol, pulse oximetry, supplemental oxygen, incentive spirometry  Likely some chronic hypoxia secondary to patient's body habitus and decreased mobility with history of COPD and sleep apnea  Plan for discharge home with home oxygen, with case management and patient    Acute cystitis without hematuria  Assessment & Plan  UA positive for pyuria, bacteriuria, leukocyte esterase  Patient denies urinary symptoms but she is somewhat confused, WBC elevated at 13  Darted on ceftriaxone empirically for 3 days, may need to extend, patient has history of chronic cystitis with confusion    Encephalopathy  Assessment & Plan  History of hydrocephalus noted.  On chronic opiates, muscle relaxants, Lamictal  I suspect patient has chronic encephalopathy that is worse after given fentanyl IV  No focal deficit  Fall precautions   at bedside states that patient often has been increased confusion with urinary tract infections.    Fall from ground level  Assessment & Plan  PT OT evaluation  Fall precautions    Hypokalemia  Assessment & Plan  Replacement ordered    Hypothyroidism- (present on admission)  Assessment & Plan  History of.  Apparently not on supplementation.  We will check TSH    Essential hypertension- (present on admission)  Assessment & Plan  Uncontrolled, systolic blood pressure of 200, that may be related to pain  Patient states she is not taking any blood pressure medications  Will give  hydralazine 20 mg, IV Lasix 40 mg for possible mild fluid overload  Monitor blood pressure         VTE prophylaxis: enoxaparin ppx    I have performed a physical exam and reviewed and updated ROS and Plan today (12/3/2022). In review of yesterday's note (12/2/2022), there are no changes except as documented above.

## 2022-12-04 LAB
ANION GAP SERPL CALC-SCNC: 11 MMOL/L (ref 7–16)
BASOPHILS # BLD AUTO: 0.4 % (ref 0–1.8)
BASOPHILS # BLD: 0.04 K/UL (ref 0–0.12)
BUN SERPL-MCNC: 13 MG/DL (ref 8–22)
CALCIUM SERPL-MCNC: 9.6 MG/DL (ref 8.5–10.5)
CHLORIDE SERPL-SCNC: 106 MMOL/L (ref 96–112)
CO2 SERPL-SCNC: 27 MMOL/L (ref 20–33)
CREAT SERPL-MCNC: 0.46 MG/DL (ref 0.5–1.4)
EOSINOPHIL # BLD AUTO: 0.48 K/UL (ref 0–0.51)
EOSINOPHIL NFR BLD: 4.7 % (ref 0–6.9)
ERYTHROCYTE [DISTWIDTH] IN BLOOD BY AUTOMATED COUNT: 44.4 FL (ref 35.9–50)
GFR SERPLBLD CREATININE-BSD FMLA CKD-EPI: 103 ML/MIN/1.73 M 2
GLUCOSE SERPL-MCNC: 102 MG/DL (ref 65–99)
HCT VFR BLD AUTO: 50.5 % (ref 37–47)
HGB BLD-MCNC: 16.4 G/DL (ref 12–16)
IMM GRANULOCYTES # BLD AUTO: 0.03 K/UL (ref 0–0.11)
IMM GRANULOCYTES NFR BLD AUTO: 0.3 % (ref 0–0.9)
LYMPHOCYTES # BLD AUTO: 1.82 K/UL (ref 1–4.8)
LYMPHOCYTES NFR BLD: 17.9 % (ref 22–41)
MCH RBC QN AUTO: 30.5 PG (ref 27–33)
MCHC RBC AUTO-ENTMCNC: 32.5 G/DL (ref 33.6–35)
MCV RBC AUTO: 93.9 FL (ref 81.4–97.8)
MONOCYTES # BLD AUTO: 0.87 K/UL (ref 0–0.85)
MONOCYTES NFR BLD AUTO: 8.6 % (ref 0–13.4)
NEUTROPHILS # BLD AUTO: 6.91 K/UL (ref 2–7.15)
NEUTROPHILS NFR BLD: 68.1 % (ref 44–72)
NRBC # BLD AUTO: 0 K/UL
NRBC BLD-RTO: 0 /100 WBC
PLATELET # BLD AUTO: 259 K/UL (ref 164–446)
PMV BLD AUTO: 9.9 FL (ref 9–12.9)
POTASSIUM SERPL-SCNC: 4 MMOL/L (ref 3.6–5.5)
RBC # BLD AUTO: 5.38 M/UL (ref 4.2–5.4)
SODIUM SERPL-SCNC: 144 MMOL/L (ref 135–145)
WBC # BLD AUTO: 10.2 K/UL (ref 4.8–10.8)

## 2022-12-04 PROCEDURE — 700111 HCHG RX REV CODE 636 W/ 250 OVERRIDE (IP): Performed by: NURSE PRACTITIONER

## 2022-12-04 PROCEDURE — 80048 BASIC METABOLIC PNL TOTAL CA: CPT

## 2022-12-04 PROCEDURE — 94640 AIRWAY INHALATION TREATMENT: CPT

## 2022-12-04 PROCEDURE — A9270 NON-COVERED ITEM OR SERVICE: HCPCS | Performed by: INTERNAL MEDICINE

## 2022-12-04 PROCEDURE — 85025 COMPLETE CBC W/AUTO DIFF WBC: CPT

## 2022-12-04 PROCEDURE — 700111 HCHG RX REV CODE 636 W/ 250 OVERRIDE (IP): Performed by: INTERNAL MEDICINE

## 2022-12-04 PROCEDURE — 36415 COLL VENOUS BLD VENIPUNCTURE: CPT

## 2022-12-04 PROCEDURE — 700102 HCHG RX REV CODE 250 W/ 637 OVERRIDE(OP): Performed by: INTERNAL MEDICINE

## 2022-12-04 PROCEDURE — 770001 HCHG ROOM/CARE - MED/SURG/GYN PRIV*

## 2022-12-04 PROCEDURE — 99232 SBSQ HOSP IP/OBS MODERATE 35: CPT | Performed by: NURSE PRACTITIONER

## 2022-12-04 RX ADMIN — OXYCODONE HYDROCHLORIDE 10 MG: 10 TABLET ORAL at 10:53

## 2022-12-04 RX ADMIN — PROPRANOLOL HYDROCHLORIDE 10 MG: 10 TABLET ORAL at 05:02

## 2022-12-04 RX ADMIN — NYSTATIN: 100000 POWDER TOPICAL at 18:00

## 2022-12-04 RX ADMIN — BUDESONIDE AND FORMOTEROL FUMARATE DIHYDRATE 2 PUFF: 160; 4.5 AEROSOL RESPIRATORY (INHALATION) at 07:41

## 2022-12-04 RX ADMIN — CEFTRIAXONE SODIUM 1000 MG: 10 INJECTION, POWDER, FOR SOLUTION INTRAVENOUS at 20:36

## 2022-12-04 RX ADMIN — NYSTATIN: 100000 POWDER TOPICAL at 05:02

## 2022-12-04 RX ADMIN — AMITRIPTYLINE HYDROCHLORIDE 25 MG: 25 TABLET, FILM COATED ORAL at 20:36

## 2022-12-04 RX ADMIN — OXYCODONE HYDROCHLORIDE 10 MG: 10 TABLET ORAL at 20:35

## 2022-12-04 RX ADMIN — DOCUSATE SODIUM 50 MG AND SENNOSIDES 8.6 MG 2 TABLET: 8.6; 5 TABLET, FILM COATED ORAL at 05:02

## 2022-12-04 RX ADMIN — LISINOPRIL 10 MG: 10 TABLET ORAL at 12:38

## 2022-12-04 RX ADMIN — BUDESONIDE AND FORMOTEROL FUMARATE DIHYDRATE 2 PUFF: 160; 4.5 AEROSOL RESPIRATORY (INHALATION) at 20:36

## 2022-12-04 RX ADMIN — NYSTATIN: 100000 POWDER TOPICAL at 12:38

## 2022-12-04 RX ADMIN — ENOXAPARIN SODIUM 40 MG: 40 INJECTION SUBCUTANEOUS at 18:24

## 2022-12-04 ASSESSMENT — PAIN DESCRIPTION - PAIN TYPE
TYPE: ACUTE PAIN
TYPE: ACUTE PAIN

## 2022-12-04 ASSESSMENT — ENCOUNTER SYMPTOMS: FALLS: 1

## 2022-12-04 ASSESSMENT — PATIENT HEALTH QUESTIONNAIRE - PHQ9
1. LITTLE INTEREST OR PLEASURE IN DOING THINGS: NOT AT ALL
2. FEELING DOWN, DEPRESSED, IRRITABLE, OR HOPELESS: NOT AT ALL
SUM OF ALL RESPONSES TO PHQ9 QUESTIONS 1 AND 2: 0

## 2022-12-04 NOTE — PROGRESS NOTES
[facility] Medicine Daily Progress Note    Date of Service  12/4/2022    Chief Complaint  Sheyla Garcia is a 72 y.o. female admitted 12/2/2022 with back pain following a ground-level fall    Hospital Course  Sheyla Garcia is a 72 y.o. female with past medical history of obesity, COPD, GERD, fibromyalgia, chronic pain, debility, seizure, hypertension who presented 12/2/2022 with complaints of lower back pain up to 9 out of 10 worsening with movement after she slide down to the ground, after she was trying to get up and her knee gave up.  Denies head trauma or loss of consciousness.  On route to the hospital, patient was given fentanyl 100 mcg intravenously by EMS.  Her room air SPO2 was 70%, therefore she was placed on 4 L nasal cannula.  According to the patient and her , SPO2 is usually at 88% on room air at baseline.  Patient denies currently any cough.  Blood pressure noted to be elevated up to 218/91.  At this time patient on 3 L nasal cannula.  She appears to be slightly confused, disoriented in time and place.  UA did show moderate bacteria, WBC 20, hyaline casts 3-5, ketones 40.  D-dimer was elevated at 0.83, proBNP 476, troponin 11.  EKG negative for acute ischemia.  WBC 12.7.  Patient was evaluated with CTA of pulmonary artery which was negative for PE, showing mild left basilar atelectasis, hepatomegaly with steatosis.  CT of lumbar spine negative for acute fracture, showing fusion across the left sacroiliac joint, mild scoliosis, DDD, left and right knee x-ray showed no acute fracture or dislocation.     Interval Problem Update  Patient's mentation seems slightly improved over yesterday, able to answer questions more appropriately however is oriented to place and situation.  We will need to ambulate prior to discharge, has 2 steps to enter home.  -Patient noted to be hypoxic, likely chronic component related to patient's obesity and decreased mobility, discussed with  will order  oxygen for discharge  -Suspect worsening confusion is related to acute urinary tract infection, per  at the bedside this has happened in the past  -Continue IV ceftriaxone, leukocytosis has improved  -Physical therapy worked with patient, patient able to stand however unable to ambulate, not safe for discharge, discussed with   -When patient's mentation improves can reconsult physical therapy and plan for discharge    I have discussed this patient's plan of care and discharge plan at IDT rounds today with Case Management, Nursing, Nursing leadership, and other members of the IDT team.    Consultants/Specialty  None    Code Status  Full Code    Disposition  Patient is not medically cleared for discharge.   Anticipate discharge to to home with close outpatient follow-up.  I have placed the appropriate orders for post-discharge needs.    Review of Systems  Review of Systems   Unable to perform ROS: Mental acuity   Musculoskeletal:  Positive for falls and joint pain.   All other systems reviewed and are negative.     Physical Exam  Temp:  [36.4 °C (97.5 °F)-36.8 °C (98.3 °F)] 36.7 °C (98 °F)  Pulse:  [75-91] 88  Resp:  [16-18] 17  BP: (139-153)/(70-85) 153/85  SpO2:  [90 %-96 %] 90 %    Physical Exam  Vitals and nursing note reviewed.   Constitutional:       General: She is awake. She is not in acute distress.     Appearance: She is overweight. She is ill-appearing.   HENT:      Head: Normocephalic.   Eyes:      Pupils: Pupils are equal, round, and reactive to light.   Cardiovascular:      Rate and Rhythm: Normal rate and regular rhythm.      Pulses: Normal pulses.      Heart sounds: Normal heart sounds. No murmur heard.  Pulmonary:      Effort: Pulmonary effort is normal.      Breath sounds: Decreased air movement present. Examination of the right-lower field reveals decreased breath sounds. Examination of the left-lower field reveals decreased breath sounds. Decreased breath sounds present. No wheezing.    Abdominal:      General: Bowel sounds are normal.      Palpations: Abdomen is soft.      Tenderness: There is no abdominal tenderness.   Musculoskeletal:      Right lower leg: No edema.      Left lower leg: No edema.   Skin:     General: Skin is warm.          Neurological:      General: No focal deficit present.      Cranial Nerves: No cranial nerve deficit.      Motor: Weakness present.   Psychiatric:         Speech: Speech is delayed.         Cognition and Memory: Memory is impaired.       Fluids    Intake/Output Summary (Last 24 hours) at 12/4/2022 1222  Last data filed at 12/4/2022 0418  Gross per 24 hour   Intake 200 ml   Output 1050 ml   Net -850 ml         Laboratory  Recent Labs     12/02/22  1459 12/03/22  0701 12/04/22  1008   WBC 12.7* 11.7* 10.2   RBC 5.16 5.43* 5.38   HEMOGLOBIN 15.8 16.4* 16.4*   HEMATOCRIT 47.7* 50.2* 50.5*   MCV 92.4 92.4 93.9   MCH 30.6 30.2 30.5   MCHC 33.1* 32.7* 32.5*   RDW 43.1 43.3 44.4   PLATELETCT 248 264 259   MPV 9.7 9.4 9.9       Recent Labs     12/02/22  1459 12/03/22  0701 12/04/22  1008   SODIUM 141 144 144   POTASSIUM 3.4* 3.4* 4.0   CHLORIDE 105 104 106   CO2 25 26 27   GLUCOSE 91 111* 102*   BUN 10 7* 13   CREATININE 0.42* 0.38* 0.46*   CALCIUM 9.4 9.5 9.6                     Imaging  CT-CTA CHEST PULMONARY ARTERY W/ RECONS   Final Result      1.  Negative for pulmonary embolism      2.  Mild left basilar atelectasis      3.  Hepatic steatosis and hepatomegaly            CT-LSPINE W/O PLUS RECONS   Final Result      1.  Negative for lumbar spine fracture      2.  Multilevel facet arthropathy      3.  Mild scoliosis      4.  Fusion across the left sacroiliac joint      5.  Peritoneal catheter present      DX-KNEE 3 VIEWS RIGHT   Final Result         1.   No radiographic evidence of acute injury.      2.  Moderate osteoarthritic changes of the patellofemoral joint and lateral compartment.      DX-KNEE 3 VIEWS LEFT   Final Result      1.  No radiographic evidence of  acute traumatic injury.   2.  Mild degenerative changes of the medial and patellofemoral compartments.      DX-CHEST-PORTABLE (1 VIEW)   Final Result      No acute cardiac or pulmonary abnormalities are identified.             Assessment/Plan  * Acute respiratory failure with hypoxia (HCC)  Assessment & Plan  History of COPD, and reportedly SPO2 88% at baseline on room air, does not use oxygen at home.  History of COPD and sleep apnea.  Was given fentanyl 100 mcg in route to the hospital and  on chronic opiate therapy  CT of pulmonary artery negative for PE, proBNP elevated at 400, EKG negative for acute findings  Plan: Will try DuoNeb and Lasix.  Continue home inhalers.  RT protocol, pulse oximetry, supplemental oxygen, incentive spirometry  Likely some chronic hypoxia secondary to patient's body habitus and decreased mobility with history of COPD and sleep apnea  Plan for discharge home with home oxygen, with case management and patient    Acute cystitis without hematuria  Assessment & Plan  UA positive for pyuria, bacteriuria, leukocyte esterase  Patient denies urinary symptoms but she is somewhat confused, WBC elevated at 13  Darted on ceftriaxone empirically for 5 days, may need to extend, patient has history of chronic cystitis with confusion    Encephalopathy  Assessment & Plan  History of hydrocephalus noted.  On chronic opiates, muscle relaxants, Lamictal  I suspect patient has chronic encephalopathy that is worse after given fentanyl IV  No focal deficit  Fall precautions   at bedside states that patient often has been increased confusion with urinary tract infections    Mentation does seem slightly improved today however still confused, will need to show that she is able to ambulate with front wheel walker prior to discharge, has 2 stairs to enter home.    Fall from ground level  Assessment & Plan  PT OT evaluation  Fall precautions    Hypokalemia  Assessment & Plan  Replacement  ordered    Hypothyroidism- (present on admission)  Assessment & Plan  History of.  Apparently not on supplementation.  We will check TSH    Essential hypertension- (present on admission)  Assessment & Plan  Uncontrolled, systolic blood pressure of 200, that may be related to pain  Patient states she is not taking any blood pressure medications  Will give hydralazine 20 mg, IV Lasix 40 mg for possible mild fluid overload  Monitor blood pressure         VTE prophylaxis: enoxaparin ppx    I have performed a physical exam and reviewed and updated ROS and Plan today (12/4/2022). In review of yesterday's note (12/3/2022), there are no changes except as documented above.

## 2022-12-04 NOTE — CARE PLAN
Problem: Knowledge Deficit - Standard  Goal: Patient and family/care givers will demonstrate understanding of plan of care, disease process/condition, diagnostic tests and medications  12/3/2022 1714 by Rhea Mackenzie R.N.  Outcome: Progressing  12/3/2022 1714 by Rhea Mackenzie R.N.  Outcome: Progressing  12/3/2022 1712 by Rhea Mackenzie R.N.  Outcome: Progressing     Problem: Skin Integrity  Goal: Skin integrity is maintained or improved  12/3/2022 1714 by Rhea Mackenzie R.N.  Outcome: Progressing  12/3/2022 1714 by Rhea Mackenzie R.N.  Outcome: Progressing  12/3/2022 1712 by Rhea Mackenzie R.N.  Outcome: Progressing     Problem: Fall Risk  Goal: Patient will remain free from falls  12/3/2022 1714 by Rhea Mackenzie R.N.  Outcome: Progressing  12/3/2022 1714 by Rhea Mackenzie R.N.  Outcome: Progressing  12/3/2022 1712 by Rhea Mackenzie R.N.  Outcome: Progressing     Problem: Pain - Standard  Goal: Alleviation of pain or a reduction in pain to the patient’s comfort goal  12/3/2022 1714 by Rhea Mackenzie R.N.  Outcome: Progressing  12/3/2022 1714 by Rhea Mackenzie R.N.  Outcome: Progressing  12/3/2022 1712 by Rhea Mackenzie R.N.  Outcome: Progressing     Problem: Psychosocial  Goal: Patient's level of anxiety will decrease  Outcome: Progressing  Goal: Patient's ability to verbalize feelings about condition will improve  Outcome: Progressing  Goal: Patient's ability to re-evaluate and adapt role responsibilities will improve  Outcome: Progressing  Goal: Patient and family will demonstrate ability to cope with life altering diagnosis and/or procedure  Outcome: Progressing  Goal: Spiritual and cultural needs incorporated into hospitalization  Outcome: Progressing     Problem: Communication  Goal: The ability to communicate needs accurately and effectively will improve  Outcome: Progressing     Problem: Discharge Barriers/Planning  Goal: Patient's continuum of care needs are met  Outcome: Progressing      Problem: Hemodynamics  Goal: Patient's hemodynamics, fluid balance and neurologic status will be stable or improve  Outcome: Progressing     Problem: Respiratory  Goal: Patient will achieve/maintain optimum respiratory ventilation and gas exchange  Outcome: Progressing     Problem: Chest Tube Management  Goal: Complications related to chest tube will be avoided or minimized  Outcome: Progressing     Problem: Fluid Volume  Goal: Fluid volume balance will be maintained  Outcome: Progressing     Problem: Mechanical Ventilation  Goal: Safe management of artificial airway and ventilation  Outcome: Progressing  Goal: Successful weaning off mechanical ventilator, spontaneously maintains adequate gas exchange  Outcome: Progressing  Goal: Patient will be able to express needs and understand communication  Outcome: Progressing     Problem: Dysphagia  Goal: Dysphagia will improve  Outcome: Progressing     Problem: Risk for Aspiration  Goal: Patient's risk for aspiration will be absent or decrease  Outcome: Progressing     Problem: Nutrition  Goal: Patient's nutritional and fluid intake will be adequate or improve  Outcome: Progressing  Goal: Enteral nutrition will be maintained or improve  Outcome: Progressing  Goal: Enteral nutrition will be maintained or improve  Outcome: Progressing     Problem: Urinary Elimination  Goal: Establish and maintain regular urinary output  Outcome: Progressing     Problem: Bowel Elimination  Goal: Establish and maintain regular bowel function  Outcome: Progressing     Problem: Gastrointestinal Irritability  Goal: Nausea and vomiting will be absent or improve  Outcome: Progressing  Goal: Diarrhea will be absent or improved  Outcome: Progressing     Problem: Rectal Tube  Goal: Fecal output will be contained and skin will remain free from irritation  Outcome: Progressing     Problem: Mobility  Goal: Patient's capacity to carry out activities will improve  Outcome: Progressing     Problem: Self  Care  Goal: Patient will have the ability to perform ADLs independently or with assistance (bathe, groom, dress, toilet and feed)  Outcome: Progressing     Problem: Infection - Standard  Goal: Patient will remain free from infection  Outcome: Progressing     Problem: Wound/ / Incision Healing  Goal: Patient's wound/surgical incision will decrease in size and heals properly  Outcome: Progressing   The patient is Stable - Low risk of patient condition declining or worsening    Shift Goals  Clinical Goals: treat UTI  Patient Goals: manage pain  Family Goals: manage pain uti o2 sat    Progress made toward(s) clinical / shift goals: pt remains confused more than usual per . He agrees to let pt stay overnight to receive IVATB. Patient comfortable and pleasant, will cont to monitor for s/s of distress or worsening confusion.    Patient is not progressing towards the following goals:

## 2022-12-04 NOTE — CARE PLAN
The patient is Stable - Low risk of patient condition declining or worsening    Shift Goals  Clinical Goals: comfort  Patient Goals: rest  Family Goals: manage pain uti o2 sat    Progress made toward(s) clinical / shift goals:      Patient is not progressing towards the following goals:

## 2022-12-04 NOTE — CARE PLAN
Problem: Knowledge Deficit - Standard  Goal: Patient and family/care givers will demonstrate understanding of plan of care, disease process/condition, diagnostic tests and medications  Outcome: Progressing     Problem: Skin Integrity  Goal: Skin integrity is maintained or improved  Outcome: Progressing     Problem: Fall Risk  Goal: Patient will remain free from falls  Outcome: Progressing     Problem: Pain - Standard  Goal: Alleviation of pain or a reduction in pain to the patient’s comfort goal  Outcome: Progressing     Problem: Psychosocial  Goal: Patient's level of anxiety will decrease  Outcome: Progressing  Goal: Patient's ability to verbalize feelings about condition will improve  Outcome: Progressing  Goal: Patient's ability to re-evaluate and adapt role responsibilities will improve  Outcome: Progressing  Goal: Patient and family will demonstrate ability to cope with life altering diagnosis and/or procedure  Outcome: Progressing  Goal: Spiritual and cultural needs incorporated into hospitalization  Outcome: Progressing     Problem: Communication  Goal: The ability to communicate needs accurately and effectively will improve  Outcome: Progressing     Problem: Discharge Barriers/Planning  Goal: Patient's continuum of care needs are met  Outcome: Progressing     Problem: Hemodynamics  Goal: Patient's hemodynamics, fluid balance and neurologic status will be stable or improve  Outcome: Progressing     Problem: Respiratory  Goal: Patient will achieve/maintain optimum respiratory ventilation and gas exchange  Outcome: Progressing     Problem: Chest Tube Management  Goal: Complications related to chest tube will be avoided or minimized  Outcome: Progressing     Problem: Fluid Volume  Goal: Fluid volume balance will be maintained  Outcome: Progressing     Problem: Mechanical Ventilation  Goal: Safe management of artificial airway and ventilation  Outcome: Progressing  Goal: Successful weaning off mechanical  ventilator, spontaneously maintains adequate gas exchange  Outcome: Progressing  Goal: Patient will be able to express needs and understand communication  Outcome: Progressing     Problem: Dysphagia  Goal: Dysphagia will improve  Outcome: Progressing     Problem: Risk for Aspiration  Goal: Patient's risk for aspiration will be absent or decrease  Outcome: Progressing     Problem: Nutrition  Goal: Patient's nutritional and fluid intake will be adequate or improve  Outcome: Progressing  Goal: Enteral nutrition will be maintained or improve  Outcome: Progressing  Goal: Enteral nutrition will be maintained or improve  Outcome: Progressing     Problem: Urinary Elimination  Goal: Establish and maintain regular urinary output  Outcome: Progressing     Problem: Bowel Elimination  Goal: Establish and maintain regular bowel function  Outcome: Progressing     Problem: Gastrointestinal Irritability  Goal: Nausea and vomiting will be absent or improve  Outcome: Progressing  Goal: Diarrhea will be absent or improved  Outcome: Progressing     Problem: Rectal Tube  Goal: Fecal output will be contained and skin will remain free from irritation  Outcome: Progressing     Problem: Mobility  Goal: Patient's capacity to carry out activities will improve  Outcome: Progressing     Problem: Self Care  Goal: Patient will have the ability to perform ADLs independently or with assistance (bathe, groom, dress, toilet and feed)  Outcome: Progressing     Problem: Infection - Standard  Goal: Patient will remain free from infection  Outcome: Progressing     Problem: Wound/ / Incision Healing  Goal: Patient's wound/surgical incision will decrease in size and heals properly  Outcome: Progressing   The patient is Watcher - Medium risk of patient condition declining or worsening    Shift Goals  Clinical Goals: comfort  Patient Goals: rest  Family Goals: pain control and UTI ATB    Progress made toward(s) clinical / shift goals: Patient is carrying on a  coherent conversation today. Still not saying things that make sense but better than previous day. Will continue to watch.Still not strong enough to stand with walker.     Patient is not progressing towards the following goals:

## 2022-12-05 VITALS
TEMPERATURE: 97.6 F | WEIGHT: 261 LBS | RESPIRATION RATE: 18 BRPM | OXYGEN SATURATION: 95 % | BODY MASS INDEX: 37.37 KG/M2 | SYSTOLIC BLOOD PRESSURE: 173 MMHG | HEIGHT: 70 IN | HEART RATE: 95 BPM | DIASTOLIC BLOOD PRESSURE: 89 MMHG

## 2022-12-05 PROBLEM — E87.6 HYPOKALEMIA: Status: RESOLVED | Noted: 2019-11-23 | Resolved: 2022-12-05

## 2022-12-05 PROBLEM — N30.00 ACUTE CYSTITIS WITHOUT HEMATURIA: Status: RESOLVED | Noted: 2022-12-02 | Resolved: 2022-12-05

## 2022-12-05 LAB
ANION GAP SERPL CALC-SCNC: 12 MMOL/L (ref 7–16)
BACTERIA UR CULT: ABNORMAL
BACTERIA UR CULT: ABNORMAL
BASOPHILS # BLD AUTO: 0.6 % (ref 0–1.8)
BASOPHILS # BLD: 0.05 K/UL (ref 0–0.12)
BUN SERPL-MCNC: 15 MG/DL (ref 8–22)
CALCIUM SERPL-MCNC: 9.3 MG/DL (ref 8.5–10.5)
CHLORIDE SERPL-SCNC: 106 MMOL/L (ref 96–112)
CO2 SERPL-SCNC: 26 MMOL/L (ref 20–33)
CREAT SERPL-MCNC: 0.44 MG/DL (ref 0.5–1.4)
EOSINOPHIL # BLD AUTO: 0.57 K/UL (ref 0–0.51)
EOSINOPHIL NFR BLD: 6.3 % (ref 0–6.9)
ERYTHROCYTE [DISTWIDTH] IN BLOOD BY AUTOMATED COUNT: 44.1 FL (ref 35.9–50)
GFR SERPLBLD CREATININE-BSD FMLA CKD-EPI: 104 ML/MIN/1.73 M 2
GLUCOSE SERPL-MCNC: 87 MG/DL (ref 65–99)
HCT VFR BLD AUTO: 51.1 % (ref 37–47)
HGB BLD-MCNC: 16.4 G/DL (ref 12–16)
IMM GRANULOCYTES # BLD AUTO: 0.04 K/UL (ref 0–0.11)
IMM GRANULOCYTES NFR BLD AUTO: 0.4 % (ref 0–0.9)
LYMPHOCYTES # BLD AUTO: 2.17 K/UL (ref 1–4.8)
LYMPHOCYTES NFR BLD: 23.9 % (ref 22–41)
MCH RBC QN AUTO: 30.5 PG (ref 27–33)
MCHC RBC AUTO-ENTMCNC: 32.1 G/DL (ref 33.6–35)
MCV RBC AUTO: 95 FL (ref 81.4–97.8)
MONOCYTES # BLD AUTO: 0.91 K/UL (ref 0–0.85)
MONOCYTES NFR BLD AUTO: 10 % (ref 0–13.4)
NEUTROPHILS # BLD AUTO: 5.33 K/UL (ref 2–7.15)
NEUTROPHILS NFR BLD: 58.8 % (ref 44–72)
NRBC # BLD AUTO: 0 K/UL
NRBC BLD-RTO: 0 /100 WBC
PLATELET # BLD AUTO: 268 K/UL (ref 164–446)
PMV BLD AUTO: 10.1 FL (ref 9–12.9)
POTASSIUM SERPL-SCNC: 3.8 MMOL/L (ref 3.6–5.5)
RBC # BLD AUTO: 5.38 M/UL (ref 4.2–5.4)
SIGNIFICANT IND 70042: ABNORMAL
SITE SITE: ABNORMAL
SODIUM SERPL-SCNC: 144 MMOL/L (ref 135–145)
SOURCE SOURCE: ABNORMAL
WBC # BLD AUTO: 9.1 K/UL (ref 4.8–10.8)

## 2022-12-05 PROCEDURE — 85025 COMPLETE CBC W/AUTO DIFF WBC: CPT

## 2022-12-05 PROCEDURE — 80048 BASIC METABOLIC PNL TOTAL CA: CPT

## 2022-12-05 PROCEDURE — A9270 NON-COVERED ITEM OR SERVICE: HCPCS | Performed by: INTERNAL MEDICINE

## 2022-12-05 PROCEDURE — 97166 OT EVAL MOD COMPLEX 45 MIN: CPT

## 2022-12-05 PROCEDURE — 700102 HCHG RX REV CODE 250 W/ 637 OVERRIDE(OP): Performed by: INTERNAL MEDICINE

## 2022-12-05 PROCEDURE — 99239 HOSP IP/OBS DSCHRG MGMT >30: CPT | Performed by: NURSE PRACTITIONER

## 2022-12-05 PROCEDURE — 97530 THERAPEUTIC ACTIVITIES: CPT

## 2022-12-05 PROCEDURE — 94640 AIRWAY INHALATION TREATMENT: CPT

## 2022-12-05 PROCEDURE — 36415 COLL VENOUS BLD VENIPUNCTURE: CPT

## 2022-12-05 RX ORDER — CEFDINIR 300 MG/1
300 CAPSULE ORAL 2 TIMES DAILY
Qty: 8 CAPSULE | Refills: 0 | Status: SHIPPED | OUTPATIENT
Start: 2022-12-05 | End: 2022-12-09

## 2022-12-05 RX ADMIN — BUDESONIDE AND FORMOTEROL FUMARATE DIHYDRATE 2 PUFF: 160; 4.5 AEROSOL RESPIRATORY (INHALATION) at 09:06

## 2022-12-05 RX ADMIN — PROPRANOLOL HYDROCHLORIDE 10 MG: 10 TABLET ORAL at 09:06

## 2022-12-05 ASSESSMENT — COGNITIVE AND FUNCTIONAL STATUS - GENERAL
EATING MEALS: A LOT
DRESSING REGULAR UPPER BODY CLOTHING: A LOT
TURNING FROM BACK TO SIDE WHILE IN FLAT BAD: UNABLE
MOBILITY SCORE: 6
PERSONAL GROOMING: A LOT
CLIMB 3 TO 5 STEPS WITH RAILING: TOTAL
SUGGESTED CMS G CODE MODIFIER MOBILITY: CN
WALKING IN HOSPITAL ROOM: TOTAL
MOVING TO AND FROM BED TO CHAIR: UNABLE
SUGGESTED CMS G CODE MODIFIER DAILY ACTIVITY: CL
DAILY ACTIVITIY SCORE: 12
MOVING FROM LYING ON BACK TO SITTING ON SIDE OF FLAT BED: UNABLE
STANDING UP FROM CHAIR USING ARMS: TOTAL
DRESSING REGULAR LOWER BODY CLOTHING: A LOT
HELP NEEDED FOR BATHING: A LOT
TOILETING: A LOT

## 2022-12-05 ASSESSMENT — ACTIVITIES OF DAILY LIVING (ADL): TOILETING: INDEPENDENT

## 2022-12-05 ASSESSMENT — GAIT ASSESSMENTS: GAIT LEVEL OF ASSIST: UNABLE TO PARTICIPATE

## 2022-12-05 NOTE — CARE PLAN
The patient is Stable - Low risk of patient condition declining or worsening    Shift Goals  Clinical Goals: comfort  Patient Goals: rest  Family Goals: pain control and UTI ATB    Progress made toward(s) clinical / shift goals:      Patient is not progressing towards the following goals:      Problem: Knowledge Deficit - Standard  Goal: Patient and family/care givers will demonstrate understanding of plan of care, disease process/condition, diagnostic tests and medications  Outcome: Not Progressing     Problem: Skin Integrity  Goal: Skin integrity is maintained or improved  Outcome: Not Progressing     Problem: Fall Risk  Goal: Patient will remain free from falls  Outcome: Not Progressing     Problem: Pain - Standard  Goal: Alleviation of pain or a reduction in pain to the patient’s comfort goal  Outcome: Not Progressing     Problem: Psychosocial  Goal: Patient's level of anxiety will decrease  Outcome: Not Progressing  Goal: Patient's ability to verbalize feelings about condition will improve  Outcome: Not Progressing  Goal: Patient's ability to re-evaluate and adapt role responsibilities will improve  Outcome: Not Progressing  Goal: Patient and family will demonstrate ability to cope with life altering diagnosis and/or procedure  Outcome: Not Progressing  Goal: Spiritual and cultural needs incorporated into hospitalization  Outcome: Not Progressing     Problem: Communication  Goal: The ability to communicate needs accurately and effectively will improve  Outcome: Not Progressing     Problem: Discharge Barriers/Planning  Goal: Patient's continuum of care needs are met  Outcome: Not Progressing     Problem: Hemodynamics  Goal: Patient's hemodynamics, fluid balance and neurologic status will be stable or improve  Outcome: Not Progressing     Problem: Respiratory  Goal: Patient will achieve/maintain optimum respiratory ventilation and gas exchange  Outcome: Not Progressing     Problem: Chest Tube Management  Goal:  Complications related to chest tube will be avoided or minimized  Outcome: Not Progressing     Problem: Fluid Volume  Goal: Fluid volume balance will be maintained  Outcome: Not Progressing     Problem: Mechanical Ventilation  Goal: Safe management of artificial airway and ventilation  Outcome: Not Progressing  Goal: Successful weaning off mechanical ventilator, spontaneously maintains adequate gas exchange  Outcome: Not Progressing  Goal: Patient will be able to express needs and understand communication  Outcome: Not Progressing     Problem: Dysphagia  Goal: Dysphagia will improve  Outcome: Not Progressing     Problem: Risk for Aspiration  Goal: Patient's risk for aspiration will be absent or decrease  Outcome: Not Progressing     Problem: Nutrition  Goal: Patient's nutritional and fluid intake will be adequate or improve  Outcome: Not Progressing  Goal: Enteral nutrition will be maintained or improve  Outcome: Not Progressing  Goal: Enteral nutrition will be maintained or improve  Outcome: Not Progressing     Problem: Urinary Elimination  Goal: Establish and maintain regular urinary output  Outcome: Not Progressing     Problem: Bowel Elimination  Goal: Establish and maintain regular bowel function  Outcome: Not Progressing     Problem: Gastrointestinal Irritability  Goal: Nausea and vomiting will be absent or improve  Outcome: Not Progressing  Goal: Diarrhea will be absent or improved  Outcome: Not Progressing     Problem: Rectal Tube  Goal: Fecal output will be contained and skin will remain free from irritation  Outcome: Not Progressing     Problem: Mobility  Goal: Patient's capacity to carry out activities will improve  Outcome: Not Progressing     Problem: Self Care  Goal: Patient will have the ability to perform ADLs independently or with assistance (bathe, groom, dress, toilet and feed)  Outcome: Not Progressing     Problem: Infection - Standard  Goal: Patient will remain free from infection  Outcome: Not  Progressing     Problem: Wound/ / Incision Healing  Goal: Patient's wound/surgical incision will decrease in size and heals properly  Outcome: Not Progressing

## 2022-12-05 NOTE — PROGRESS NOTES
Patient's  stated that he was discharging his wife AMA this AM. Charge RN and APRN paged. APRN to bedside to discuss reasoning behind continued admission- oxygen requirement and home O2 setup, PT recommending SNF d/t poor mobility and safety, C+S pending results for home antibiotic therapy.  advised about potential harmful outcomes if his wife was discharged.  stated that he understood and that he has been taking care of her at home and could get antibiotics outpatient. Patient's PIV removed,  stated that he would give afternoon meds at home, and patient was safely transferred into car via RN and CNA.

## 2022-12-05 NOTE — THERAPY
Occupational Therapy   Initial Evaluation     Patient Name: Sheyla Garcia  Age:  72 y.o., Sex:  female  Medical Record #: [id number]  Today's Date: 12/5/2022     Precautions  Precautions: (P) Fall Risk    Assessment    Patient is 72 y.o. female admitted for back pain following GLF, acute respiratory failure, encephalopathy, per EMR pts spouse assists with all care, usually able to ambulate with FWW within home. Pt required maxA for bed mobility, lower body ADLs and standing attempts with FWW. Pt would benefit from continued skilled therapy while admitted as well as recommend post-acute placement.      Plan    Recommend Occupational Therapy 3 times per week until therapy goals are met for the following treatments:  Adaptive Equipment, Neuro Re-Education / Balance, Self Care/Activities of Daily Living, Therapeutic Activities, and Therapeutic Exercises.    DC Equipment Recommendations: (P) Unable to determine at this time  Discharge Recommendations: (P) Recommend post-acute placement for additional occupational therapy services prior to discharge home     Objective       12/05/22 0904   Prior Living Situation   Prior Services Continuous (24 Hour) Care Giving Family   Housing / Facility 1 Story House   Steps Into Home 2   Steps In Home 0   Bathroom Set up Walk In Shower   Equipment Owned Front-Wheel Walker;Wheelchair   Lives with - Patient's Self Care Capacity Spouse   Prior Level of ADL Function   Self Feeding Independent   Grooming / Hygiene Independent   Bathing Independent   Dressing Independent   Toileting Independent   Comments spouse assists as needed   History of Falls   History of Falls Yes   Date of Last Fall   (reason for admit)   Precautions   Precautions Fall Risk   Pain 0 - 10 Group   Therapist Pain Assessment Post Activity Pain Same as Prior to Activity;Nurse Notified   Cognition    Cognition / Consciousness X   Level of Consciousness Alert   Comments confused, tangential, easily distracted   Strength  Upper Body   Upper Body Strength  X   Gross Strength Generalized Weakness, Equal Bilaterally.    Balance Assessment   Sitting Balance (Static) Fair -   Sitting Balance (Dynamic) Poor +   Standing Balance (Static) Poor -   Standing Balance (Dynamic) Trace +   Weight Shift Sitting Poor   Weight Shift Standing Poor   Bed Mobility    Supine to Sit Moderate Assist   Sit to Supine Maximal Assist   Scooting Minimal Assist   ADL Assessment   Grooming Maximal Assist   Upper Body Dressing Maximal Assist   Lower Body Dressing Maximal Assist   How much help from another person does the patient currently need...   Putting on and taking off regular lower body clothing? 2   Bathing (including washing, rinsing, and drying)? 2   Toileting, which includes using a toilet, bedpan, or urinal? 2   Putting on and taking off regular upper body clothing? 2   Taking care of personal grooming such as brushing teeth? 2   Eating meals? 2   6 Clicks Daily Activity Score 12   Functional Mobility   Sit to Stand Maximal Assist   Bed, Chair, Wheelchair Transfer Unable to Participate   Mobility bed mobility, STS at EOB, returned to supine   Comments w/ HHA   Activity Tolerance   Sitting Edge of Bed 5 min   Standing 3 min total   Short Term Goals   Short Term Goal # 1 Pt will complete ADL transfers with supervision   Short Term Goal # 2 Pt will complete LB dressing with supervision   Short Term Goal # 3 Pt will complete toileting with supervision   Education Group   Education Provided Role of Occupational Therapist   Role of Occupational Therapist Patient Response Patient;Nonacceptance;Explanation;Reinforcement Needed;No Learning Evidence   Problem List   Problem List Decreased Homemaking Skills;Decreased Active Daily Living Skills;Decreased Upper Extremity Strength Right;Decreased Upper Extremity Strength Left;Decreased Functional Mobility;Decreased Activity Tolerance;Impaired Postural Control / Balance;Impaired Cognitive Function   Anticipated  Discharge Equipment and Recommendations   DC Equipment Recommendations Unable to determine at this time   Discharge Recommendations Recommend post-acute placement for additional occupational therapy services prior to discharge home   Interdisciplinary Plan of Care Collaboration   IDT Collaboration with  Nursing   Patient Position at End of Therapy In Bed;Bed Alarm On;Call Light within Reach;Tray Table within Reach;Phone within Reach   Collaboration Comments RN updated

## 2022-12-05 NOTE — PROGRESS NOTES
RN and CNA attempting to turn patient as part of pressure injury prevention. Patient became very verbally aggressive with staff, name calling, screaming, and refusing turn. Patient left spine and will attempt turn again in 2 hrs.

## 2022-12-05 NOTE — DISCHARGE SUMMARY
Discharge Summary    CHIEF COMPLAINT ON ADMISSION  Chief Complaint   Patient presents with    Low Back Pain    T-5000 GLF       Reason for Admission  ems     Admission Date  12/2/2022    CODE STATUS  Full Code    HPI & HOSPITAL COURSE    Sheyla Garcia is a 72 y.o. female with past medical history of obesity, COPD, GERD, fibromyalgia, chronic pain, debility, seizure, hypertension who presented 12/2/2022 with complaints of lower back pain up to 9 out of 10 worsening with movement after she slide down to the ground, after she was trying to get up and her knee gave up.  Denies head trauma or loss of consciousness.  On route to the hospital, patient was given fentanyl 100 mcg intravenously by EMS.  Her room air SPO2 was 70%, therefore she was placed on 4 L nasal cannula.  According to the patient and her , SPO2 is usually at 88% on room air at baseline.  Patient denies currently any cough.  Blood pressure noted to be elevated up to 218/91.  At this time patient on 3 L nasal cannula.  She appears to be slightly confused, disoriented in time and place.  UA did show moderate bacteria, WBC 20, hyaline casts 3-5, ketones 40.  D-dimer was elevated at 0.83, proBNP 476, troponin 11.  EKG negative for acute ischemia.  WBC 12.7.  Patient was evaluated with CTA of pulmonary artery which was negative for PE, showing mild left basilar atelectasis, hepatomegaly with steatosis.  CT of lumbar spine negative for acute fracture, showing fusion across the left sacroiliac joint, mild scoliosis, DDD, left and right knee x-ray showed no acute fracture or dislocation.     During hospitalization was noted to be persistently hypoxic.  She does have a history of COPD, and sleep apnea.  However most likely related to morbid obesity and difficulty with ambulation.  Will be discharged on continuous home oxygen therapy.  Also noted to have a urinary tract infection with Klebsiella vera cola growing out, patient responded well to 3 days of  IV ceftriaxone, transition for additional 4 days of cefdinir for total of 7 days of therapy.    With persistent weakness, physical therapy worked with patient and she was barely able to stand with two-person assist.  Not believe patient is safe for discharge as result of her weakness and difficulty with ambulation, especially since she presented with ground-level fall and inability to stand up.  Patient is insisting on taking patient home, and understands that he will have to take her AGAINST MEDICAL ADVICE.  Reviewed other medical issues including antibiotics for continued treatment of urinary tract infection.      I have performed a physical exam and reviewed and updated ROS and Plan today (12/5/2022). In review of yesterday's note (12/4/2022), there are no changes except as documented above.      Patient is being discharged AGAINST MEDICAL ADVICE into the care of her .    The patient met 2-midnight criteria for an inpatient stay at the time of discharge.    Discharge Date  12/5/2022    FOLLOW UP ITEMS POST DISCHARGE  Complete cefdinir     DISCHARGE DIAGNOSES  Principal Problem:    Acute respiratory failure with hypoxia (HCC) POA: Unknown  Active Problems:    Encephalopathy POA: Unknown    Essential hypertension POA: Yes    Hypothyroidism POA: Yes    Hypoxia POA: Yes    Fall from ground level POA: Unknown  Resolved Problems:    Acute cystitis without hematuria POA: Unknown    Hypokalemia POA: Unknown      FOLLOW UP  No future appointments.  No follow-up provider specified.    MEDICATIONS ON DISCHARGE     Medication List        START taking these medications        Instructions   cefdinir 300 MG Caps  Commonly known as: OMNICEF   Take 1 Capsule by mouth 2 times a day for 4 days.  Dose: 300 mg            CONTINUE taking these medications        Instructions   * albuterol 2.5mg/3ml Nebu solution for nebulization  Commonly known as: PROVENTIL   Take 3 mL by nebulization every four hours as needed for Shortness  of Breath.  Dose: 2.5 mg     * albuterol 108 (90 Base) MCG/ACT Aers inhalation aerosol   INHALE TWO PUFFS BY MOUTH EVERY SIX HOURS AS NEEDED FOR SHORTNESS OF BREATH     amitriptyline 25 MG Tabs  Commonly known as: ELAVIL   Take 25 mg by mouth at bedtime.  Dose: 25 mg     ARIPiprazole 10 MG Tabs  Commonly known as: Abilify   Take 10 mg by mouth every evening.  Dose: 10 mg     BETA CAROTENE PO   Take 1 Dose by mouth every day. Unknown OTC Strength.  Dose: 1 Dose     diclofenac sodium 1 % Gel  Commonly known as: Voltaren   Apply 4 g topically 4 times a day as needed (for hip and knee pain).  Dose: 4 g     docusate sodium 100 MG Caps  Commonly known as: COLACE   Take 100 mg by mouth 1 time a day as needed for Constipation.  Dose: 100 mg     estradiol 1 MG Tabs  Commonly known as: ESTRACE   Take 1 Tablet by mouth every day.  Dose: 1 mg     Fluticasone Furoate-Vilanterol 200-25 MCG/INH Aepb  Commonly known as: Breo Ellipta   Inhale 1 Puff every day.  Dose: 1 Puff     HYDROcodone-acetaminophen 5-325 MG Tabs per tablet  Commonly known as: NORCO   Take 1 Tablet by mouth 2 times a day as needed (severe pain) for up to 30 days.  Dose: 1 Tablet     ibuprofen 200 MG Tabs  Commonly known as: MOTRIN   Take 600 mg by mouth every 8 hours as needed for Mild Pain.  Dose: 600 mg     lamoTRIgine 25 MG Tabs  Commonly known as: LAMICTAL   Take 25 mg by mouth every evening.  Dose: 25 mg     lidocaine 5 % Ptch  Commonly known as: LIDODERM   Place 1 Patch on the skin every 24 hours.  Dose: 1 Patch     lisinopril 10 MG Tabs  Commonly known as: PRINIVIL   Take 1 Tablet by mouth every day.  Dose: 10 mg     methocarbamol 750 MG Tabs  Commonly known as: ROBAXIN   Take 750 mg by mouth. Five times daily as needed  Dose: 750 mg     NYQUIL PO   Take 30 mL by mouth as needed (Cold and flu symptoms).  Dose: 30 mL     oxyCODONE-acetaminophen  MG Tabs  Commonly known as: PERCOCET-10   Take 1 Tablet by mouth 3 times a day as needed for Severe  Pain.  Dose: 1 Tablet     propranolol 10 MG Tabs  Commonly known as: INDERAL   Take 10 mg by mouth 2 times a day.  Dose: 10 mg     SUMAtriptan 50 MG Tabs  Commonly known as: IMITREX   Take 1 Tablet by mouth one time as needed for Migraine for up to 1 dose.  Dose: 50 mg     SUPER B-COMPLEX/VIT C/FA PO   Take 1 Tab by mouth every day.  Dose: 1 Tablet     therapeutic multivitamin-minerals Tabs   Take 1 Tab by mouth every day.  Dose: 1 Tablet           * This list has 2 medication(s) that are the same as other medications prescribed for you. Read the directions carefully, and ask your doctor or other care provider to review them with you.                  Allergies  Allergies   Allergen Reactions    Gabapentin Unspecified     seizure       DIET  Orders Placed This Encounter   Procedures    Diet Order Diet: Regular     Standing Status:   Standing     Number of Occurrences:   1     Order Specific Question:   Diet:     Answer:   Regular [1]       LABORATORY  Lab Results   Component Value Date    SODIUM 144 12/05/2022    POTASSIUM 3.8 12/05/2022    CHLORIDE 106 12/05/2022    CO2 26 12/05/2022    GLUCOSE 87 12/05/2022    BUN 15 12/05/2022    CREATININE 0.44 (L) 12/05/2022        Lab Results   Component Value Date    WBC 9.1 12/05/2022    HEMOGLOBIN 16.4 (H) 12/05/2022    HEMATOCRIT 51.1 (H) 12/05/2022    PLATELETCT 268 12/05/2022      CT-CTA CHEST PULMONARY ARTERY W/ RECONS   Final Result      1.  Negative for pulmonary embolism      2.  Mild left basilar atelectasis      3.  Hepatic steatosis and hepatomegaly            CT-LSPINE W/O PLUS RECONS   Final Result      1.  Negative for lumbar spine fracture      2.  Multilevel facet arthropathy      3.  Mild scoliosis      4.  Fusion across the left sacroiliac joint      5.  Peritoneal catheter present      DX-KNEE 3 VIEWS RIGHT   Final Result         1.   No radiographic evidence of acute injury.      2.  Moderate osteoarthritic changes of the patellofemoral joint and lateral  compartment.      DX-KNEE 3 VIEWS LEFT   Final Result      1.  No radiographic evidence of acute traumatic injury.   2.  Mild degenerative changes of the medial and patellofemoral compartments.      DX-CHEST-PORTABLE (1 VIEW)   Final Result      No acute cardiac or pulmonary abnormalities are identified.         Total time of the discharge process took 39 minutes.

## 2022-12-06 ENCOUNTER — PATIENT OUTREACH (OUTPATIENT)
Dept: MEDICAL GROUP | Facility: LAB | Age: 72
End: 2022-12-06
Payer: MEDICARE

## 2022-12-06 NOTE — PROGRESS NOTES
Subjective:     Sheyla Garcia is a 72 y.o. female who presents for Hospital Follow-up.    POST DISCHARGE CALL:  Discharge Date:12/5/2022   Date of Outreach Call: 12/6/2022  9:52 AM  Now that you're home, how are you doing? Fair  Did you receive any new prescriptions? Yes  Were you able to fill those medications? Yes  How did you fill those prescriptions? Pharmacy  If not able to fill prescriptions, why? *    Do you have questions about your medications? No  Do you have a follow-up appointment scheduled?Yes  Any issues or paperwork you wish to discuss with your PCP? Pt would like pain medication refilled  Does patient qualify for CCM Program? Yes  If patient qualifies, was CCM Program Introduced? Yes  If patient does not qualify, comment? *  Number of Attempts: 1  Current or previous attempts completed within two business days of discharge? Yes  Provided education regarding treatment plan, medication, self-management, ADLs? Yes  Has patient completed Advance Directive? If yes, advise them to bring to appointment. No  Care Manager phone number provided? Yes  Is there anything else I can help you with? No  Chief Complaint? T-5000 GLF, low back pain  Admitting Dx? EMS  Discharge Diagnosis? Acute respiratory failure with hypoxia  Additional Comments? *    HPI:   Recently hospitalized for fall in bathroom occurring last Friday.  , Ed, is doing most of the story telling / talking today and tells me that he called 911 to have her checked out b/c of all the falling and he was unsure if she was physically injured.  Mentions that she had been confused for a few d prior to hospital visit.   In ER and while hospitalized she was hypoxic - therefore send home with oxygen 2 L - wearing periodically when she will let her  put this on.    BP elevated in hospital.    UTI dx in hospital - still on antibiotics - cefdinir.    Chronic pain:  off pain pills x a week and out of hydrocodone but  wants her to change to  oxycodone b/c hydrocodone wasn't controlling her pain and oxycodone has in the past - requesting ability to give her 3 oxycodone per day.  Her  has not noticed that oxycodone makes her confused (in the past).   She was not stable on her feet in the hospital and her  brought her home AMA - she is using a wheelchair at home but  prefers she is home b/c of her chronic confusion when hospitalized.  Wearing briefs.  Has tried up to 3 months of home PT was this was unsuccessful b/c of lack of pain control.   Injection in bursa scheduled with Dr. Burgess next week.   is interested in getting her pain under control and then possibly retrying physical therapy.  Current pain location:  low back, knees, feet, hips.  Also using voltaren gel for pain.   Regularly seeing psychiatry for possible schizophrenia and just started Lamictal, Abilify and propanolol.    Current medicines (including reconciliation performed today)  Current Outpatient Medications   Medication Sig Dispense Refill    SUMAtriptan (IMITREX) 50 MG Tab Take 1 Tablet by mouth one time as needed for Migraine for up to 1 dose. 10 Tablet 3    cefdinir (OMNICEF) 300 MG Cap Take 1 Capsule by mouth 2 times a day for 4 days. 8 Capsule 0    methocarbamol (ROBAXIN) 750 MG Tab Take 750 mg by mouth. Five times daily as needed      oxyCODONE-acetaminophen (PERCOCET-10)  MG Tab Take 1 Tablet by mouth 3 times a day as needed for Severe Pain.      diclofenac sodium (VOLTAREN) 1 % Gel Apply 4 g topically 4 times a day as needed (for hip and knee pain).      albuterol 108 (90 Base) MCG/ACT Aero Soln inhalation aerosol INHALE TWO PUFFS BY MOUTH EVERY SIX HOURS AS NEEDED FOR SHORTNESS OF BREATH 6.7 g 0    amitriptyline (ELAVIL) 25 MG Tab Take 25 mg by mouth at bedtime.      ARIPiprazole (ABILIFY) 10 MG Tab Take 10 mg by mouth every evening.      lamoTRIgine (LAMICTAL) 25 MG Tab Take 25 mg by mouth every evening.      propranolol (INDERAL) 10 MG Tab  Take 10 mg by mouth 2 times a day.      HYDROcodone-acetaminophen (NORCO) 5-325 MG Tab per tablet Take 1 Tablet by mouth 2 times a day as needed (severe pain) for up to 30 days. 60 Tablet 0    lisinopril (PRINIVIL) 10 MG Tab Take 1 Tablet by mouth every day. 100 Tablet 2    Fluticasone Furoate-Vilanterol (BREO ELLIPTA) 200-25 MCG/INH AEROSOL POWDER, BREATH ACTIVATED Inhale 1 Puff every day. 1 Each 11    albuterol (PROVENTIL) 2.5mg/3ml Nebu Soln solution for nebulization Take 3 mL by nebulization every four hours as needed for Shortness of Breath. 75 mL 3    estradiol (ESTRACE) 1 MG Tab Take 1 Tablet by mouth every day. 90 Tablet 4    lidocaine (LIDODERM) 5 % Patch Place 1 Patch on the skin every 24 hours. 30 Patch 5    ibuprofen (MOTRIN) 200 MG Tab Take 600 mg by mouth every 8 hours as needed for Mild Pain.      Pseudoeph-Doxylamine-DM-APAP (NYQUIL PO) Take 30 mL by mouth as needed (Cold and flu symptoms).      docusate sodium (COLACE) 100 MG Cap Take 100 mg by mouth 1 time a day as needed for Constipation.      therapeutic multivitamin-minerals (THERAGRAN-M) Tab Take 1 Tab by mouth every day.      B Complex-C-Folic Acid (SUPER B-COMPLEX/VIT C/FA PO) Take 1 Tab by mouth every day.      BETA CAROTENE PO Take 1 Dose by mouth every day. Unknown OTC Strength.       No current facility-administered medications for this visit.       Allergies:   Gabapentin    Social History     Tobacco Use    Smoking status: Former     Years: 2.00     Types: Cigarettes     Quit date: 1986     Years since quittin.9    Smokeless tobacco: Never    Tobacco comments:     Age [age]    Vaping Use    Vaping Use: Never used   Substance Use Topics    Alcohol use: Yes     Comment: Wine - daily    Drug use: Never       Objective:     There were no vitals filed for this visit.  There is no height or weight on file to calculate BMI.    Physical Exam:  Gen: NAD  Resp: nonlabored.  Able to speak in full sentences  Psy: pleasant / cooperative.    Neuro:  Alert, remembers who I am on Zoom but has difficulty recalling in-depth details of her hospitalization and  helps her with this.    Assessment and Plan:   1. Hospital discharge follow-up        2. Hypoxia - has home o2        3. Fall from ground level        4. Fibromyalgia        5. Acute cystitis without hematuria        6. Chronic use of opiate drug for therapeutic purpose        7. Chronic bilateral low back pain with left-sided sciatica  oxyCODONE-acetaminophen (PERCOCET) 5-325 MG Tab      8. Confusion        9. Schizoaffective disorder, unspecified type (HCC)        Per patient's , she is slowly improving and no longer hallucinating.  Compliant with antibiotics for UTI.  I updated psy meds in epic- next appt end of dec. Reviewed hospital records with pt's .  Patient and her  are looking forward to Dr. Burgess's appt next week for bursa injection.  Ed will talk to Dr. Burgess about taking oxycodone rx over if she needs more than 3/day.  She was prescribed 5/325 oxycodone to take 1 every 8 hours as needed for severe pain.  Reviewed  profile which shows that last refill was hydrocodone 1 month ago, prescribed by myself.  When I see her in person in the office we can update a pain contract and urine drug screen if she does not move her opiates over to Dr. Burgess.  Her  does not feel that the patient's confusion, hallucinations or any psychiatric issues ever worsened with opiate therapy but we had a discussion about the possibility of this and he will notify me if he notices any changes in her behavior with restarting oxycodone.  Discussed the prevention of constipation.  To prevent recurrent UTI, discussed high water intake.  Consider cranberry pills and avoid juice/soda/high sugar intake.    Follow-up 1 month.    - Chart and discharge summary were reviewed.   - Hospitalization and results reviewed with patient.   - Medications reviewed including instructions regarding  high risk medications, dosing and side effects.  - Recommended Services: Referral to Home Health will be needed when she wants to restart home physical therapy after pain becomes controlled  - Advance directive/POLST on file?  Yes    Follow-up: 1 month    Face-to-face transitional care management services with MODERATE (today's visit is within 14 days post discharge & LACE+ score of 28-58) medical decision complexity were provided.     LACE+ Historical Score Over Time (0-28: Low, 29-58: Medium, 59+: High): 68          [address]

## 2022-12-08 ENCOUNTER — TELEMEDICINE (OUTPATIENT)
Dept: MEDICAL GROUP | Facility: LAB | Age: 72
End: 2022-12-08
Payer: MEDICARE

## 2022-12-08 ENCOUNTER — PATIENT MESSAGE (OUTPATIENT)
Dept: MEDICAL GROUP | Facility: LAB | Age: 72
End: 2022-12-08

## 2022-12-08 VITALS
OXYGEN SATURATION: 92 % | DIASTOLIC BLOOD PRESSURE: 81 MMHG | BODY MASS INDEX: 37.37 KG/M2 | HEART RATE: 108 BPM | WEIGHT: 261 LBS | HEIGHT: 70 IN | SYSTOLIC BLOOD PRESSURE: 133 MMHG

## 2022-12-08 DIAGNOSIS — G89.29 CHRONIC BILATERAL LOW BACK PAIN WITH LEFT-SIDED SCIATICA: ICD-10-CM

## 2022-12-08 DIAGNOSIS — Z79.891 CHRONIC USE OF OPIATE DRUG FOR THERAPEUTIC PURPOSE: ICD-10-CM

## 2022-12-08 DIAGNOSIS — M54.42 CHRONIC BILATERAL LOW BACK PAIN WITH LEFT-SIDED SCIATICA: ICD-10-CM
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
## 2022-12-08 DIAGNOSIS — F25.9 SCHIZOAFFECTIVE DISORDER, UNSPECIFIED TYPE (HCC): ICD-10-CM

## 2022-12-08 DIAGNOSIS — Z09 HOSPITAL DISCHARGE FOLLOW-UP: ICD-10-CM

## 2022-12-08 DIAGNOSIS — I10 ESSENTIAL HYPERTENSION: ICD-10-CM

## 2022-12-08 DIAGNOSIS — N30.00 ACUTE CYSTITIS WITHOUT HEMATURIA: ICD-10-CM

## 2022-12-08 DIAGNOSIS — R41.0 CONFUSION: ICD-10-CM

## 2022-12-08 DIAGNOSIS — R09.02 HYPOXIA: ICD-10-CM

## 2022-12-08 DIAGNOSIS — M79.7 FIBROMYALGIA: ICD-10-CM

## 2022-12-08 PROBLEM — J96.01 ACUTE RESPIRATORY FAILURE WITH HYPOXIA (HCC): Status: RESOLVED | Noted: 2022-12-02 | Resolved: 2022-12-08

## 2022-12-08 PROCEDURE — 99495 TRANSJ CARE MGMT MOD F2F 14D: CPT | Mod: 95 | Performed by: NURSE PRACTITIONER

## 2022-12-08 RX ORDER — OXYCODONE HYDROCHLORIDE AND ACETAMINOPHEN 5; 325 MG/1; MG/1
1 TABLET ORAL EVERY 8 HOURS PRN
Qty: 90 TABLET | Refills: 0 | Status: SHIPPED | OUTPATIENT
Start: 2022-12-08 | End: 2023-01-07

## 2022-12-08 RX ORDER — ARIPIPRAZOLE 15 MG/1
15 TABLET ORAL DAILY
Qty: 30 TABLET | Refills: 4 | Status: ON HOLD
Start: 2022-12-08 | End: 2023-08-22

## 2022-12-08 RX ORDER — OXYCODONE HYDROCHLORIDE AND ACETAMINOPHEN 5; 325 MG/1; MG/1
1 TABLET ORAL EVERY 8 HOURS PRN
Qty: 90 TABLET | Refills: 0 | Status: SHIPPED | OUTPATIENT
Start: 2022-12-08 | End: 2022-12-08 | Stop reason: SDUPTHER

## 2022-12-08 RX ORDER — LAMOTRIGINE 100 MG/1
100 TABLET ORAL EVERY EVENING
Qty: 30 TABLET | Refills: 5 | Status: ON HOLD
Start: 2022-12-08 | End: 2023-03-31

## 2022-12-08 ASSESSMENT — FIBROSIS 4 INDEX: FIB4 SCORE: 1.66

## 2022-12-19 ENCOUNTER — PATIENT MESSAGE (OUTPATIENT)
Dept: MEDICAL GROUP | Facility: LAB | Age: 72
End: 2022-12-19
Payer: MEDICARE

## 2022-12-19 DIAGNOSIS — N39.0 ACUTE UTI: ICD-10-CM

## 2022-12-19 DIAGNOSIS — R44.3 HALLUCINATION: ICD-10-CM

## 2022-12-20 NOTE — PROGRESS NOTES
Virtual Visit: Established Patient   This visit was conducted via Zoom using secure and encrypted videoconferencing technology.   The patient was in their home in the [facility].    The patient's identity was confirmed and verbal consent was obtained for this virtual visit.

## 2023-01-12 ENCOUNTER — HOSPITAL ENCOUNTER (OUTPATIENT)
Facility: MEDICAL CENTER | Age: 73
End: 2023-01-12
Attending: NURSE PRACTITIONER
Payer: MEDICARE

## 2023-01-12 PROCEDURE — 87186 SC STD MICRODIL/AGAR DIL: CPT

## 2023-01-12 PROCEDURE — 87077 CULTURE AEROBIC IDENTIFY: CPT

## 2023-01-12 PROCEDURE — 87086 URINE CULTURE/COLONY COUNT: CPT | Mod: GA

## 2023-01-14 LAB
BACTERIA UR CULT: ABNORMAL
SIGNIFICANT IND 70042: ABNORMAL
SITE SITE: ABNORMAL
SOURCE SOURCE: ABNORMAL

## 2023-01-15 RX ORDER — SULFAMETHOXAZOLE AND TRIMETHOPRIM 800; 160 MG/1; MG/1
1 TABLET ORAL 2 TIMES DAILY
Qty: 10 TABLET | Refills: 0 | Status: SHIPPED | OUTPATIENT
Start: 2023-01-15 | End: 2023-01-20

## 2023-01-17 ENCOUNTER — PATIENT MESSAGE (OUTPATIENT)
Dept: MEDICAL GROUP | Facility: LAB | Age: 73
End: 2023-01-17
Payer: MEDICARE

## 2023-01-17 DIAGNOSIS — M54.42 CHRONIC BILATERAL LOW BACK PAIN WITH LEFT-SIDED SCIATICA: ICD-10-CM

## 2023-01-17 DIAGNOSIS — G89.29 CHRONIC BILATERAL LOW BACK PAIN WITH LEFT-SIDED SCIATICA: ICD-10-CM

## 2023-01-17 DIAGNOSIS — N39.0 RECURRENT UTI: ICD-10-CM

## 2023-01-17 RX ORDER — OXYCODONE AND ACETAMINOPHEN 10; 325 MG/1; MG/1
1 TABLET ORAL 3 TIMES DAILY PRN
Qty: 90 TABLET | Refills: 0 | Status: SHIPPED | OUTPATIENT
Start: 2023-01-17 | End: 2023-01-20 | Stop reason: SDUPTHER

## 2023-01-18 ENCOUNTER — TELEPHONE (OUTPATIENT)
Dept: MEDICAL GROUP | Facility: LAB | Age: 73
End: 2023-01-18
Payer: MEDICARE

## 2023-01-18 NOTE — TELEPHONE ENCOUNTER
He says that they have contacted him and that he thinks they are trying to scam him. They want him to pay upfront costs and he says that he will only get it if it's covered by medicare.  He says that he does check her oxygen and she has a home oxygen tank. No need for this

## 2023-01-18 NOTE — TELEPHONE ENCOUNTER
Will you ask if by any chance her  has a pulse oximeter at home and if so, he should spot check her oxygen at home while she is resting a few times per day and definitely have her wear oxygen if it is consistently below 88%.

## 2023-01-19 ENCOUNTER — PATIENT MESSAGE (OUTPATIENT)
Dept: MEDICAL GROUP | Facility: LAB | Age: 73
End: 2023-01-19
Payer: MEDICARE

## 2023-01-19 DIAGNOSIS — M54.42 CHRONIC BILATERAL LOW BACK PAIN WITH LEFT-SIDED SCIATICA: ICD-10-CM

## 2023-01-19 DIAGNOSIS — G89.29 CHRONIC BILATERAL LOW BACK PAIN WITH LEFT-SIDED SCIATICA: ICD-10-CM

## 2023-01-20 RX ORDER — OXYCODONE AND ACETAMINOPHEN 10; 325 MG/1; MG/1
1 TABLET ORAL 3 TIMES DAILY PRN
Qty: 90 TABLET | Refills: 0 | Status: SHIPPED | OUTPATIENT
Start: 2023-01-20 | End: 2023-02-19

## 2023-03-02 ENCOUNTER — PATIENT OUTREACH (OUTPATIENT)
Dept: HEALTH INFORMATION MANAGEMENT | Facility: OTHER | Age: 73
End: 2023-03-02
Payer: MEDICARE

## 2023-03-02 NOTE — PROGRESS NOTES
CHW Kanika reached out to patient regarding General Care Management enrollment. Pt was referred by Alanis SLADE. CHW was unable to reach pt at this time. CHW left pt a VM. CHW will continue to follow. CHW will call again 3/6/23.

## 2023-03-06 NOTE — PROGRESS NOTES
KENNY Boudreaux reached out to pt regarding General Care Management enrollment. Pt's  Ed answered. Pt was unavailable to talk at this time. Pt's  said 11 would be a good time to talk to pt. CHW will call pt back at this time.

## 2023-03-06 NOTE — PROGRESS NOTES
KENNY Boudreaux reached out to pt regarding general care management enrollment. CHW spoke to pt about the program. Pt reports she is not interested at this time. CHW gave pt contact information. CHW will not follow at this time.

## 2023-03-13 ENCOUNTER — APPOINTMENT (OUTPATIENT)
Dept: RADIOLOGY | Facility: MEDICAL CENTER | Age: 73
DRG: 689 | End: 2023-03-13
Attending: INTERNAL MEDICINE
Payer: MEDICARE

## 2023-03-13 ENCOUNTER — APPOINTMENT (OUTPATIENT)
Dept: RADIOLOGY | Facility: MEDICAL CENTER | Age: 73
DRG: 689 | End: 2023-03-13
Attending: EMERGENCY MEDICINE
Payer: MEDICARE

## 2023-03-13 ENCOUNTER — APPOINTMENT (OUTPATIENT)
Dept: RADIOLOGY | Facility: MEDICAL CENTER | Age: 73
DRG: 689 | End: 2023-03-13
Payer: MEDICARE

## 2023-03-13 ENCOUNTER — HOSPITAL ENCOUNTER (INPATIENT)
Facility: MEDICAL CENTER | Age: 73
LOS: 9 days | DRG: 689 | End: 2023-03-22
Attending: EMERGENCY MEDICINE | Admitting: INTERNAL MEDICINE
Payer: MEDICARE

## 2023-03-13 DIAGNOSIS — G93.40 ENCEPHALOPATHY: ICD-10-CM

## 2023-03-13 DIAGNOSIS — N39.0 ACUTE UTI: ICD-10-CM

## 2023-03-13 DIAGNOSIS — I10 ESSENTIAL HYPERTENSION: ICD-10-CM

## 2023-03-13 DIAGNOSIS — R41.82 ALTERED MENTAL STATUS, UNSPECIFIED ALTERED MENTAL STATUS TYPE: ICD-10-CM

## 2023-03-13 LAB
ALBUMIN SERPL BCP-MCNC: 3.7 G/DL (ref 3.2–4.9)
ALBUMIN/GLOB SERPL: 1.2 G/DL
ALP SERPL-CCNC: 102 U/L (ref 30–99)
ALT SERPL-CCNC: 8 U/L (ref 2–50)
ANION GAP SERPL CALC-SCNC: 15 MMOL/L (ref 7–16)
APPEARANCE UR: ABNORMAL
AST SERPL-CCNC: 17 U/L (ref 12–45)
BACTERIA #/AREA URNS HPF: ABNORMAL /HPF
BASOPHILS # BLD AUTO: 0.2 % (ref 0–1.8)
BASOPHILS # BLD: 0.02 K/UL (ref 0–0.12)
BILIRUB SERPL-MCNC: 1 MG/DL (ref 0.1–1.5)
BILIRUB UR QL STRIP.AUTO: NEGATIVE
BUN SERPL-MCNC: 10 MG/DL (ref 8–22)
CALCIUM ALBUM COR SERPL-MCNC: 9.6 MG/DL (ref 8.5–10.5)
CALCIUM SERPL-MCNC: 9.4 MG/DL (ref 8.5–10.5)
CHLORIDE SERPL-SCNC: 101 MMOL/L (ref 96–112)
CO2 SERPL-SCNC: 25 MMOL/L (ref 20–33)
COLOR UR: YELLOW
CREAT SERPL-MCNC: 0.46 MG/DL (ref 0.5–1.4)
EOSINOPHIL # BLD AUTO: 0.18 K/UL (ref 0–0.51)
EOSINOPHIL NFR BLD: 1.4 % (ref 0–6.9)
EPI CELLS #/AREA URNS HPF: ABNORMAL /HPF
ERYTHROCYTE [DISTWIDTH] IN BLOOD BY AUTOMATED COUNT: 46.6 FL (ref 35.9–50)
GFR SERPLBLD CREATININE-BSD FMLA CKD-EPI: 103 ML/MIN/1.73 M 2
GLOBULIN SER CALC-MCNC: 3.2 G/DL (ref 1.9–3.5)
GLUCOSE SERPL-MCNC: 103 MG/DL (ref 65–99)
GLUCOSE UR STRIP.AUTO-MCNC: NEGATIVE MG/DL
HCT VFR BLD AUTO: 50.2 % (ref 37–47)
HGB BLD-MCNC: 16.5 G/DL (ref 12–16)
HYALINE CASTS #/AREA URNS LPF: ABNORMAL /LPF
IMM GRANULOCYTES # BLD AUTO: 0.06 K/UL (ref 0–0.11)
IMM GRANULOCYTES NFR BLD AUTO: 0.5 % (ref 0–0.9)
KETONES UR STRIP.AUTO-MCNC: 80 MG/DL
LACTATE SERPL-SCNC: 1.1 MMOL/L (ref 0.5–2)
LEUKOCYTE ESTERASE UR QL STRIP.AUTO: ABNORMAL
LYMPHOCYTES # BLD AUTO: 1.45 K/UL (ref 1–4.8)
LYMPHOCYTES NFR BLD: 11.6 % (ref 22–41)
MAGNESIUM SERPL-MCNC: 1.6 MG/DL (ref 1.5–2.5)
MCH RBC QN AUTO: 30.6 PG (ref 27–33)
MCHC RBC AUTO-ENTMCNC: 32.9 G/DL (ref 33.6–35)
MCV RBC AUTO: 93.1 FL (ref 81.4–97.8)
MICRO URNS: ABNORMAL
MONOCYTES # BLD AUTO: 0.81 K/UL (ref 0–0.85)
MONOCYTES NFR BLD AUTO: 6.5 % (ref 0–13.4)
NEUTROPHILS # BLD AUTO: 9.94 K/UL (ref 2–7.15)
NEUTROPHILS NFR BLD: 79.8 % (ref 44–72)
NITRITE UR QL STRIP.AUTO: NEGATIVE
NRBC # BLD AUTO: 0 K/UL
NRBC BLD-RTO: 0 /100 WBC
NT-PROBNP SERPL IA-MCNC: 654 PG/ML (ref 0–125)
PH UR STRIP.AUTO: 6.5 [PH] (ref 5–8)
PLATELET # BLD AUTO: 265 K/UL (ref 164–446)
PMV BLD AUTO: 9.5 FL (ref 9–12.9)
POTASSIUM SERPL-SCNC: 3.4 MMOL/L (ref 3.6–5.5)
PROCALCITONIN SERPL-MCNC: <0.05 NG/ML
PROT SERPL-MCNC: 6.9 G/DL (ref 6–8.2)
PROT UR QL STRIP: 30 MG/DL
RBC # BLD AUTO: 5.39 M/UL (ref 4.2–5.4)
RBC # URNS HPF: ABNORMAL /HPF
RBC UR QL AUTO: NEGATIVE
SODIUM SERPL-SCNC: 141 MMOL/L (ref 135–145)
SP GR UR STRIP.AUTO: 1.02
TSH SERPL DL<=0.005 MIU/L-ACNC: 2.46 UIU/ML (ref 0.38–5.33)
UROBILINOGEN UR STRIP.AUTO-MCNC: 1 MG/DL
WBC # BLD AUTO: 12.5 K/UL (ref 4.8–10.8)
WBC #/AREA URNS HPF: ABNORMAL /HPF

## 2023-03-13 PROCEDURE — 81001 URINALYSIS AUTO W/SCOPE: CPT

## 2023-03-13 PROCEDURE — 80053 COMPREHEN METABOLIC PANEL: CPT

## 2023-03-13 PROCEDURE — 87077 CULTURE AEROBIC IDENTIFY: CPT

## 2023-03-13 PROCEDURE — 83880 ASSAY OF NATRIURETIC PEPTIDE: CPT

## 2023-03-13 PROCEDURE — 84145 PROCALCITONIN (PCT): CPT

## 2023-03-13 PROCEDURE — 87186 SC STD MICRODIL/AGAR DIL: CPT

## 2023-03-13 PROCEDURE — 700102 HCHG RX REV CODE 250 W/ 637 OVERRIDE(OP): Performed by: INTERNAL MEDICINE

## 2023-03-13 PROCEDURE — 700111 HCHG RX REV CODE 636 W/ 250 OVERRIDE (IP): Performed by: EMERGENCY MEDICINE

## 2023-03-13 PROCEDURE — 71045 X-RAY EXAM CHEST 1 VIEW: CPT

## 2023-03-13 PROCEDURE — 96374 THER/PROPH/DIAG INJ IV PUSH: CPT

## 2023-03-13 PROCEDURE — 36415 COLL VENOUS BLD VENIPUNCTURE: CPT

## 2023-03-13 PROCEDURE — 84443 ASSAY THYROID STIM HORMONE: CPT

## 2023-03-13 PROCEDURE — 87040 BLOOD CULTURE FOR BACTERIA: CPT

## 2023-03-13 PROCEDURE — 72100 X-RAY EXAM L-S SPINE 2/3 VWS: CPT

## 2023-03-13 PROCEDURE — 770006 HCHG ROOM/CARE - MED/SURG/GYN SEMI*

## 2023-03-13 PROCEDURE — 85025 COMPLETE CBC W/AUTO DIFF WBC: CPT

## 2023-03-13 PROCEDURE — 70450 CT HEAD/BRAIN W/O DYE: CPT

## 2023-03-13 PROCEDURE — 99223 1ST HOSP IP/OBS HIGH 75: CPT | Mod: AI | Performed by: INTERNAL MEDICINE

## 2023-03-13 PROCEDURE — 93005 ELECTROCARDIOGRAM TRACING: CPT | Performed by: INTERNAL MEDICINE

## 2023-03-13 PROCEDURE — A9270 NON-COVERED ITEM OR SERVICE: HCPCS | Performed by: INTERNAL MEDICINE

## 2023-03-13 PROCEDURE — 87086 URINE CULTURE/COLONY COUNT: CPT

## 2023-03-13 PROCEDURE — 83735 ASSAY OF MAGNESIUM: CPT

## 2023-03-13 PROCEDURE — 99285 EMERGENCY DEPT VISIT HI MDM: CPT

## 2023-03-13 PROCEDURE — 83605 ASSAY OF LACTIC ACID: CPT

## 2023-03-13 PROCEDURE — 51701 INSERT BLADDER CATHETER: CPT

## 2023-03-13 RX ORDER — ALBUTEROL SULFATE 2.5 MG/3ML
2.5 SOLUTION RESPIRATORY (INHALATION) EVERY 4 HOURS PRN
Status: DISCONTINUED | OUTPATIENT
Start: 2023-03-13 | End: 2023-03-13

## 2023-03-13 RX ORDER — AMOXICILLIN 250 MG
2 CAPSULE ORAL 2 TIMES DAILY
Status: DISCONTINUED | OUTPATIENT
Start: 2023-03-14 | End: 2023-03-22 | Stop reason: HOSPADM

## 2023-03-13 RX ORDER — BISACODYL 10 MG
10 SUPPOSITORY, RECTAL RECTAL
Status: DISCONTINUED | OUTPATIENT
Start: 2023-03-13 | End: 2023-03-22 | Stop reason: HOSPADM

## 2023-03-13 RX ORDER — POLYETHYLENE GLYCOL 3350 17 G/17G
1 POWDER, FOR SOLUTION ORAL
Status: DISCONTINUED | OUTPATIENT
Start: 2023-03-13 | End: 2023-03-22 | Stop reason: HOSPADM

## 2023-03-13 RX ORDER — SUMATRIPTAN 50 MG/1
50 TABLET, FILM COATED ORAL
Status: DISCONTINUED | OUTPATIENT
Start: 2023-03-13 | End: 2023-03-13

## 2023-03-13 RX ORDER — LABETALOL HYDROCHLORIDE 5 MG/ML
10 INJECTION, SOLUTION INTRAVENOUS EVERY 4 HOURS PRN
Status: DISCONTINUED | OUTPATIENT
Start: 2023-03-13 | End: 2023-03-22 | Stop reason: HOSPADM

## 2023-03-13 RX ORDER — HALOPERIDOL 5 MG/ML
5 INJECTION INTRAMUSCULAR EVERY 4 HOURS PRN
Status: DISCONTINUED | OUTPATIENT
Start: 2023-03-13 | End: 2023-03-22 | Stop reason: HOSPADM

## 2023-03-13 RX ORDER — ONDANSETRON 2 MG/ML
4 INJECTION INTRAMUSCULAR; INTRAVENOUS EVERY 4 HOURS PRN
Status: DISCONTINUED | OUTPATIENT
Start: 2023-03-13 | End: 2023-03-22 | Stop reason: HOSPADM

## 2023-03-13 RX ORDER — METHOCARBAMOL 500 MG/1
500 TABLET, FILM COATED ORAL 3 TIMES DAILY
Status: DISCONTINUED | OUTPATIENT
Start: 2023-03-13 | End: 2023-03-13

## 2023-03-13 RX ORDER — LISINOPRIL 10 MG/1
10 TABLET ORAL
Status: DISCONTINUED | OUTPATIENT
Start: 2023-03-13 | End: 2023-03-14

## 2023-03-13 RX ORDER — OXYCODONE HYDROCHLORIDE 5 MG/1
5 TABLET ORAL EVERY 4 HOURS PRN
Status: DISCONTINUED | OUTPATIENT
Start: 2023-03-13 | End: 2023-03-16

## 2023-03-13 RX ORDER — DULOXETIN HYDROCHLORIDE 30 MG/1
30 CAPSULE, DELAYED RELEASE ORAL
Status: ON HOLD | COMMUNITY
End: 2023-08-22

## 2023-03-13 RX ORDER — ARIPIPRAZOLE 15 MG/1
15 TABLET ORAL
Status: DISCONTINUED | OUTPATIENT
Start: 2023-03-13 | End: 2023-03-22 | Stop reason: HOSPADM

## 2023-03-13 RX ORDER — ONDANSETRON 4 MG/1
4 TABLET, ORALLY DISINTEGRATING ORAL EVERY 4 HOURS PRN
Status: DISCONTINUED | OUTPATIENT
Start: 2023-03-13 | End: 2023-03-22 | Stop reason: HOSPADM

## 2023-03-13 RX ORDER — OXYCODONE AND ACETAMINOPHEN 10; 325 MG/1; MG/1
1 TABLET ORAL 3 TIMES DAILY PRN
Status: ON HOLD | COMMUNITY
End: 2023-03-22

## 2023-03-13 RX ORDER — ACETAMINOPHEN 325 MG/1
650 TABLET ORAL EVERY 6 HOURS PRN
Status: DISCONTINUED | OUTPATIENT
Start: 2023-03-13 | End: 2023-03-22 | Stop reason: HOSPADM

## 2023-03-13 RX ORDER — ALBUTEROL SULFATE 90 UG/1
2 AEROSOL, METERED RESPIRATORY (INHALATION)
Status: DISCONTINUED | OUTPATIENT
Start: 2023-03-13 | End: 2023-03-22 | Stop reason: HOSPADM

## 2023-03-13 RX ORDER — PROPRANOLOL HYDROCHLORIDE 10 MG/1
10 TABLET ORAL
Status: DISCONTINUED | OUTPATIENT
Start: 2023-03-13 | End: 2023-03-22 | Stop reason: HOSPADM

## 2023-03-13 RX ORDER — LAMOTRIGINE 100 MG/1
100 TABLET ORAL EVERY EVENING
Status: DISCONTINUED | OUTPATIENT
Start: 2023-03-13 | End: 2023-03-22 | Stop reason: HOSPADM

## 2023-03-13 RX ORDER — AMITRIPTYLINE HYDROCHLORIDE 25 MG/1
25 TABLET, FILM COATED ORAL
Status: DISCONTINUED | OUTPATIENT
Start: 2023-03-13 | End: 2023-03-13

## 2023-03-13 RX ORDER — CEFTRIAXONE 2 G/1
2000 INJECTION, POWDER, FOR SOLUTION INTRAMUSCULAR; INTRAVENOUS ONCE
Status: COMPLETED | OUTPATIENT
Start: 2023-03-13 | End: 2023-03-13

## 2023-03-13 RX ADMIN — ARIPIPRAZOLE 15 MG: 15 TABLET ORAL at 20:24

## 2023-03-13 RX ADMIN — LAMOTRIGINE 100 MG: 100 TABLET ORAL at 20:24

## 2023-03-13 RX ADMIN — CEFTRIAXONE SODIUM 2000 MG: 2 INJECTION, POWDER, FOR SOLUTION INTRAMUSCULAR; INTRAVENOUS at 15:11

## 2023-03-13 RX ADMIN — PROPRANOLOL HYDROCHLORIDE 10 MG: 10 TABLET ORAL at 20:24

## 2023-03-13 RX ADMIN — LISINOPRIL 10 MG: 10 TABLET ORAL at 20:25

## 2023-03-13 RX ADMIN — OXYCODONE HYDROCHLORIDE 5 MG: 5 TABLET ORAL at 20:30

## 2023-03-13 RX ADMIN — RIVAROXABAN 10 MG: 10 TABLET, FILM COATED ORAL at 20:25

## 2023-03-13 ASSESSMENT — LIFESTYLE VARIABLES
EVER FELT BAD OR GUILTY ABOUT YOUR DRINKING: NO
DOES PATIENT WANT TO STOP DRINKING: NO
TOTAL SCORE: 0
CONSUMPTION TOTAL: NEGATIVE
EVER HAD A DRINK FIRST THING IN THE MORNING TO STEADY YOUR NERVES TO GET RID OF A HANGOVER: NO
HAVE YOU EVER FELT YOU SHOULD CUT DOWN ON YOUR DRINKING: NO
HOW MANY TIMES IN THE PAST YEAR HAVE YOU HAD 5 OR MORE DRINKS IN A DAY: 0
TOTAL SCORE: 0
HAVE PEOPLE ANNOYED YOU BY CRITICIZING YOUR DRINKING: NO
ON A TYPICAL DAY WHEN YOU DRINK ALCOHOL HOW MANY DRINKS DO YOU HAVE: 1
ALCOHOL_USE: YES
AVERAGE NUMBER OF DAYS PER WEEK YOU HAVE A DRINK CONTAINING ALCOHOL: 4
TOTAL SCORE: 0

## 2023-03-13 ASSESSMENT — PAIN DESCRIPTION - PAIN TYPE: TYPE: ACUTE PAIN;CHRONIC PAIN

## 2023-03-13 ASSESSMENT — PATIENT HEALTH QUESTIONNAIRE - PHQ9
SUM OF ALL RESPONSES TO PHQ9 QUESTIONS 1 AND 2: 0
1. LITTLE INTEREST OR PLEASURE IN DOING THINGS: NOT AT ALL

## 2023-03-13 ASSESSMENT — FIBROSIS 4 INDEX: FIB4 SCORE: 1.66

## 2023-03-13 NOTE — ED TRIAGE NOTES
"Chief Complaint   Patient presents with    ALOC     LNW on Thursday, progressive decline since then, Aox1, Saturday GLF seen at ER for assessment/discharge, Sunday AMS became worse per partner    UTI     Rancid smell evident, EMS observed dark color        BIB Fire for above complaint. 83% RA, placed on 2L 96%. EMS vitals 142/65 HR 82 gcs 14 glucose 110    Septic score 3 protocols ordered. Labs drawn.      BP (!) 226/105   Pulse 79   Temp 36.3 °C (97.3 °F) (Temporal)   Resp 20   Ht 1.753 m (5' 9\")   Wt 118 kg (260 lb)   SpO2 95%   BMI 38.40 kg/m²      "

## 2023-03-13 NOTE — ED PROVIDER NOTES
ED Provider Note    CHIEF COMPLAINT  Chief Complaint   Patient presents with    ALOC     LNW on Thursday, progressive decline since then, Aox1, Saturday GLF seen at ER for assessment/discharge, Sunday AMS became worse per partner    UTI     Rancid smell evident, EMS observed dark color       EXTERNAL RECORDS REVIEWED  Reviewed outpatient laboratory studies inpatient imaging and consult    HPI/ROS  LIMITATION TO HISTORY   None  OUTSIDE HISTORIAN(S):   provided additional history    Sheyla Garcia is a 72 y.o. female who presents for evaluation of confusion, decreased urine output possible urinary tract infection as well as a ground-level fall.  The patient has a complex and extensive medical history as listed below.  She is extremely debilitated.  Her  is her primary caregiver.  Apparently she had a slip and fall but did not clearly strike her head several days ago but she is becoming increasingly lethargic ever since.   reports that when she gets confused she typically has a UTI.  There is no report of any seizure.  She has had chronic cough as well.    PAST MEDICAL HISTORY   has a past medical history of Asthma (08/05/2021), Bowel habit changes, Breath shortness (08/05/2021), Emphysema of lung (HCC) (08/05/2021), Fibromyalgia, Heart burn, Hypertension, Hypothyroidism (2008), Indigestion, Migraine, Obesity, Pneumonia (2012), Psychiatric problem, Renal disorder (08/05/2021), Seizure ([facility]) (2017), and Urinary incontinence.    SURGICAL HISTORY   has a past surgical history that includes nasal septal reconstruction; tonsillectomy; bladder sling female; other surgical procedure; orif, knee (1/3/08); other orthopedic surgery; gyn surgery; thoracic fusion o-arm (Right, 10/13/2021); and cath placement capd (N/A, 10/13/2021).    FAMILY HISTORY  Family History   Problem Relation Age of Onset    Heart Disease Mother         chf    Cancer Father         lung, berrylium exposure    Stroke Father          "tia's    Cancer Maternal Grandfather         lung    Diabetes Neg Hx        SOCIAL HISTORY  Social History     Tobacco Use    Smoking status: Former     Years: 2.00     Types: Cigarettes     Quit date: 1986     Years since quittin.2    Smokeless tobacco: Never    Tobacco comments:     Age [age]    Vaping Use    Vaping Use: Never used   Substance and Sexual Activity    Alcohol use: Yes     Comment: Wine - daily    Drug use: Never    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 (Registered Nurse) on 23 at 1348  Med List Status: Partial     Medication Last Dose Status   albuterol (PROVENTIL) 2.5mg/3ml Nebu Soln solution for nebulization  Active   albuterol 108 (90 Base) MCG/ACT Aero Soln inhalation aerosol  Active   amitriptyline (ELAVIL) 25 MG Tab  Active   ARIPiprazole (ABILIFY) 15 MG Tab  Active   B Complex-C-Folic Acid (SUPER B-COMPLEX/VIT C/FA PO)  Active   BETA CAROTENE PO  Active   diclofenac sodium (VOLTAREN) 1 % Gel  Active   docusate sodium (COLACE) 100 MG Cap  Active   estradiol (ESTRACE) 1 MG Tab  Active   Fluticasone Furoate-Vilanterol (BREO ELLIPTA) 200-25 MCG/INH AEROSOL POWDER, BREATH ACTIVATED  Active   ibuprofen (MOTRIN) 200 MG Tab  Active   lamoTRIgine (LAMICTAL) 100 MG Tab  Active   lidocaine (LIDODERM) 5 % Patch  Active   lisinopril (PRINIVIL) 10 MG Tab  Active   methocarbamol (ROBAXIN) 750 MG Tab  Active   propranolol (INDERAL) 10 MG Tab  Active   Pseudoeph-Doxylamine-DM-APAP (NYQUIL PO)  Active   SUMAtriptan (IMITREX) 50 MG Tab  Active   therapeutic multivitamin-minerals (THERAGRAN-M) Tab  Active                    ALLERGIES  Allergies   Allergen Reactions    Gabapentin Unspecified     seizure       PHYSICAL EXAM  VITAL SIGNS: BP (!) 182/82   Pulse 77   Temp 36.3 °C (97.3 °F) (Temporal)   Resp (!) 23   Ht 1.753 m (5' 9\")   Wt 118 kg (260 lb)   SpO2 95%   BMI 38.40 kg/m²  "   Pulse ox interpretation: I interpret this pulse ox as normal.  Constitutional: Alert and oriented x 3, no acute distress patient appears chronically ill, disheveled  HEENT: Atraumatic normocephalic, pupils are equal round reactive to light extraocular movements are intact. The nares is clear, external ears are normal, mouth shows moist mucous membranes normal dentition for age  Neck: Supple, no JVD no tracheal deviation  Cardiovascular: Regular rate and rhythm no murmur rub or gallop 2+ pulses peripherally x4  Thorax & Lungs: No respiratory distress, no wheezes rales or rhonchi, No chest tenderness.   GI: Soft nontender nondistended positive bowel sounds, no peritoneal signs  Skin: Warm dry no acute rash or lesion  Musculoskeletal: Moving all extremities with full range and 5 of 5 strength no acute  deformity  Neurologic: GCS 15 but sluggish no seizure activity moving all extremities cranial nerves III through XII are grossly intact no sensory deficit no cerebellar dysfunction   Psychiatric: Appropriate affect for situation at this time          DIAGNOSTIC STUDIES / PROCEDURES      LABS  Results for orders placed or performed during the hospital encounter of 03/13/23   Lactic acid (lactate): Repeat if initial lactic acid result is greater than 2   Result Value Ref Range    Lactic Acid 1.1 0.5 - 2.0 mmol/L   CBC With Differential   Result Value Ref Range    WBC 12.5 (H) 4.8 - 10.8 K/uL    RBC 5.39 4.20 - 5.40 M/uL    Hemoglobin 16.5 (H) 12.0 - 16.0 g/dL    Hematocrit 50.2 (H) 37.0 - 47.0 %    MCV 93.1 81.4 - 97.8 fL    MCH 30.6 27.0 - 33.0 pg    MCHC 32.9 (L) 33.6 - 35.0 g/dL    RDW 46.6 35.9 - 50.0 fL    Platelet Count 265 164 - 446 K/uL    MPV 9.5 9.0 - 12.9 fL    Neutrophils-Polys 79.80 (H) 44.00 - 72.00 %    Lymphocytes 11.60 (L) 22.00 - 41.00 %    Monocytes 6.50 0.00 - 13.40 %    Eosinophils 1.40 0.00 - 6.90 %    Basophils 0.20 0.00 - 1.80 %    Immature Granulocytes 0.50 0.00 - 0.90 %    Nucleated RBC 0.00  /100 WBC    Neutrophils (Absolute) 9.94 (H) 2.00 - 7.15 K/uL    Lymphs (Absolute) 1.45 1.00 - 4.80 K/uL    Monos (Absolute) 0.81 0.00 - 0.85 K/uL    Eos (Absolute) 0.18 0.00 - 0.51 K/uL    Baso (Absolute) 0.02 0.00 - 0.12 K/uL    Immature Granulocytes (abs) 0.06 0.00 - 0.11 K/uL    NRBC (Absolute) 0.00 K/uL   Comp Metabolic Panel   Result Value Ref Range    Sodium 141 135 - 145 mmol/L    Potassium 3.4 (L) 3.6 - 5.5 mmol/L    Chloride 101 96 - 112 mmol/L    Co2 25 20 - 33 mmol/L    Anion Gap 15.0 7.0 - 16.0    Glucose 103 (H) 65 - 99 mg/dL    Bun 10 8 - 22 mg/dL    Creatinine 0.46 (L) 0.50 - 1.40 mg/dL    Calcium 9.4 8.5 - 10.5 mg/dL    AST(SGOT) 17 12 - 45 U/L    ALT(SGPT) 8 2 - 50 U/L    Alkaline Phosphatase 102 (H) 30 - 99 U/L    Total Bilirubin 1.0 0.1 - 1.5 mg/dL    Albumin 3.7 3.2 - 4.9 g/dL    Total Protein 6.9 6.0 - 8.2 g/dL    Globulin 3.2 1.9 - 3.5 g/dL    A-G Ratio 1.2 g/dL   Urinalysis    Specimen: Urine   Result Value Ref Range    Color Yellow     Character Cloudy (A)     Specific Gravity 1.023 <1.035    Ph 6.5 5.0 - 8.0    Glucose Negative Negative mg/dL    Ketones 80 (A) Negative mg/dL    Protein 30 (A) Negative mg/dL    Bilirubin Negative Negative    Urobilinogen, Urine 1.0 Negative    Nitrite Negative Negative    Leukocyte Esterase Trace (A) Negative    Occult Blood Negative Negative    Micro Urine Req Microscopic    URINE MICROSCOPIC (W/UA)   Result Value Ref Range    WBC 2-5 /hpf    RBC 0-2 /hpf    Bacteria Many (A) None /hpf    Epithelial Cells Moderate (A) /hpf    Hyaline Cast 0-2 /lpf   ESTIMATED GFR   Result Value Ref Range    GFR (CKD-EPI) 103 >60 mL/min/1.73 m 2   CORRECTED CALCIUM   Result Value Ref Range    Correct Calcium 9.6 8.5 - 10.5 mg/dL   proBrain Natriuretic Peptide, NT   Result Value Ref Range    NT-proBNP 654 (H) 0 - 125 pg/mL        RADIOLOGY  I have independently interpreted the diagnostic imaging associated with this visit and am waiting the final reading from the radiologist.    My preliminary interpretation is as follows: No acute process chronic hydrocephalus  Radiologist interpretation:   CT-HEAD W/O   Final Result      1.  Again seen hydrocephalus, not significantly changed from comparison.      2.  Interval placement of a right frontal ventriculoperitoneal shunt catheter.      3.  Periventricular chronic small vessel ischemic change.         DX-CHEST-PORTABLE (1 VIEW)   Final Result      Mild interstitial pulmonary edema.          COURSE & MEDICAL DECISION MAKING    ED Observation Status?  No    INITIAL ASSESSMENT, COURSE AND PLAN  Care Narrative:     This is a very debilitated 72-year-old female brought in by ambulance with confusion and hypoxia and concern about possible pneumonia and/or head injury.  Septic protocol was initiated.  The patient did not have any vital signs concerning for sepsis other than hypoxia.  She had no high fever or tachycardia.  Extensive work-up was initiated.  She had no lactic acidemia but she had an elevated white blood cell count with left shift.  Catheterized urine sample demonstrates many bacteria concerning for urinary tract infection.  Chest x-ray demonstrated some pulmonary edema and BNP is slightly elevated.  CT scan of the head demonstrates chronic hydrocephalus but no acute process and she has a nonfocal neurological exam.  Given her age and altered mental status and overall debility I recommended admission to the hospital.  She was given IV Rocephin and will be admitted to the hospitalist service    ADDITIONAL PROBLEM LIST    DISPOSITION AND DISCUSSIONS  I have discussed management of the patient with the following physicians and PILAR's: Discussed with admitting hospitalist      FINAL DIAGNOSIS  SIRS  Altered mental status  Urinary tract infection       Electronically signed by: Bryan Werner M.D., 3/13/2023 2:43 PM

## 2023-03-14 DIAGNOSIS — G89.29 CHRONIC BILATERAL LOW BACK PAIN WITH LEFT-SIDED SCIATICA: ICD-10-CM

## 2023-03-14 DIAGNOSIS — M54.42 CHRONIC BILATERAL LOW BACK PAIN WITH LEFT-SIDED SCIATICA: ICD-10-CM

## 2023-03-14 PROBLEM — R79.89 ELEVATED BRAIN NATRIURETIC PEPTIDE (BNP) LEVEL: Status: ACTIVE | Noted: 2023-03-14

## 2023-03-14 LAB
ALBUMIN SERPL BCP-MCNC: 3.8 G/DL (ref 3.2–4.9)
ALBUMIN/GLOB SERPL: 1.2 G/DL
ALP SERPL-CCNC: 94 U/L (ref 30–99)
ALT SERPL-CCNC: 8 U/L (ref 2–50)
ANION GAP SERPL CALC-SCNC: 16 MMOL/L (ref 7–16)
AST SERPL-CCNC: 21 U/L (ref 12–45)
BASOPHILS # BLD AUTO: 0.3 % (ref 0–1.8)
BASOPHILS # BLD: 0.04 K/UL (ref 0–0.12)
BILIRUB SERPL-MCNC: 0.6 MG/DL (ref 0.1–1.5)
BUN SERPL-MCNC: 11 MG/DL (ref 8–22)
CALCIUM ALBUM COR SERPL-MCNC: 9.4 MG/DL (ref 8.5–10.5)
CALCIUM SERPL-MCNC: 9.2 MG/DL (ref 8.5–10.5)
CHLORIDE SERPL-SCNC: 103 MMOL/L (ref 96–112)
CO2 SERPL-SCNC: 20 MMOL/L (ref 20–33)
CREAT SERPL-MCNC: 0.52 MG/DL (ref 0.5–1.4)
EKG IMPRESSION: NORMAL
EOSINOPHIL # BLD AUTO: 0.17 K/UL (ref 0–0.51)
EOSINOPHIL NFR BLD: 1.2 % (ref 0–6.9)
ERYTHROCYTE [DISTWIDTH] IN BLOOD BY AUTOMATED COUNT: 48.8 FL (ref 35.9–50)
GFR SERPLBLD CREATININE-BSD FMLA CKD-EPI: 100 ML/MIN/1.73 M 2
GLOBULIN SER CALC-MCNC: 3.2 G/DL (ref 1.9–3.5)
GLUCOSE SERPL-MCNC: 98 MG/DL (ref 65–99)
HCT VFR BLD AUTO: 50.3 % (ref 37–47)
HGB BLD-MCNC: 16.3 G/DL (ref 12–16)
IMM GRANULOCYTES # BLD AUTO: 0.06 K/UL (ref 0–0.11)
IMM GRANULOCYTES NFR BLD AUTO: 0.4 % (ref 0–0.9)
LACTATE SERPL-SCNC: 1.4 MMOL/L (ref 0.5–2)
LYMPHOCYTES # BLD AUTO: 2.42 K/UL (ref 1–4.8)
LYMPHOCYTES NFR BLD: 17.3 % (ref 22–41)
MAGNESIUM SERPL-MCNC: 1.7 MG/DL (ref 1.5–2.5)
MCH RBC QN AUTO: 31.5 PG (ref 27–33)
MCHC RBC AUTO-ENTMCNC: 32.4 G/DL (ref 33.6–35)
MCV RBC AUTO: 97.1 FL (ref 81.4–97.8)
MONOCYTES # BLD AUTO: 1.43 K/UL (ref 0–0.85)
MONOCYTES NFR BLD AUTO: 10.2 % (ref 0–13.4)
NEUTROPHILS # BLD AUTO: 9.87 K/UL (ref 2–7.15)
NEUTROPHILS NFR BLD: 70.6 % (ref 44–72)
NRBC # BLD AUTO: 0 K/UL
NRBC BLD-RTO: 0 /100 WBC
PLATELET # BLD AUTO: 237 K/UL (ref 164–446)
PMV BLD AUTO: 9.3 FL (ref 9–12.9)
POTASSIUM SERPL-SCNC: 3.9 MMOL/L (ref 3.6–5.5)
PROT SERPL-MCNC: 7 G/DL (ref 6–8.2)
RBC # BLD AUTO: 5.18 M/UL (ref 4.2–5.4)
SODIUM SERPL-SCNC: 139 MMOL/L (ref 135–145)
WBC # BLD AUTO: 14 K/UL (ref 4.8–10.8)

## 2023-03-14 PROCEDURE — 700102 HCHG RX REV CODE 250 W/ 637 OVERRIDE(OP): Performed by: INTERNAL MEDICINE

## 2023-03-14 PROCEDURE — 94760 N-INVAS EAR/PLS OXIMETRY 1: CPT

## 2023-03-14 PROCEDURE — A9270 NON-COVERED ITEM OR SERVICE: HCPCS | Performed by: HOSPITALIST

## 2023-03-14 PROCEDURE — 92610 EVALUATE SWALLOWING FUNCTION: CPT

## 2023-03-14 PROCEDURE — 97167 OT EVAL HIGH COMPLEX 60 MIN: CPT

## 2023-03-14 PROCEDURE — A9270 NON-COVERED ITEM OR SERVICE: HCPCS | Performed by: INTERNAL MEDICINE

## 2023-03-14 PROCEDURE — 80053 COMPREHEN METABOLIC PANEL: CPT

## 2023-03-14 PROCEDURE — 36415 COLL VENOUS BLD VENIPUNCTURE: CPT

## 2023-03-14 PROCEDURE — 93010 ELECTROCARDIOGRAM REPORT: CPT | Performed by: INTERNAL MEDICINE

## 2023-03-14 PROCEDURE — 85025 COMPLETE CBC W/AUTO DIFF WBC: CPT

## 2023-03-14 PROCEDURE — 770006 HCHG ROOM/CARE - MED/SURG/GYN SEMI*

## 2023-03-14 PROCEDURE — 700102 HCHG RX REV CODE 250 W/ 637 OVERRIDE(OP): Performed by: HOSPITALIST

## 2023-03-14 PROCEDURE — 99232 SBSQ HOSP IP/OBS MODERATE 35: CPT | Performed by: HOSPITALIST

## 2023-03-14 PROCEDURE — 97162 PT EVAL MOD COMPLEX 30 MIN: CPT

## 2023-03-14 PROCEDURE — 83605 ASSAY OF LACTIC ACID: CPT

## 2023-03-14 PROCEDURE — 83735 ASSAY OF MAGNESIUM: CPT

## 2023-03-14 RX ORDER — CEPHALEXIN 500 MG/1
500 CAPSULE ORAL EVERY 6 HOURS
Status: DISCONTINUED | OUTPATIENT
Start: 2023-03-14 | End: 2023-03-15

## 2023-03-14 RX ORDER — OXYCODONE AND ACETAMINOPHEN 10; 325 MG/1; MG/1
1 TABLET ORAL 3 TIMES DAILY PRN
Qty: 90 TABLET | Refills: 0 | OUTPATIENT
Start: 2023-03-14 | End: 2023-04-13

## 2023-03-14 RX ORDER — LISINOPRIL 20 MG/1
20 TABLET ORAL
Status: DISCONTINUED | OUTPATIENT
Start: 2023-03-14 | End: 2023-03-15

## 2023-03-14 RX ADMIN — RIVAROXABAN 10 MG: 10 TABLET, FILM COATED ORAL at 16:42

## 2023-03-14 RX ADMIN — DOCUSATE SODIUM 50 MG AND SENNOSIDES 8.6 MG 2 TABLET: 8.6; 5 TABLET, FILM COATED ORAL at 16:41

## 2023-03-14 RX ADMIN — ARIPIPRAZOLE 15 MG: 15 TABLET ORAL at 20:41

## 2023-03-14 RX ADMIN — OXYCODONE HYDROCHLORIDE 5 MG: 5 TABLET ORAL at 07:24

## 2023-03-14 RX ADMIN — CEPHALEXIN 500 MG: 500 CAPSULE ORAL at 14:16

## 2023-03-14 RX ADMIN — LISINOPRIL 20 MG: 20 TABLET ORAL at 20:40

## 2023-03-14 RX ADMIN — PROPRANOLOL HYDROCHLORIDE 10 MG: 10 TABLET ORAL at 20:41

## 2023-03-14 RX ADMIN — CEPHALEXIN 500 MG: 500 CAPSULE ORAL at 16:42

## 2023-03-14 RX ADMIN — OXYCODONE HYDROCHLORIDE 5 MG: 5 TABLET ORAL at 12:30

## 2023-03-14 RX ADMIN — LAMOTRIGINE 100 MG: 100 TABLET ORAL at 16:42

## 2023-03-14 ASSESSMENT — COPD QUESTIONNAIRES
DURING THE PAST 4 WEEKS HOW MUCH DID YOU FEEL SHORT OF BREATH: NONE/LITTLE OF THE TIME
COPD SCREENING SCORE: 4
IN THE PAST 12 MONTHS DO YOU DO LESS THAN YOU USED TO BECAUSE OF YOUR BREATHING PROBLEMS: DISAGREE/UNSURE
HAVE YOU SMOKED AT LEAST 100 CIGARETTES IN YOUR ENTIRE LIFE: YES
DO YOU EVER COUGH UP ANY MUCUS OR PHLEGM?: NO/ONLY WITH OCCASIONAL COLDS OR INFECTIONS

## 2023-03-14 ASSESSMENT — GAIT ASSESSMENTS: GAIT LEVEL OF ASSIST: UNABLE TO PARTICIPATE

## 2023-03-14 ASSESSMENT — COGNITIVE AND FUNCTIONAL STATUS - GENERAL
DRESSING REGULAR LOWER BODY CLOTHING: TOTAL
TURNING FROM BACK TO SIDE WHILE IN FLAT BAD: UNABLE
PERSONAL GROOMING: TOTAL
HELP NEEDED FOR BATHING: TOTAL
SUGGESTED CMS G CODE MODIFIER DAILY ACTIVITY: CN
STANDING UP FROM CHAIR USING ARMS: TOTAL
MOVING FROM LYING ON BACK TO SITTING ON SIDE OF FLAT BED: UNABLE
DRESSING REGULAR UPPER BODY CLOTHING: TOTAL
SUGGESTED CMS G CODE MODIFIER MOBILITY: CN
TOILETING: TOTAL
DAILY ACTIVITIY SCORE: 6
EATING MEALS: TOTAL
CLIMB 3 TO 5 STEPS WITH RAILING: TOTAL
MOVING TO AND FROM BED TO CHAIR: UNABLE
WALKING IN HOSPITAL ROOM: TOTAL
MOBILITY SCORE: 6

## 2023-03-14 ASSESSMENT — ACTIVITIES OF DAILY LIVING (ADL): TOILETING: INDEPENDENT

## 2023-03-14 ASSESSMENT — PAIN DESCRIPTION - PAIN TYPE
TYPE: ACUTE PAIN;CHRONIC PAIN
TYPE: ACUTE PAIN;CHRONIC PAIN
TYPE: ACUTE PAIN
TYPE: ACUTE PAIN;CHRONIC PAIN

## 2023-03-14 NOTE — ASSESSMENT & PLAN NOTE
Patient has hallucination and altered mental status  Her  and the caregiver  Patient has been refusing her medications recently    Continue Abilify and lamotrigine  Frequent orientation

## 2023-03-14 NOTE — THERAPY
"Speech Language Pathology   Clinical Swallow Evaluation     Patient Name: Sheyla Garcia  AGE:  72 y.o., SEX:  female  Medical Record #: [id number]  Date of Service: 3/14/2023  HPI: Normal pressure hydrocephalus. 3/13 chest xray report including mild interstitial pulm edema. SEE EMR for complete hist.       PMHx:    No prior SLP per EMR    Level of Consciousness: Alert  Affect/Behavior: Confused  Follows Directives: Inconsistent  Orientation: Self  Hearing: Functional hearing    Prior Living Situation & Level of Function:  Per EMR, pt lives with  and has a caregiver.      Oral Mechanism Evaluation  Facial Symmetry: Pt did not follow commands to assess  Facial Sensation: Pt did not follow commands to assess  Labial Observations: Pt did not follow commands to assess  Lingual Observations: Midline-fasciculations with protrusion.   Dentition: Fair  Comments:      Voice  Quality: WFL  Intensity: Appropriate  Cough: WFL  Comments:      Current Method of Nutrition   Per , phone called placed, poor PO intake last 3 days. Pt usually on reg texture and TNO    Subjective  \"Water.\"       Assessment  Positioning: Gomez's (60-90 degrees)  Bolus Administration: SLP  Factor(s) Affecting Performance: Impaired command following, severe confusion.    Swallowing Trials  Ice: WFL  Thin Liquid (TN0): WFL  Liquidised (LQ3): WFL  Pureed (PU4): WFL    Comments:  Pt with s/s of severe confusion. No coughing of s/s of difficulty with sips of water from the spoon or cup. No oral residue with LQ3.  Pt with limited oral cavity opening. Pt accepting approx 1/2 tsp amounts of PU4. Slight to mild lingual residue post puree egg. This residue clears with sips of thin water or with thinner LQ3 puree. Pt requires assist with feeding with decreased ability to hold a cup with bilat hands. Pt also with limited oral cavity opening and accepting approx 1/2 tsp amounts of PU4.  Pt with frequent talking with oral bolus.        Clinical " "Impressions  Pt recommended for PU4/TNO 1-1 feeding. Call placed to pt's  and he is agreement to this texture. Swallow strategies posted HOB and SLP collaborated with the RN.       Recommendations  1.  PU4/TNO 1-1 feeding   2.  Instrumentation: None indicated at this time  3.  Swallowing Instructions & Precautions:   Supervision: Careful 1:1 hand feeding  Positioning: Fully upright and midline during oral intake  Medication: Whole with puree, Crush with applesauce or puree, as appropriate  Strategies: Small bites/sips, Alternate bites and sips, Slow rate of intake, No straws  Oral Care: Q8h        SLP Treatment Plan  Treatment Plan: (P) Dysphagia Treatment  SLP Frequency: (P) 3x Per Week  Estimated Duration: (P) Until Therapy Goals Met      Anticipated Discharge Needs    Dependent on pt status at d/c           Patient / Family Goals  Patient / Family Goal #1: (P) Per  \"to eat.\"  Short Term Goals  Short Term Goal # 1: (P) Pt will consume PU4/TNO with 1-1 feeding and without s/s of difficulty with posted and recommended swallow strategies.  Goal Outcome # 1: (P) Goal not met      Crystal Oscar, CHAPIS   "

## 2023-03-14 NOTE — THERAPY
"Physical Therapy   Initial Evaluation     Patient Name: Sheyla Garcia  Age:  72 y.o., Sex:  female  Medical Record #: [id number]  Today's Date: 3/14/2023     Precautions  Precautions: Fall Risk    Assessment    Patient is 72 y.o. female who presents with worsening mentation, UTI. History of asthma, normal pressure hydrocephalus, schizophrenia, hypothyroidism and hypertension.     Pt is somewhat agreeable to participation in therapy session, but is unable to maintain focus/follow commands, requires assistance and multiple cues to complete mobility. Pt requires max assist for supine to/from sit EOB, unable to maintain static sitting balance at EOB, posterior lean and is unable to follow commands to correct lean. Pt reports increased pain with tactile pressure on posterior calves, reports tendon sensitivity. Pt will benefit from continued PT services in acute setting, as well as in post acute setting.       Plan    Physical Therapy Initial Treatment Plan   Treatment Plan : (P) Bed Mobility, Gait Training, Neuro Re-Education / Balance, Therapeutic Exercise, Stair Training, Therapeutic Activities  Treatment Frequency: (P) 3 Times per Week  Duration: (P) Until Therapy Goals Met    DC Equipment Recommendations: (P) Unable to determine at this time  Discharge Recommendations: (P) Recommend post-acute placement for additional physical therapy services prior to discharge home       Subjective    \"I have very sensitive tendons behind my knees.\"     Objective       03/14/23 1129   Charge Group   PT Evaluation PT Evaluation Mod   Total Time Spent   PT Total Time Yes   PT Evaluation Time Spent (Mins) 16   PT Total Time Spent (Calculated) 16    Services   Is patient using  services for this encounter? No   Precautions   Precautions Fall Risk   Vitals   O2 (LPM) 2   O2 Delivery Device Silicone Nasal Cannula   Pain 0 - 10 Group   Location Leg   Location Orientation Left   Prior Living Situation   Prior Services " Unable To Determine At This Time   Housing / Facility 1 Story House   Steps Into Home 1   Bathroom Set up Walk In Shower;Shower Chair;Grab Bars   Equipment Owned Single Point Cane   Lives with - Patient's Self Care Capacity Spouse   Comments   (unable to assess PLOF; pt unable to give accurate history)   Prior Level of Functional Mobility   Bed Mobility Unable To Determine At This Time   History of Falls   History of Falls No   Cognition    Cognition / Consciousness X   Speech/ Communication Delayed Responses  (distractable, nonsensical)   Level of Consciousness Alert   Ability To Follow Commands Unable to Follow 1 Step Commands   New Learning Impaired   Attention Impaired   Sequencing Impaired   Strength Upper Body   Upper Body Strength  X   Gross Strength Generalized Weakness, Equal Bilaterally.    Strength Lower Body   Lower Body Strength  X   Gross Strength Generalized Weakness, Equal Bilaterally   Coordination Lower Body    Coordination Lower Body  X   Balance Assessment   Sitting Balance (Static) Trace +   Sitting Balance (Dynamic) Trace +   Comments pt with posterior lean in static sitting, requires assistance to remain upright, verbal cues for upright posture to correct lean, pt loses balance during dependent sock donning   Bed Mobility    Supine to Sit Maximal Assist   Sit to Supine Maximal Assist   Gait Analysis   Gait Level Of Assist Unable to Participate   Functional Mobility   Sit to Stand Unable to Participate   Mobility supine to/from sit EOB, static sitting balance   How much difficulty does the patient currently have...   Turning over in bed (including adjusting bedclothes, sheets and blankets)? 1   Sitting down on and standing up from a chair with arms (e.g., wheelchair, bedside commode, etc.) 1   Moving from lying on back to sitting on the side of the bed? 1   How much help from another person does the patient currently need...   Moving to and from a bed to a chair (including a wheelchair)? 1    Need to walk in a hospital room? 1   Climbing 3-5 steps with a railing? 1   6 clicks Mobility Score 6   Activity Tolerance   Sitting in Chair unable   Sitting Edge of Bed 3 min   Standing unable   Short Term Goals    Short Term Goal # 1 supine to/from sit EOB with min assist in 6 visits   Short Term Goal # 2 pt will demonstrate static sitting at EOB with feet on floor and SBA for 10 minutes in 6 visits   Short Term Goal # 3 sit to stand from EOB with use of FWW and min assist in 6 visits   Short Term Goal # 4 pt will ambulate with FWW and CGA 20 feet in 6 visits   Education Group   Education Provided Role of Physical Therapist;Transfer Status   Role of Physical Therapist Patient Response Patient;Acceptance;Explanation;Verbal Demonstration   Transfer Status Patient Response Patient;Acceptance;Explanation;Verbal Demonstration;Action Demonstration;Reinforcement Needed   Physical Therapy Initial Treatment Plan    Treatment Plan  Bed Mobility;Gait Training;Neuro Re-Education / Balance;Therapeutic Exercise;Stair Training;Therapeutic Activities   Treatment Frequency 3 Times per Week   Duration Until Therapy Goals Met   Problem List    Problems Impaired Bed Mobility;Impaired Transfers;Pain;Impaired Ambulation;Functional Strength Deficit;Impaired Balance;Decreased Activity Tolerance;Safety Awareness Deficits / Cognition   Anticipated Discharge Equipment and Recommendations   DC Equipment Recommendations Unable to determine at this time   Discharge Recommendations Recommend post-acute placement for additional physical therapy services prior to discharge home   Interdisciplinary Plan of Care Collaboration   IDT Collaboration with  Nursing;Occupational Therapist   Patient Position at End of Therapy Seated;Call Light within Reach;Tray Table within Reach;Phone within Reach   Collaboration Comments RN updated   Session Information   Date / Session Number  3/14  -1  (1/3; 3/20)     Mara Nash DPT

## 2023-03-14 NOTE — PROGRESS NOTES
4 Eyes Skin Assessment Completed by BERLIN brady and BERLIN pineda.    Head WDL  Ears Redness and Blanching  Nose WDL  Mouth WDL  Neck Discoloration  Breast/Chest Redness left under breast- red/discolored. Nonblanching. Redness under breasts/pannus  Shoulder Blades WDL  Spine WDL  (R) Arm/Elbow/Hand Abrasion, tear right forearm  (L) Arm/Elbow/Hand WDL  Abdomen Redness pannus  Groin Redness and Blanching  Scrotum/Coccyx/Buttocks Redness, Blanching, and Non-Blanching, intact  (R) Leg Redness and Blanching right hip  (L) Leg WDL  (R) Heel/Foot/Toe Redness and Boggy  (L) Heel/Foot/Toe Redness and Boggy          Devices In Places Nasal Cannula      Interventions In Place Heel Mepilex, Pillows, Q2 Turns, and Barrier Cream    Possible Skin Injury Yes    Pictures Uploaded Into Epic Yes  Wound Consult Placed N/A  RN Wound Prevention Protocol Ordered Yes

## 2023-03-14 NOTE — PROGRESS NOTES
"1945- Pt arrived to unit with  at bedside  AAOx2, disoriented to situation/time.  2L NC ( states she has it at home but does not wear it)  BP elevated  Complaining of back pain/generalized pain. Pt has fibromyalgia and states she hurts \"all over\"  Incontinent x2.   +BM  Pt cleaned and placed on a purewick.    POC reviewed, frequent rounding in place  Call light within reach    "

## 2023-03-14 NOTE — ASSESSMENT & PLAN NOTE
Uncontrolled we will increase amlodipine  Continue lisinopril and propranolol monitor blood pressure

## 2023-03-14 NOTE — HOSPITAL COURSE
72-year-old female with history of asthma, normal pressure hydrocephalus, schizophrenia, hypothyroidism and hypertension who presented 3/13 with worsening mentation.  Patient was not able to provide history and the history was taken from her  who she lives with.  Patient has history of schizophrenia with hallucination most of the time and also cognitive disorder, the  is the caregiver and recently patient has been refusing taking her medication and she is spitting the medication on the floor.  No significant fever or chest pain however patient has been lethargic.  On admission patient was found to have hypertension 200/100.  No fever.  Labs showed mildly leukocytosis 12.5 with normal kidney function and .  Urine showed normal white blood cell and CT head did not show any acute finding.

## 2023-03-14 NOTE — THERAPY
Occupational Therapy   Initial Evaluation     Patient Name: Sheyla Garcia  Age:  72 y.o., Sex:  female  Medical Record #: [id number]  Today's Date: 3/14/2023     Precautions  Precautions: (P) Fall Risk    Assessment    Patient is 72 y.o. female who presents with worsening mentation, UTI. History of asthma, normal pressure hydrocephalus, schizophrenia, hypothyroidism and hypertension. Pt a very unreliable historian due to mentation, per EMR pts spouse assists with ADLs at baseline and patient ambulates with FWW within home. Pt required maxA for bed mobility, refused to stand, total assist for lower body ADLs and toileting. Will continue to see for skilled therapy while admitted as well as recommend post-acute placement.      Plan    Occupational Therapy Initial Treatment Plan   Treatment Interventions: (P) Self Care / Activities of Daily Living, Adaptive Equipment, Neuro Re-Education / Balance, Therapeutic Activity, Therapeutic Exercises  Treatment Frequency: (P) 2 Times per Week  Duration: (P) Until Therapy Goals Met    DC Equipment Recommendations: (P) Unable to determine at this time  Discharge Recommendations: (P) Recommend post-acute placement for additional occupational therapy services prior to discharge home     Objective       03/14/23 1131   Prior Living Situation   Prior Services Intermittent Physical Support for ADL Per Family   Housing / Facility 1 Story House   Steps Into Home 1   Bathroom Set up Walk In Shower;Shower Chair;Grab Bars   Equipment Owned Single Point Cane   Lives with - Patient's Self Care Capacity Spouse   Prior Level of ADL Function   Self Feeding Independent   Grooming / Hygiene Independent   Bathing Requires Assist   Dressing Requires Assist   Toileting Independent   Prior Level of IADL Function   Medication Management Requires Assist   Laundry Requires Assist   Kitchen Mobility Requires Assist   Finances Requires Assist   Home Management Requires Assist   Shopping Requires Assist    Prior Level Of Mobility Supervision With Device in Home   Driving / Transportation Relatives / Others Provide Transportation   Occupation (Pre-Hospital Vocational) Retired Due To Age   History of Falls   History of Falls Yes   Date of Last Fall   (for previous admission)   Precautions   Precautions Fall Risk   Pain 0 - 10 Group   Therapist Pain Assessment Post Activity Pain Same as Prior to Activity;Nurse Notified   Cognition    Cognition / Consciousness X   Speech/ Communication Delayed Responses   Level of Consciousness Alert   Ability To Follow Commands Unable to Follow 1 Step Commands   New Learning Impaired   Attention Impaired   Sequencing Impaired   Strength Upper Body   Upper Body Strength  X   Gross Strength Generalized Weakness, Equal Bilaterally.    Sensation Upper Body   Upper Extremity Sensation  Not Tested   Upper Body Muscle Tone   Upper Body Muscle Tone  WDL   Balance Assessment   Sitting Balance (Static) Trace +   Sitting Balance (Dynamic) Trace +   Bed Mobility    Supine to Sit Maximal Assist   Sit to Supine Maximal Assist   ADL Assessment   Grooming Maximal Assist   Upper Body Dressing Moderate Assist   Lower Body Dressing Total Assist   Toileting Total Assist   How much help from another person does the patient currently need...   Putting on and taking off regular lower body clothing? 1   Bathing (including washing, rinsing, and drying)? 1   Toileting, which includes using a toilet, bedpan, or urinal? 1   Putting on and taking off regular upper body clothing? 1   Taking care of personal grooming such as brushing teeth? 1   Eating meals? 1   6 Clicks Daily Activity Score 6   Functional Mobility   Sit to Stand Refused   Bed, Chair, Wheelchair Transfer Refused   Toilet Transfers Refused   Mobility bed mobility, EOB for short period of time returned to supine   Activity Tolerance   Sitting Edge of Bed 2 min   Short Term Goals   Short Term Goal # 1 Pt will complete ADL transfers with modA   Short  Term Goal # 2 Pt will complete LB dressing with modA   Short Term Goal # 3 Pt will complete toileting with modA   Education Group   Education Provided Role of Occupational Therapist   Role of Occupational Therapist Patient Response Patient;Nonacceptance;Explanation;Reinforcement Needed   Occupational Therapy Initial Treatment Plan    Treatment Interventions Self Care / Activities of Daily Living;Adaptive Equipment;Neuro Re-Education / Balance;Therapeutic Activity;Therapeutic Exercises   Treatment Frequency 2 Times per Week   Duration Until Therapy Goals Met   Problem List   Problem List Decreased Active Daily Living Skills;Decreased Homemaking Skills;Decreased Upper Extremity Strength Right;Decreased Upper Extremity Strength Left;Decreased Functional Mobility;Impaired Postural Control / Balance;Impaired Cognitive Function;Safety Awareness Deficits / Cognition   Anticipated Discharge Equipment and Recommendations   DC Equipment Recommendations Unable to determine at this time   Discharge Recommendations Recommend post-acute placement for additional occupational therapy services prior to discharge home   Interdisciplinary Plan of Care Collaboration   IDT Collaboration with  Nursing;Physical Therapist   Patient Position at End of Therapy In Bed;Bed Alarm On;Call Light within Reach;Tray Table within Reach;Phone within Reach   Collaboration Comments RN updated

## 2023-03-14 NOTE — ASSESSMENT & PLAN NOTE
Likely related to schizophrenia and   possible UTI  CT head reviewed with no acute pathology revealed stable hydrocephalus  Patient received 1 dose of ceftriaxone urine mildly abnormal    Urine culture growing Enterococcus completed course of Augmentin on 3/18/2023

## 2023-03-14 NOTE — H&P
Hospital Medicine History & Physical Note    Date of Service  3/13/2023    Primary Care Physician  JENNIFER Childers.    Consultants  None     Code Status  Full Code    Chief Complaint  Chief Complaint   Patient presents with    ALOC     LNW on Thursday, progressive decline since then, Aox1, Saturday GLF seen at ER for assessment/discharge, Sunday AMS became worse per partner    UTI     Rancid smell evident, EMS observed dark color       History of Presenting Illness    72-year-old female with history of asthma, normal pressure hydrocephalus, schizophrenia, hypothyroidism and hypertension who presented 3/13 with worsening mentation.  Patient was not able to provide history and the history was taken from her  who she lives with.  Patient has history of schizophrenia with hallucination most of the time and also cognitive disorder, the  is the caregiver and recently patient has been refusing taking her medication and she is spitting the medication on the floor.  No significant fever or chest pain however patient has been lethargic.  On admission patient was found to have hypertension 200/100.  No fever.  Labs showed mildly leukocytosis 12.5 with normal kidney function and .  Urine showed normal white blood cell and CT head did not show any acute finding.  Patient will be admitted for encephalopathy and schizophrenia.    The case was discussed with the patient's , answered all his questions and discussed the CODE STATUS, he declared that she wants to be full code.      I discussed the plan of care with patient, family, and bedside RN.    Review of Systems  Review of Systems   Unable to perform ROS: Psychiatric disorder     Past Medical History   has a past medical history of Asthma (08/05/2021), Bowel habit changes, Breath shortness (08/05/2021), Emphysema of lung (HCC) (08/05/2021), Fibromyalgia, Heart burn, Hypertension, Hypothyroidism (2008), Indigestion, Migraine, Obesity,  Pneumonia (2012), Psychiatric problem, Renal disorder (08/05/2021), Seizure ([facility]) (2017), and Urinary incontinence.    Surgical History   has a past surgical history that includes nasal septal reconstruction; tonsillectomy; bladder sling female; other surgical procedure; orif, knee (1/3/08); other orthopedic surgery; gyn surgery; thoracic fusion o-arm (Right, 10/13/2021); and cath placement capd (N/A, 10/13/2021).     Family History  family history includes Cancer in her father and maternal grandfather; Heart Disease in her mother; Stroke in her father.   Family history reviewed with patient. There is no family history that is pertinent to the chief complaint.     Social History   reports that she quit smoking about 37 years ago. Her smoking use included cigarettes. She has never used smokeless tobacco. She reports current alcohol use. She reports that she does not use drugs.    Allergies  Allergies   Allergen Reactions    Gabapentin Unspecified     seizure       Medications  Prior to Admission Medications   Prescriptions Last Dose Informant Patient Reported? Taking?   ARIPiprazole (ABILIFY) 15 MG Tab   No No   Sig: Take 1 Tablet by mouth every day.   B Complex-C-Folic Acid (SUPER B-COMPLEX/VIT C/FA PO)  Significant Other Yes No   Sig: Take 1 Tab by mouth every day.   BETA CAROTENE PO  Significant Other Yes No   Sig: Take 1 Dose by mouth every day. Unknown OTC Strength.   Fluticasone Furoate-Vilanterol (BREO ELLIPTA) 200-25 MCG/INH AEROSOL POWDER, BREATH ACTIVATED  Significant Other No No   Sig: Inhale 1 Puff every day.   Pseudoeph-Doxylamine-DM-APAP (NYQUIL PO)  Significant Other Yes No   Sig: Take 30 mL by mouth as needed (Cold and flu symptoms).   SUMAtriptan (IMITREX) 50 MG Tab  Significant Other No No   Sig: Take 1 Tablet by mouth one time as needed for Migraine for up to 1 dose.   albuterol (PROVENTIL) 2.5mg/3ml Nebu Soln solution for nebulization  Significant Other No No   Sig: Take 3 mL by nebulization every  four hours as needed for Shortness of Breath.   albuterol 108 (90 Base) MCG/ACT Aero Soln inhalation aerosol  Significant Other No No   Sig: INHALE TWO PUFFS BY MOUTH EVERY SIX HOURS AS NEEDED FOR SHORTNESS OF BREATH   amitriptyline (ELAVIL) 25 MG Tab  Significant Other Yes No   Sig: Take 25 mg by mouth at bedtime.   diclofenac sodium (VOLTAREN) 1 % Gel   Yes No   Sig: Apply 4 g topically 4 times a day as needed (for hip and knee pain).   docusate sodium (COLACE) 100 MG Cap  Significant Other Yes No   Sig: Take 100 mg by mouth 1 time a day as needed for Constipation.   estradiol (ESTRACE) 1 MG Tab  Significant Other No No   Sig: Take 1 Tablet by mouth every day.   ibuprofen (MOTRIN) 200 MG Tab  Significant Other Yes No   Sig: Take 600 mg by mouth every 8 hours as needed for Mild Pain.   lamoTRIgine (LAMICTAL) 100 MG Tab   No No   Sig: Take 1 Tablet by mouth every evening.   lidocaine (LIDODERM) 5 % Patch  Significant Other No No   Sig: Place 1 Patch on the skin every 24 hours.   lisinopril (PRINIVIL) 10 MG Tab  Significant Other No No   Sig: Take 1 Tablet by mouth every day.   methocarbamol (ROBAXIN) 750 MG Tab  Significant Other Yes No   Sig: Take 750 mg by mouth. Five times daily as needed   propranolol (INDERAL) 10 MG Tab  Significant Other Yes No   Sig: Take 10 mg by mouth 2 times a day.   therapeutic multivitamin-minerals (THERAGRAN-M) Tab  Significant Other Yes No   Sig: Take 1 Tab by mouth every day.      Facility-Administered Medications: None       Physical Exam  Temp:  [36.3 °C (97.3 °F)] 36.3 °C (97.3 °F)  Pulse:  [72-88] 77  Resp:  [20-26] 23  BP: (166-226)/() 182/82  SpO2:  [87 %-95 %] 95 %  Blood Pressure : (!) 182/82   Temperature: 36.3 °C (97.3 °F)   Pulse: 77   Respiration: (!) 23   Pulse Oximetry: 95 %       Physical Exam  Constitutional:       Appearance: She is obese. She is ill-appearing.      Comments: Disheveled    HENT:      Head: Normocephalic and atraumatic.   Eyes:      General: No  scleral icterus.  Cardiovascular:      Rate and Rhythm: Normal rate.      Heart sounds: No murmur heard.  Pulmonary:      Effort: No respiratory distress.      Breath sounds: No wheezing.   Abdominal:      General: Bowel sounds are normal. There is no distension.      Palpations: Abdomen is soft.      Tenderness: There is no abdominal tenderness. There is no guarding.   Musculoskeletal:         General: No deformity.      Right lower leg: No edema.      Left lower leg: No edema.   Skin:     General: Skin is dry.      Coloration: Skin is not jaundiced.      Findings: No bruising, lesion or rash.   Neurological:      General: No focal deficit present.      Mental Status: She is disoriented.      Comments: Not cooperative with exam, patient is alert to her name only.       Laboratory:  Recent Labs     03/13/23  1358   WBC 12.5*   RBC 5.39   HEMOGLOBIN 16.5*   HEMATOCRIT 50.2*   MCV 93.1   MCH 30.6   MCHC 32.9*   RDW 46.6   PLATELETCT 265   MPV 9.5     Recent Labs     03/13/23  1358   SODIUM 141   POTASSIUM 3.4*   CHLORIDE 101   CO2 25   GLUCOSE 103*   BUN 10   CREATININE 0.46*   CALCIUM 9.4     Recent Labs     03/13/23  1358   ALTSGPT 8   ASTSGOT 17   ALKPHOSPHAT 102*   TBILIRUBIN 1.0   GLUCOSE 103*         Recent Labs     03/13/23  1358   NTPROBNP 654*         No results for input(s): TROPONINT in the last 72 hours.    Imaging:  CT-HEAD W/O   Final Result      1.  Again seen hydrocephalus, not significantly changed from comparison.      2.  Interval placement of a right frontal ventriculoperitoneal shunt catheter.      3.  Periventricular chronic small vessel ischemic change.         DX-CHEST-PORTABLE (1 VIEW)   Final Result      Mild interstitial pulmonary edema.          X-Ray:  I have personally reviewed the images and compared with prior images.  EKG:  I have personally reviewed the images and compared with prior images.    Assessment/Plan:  Justification for Admission Status  I anticipate this patient will  require at least two midnights for appropriate medical management, necessitating inpatient admission because schizophrenia with encephalopathy and possible UTI    Patient will need a Med/Surg bed on MEDICAL service .  The need is secondary to encephalopathy.    * Encephalopathy- (present on admission)  Assessment & Plan  Likely related to schizophrenia and UTI/dehydration?  CT head did not show any acute finding  Patient received 1 dose of UTI  Urine and blood culture and check procalcitonin, if is positive we will continue antibiotics.  Patient will need psych eval after treating her UTI  PT and OT    Schizoaffective disorder (HCC)- (present on admission)  Assessment & Plan  Patient has hallucination and altered mental status  Her  and the caregiver  Patient has been refusing her medications recently  Continue home medication Abilify and lamotrigine  Patient will need psych eval      Normal pressure hydrocephalus (HCC)- (present on admission)  Assessment & Plan  Worsening cognitive for last 2 years  Patient has shunt  Consider neuro consult if needed however her symptoms are likely chronic and need to follow-up with neurology clinic    S/P  shunt- (present on admission)  Assessment & Plan  Follow-up with neurosurgeon as outpatient    Debility- (present on admission)  Assessment & Plan  With recurrent falls  PT and OT  Check TSH and vitamin D  Patient might need placement, her  is the caregiver    Moderate persistent asthma without complication- (present on admission)  Assessment & Plan  No exacerbation  Continue albuterol as needed    Scoliosis- (present on admission)  Assessment & Plan  Chronic back pain  We will order x-ray to rule out any fracture  Pain management    KIRA (obstructive sleep apnea)- (present on admission)  Assessment & Plan  CPAP at night    Hypothyroidism- (present on admission)  Assessment & Plan  TSH 1.2 which was 3 months ago  Repeat TSH since patient has not compliant with the  treatment    Essential hypertension- (present on admission)  Assessment & Plan  Uncontrolled on admission, likely related to noncompliance  Resume home medication lisinopril and propranolol  IV labetalol as needed        VTE prophylaxis: SCDs/TEDs and Xarelto 10 mg daily as prophylaxis      Interventions to be considered in all patients in order to minimize the risk of delirium.   -do not disturb patient (vitals or lab draws) between the hours of 10 PM and 6 AM.  -ideally the patient should not sleep during the day and we should avoid day time naps.   -up in chair for meals  -ambulate at least three times daily, as able  -watch for constipation  -timed voiding - ask patient is she would like to go to the bathroom q 2-3 hours, except during the do not disturb hours.   -remove all necessary lines (central lines, peripheral IVs, feeding tubes, parker catheters)  -unless patient has shown harm to self or others I would recommend against use of restraints - either chemical or physical (antipsychotics)   -minimize polypharmacy, do not dose medication during sleep hours

## 2023-03-14 NOTE — ASSESSMENT & PLAN NOTE
Worsening cognitive for last 2 years  Patient has  shunt with stable head CT   Outpatient follow-up with neurology

## 2023-03-14 NOTE — PROGRESS NOTES
[facility] Medicine Daily Progress Note    Date of Service  3/14/2023    Chief Complaint  Sheyla Garcia is a 72 y.o. female admitted 3/13/2023 with altered mental status    Hospital Course  72-year-old female with history of asthma, normal pressure hydrocephalus, schizophrenia, hypothyroidism and hypertension who presented 3/13 with worsening mentation.  Patient was not able to provide history and the history was taken from her  who she lives with.  Patient has history of schizophrenia with hallucination most of the time and also cognitive disorder, the  is the caregiver and recently patient has been refusing taking her medication and she is spitting the medication on the floor.  No significant fever or chest pain however patient has been lethargic.  On admission patient was found to have hypertension 200/100.  No fever.  Labs showed mildly leukocytosis 12.5 with normal kidney function and .  Urine showed normal white blood cell and CT head did not show any acute finding.    Interval Problem Update    Patient complains of generalized pain  She is oriented to self and place  Afebrile on  2l NC      I have discussed this patient's plan of care and discharge plan at IDT rounds today with Case Management, Nursing, Nursing leadership, and other members of the IDT team.    Consultants/Specialty       Code Status  Full Code    Disposition  Patient is not medically cleared for discharge.   Anticipate discharge to  TBD .  I have placed the appropriate orders for post-discharge needs.    Review of Systems  Review of Systems   Unable to perform ROS: Mental status change      Physical Exam  Temp:  [36.3 °C (97.3 °F)-36.6 °C (97.8 °F)] 36.6 °C (97.8 °F)  Pulse:  [72-88] 86  Resp:  [18-26] 18  BP: (152-226)/() 162/76  SpO2:  [87 %-100 %] 100 %    Physical Exam  Vitals and nursing note reviewed.   Constitutional:       General: She is not in acute distress.     Appearance: She is obese.   HENT:       Head: Normocephalic and atraumatic.      Nose: Nose normal. No rhinorrhea.      Mouth/Throat:      Pharynx: No oropharyngeal exudate or posterior oropharyngeal erythema.   Eyes:      General: No scleral icterus.        Right eye: No discharge.         Left eye: No discharge.   Cardiovascular:      Rate and Rhythm: Normal rate and regular rhythm.      Heart sounds: Normal heart sounds. No murmur heard.    No friction rub. No gallop.   Pulmonary:      Effort: Pulmonary effort is normal. No respiratory distress.      Breath sounds: No stridor. Decreased breath sounds present. No wheezing, rhonchi or rales.   Chest:      Chest wall: No tenderness.   Abdominal:      General: Bowel sounds are normal. There is no distension.      Palpations: Abdomen is soft. There is no mass.      Tenderness: There is no abdominal tenderness. There is no rebound.   Musculoskeletal:         General: Swelling present. No tenderness.      Cervical back: Neck supple. No rigidity.   Skin:     General: Skin is warm and dry.      Coloration: Skin is not cyanotic or jaundiced.      Nails: There is no clubbing.   Neurological:      General: No focal deficit present.      Mental Status: She is alert.      Cranial Nerves: No cranial nerve deficit.      Motor: No weakness.      Comments: Oriented x2   Psychiatric:         Mood and Affect: Mood normal.         Behavior: Behavior normal.       Fluids  No intake or output data in the 24 hours ending 03/14/23 1314    Laboratory  Recent Labs     03/13/23  1358 03/14/23  0321   WBC 12.5* 14.0*   RBC 5.39 5.18   HEMOGLOBIN 16.5* 16.3*   HEMATOCRIT 50.2* 50.3*   MCV 93.1 97.1   MCH 30.6 31.5   MCHC 32.9* 32.4*   RDW 46.6 48.8   PLATELETCT 265 237   MPV 9.5 9.3     Recent Labs     03/13/23  1358 03/14/23  0321   SODIUM 141 139   POTASSIUM 3.4* 3.9   CHLORIDE 101 103   CO2 25 20   GLUCOSE 103* 98   BUN 10 11   CREATININE 0.46* 0.52   CALCIUM 9.4 9.2                   Imaging  DX-LUMBAR SPINE-2 OR 3 VIEWS    Final Result      Mild lumbar spondylosis. No acute fracture or listhesis.      CT-HEAD W/O   Final Result      1.  Again seen hydrocephalus, not significantly changed from comparison.      2.  Interval placement of a right frontal ventriculoperitoneal shunt catheter.      3.  Periventricular chronic small vessel ischemic change.         DX-CHEST-PORTABLE (1 VIEW)   Final Result      Mild interstitial pulmonary edema.           Assessment/Plan  * Encephalopathy- (present on admission)  Assessment & Plan  Likely related to schizophrenia and   possible UTI  CT head reviewed with no acute pathology revealed stable hydrocephalus  Patient received 1 dose of ceftriaxone urine mildly abnormal will transition to p.o. Keflex    Follow-up on cultures    Elevated brain natriuretic peptide (BNP) level  Assessment & Plan  We will check echocardiogram    Schizoaffective disorder (HCC)- (present on admission)  Assessment & Plan  Patient has hallucination and altered mental status  Her  and the caregiver  Patient has been refusing her medications recently    Continue Abilify and lamotrigine  Frequent orientation      Normal pressure hydrocephalus (HCC)- (present on admission)  Assessment & Plan  Worsening cognitive for last 2 years  Patient has  shunt with stable head CT   Outpatient follow-up with neurology    S/P  shunt- (present on admission)  Assessment & Plan  Follow-up with neurosurgeon as outpatient    Debility- (present on admission)  Assessment & Plan  With recurrent falls  PT and OT     Patient might need placement, her  is the caregiver    Moderate persistent asthma without complication- (present on admission)  Assessment & Plan  No exacerbation  Continue albuterol as needed    Scoliosis- (present on admission)  Assessment & Plan  Chronic back pain    X-ray reviewed negative for fracture  Tylenol as needed    KIRA (obstructive sleep apnea)- (present on admission)  Assessment & Plan  CPAP at  night    Hypothyroidism- (present on admission)  Assessment & Plan  Ruled out    TSH 2.46    Essential hypertension- (present on admission)  Assessment & Plan  Uncontrolled    Will increase lisinopril 20 mg continue home dose of propranolol and monitor blood pressure         VTE prophylaxis: Xarelto 10 mg daily as prophylaxis    I have performed a physical exam and reviewed and updated ROS and Plan today (3/14/2023). In review of yesterday's note (3/13/2023), there are no changes except as documented above.

## 2023-03-14 NOTE — PROGRESS NOTES
MARCELLO DCE chart review. Monitoring for developments in discharge disposition and will round back to obtain choice as indicated. Thank you.

## 2023-03-14 NOTE — CARE PLAN
The patient is Stable - Low risk of patient condition declining or worsening    Shift Goals  Clinical Goals: safety  Patient Goals: sleep, pain control    Progress made toward(s) clinical / shift goals:  pt meds passed per MAR, no injuries this shift      Problem: Knowledge Deficit - Standard  Goal: Patient and family/care givers will demonstrate understanding of plan of care, disease process/condition, diagnostic tests and medications  Outcome: Progressing     Problem: Pain - Standard  Goal: Alleviation of pain or a reduction in pain to the patient’s comfort goal  Outcome: Progressing     Problem: Fall Risk  Goal: Patient will remain free from falls  Outcome: Progressing     Problem: Skin Integrity  Goal: Skin integrity is maintained or improved  Outcome: Progressing       Patient is not progressing towards the following goals:

## 2023-03-14 NOTE — PROGRESS NOTES
at the bedside advised that after speaking with pt's primary and psychiatrist that they would recommend that pt should not be receiving narcotics for pain relief and would also like to know if a neurology consult would can be placed for the pt.

## 2023-03-15 ENCOUNTER — APPOINTMENT (OUTPATIENT)
Dept: CARDIOLOGY | Facility: MEDICAL CENTER | Age: 73
DRG: 689 | End: 2023-03-15
Attending: HOSPITALIST
Payer: MEDICARE

## 2023-03-15 LAB
LV EJECT FRACT  99904: 65
LV EJECT FRACT MOD 2C 99903: 53.39
LV EJECT FRACT MOD 4C 99902: 53.26
LV EJECT FRACT MOD BP 99901: 53.4

## 2023-03-15 PROCEDURE — 99232 SBSQ HOSP IP/OBS MODERATE 35: CPT | Performed by: HOSPITALIST

## 2023-03-15 PROCEDURE — 93306 TTE W/DOPPLER COMPLETE: CPT

## 2023-03-15 PROCEDURE — 93306 TTE W/DOPPLER COMPLETE: CPT | Mod: 26 | Performed by: INTERNAL MEDICINE

## 2023-03-15 PROCEDURE — A9270 NON-COVERED ITEM OR SERVICE: HCPCS | Performed by: HOSPITALIST

## 2023-03-15 PROCEDURE — 700102 HCHG RX REV CODE 250 W/ 637 OVERRIDE(OP): Performed by: HOSPITALIST

## 2023-03-15 PROCEDURE — 700117 HCHG RX CONTRAST REV CODE 255: Performed by: HOSPITALIST

## 2023-03-15 PROCEDURE — 700101 HCHG RX REV CODE 250: Performed by: INTERNAL MEDICINE

## 2023-03-15 PROCEDURE — 700102 HCHG RX REV CODE 250 W/ 637 OVERRIDE(OP): Performed by: INTERNAL MEDICINE

## 2023-03-15 PROCEDURE — 770006 HCHG ROOM/CARE - MED/SURG/GYN SEMI*

## 2023-03-15 PROCEDURE — A9270 NON-COVERED ITEM OR SERVICE: HCPCS | Performed by: INTERNAL MEDICINE

## 2023-03-15 RX ORDER — AMOXICILLIN AND CLAVULANATE POTASSIUM 875; 125 MG/1; MG/1
1 TABLET, FILM COATED ORAL EVERY 12 HOURS
Status: COMPLETED | OUTPATIENT
Start: 2023-03-15 | End: 2023-03-20

## 2023-03-15 RX ORDER — NYSTATIN 100000 [USP'U]/G
POWDER TOPICAL 2 TIMES DAILY
Status: DISPENSED | OUTPATIENT
Start: 2023-03-15 | End: 2023-03-20

## 2023-03-15 RX ORDER — LISINOPRIL 20 MG/1
40 TABLET ORAL
Status: DISCONTINUED | OUTPATIENT
Start: 2023-03-15 | End: 2023-03-22 | Stop reason: HOSPADM

## 2023-03-15 RX ADMIN — DOCUSATE SODIUM 50 MG AND SENNOSIDES 8.6 MG 2 TABLET: 8.6; 5 TABLET, FILM COATED ORAL at 04:01

## 2023-03-15 RX ADMIN — LABETALOL HYDROCHLORIDE 10 MG: 5 INJECTION INTRAVENOUS at 03:36

## 2023-03-15 RX ADMIN — OXYCODONE HYDROCHLORIDE 5 MG: 5 TABLET ORAL at 21:56

## 2023-03-15 RX ADMIN — LAMOTRIGINE 100 MG: 100 TABLET ORAL at 17:03

## 2023-03-15 RX ADMIN — LISINOPRIL 40 MG: 20 TABLET ORAL at 20:35

## 2023-03-15 RX ADMIN — AMOXICILLIN AND CLAVULANATE POTASSIUM 1 TABLET: 875; 125 TABLET, FILM COATED ORAL at 17:03

## 2023-03-15 RX ADMIN — PROPRANOLOL HYDROCHLORIDE 10 MG: 10 TABLET ORAL at 20:35

## 2023-03-15 RX ADMIN — CEPHALEXIN 500 MG: 500 CAPSULE ORAL at 04:01

## 2023-03-15 RX ADMIN — NYSTATIN: 100000 POWDER TOPICAL at 17:03

## 2023-03-15 RX ADMIN — CEPHALEXIN 500 MG: 500 CAPSULE ORAL at 11:16

## 2023-03-15 RX ADMIN — RIVAROXABAN 10 MG: 10 TABLET, FILM COATED ORAL at 17:03

## 2023-03-15 RX ADMIN — ARIPIPRAZOLE 15 MG: 15 TABLET ORAL at 20:35

## 2023-03-15 RX ADMIN — HUMAN ALBUMIN MICROSPHERES AND PERFLUTREN 3 ML: 10; .22 INJECTION, SOLUTION INTRAVENOUS at 08:59

## 2023-03-15 ASSESSMENT — PAIN DESCRIPTION - PAIN TYPE
TYPE: ACUTE PAIN
TYPE: ACUTE PAIN

## 2023-03-15 NOTE — CARE PLAN
Report received at change of shift. Pt is alert and oriented to self and place only  Clear speech and does not call for help  Pt does not display any s/s of distress and denies pain  VS- HTN and on2l per baseline, upon trying to recheck bp 1 hr post bp med administration pt refused and became agitated.  Repositioning [id number] ongoing. Fall precautions in place, bed low and locked, call light within reach, treaded socks on, bed alarm on.           The patient is Stable - Low risk of patient condition declining or worsening    Shift Goals  Clinical Goals: Safety,  Patient Goals: comfort    Progress made toward(s) clinical / shift goals:  Cont w poc. ABX therapy.    Patient is not progressing towards the following goals:      Problem: Knowledge Deficit - Standard  Goal: Patient and family/care givers will demonstrate understanding of plan of care, disease process/condition, diagnostic tests and medications  Outcome: Progressing     Problem: Pain - Standard  Goal: Alleviation of pain or a reduction in pain to the patient’s comfort goal  Outcome: Progressing     Problem: Fall Risk  Goal: Patient will remain free from falls  Outcome: Progressing     Problem: Skin Integrity  Goal: Skin integrity is maintained or improved  Outcome: Progressing

## 2023-03-15 NOTE — PROGRESS NOTES
Upon rounding on patient and repositioning pt noted to be soiled and purewick hose kinked. Informed patient that we could not leave her wet with urine and that we were going to clean her up. Pt denied being wet. Pt cleaned up pt tolerated poorly did not want to be repositioned nor did she want a new gown. Pt pinching nurses and care aide. Pt cleansed, new purewick applied and repositioned. Bed low and locked, call light within reach.

## 2023-03-15 NOTE — CARE PLAN
The patient is Stable - Low risk of patient condition declining or worsening    Shift Goals  Clinical Goals: safety  Patient Goals: SHAZIA    Progress made toward(s) clinical / shift goals:  yes      Problem: Knowledge Deficit - Standard  Goal: Patient and family/care givers will demonstrate understanding of plan of care, disease process/condition, diagnostic tests and medications  Outcome: Progressing     Problem: Pain - Standard  Goal: Alleviation of pain or a reduction in pain to the patient’s comfort goal  Outcome: Progressing     Problem: Fall Risk  Goal: Patient will remain free from falls  Outcome: Progressing     Problem: Skin Integrity  Goal: Skin integrity is maintained or improved  Outcome: Progressing       Patient is not progressing towards the following goals:

## 2023-03-15 NOTE — PROGRESS NOTES
Pt refusing abx stating that she does not trust me and does not want to take any medications. Reports that she will take it home to inspect it and then possibly taken. Pt does not allow bp to be taken at this time either.

## 2023-03-15 NOTE — PROGRESS NOTES
Received bedside report from night shift RN.   Assumed care of patient at change of shift.   Assessment complete and POC discussed.   STATUS: Patient is A&Oxself, VSS, on 2.5L.   PAIN: Patient denies pain, no apparent signs of distress or discomfort.   Patient is sitting up in bed for breakfast.   Bed alarm set, call light in reach.  Bed is in lowest/locked position.   Call light and belongings are within reach.   No further needs at this time.  Will continue to monitor.

## 2023-03-15 NOTE — PROGRESS NOTES
Hospital Medicine Daily Progress Note    Date of Service  3/15/2023    Chief Complaint  Sheyla Garcia is a 72 y.o. female admitted 3/13/2023 with altered mental status    Hospital Course  72-year-old female with history of asthma, normal pressure hydrocephalus, schizophrenia, hypothyroidism and hypertension who presented 3/13 with worsening mentation.  Patient was not able to provide history and the history was taken from her  who she lives with.  Patient has history of schizophrenia with hallucination most of the time and also cognitive disorder, the  is the caregiver and recently patient has been refusing taking her medication and she is spitting the medication on the floor.  No significant fever or chest pain however patient has been lethargic.  On admission patient was found to have hypertension 200/100.  No fever.  Labs showed mildly leukocytosis 12.5 with normal kidney function and .  Urine showed normal white blood cell and CT head did not show any acute finding.    Interval Problem Update    Patient is oriented to self  Urine culture growing Enterococcus I have switched antibiotic to Augmentin  She is afebrile  On 2-1/2 L nasal cannula  Blood pressure is uncontrolled I have increased lisinopril    I have discussed this patient's plan of care and discharge plan at IDT rounds today with Case Management, Nursing, Nursing leadership, and other members of the IDT team.    Consultants/Specialty       Code Status  Full Code    Disposition  Patient is not medically cleared for discharge.   Anticipate discharge to  TBD .  I have placed the appropriate orders for post-discharge needs.    Review of Systems  Review of Systems   Unable to perform ROS: Mental acuity      Physical Exam  Temp:  [36.6 °C (97.8 °F)-37.1 °C (98.7 °F)] 36.8 °C (98.3 °F)  Pulse:  [85-98] 85  Resp:  [18-19] 18  BP: (145-189)/(70-84) 146/78  SpO2:  [92 %-97 %] 92 %    Physical Exam  Vitals and nursing note reviewed.    Constitutional:       General: She is not in acute distress.  HENT:      Head: Normocephalic and atraumatic.      Nose: Nose normal. No rhinorrhea.      Mouth/Throat:      Pharynx: No oropharyngeal exudate or posterior oropharyngeal erythema.   Eyes:      General: No scleral icterus.        Right eye: No discharge.         Left eye: No discharge.   Cardiovascular:      Rate and Rhythm: Normal rate and regular rhythm.      Heart sounds: Normal heart sounds. No murmur heard.    No friction rub. No gallop.   Pulmonary:      Effort: Pulmonary effort is normal. No respiratory distress.      Breath sounds: No stridor. Decreased breath sounds present. No wheezing, rhonchi or rales.   Chest:      Chest wall: No tenderness.   Abdominal:      General: Bowel sounds are normal. There is no distension.      Palpations: Abdomen is soft. There is no mass.      Tenderness: There is no abdominal tenderness. There is no rebound.   Musculoskeletal:         General: Swelling present. No tenderness.      Cervical back: Neck supple. No rigidity.   Skin:     General: Skin is warm and dry.      Coloration: Skin is not cyanotic or jaundiced.      Nails: There is no clubbing.   Neurological:      General: No focal deficit present.      Mental Status: She is alert.      Cranial Nerves: No cranial nerve deficit.      Comments: Oriented to self   Psychiatric:         Cognition and Memory: She exhibits impaired recent memory.         Judgment: Judgment is impulsive.       Fluids    Intake/Output Summary (Last 24 hours) at 3/15/2023 1445  Last data filed at 3/15/2023 1400  Gross per 24 hour   Intake 360 ml   Output --   Net 360 ml       Laboratory  Recent Labs     03/13/23  1358 03/14/23  0321   WBC 12.5* 14.0*   RBC 5.39 5.18   HEMOGLOBIN 16.5* 16.3*   HEMATOCRIT 50.2* 50.3*   MCV 93.1 97.1   MCH 30.6 31.5   MCHC 32.9* 32.4*   RDW 46.6 48.8   PLATELETCT 265 237   MPV 9.5 9.3       Recent Labs     03/13/23  1358 03/14/23  0321   SODIUM 141 139    POTASSIUM 3.4* 3.9   CHLORIDE 101 103   CO2 25 20   GLUCOSE 103* 98   BUN 10 11   CREATININE 0.46* 0.52   CALCIUM 9.4 9.2                     Imaging  EC-ECHOCARDIOGRAM COMPLETE W/ CONT   Final Result      DX-LUMBAR SPINE-2 OR 3 VIEWS   Final Result      Mild lumbar spondylosis. No acute fracture or listhesis.      CT-HEAD W/O   Final Result      1.  Again seen hydrocephalus, not significantly changed from comparison.      2.  Interval placement of a right frontal ventriculoperitoneal shunt catheter.      3.  Periventricular chronic small vessel ischemic change.         DX-CHEST-PORTABLE (1 VIEW)   Final Result      Mild interstitial pulmonary edema.             Assessment/Plan  * Encephalopathy- (present on admission)  Assessment & Plan  Likely related to schizophrenia and   possible UTI  CT head reviewed with no acute pathology revealed stable hydrocephalus  Patient received 1 dose of ceftriaxone urine mildly abnormal    Urine culture growing Enterococcus switch Keflex to Augmentin         Elevated brain natriuretic peptide (BNP) level  Assessment & Plan  Echocardiogram reviewed with normal EF and no valvular abnormalities    Schizoaffective disorder (HCC)- (present on admission)  Assessment & Plan  Patient has hallucination and altered mental status  Her  and the caregiver  Patient has been refusing her medications recently    Continue Abilify and lamotrigine  Frequent orientation      Normal pressure hydrocephalus (HCC)- (present on admission)  Assessment & Plan  Worsening cognitive for last 2 years  Patient has  shunt with stable head CT   Outpatient follow-up with neurology    S/P  shunt- (present on admission)  Assessment & Plan  Follow-up with neurosurgeon as outpatient    Debility- (present on admission)  Assessment & Plan  With recurrent falls  PT and OT  SNF referral discussed with case management    Moderate persistent asthma without complication- (present on admission)  Assessment &  Plan  No exacerbation  Continue albuterol as needed    Scoliosis- (present on admission)  Assessment & Plan  Chronic back pain    X-ray  negative for fracture  Tylenol as needed    KIRA (obstructive sleep apnea)- (present on admission)  Assessment & Plan  CPAP at night    Hypothyroidism- (present on admission)  Assessment & Plan  Ruled out    TSH 2.46    Essential hypertension- (present on admission)  Assessment & Plan  Uncontrolled    Will increase lisinopril 40 mg continue home dose of propranolol and monitor blood pressure  Recheck BMP in a.m.         VTE prophylaxis: Xarelto 10 mg daily as prophylaxis    I have performed a physical exam and reviewed and updated ROS and Plan today (3/15/2023). In review of yesterday's note (3/14/2023), there are no changes except as documented above.

## 2023-03-15 NOTE — PROGRESS NOTES
1557: patient refused waffled mattress became combative to staff during clean up of BM, will attempt waffle mattress at next bed change. Patient has heel mepilex in place. No sacral mepilex placed d/t incontinence.      Taye score less than 17, waffle mattress ordered, patient refused waffle mattress and preventative mepilexes to heels/sacrum. Will attempt again, left waffle at bedside.

## 2023-03-15 NOTE — DISCHARGE PLANNING
TCN DCE chart review complete. TCN attempted to obtain SNF choice from patient, but she is very confused. TCN called spouse Ed's phone number, but unable to leave VM as voice mail identified as [employer]. TCN called home phone # listed in chart, but this is disconnected. TCN called patient's cell # and left VM for  to call MARCELLO Apple and/or ELIZABETH Medina back to discuss SNF choices.

## 2023-03-16 LAB
ANION GAP SERPL CALC-SCNC: 14 MMOL/L (ref 7–16)
BACTERIA UR CULT: ABNORMAL
BACTERIA UR CULT: ABNORMAL
BUN SERPL-MCNC: 10 MG/DL (ref 8–22)
CALCIUM SERPL-MCNC: 9.6 MG/DL (ref 8.5–10.5)
CHLORIDE SERPL-SCNC: 103 MMOL/L (ref 96–112)
CO2 SERPL-SCNC: 26 MMOL/L (ref 20–33)
CREAT SERPL-MCNC: 0.4 MG/DL (ref 0.5–1.4)
GFR SERPLBLD CREATININE-BSD FMLA CKD-EPI: 106 ML/MIN/1.73 M 2
GLUCOSE BLD STRIP.AUTO-MCNC: 102 MG/DL (ref 65–99)
GLUCOSE SERPL-MCNC: 114 MG/DL (ref 65–99)
POTASSIUM SERPL-SCNC: 3 MMOL/L (ref 3.6–5.5)
SIGNIFICANT IND 70042: ABNORMAL
SITE SITE: ABNORMAL
SODIUM SERPL-SCNC: 143 MMOL/L (ref 135–145)
SOURCE SOURCE: ABNORMAL

## 2023-03-16 PROCEDURE — 36415 COLL VENOUS BLD VENIPUNCTURE: CPT

## 2023-03-16 PROCEDURE — 700111 HCHG RX REV CODE 636 W/ 250 OVERRIDE (IP): Performed by: HOSPITALIST

## 2023-03-16 PROCEDURE — 99232 SBSQ HOSP IP/OBS MODERATE 35: CPT | Performed by: HOSPITALIST

## 2023-03-16 PROCEDURE — 80048 BASIC METABOLIC PNL TOTAL CA: CPT

## 2023-03-16 PROCEDURE — 770006 HCHG ROOM/CARE - MED/SURG/GYN SEMI*

## 2023-03-16 PROCEDURE — 82962 GLUCOSE BLOOD TEST: CPT

## 2023-03-16 PROCEDURE — 97530 THERAPEUTIC ACTIVITIES: CPT | Mod: CQ

## 2023-03-16 PROCEDURE — 92526 ORAL FUNCTION THERAPY: CPT

## 2023-03-16 PROCEDURE — 700102 HCHG RX REV CODE 250 W/ 637 OVERRIDE(OP): Performed by: HOSPITALIST

## 2023-03-16 PROCEDURE — A9270 NON-COVERED ITEM OR SERVICE: HCPCS | Performed by: INTERNAL MEDICINE

## 2023-03-16 PROCEDURE — 700102 HCHG RX REV CODE 250 W/ 637 OVERRIDE(OP): Performed by: INTERNAL MEDICINE

## 2023-03-16 PROCEDURE — A9270 NON-COVERED ITEM OR SERVICE: HCPCS | Performed by: HOSPITALIST

## 2023-03-16 RX ORDER — POTASSIUM CHLORIDE 20 MEQ/1
40 TABLET, EXTENDED RELEASE ORAL 3 TIMES DAILY
Status: DISCONTINUED | OUTPATIENT
Start: 2023-03-16 | End: 2023-03-16

## 2023-03-16 RX ORDER — POTASSIUM CHLORIDE 7.45 MG/ML
10 INJECTION INTRAVENOUS
Status: COMPLETED | OUTPATIENT
Start: 2023-03-16 | End: 2023-03-16

## 2023-03-16 RX ORDER — POTASSIUM CHLORIDE 7.45 MG/ML
10 INJECTION INTRAVENOUS ONCE
Status: COMPLETED | OUTPATIENT
Start: 2023-03-16 | End: 2023-03-16

## 2023-03-16 RX ADMIN — POTASSIUM CHLORIDE 10 MEQ: 7.46 INJECTION, SOLUTION INTRAVENOUS at 10:06

## 2023-03-16 RX ADMIN — POTASSIUM CHLORIDE 10 MEQ: 7.46 INJECTION, SOLUTION INTRAVENOUS at 13:30

## 2023-03-16 RX ADMIN — RIVAROXABAN 10 MG: 10 TABLET, FILM COATED ORAL at 16:15

## 2023-03-16 RX ADMIN — LISINOPRIL 40 MG: 20 TABLET ORAL at 21:48

## 2023-03-16 RX ADMIN — POTASSIUM CHLORIDE 40 MEQ: 1500 TABLET, EXTENDED RELEASE ORAL at 07:53

## 2023-03-16 RX ADMIN — NYSTATIN: 100000 POWDER TOPICAL at 06:46

## 2023-03-16 RX ADMIN — AMOXICILLIN AND CLAVULANATE POTASSIUM 1 TABLET: 875; 125 TABLET, FILM COATED ORAL at 16:15

## 2023-03-16 RX ADMIN — LABETALOL HYDROCHLORIDE 10 MG: 5 INJECTION INTRAVENOUS at 04:44

## 2023-03-16 RX ADMIN — LAMOTRIGINE 100 MG: 100 TABLET ORAL at 16:15

## 2023-03-16 RX ADMIN — PROPRANOLOL HYDROCHLORIDE 10 MG: 10 TABLET ORAL at 21:48

## 2023-03-16 RX ADMIN — AMOXICILLIN AND CLAVULANATE POTASSIUM 1 TABLET: 875; 125 TABLET, FILM COATED ORAL at 04:37

## 2023-03-16 RX ADMIN — ARIPIPRAZOLE 15 MG: 15 TABLET ORAL at 21:49

## 2023-03-16 RX ADMIN — DOCUSATE SODIUM 50 MG AND SENNOSIDES 8.6 MG 2 TABLET: 8.6; 5 TABLET, FILM COATED ORAL at 04:37

## 2023-03-16 RX ADMIN — POTASSIUM CHLORIDE 10 MEQ: 7.46 INJECTION, SOLUTION INTRAVENOUS at 12:38

## 2023-03-16 ASSESSMENT — COGNITIVE AND FUNCTIONAL STATUS - GENERAL
SUGGESTED CMS G CODE MODIFIER MOBILITY: CN
STANDING UP FROM CHAIR USING ARMS: TOTAL
MOVING TO AND FROM BED TO CHAIR: UNABLE
MOVING FROM LYING ON BACK TO SITTING ON SIDE OF FLAT BED: UNABLE
CLIMB 3 TO 5 STEPS WITH RAILING: TOTAL
WALKING IN HOSPITAL ROOM: TOTAL
TURNING FROM BACK TO SIDE WHILE IN FLAT BAD: UNABLE
MOBILITY SCORE: 6

## 2023-03-16 ASSESSMENT — GAIT ASSESSMENTS: GAIT LEVEL OF ASSIST: UNABLE TO PARTICIPATE

## 2023-03-16 ASSESSMENT — PAIN DESCRIPTION - PAIN TYPE: TYPE: ACUTE PAIN

## 2023-03-16 NOTE — PROGRESS NOTES
Patient continues to refuse, waffle mattress and sacral mepilex, screams out if attempts made, pushes on bed, hits at staff, cusses, threatens. patient becomes combative while turning. Patient does have heel mepilex in place.

## 2023-03-16 NOTE — PROGRESS NOTES
Assumed care of patient and received report.  Patient is oriented to self and reports pain level of 7/10.  Patient is not ambulatory, high fall risk, and on 2 L of O2 via cannula.  Patient in most pain while turning every 2 hours and is a two person assist.

## 2023-03-16 NOTE — PROGRESS NOTES
[facility] Medicine Daily Progress Note    Date of Service  3/16/2023    Chief Complaint  Sheyla Garcia is a 72 y.o. female admitted 3/13/2023 with altered mental status    Hospital Course  72-year-old female with history of asthma, normal pressure hydrocephalus, schizophrenia, hypothyroidism and hypertension who presented 3/13 with worsening mentation.  Patient was not able to provide history and the history was taken from her  who she lives with.  Patient has history of schizophrenia with hallucination most of the time and also cognitive disorder, the  is the caregiver and recently patient has been refusing taking her medication and she is spitting the medication on the floor.  No significant fever or chest pain however patient has been lethargic.  On admission patient was found to have hypertension 200/100.  No fever.  Labs showed mildly leukocytosis 12.5 with normal kidney function and .  Urine showed normal white blood cell and CT head did not show any acute finding.    Interval Problem Update    Patient is somnolent she is arousable oriented to self  Received oxycodone last night  Potassium 3.0 I have ordered IV repletion and discussed with nursing staff    I have discussed this patient's plan of care and discharge plan at IDT rounds today with Case Management, Nursing, Nursing leadership, and other members of the IDT team.    Consultants/Specialty       Code Status  Full Code    Disposition  Patient is not medically cleared for discharge.   Anticipate discharge to  TBD .  I have placed the appropriate orders for post-discharge needs.    Review of Systems  Review of Systems   Unable to perform ROS: Mental acuity      Physical Exam  Temp:  [36.1 °C (96.9 °F)-36.6 °C (97.8 °F)] 36.6 °C (97.8 °F)  Pulse:  [] 99  Resp:  [17-18] 17  BP: (144-182)/(74-99) 144/74  SpO2:  [92 %-100 %] 92 %    Physical Exam  Vitals and nursing note reviewed.   Constitutional:       General: She is not in  acute distress.     Comments: Somnolent arousable follows commands in all extremities oriented to self   HENT:      Head: Normocephalic and atraumatic.      Nose: Nose normal. No rhinorrhea.      Mouth/Throat:      Pharynx: No oropharyngeal exudate or posterior oropharyngeal erythema.   Eyes:      General: No scleral icterus.        Right eye: No discharge.         Left eye: No discharge.   Cardiovascular:      Rate and Rhythm: Normal rate and regular rhythm.      Heart sounds: Normal heart sounds. No murmur heard.    No friction rub. No gallop.   Pulmonary:      Effort: Pulmonary effort is normal. No respiratory distress.      Breath sounds: Normal breath sounds. No stridor. No wheezing, rhonchi or rales.   Chest:      Chest wall: No tenderness.   Abdominal:      General: Bowel sounds are normal. There is no distension.      Palpations: Abdomen is soft. There is no mass.      Tenderness: There is no abdominal tenderness. There is no rebound.   Musculoskeletal:         General: No swelling or tenderness.      Cervical back: Neck supple. No rigidity.   Skin:     General: Skin is warm and dry.      Coloration: Skin is not cyanotic or jaundiced.      Nails: There is no clubbing.   Neurological:      General: No focal deficit present.      Mental Status: She is alert.      Cranial Nerves: No cranial nerve deficit.   Psychiatric:         Cognition and Memory: Cognition is impaired.       Fluids    Intake/Output Summary (Last 24 hours) at 3/16/2023 1537  Last data filed at 3/16/2023 0445  Gross per 24 hour   Intake 60 ml   Output 400 ml   Net -340 ml         Laboratory  Recent Labs     03/14/23  0321   WBC 14.0*   RBC 5.18   HEMOGLOBIN 16.3*   HEMATOCRIT 50.3*   MCV 97.1   MCH 31.5   MCHC 32.4*   RDW 48.8   PLATELETCT 237   MPV 9.3       Recent Labs     03/14/23  0321 03/16/23  0200   SODIUM 139 143   POTASSIUM 3.9 3.0*   CHLORIDE 103 103   CO2 20 26   GLUCOSE 98 114*   BUN 11 10   CREATININE 0.52 0.40*   CALCIUM 9.2 9.6                      Imaging  EC-ECHOCARDIOGRAM COMPLETE W/ CONT   Final Result      DX-LUMBAR SPINE-2 OR 3 VIEWS   Final Result      Mild lumbar spondylosis. No acute fracture or listhesis.      CT-HEAD W/O   Final Result      1.  Again seen hydrocephalus, not significantly changed from comparison.      2.  Interval placement of a right frontal ventriculoperitoneal shunt catheter.      3.  Periventricular chronic small vessel ischemic change.         DX-CHEST-PORTABLE (1 VIEW)   Final Result      Mild interstitial pulmonary edema.             Assessment/Plan  * Encephalopathy- (present on admission)  Assessment & Plan  Likely related to schizophrenia and   possible UTI  CT head reviewed with no acute pathology revealed stable hydrocephalus  Patient received 1 dose of ceftriaxone urine mildly abnormal    Urine culture growing Enterococcus completing 3-day course of Augmentin         Elevated brain natriuretic peptide (BNP) level  Assessment & Plan  Echocardiogram reviewed with normal EF and no valvular abnormalities    Schizoaffective disorder (HCC)- (present on admission)  Assessment & Plan  Patient has hallucination and altered mental status  Her  and the caregiver  Patient has been refusing her medications recently    Continue Abilify and lamotrigine  Frequent orientation      Normal pressure hydrocephalus (HCC)- (present on admission)  Assessment & Plan  Worsening cognitive for last 2 years  Patient has  shunt with stable head CT   Outpatient follow-up with neurology    S/P  shunt- (present on admission)  Assessment & Plan  Follow-up with neurosurgeon as outpatient    Debility- (present on admission)  Assessment & Plan  With recurrent falls  PT and OT  SNF referral discussed with case management    Moderate persistent asthma without complication- (present on admission)  Assessment & Plan  No exacerbation  Continue albuterol as needed    Scoliosis- (present on admission)  Assessment & Plan  Chronic back  pain    X-ray  negative for fracture  Tylenol as needed  DC narcotics given oversedation    KIRA (obstructive sleep apnea)- (present on admission)  Assessment & Plan  CPAP at night    Essential hypertension- (present on admission)  Assessment & Plan    Continue lisinopril and propranolol and monitor blood pressure         VTE prophylaxis: Xarelto 10 mg daily as prophylaxis    I have performed a physical exam and reviewed and updated ROS and Plan today (3/16/2023). In review of yesterday's note (3/15/2023), there are no changes except as documented above.

## 2023-03-16 NOTE — DISCHARGE PLANNING
Received Choice Form @: 9627  Agency/ Facility Name: [facility]  Referral Sent per Choice Form @: 3545

## 2023-03-16 NOTE — THERAPY
Physical Therapy   Daily Treatment     Patient Name: Sheyla Garcia  Age:  72 y.o., Sex:  female  Medical Record #: [id number]  Today's Date: 3/16/2023     Precautions  Precautions: Fall Risk;Swallow Precautions  Comments: schizophrenia    Assessment    Pt greeted and seen for PT treatment. Pt req'd cues to keep eyes open throughout session, pt remained calm throughout session. Pt req'd MaxA for bed mobility, with time and heavy cuing pt was able to initiate righting in sitting when support was removed, otherwise pt demo'd posterior lean. Pt req'd TotalAx2 for partial stand. Pt did not follow any cues. Pt currently limited by impaired balance, weakness, decreased sequencing and coordination, decreased activity tolerance and impaired cognition which negatively impacts functional mobility. Pt will continue to benefit from skilled PT to address deficits.       Plan    Treatment Plan Status: Continue Current Treatment Plan  Type of Treatment: Bed Mobility, Gait Training, Neuro Re-Education / Balance, Therapeutic Exercise, Stair Training, Therapeutic Activities  Treatment Frequency: 3 Times per Week  Treatment Duration: Until Therapy Goals Met    DC Equipment Recommendations: Unable to determine at this time  Discharge Recommendations: Recommend post-acute placement for additional physical therapy services prior to discharge home       03/16/23 3981   Cognition    Level of Consciousness Responds to voice   Ability To Follow Commands Unable to Follow 1 Step Commands   Comments pt was not following any cues   Balance   Sitting Balance (Static) Trace +   Sitting Balance (Dynamic) Trace +   Standing Balance (Static) Dependent   Weight Shift Sitting Absent   Weight Shift Standing Absent   Skilled Intervention Verbal Cuing;Tactile Cuing;Sequencing;Postural Facilitation   Comments physical assist x2 to stand   Bed Mobility    Supine to Sit Maximal Assist   Sit to Supine Maximal Assist   Scooting Maximal Assist   Rolling Maximum  Assist to Lt.;Maximal Assist to Rt.   Skilled Intervention Verbal Cuing;Tactile Cuing;Sequencing;Postural Facilitation   Comments pt demo'd posterior lean, with heavy cueing and time pt did attempt to maintain sitting balance when support was removed   Gait Analysis   Gait Level Of Assist Unable to Participate   Functional Mobility   Sit to Stand Total Assist  (x2 person)   Skilled Intervention Verbal Cuing;Tactile Cuing;Sequencing;Postural Facilitation   Comments pt demo'd no intiation or WBing during stand. only partial stand performed   Short Term Goals    Short Term Goal # 1 supine to/from sit EOB with min assist in 6 visits   Goal Outcome # 1 goal not met   Short Term Goal # 2 pt will demonstrate static sitting at EOB with feet on floor and SBA for 10 minutes in 6 visits   Goal Outcome # 2 Goal not met   Short Term Goal # 3 sit to stand from EOB with use of FWW and min assist in 6 visits   Goal Outcome # 3 Goal not met   Supervising Physical Therapist (PTA Treatments Only)   Supervising Physical Therapist Mara Nash

## 2023-03-16 NOTE — DISCHARGE PLANNING
Care Transition Team Discharge Planning                   Discharge Plan:  CM spoke with patient's spouse regarding DCP. Patient choices for SNF are [facility]. CM sent choice form to [facility]. CM will continue to follow.

## 2023-03-16 NOTE — CARE PLAN
The patient is Stable - Low risk of patient condition declining or worsening    Shift Goals  Clinical Goals: potassium replacement, safety,  Patient Goals: SHAZIA  Family Goals: SHAZIA    Progress made toward(s) clinical / shift goals:  yes      Problem: Knowledge Deficit - Standard  Goal: Patient and family/care givers will demonstrate understanding of plan of care, disease process/condition, diagnostic tests and medications  Outcome: Progressing     Problem: Pain - Standard  Goal: Alleviation of pain or a reduction in pain to the patient’s comfort goal  Outcome: Progressing     Problem: Fall Risk  Goal: Patient will remain free from falls  Outcome: Progressing     Problem: Skin Integrity  Goal: Skin integrity is maintained or improved  Outcome: Progressing       Patient is not progressing towards the following goals:

## 2023-03-16 NOTE — PROGRESS NOTES
Received bedside report from night shift RN.   Assumed care of patient at change of shift.   Assessment complete and POC discussed.   STATUS: Patient is A&Ox1, self only, VSS, on 2.5L.   DRAINS: purewick  PAIN: Patient denies pain, no apparent signs of distress or discomfort.   Bed alarm set, call light in reach.  Bed is in lowest/locked position.   Call light and belongings are within reach.   No further needs at this time.  Will continue to monitor.

## 2023-03-16 NOTE — THERAPY
Speech Language Pathology   Daily Treatment     Patient Name: Sheyla Garcia  AGE:  72 y.o., SEX:  female  Medical Record #: [id number]  Date of Service: 3/16/2023      Precautions:  Precautions: Fall Risk, Swallow Precautions         Subjective  RN cleared pt to work with SLP.  Patient was alert, cooperative and agreeable to trying to advance her diet.     Assessment  Patient was presented with thins via cup/straw, prescribed purees and therapeutic trials of soft/bite sized solids. Pt declined regular solids offered. Pt attempted to bite through string cheese though was not successful, requiring SLP to break into small pieces for her to masticate. Pt made rare attempts to feed herself and was not successful, likely due to confusion/AMS. Mastication of soft solids was prolonged/inefficient; however, oral contain and bolus propulsion were intact. No oral residue was visible post swallow. Pt declined further soft solids after 3 bites but did agree to eat 4 oz pudding with no observable difficulty. Pharyngeal swallow response appeared timely. Pt did swallow multiple times w/ thin liquids via cup though no overt s/sx of aspiration occurred. Pt was successful suctioning liquid through a straw in 1/3 opportunities.     Clinical Impressions  Patient presents with a mild oral dysphagia, likely acute related to AMS. Diet advancement to a minced/moist texture is appropriate. Expect further diet advancement as AMS improves.       Recommendations  Treatment Completed: Dysphagia Treatment       Dysphagia Treatment  Diet Consistency: Minced/moist and thins (MM5/TN0)  Instrumentation: None indicated at this time  Medication: Crush with pudding/puree, as appropriate  Supervision: 1:1 feeding with constant supervision  Positioning: Fully upright and midline during oral intake  Risk Management : Small bites/sips, Slow rate of intake  Oral Care: BID                     SLP Treatment Plan  Treatment Plan: Dysphagia Treatment  SLP  "Frequency: 3x Per Week  Estimated Duration: Until Therapy Goals Met      Anticipated Discharge Needs  Discharge Recommendations: Recommend home health for continued speech therapy services  Therapy Recommendations Upon DC: Dysphagia Training, Patient / Family / Caregiver Education      Patient / Family Goals  Patient / Family Goal #1: Per  \"to eat.\"  Goal #1 Outcome: Progressing as expected  Short Term Goals  Short Term Goal # 1: Pt will consume PU4/TNO with 1-1 feeding and without s/s of difficulty with posted and recommended swallow strategies.  Goal Outcome # 1: Goal met, new goal added  Short Term Goal # 1 B : Patient will consume meals of minced/moist solids and thin liquids with no s/sx of aspiration given 1:1 feeding.      Kellen Arshad MS,CCC-SLP  "

## 2023-03-16 NOTE — CARE PLAN
The patient is Stable - Low risk of patient condition declining or worsening    Shift Goals  Clinical Goals: q2h Turns, Safety  Patient Goals: Comfort  Family Goals: SHAZIA    Progress made toward(s) clinical / shift goals:    Problem: Pain - Standard  Goal: Alleviation of pain or a reduction in pain to the patient’s comfort goal  Outcome: Progressing     Problem: Fall Risk  Goal: Patient will remain free from falls  Outcome: Progressing     Problem: Skin Integrity  Goal: Skin integrity is maintained or improved  Outcome: Progressing       Patient is not progressing towards the following goals:

## 2023-03-17 ENCOUNTER — PATIENT OUTREACH (OUTPATIENT)
Dept: SCHEDULING | Facility: IMAGING CENTER | Age: 73
End: 2023-03-17
Payer: MEDICARE

## 2023-03-17 LAB
ANION GAP SERPL CALC-SCNC: 12 MMOL/L (ref 7–16)
BUN SERPL-MCNC: 10 MG/DL (ref 8–22)
CALCIUM SERPL-MCNC: 9.6 MG/DL (ref 8.5–10.5)
CHLORIDE SERPL-SCNC: 103 MMOL/L (ref 96–112)
CO2 SERPL-SCNC: 28 MMOL/L (ref 20–33)
CREAT SERPL-MCNC: 0.42 MG/DL (ref 0.5–1.4)
GFR SERPLBLD CREATININE-BSD FMLA CKD-EPI: 105 ML/MIN/1.73 M 2
GLUCOSE SERPL-MCNC: 110 MG/DL (ref 65–99)
MAGNESIUM SERPL-MCNC: 1.7 MG/DL (ref 1.5–2.5)
POTASSIUM SERPL-SCNC: 3.3 MMOL/L (ref 3.6–5.5)
SODIUM SERPL-SCNC: 143 MMOL/L (ref 135–145)

## 2023-03-17 PROCEDURE — 83735 ASSAY OF MAGNESIUM: CPT

## 2023-03-17 PROCEDURE — 770006 HCHG ROOM/CARE - MED/SURG/GYN SEMI*

## 2023-03-17 PROCEDURE — A9270 NON-COVERED ITEM OR SERVICE: HCPCS | Performed by: HOSPITALIST

## 2023-03-17 PROCEDURE — 99232 SBSQ HOSP IP/OBS MODERATE 35: CPT | Performed by: HOSPITALIST

## 2023-03-17 PROCEDURE — A9270 NON-COVERED ITEM OR SERVICE: HCPCS | Performed by: INTERNAL MEDICINE

## 2023-03-17 PROCEDURE — 700111 HCHG RX REV CODE 636 W/ 250 OVERRIDE (IP): Performed by: HOSPITALIST

## 2023-03-17 PROCEDURE — 36415 COLL VENOUS BLD VENIPUNCTURE: CPT

## 2023-03-17 PROCEDURE — 80048 BASIC METABOLIC PNL TOTAL CA: CPT

## 2023-03-17 PROCEDURE — 700102 HCHG RX REV CODE 250 W/ 637 OVERRIDE(OP): Performed by: INTERNAL MEDICINE

## 2023-03-17 PROCEDURE — 700102 HCHG RX REV CODE 250 W/ 637 OVERRIDE(OP): Performed by: HOSPITALIST

## 2023-03-17 RX ORDER — POTASSIUM CHLORIDE 7.45 MG/ML
10 INJECTION INTRAVENOUS ONCE
Status: COMPLETED | OUTPATIENT
Start: 2023-03-17 | End: 2023-03-17

## 2023-03-17 RX ORDER — AMLODIPINE BESYLATE 5 MG/1
2.5 TABLET ORAL
Status: DISCONTINUED | OUTPATIENT
Start: 2023-03-17 | End: 2023-03-20

## 2023-03-17 RX ORDER — MAGNESIUM SULFATE HEPTAHYDRATE 40 MG/ML
2 INJECTION, SOLUTION INTRAVENOUS ONCE
Status: COMPLETED | OUTPATIENT
Start: 2023-03-17 | End: 2023-03-17

## 2023-03-17 RX ADMIN — AMOXICILLIN AND CLAVULANATE POTASSIUM 1 TABLET: 875; 125 TABLET, FILM COATED ORAL at 07:51

## 2023-03-17 RX ADMIN — RIVAROXABAN 10 MG: 10 TABLET, FILM COATED ORAL at 17:47

## 2023-03-17 RX ADMIN — NYSTATIN: 100000 POWDER TOPICAL at 07:51

## 2023-03-17 RX ADMIN — POTASSIUM CHLORIDE 10 MEQ: 7.46 INJECTION, SOLUTION INTRAVENOUS at 10:43

## 2023-03-17 RX ADMIN — NYSTATIN: 100000 POWDER TOPICAL at 17:59

## 2023-03-17 RX ADMIN — ARIPIPRAZOLE 15 MG: 15 TABLET ORAL at 21:03

## 2023-03-17 RX ADMIN — LISINOPRIL 40 MG: 20 TABLET ORAL at 21:03

## 2023-03-17 RX ADMIN — MAGNESIUM SULFATE HEPTAHYDRATE 2 G: 40 INJECTION, SOLUTION INTRAVENOUS at 09:50

## 2023-03-17 RX ADMIN — PROPRANOLOL HYDROCHLORIDE 10 MG: 10 TABLET ORAL at 21:03

## 2023-03-17 RX ADMIN — ACETAMINOPHEN 650 MG: 325 TABLET, FILM COATED ORAL at 21:30

## 2023-03-17 RX ADMIN — DOCUSATE SODIUM 50 MG AND SENNOSIDES 8.6 MG 2 TABLET: 8.6; 5 TABLET, FILM COATED ORAL at 07:51

## 2023-03-17 RX ADMIN — LAMOTRIGINE 100 MG: 100 TABLET ORAL at 17:47

## 2023-03-17 RX ADMIN — AMLODIPINE BESYLATE 2.5 MG: 5 TABLET ORAL at 09:48

## 2023-03-17 RX ADMIN — AMOXICILLIN AND CLAVULANATE POTASSIUM 1 TABLET: 875; 125 TABLET, FILM COATED ORAL at 17:47

## 2023-03-17 ASSESSMENT — FIBROSIS 4 INDEX: FIB4 SCORE: 2.19

## 2023-03-17 NOTE — PROGRESS NOTES
Hospital Medicine Daily Progress Note    Date of Service  3/17/2023    Chief Complaint  Sheyla Garcia is a 72 y.o. female admitted 3/13/2023 with altered mental status    Hospital Course  72-year-old female with history of asthma, normal pressure hydrocephalus, schizophrenia, hypothyroidism and hypertension who presented 3/13 with worsening mentation.  Patient was not able to provide history and the history was taken from her  who she lives with.  Patient has history of schizophrenia with hallucination most of the time and also cognitive disorder, the  is the caregiver and recently patient has been refusing taking her medication and she is spitting the medication on the floor.  No significant fever or chest pain however patient has been lethargic.  On admission patient was found to have hypertension 200/100.  No fever.  Labs showed mildly leukocytosis 12.5 with normal kidney function and .  Urine showed normal white blood cell and CT head did not show any acute finding.    Interval Problem Update    Patient is alert oriented to self  She is eating breakfast denies pain  BMP reviewed potassium 3.3 magnesium 1.7 replacement ordered  Discussed with case management no accepting SNF    I have discussed this patient's plan of care and discharge plan at IDT rounds today with Case Management, Nursing, Nursing leadership, and other members of the IDT team.    Consultants/Specialty       Code Status  Full Code    Disposition  Patient is not medically cleared for discharge.   Anticipate discharge to  TBD .  I have placed the appropriate orders for post-discharge needs.    Review of Systems  Review of Systems   Unable to perform ROS: Mental acuity      Physical Exam  Temp:  [36.1 °C (97 °F)-36.9 °C (98.4 °F)] 36.2 °C (97.2 °F)  Pulse:  [] 111  Resp:  [18-19] 19  BP: (117-148)/(68-99) 117/68  SpO2:  [93 %-96 %] 96 %    Physical Exam  Vitals and nursing note reviewed.   Constitutional:       General:  She is not in acute distress.     Appearance: She is obese.   HENT:      Head: Normocephalic and atraumatic.      Nose: Nose normal. No rhinorrhea.      Mouth/Throat:      Pharynx: No oropharyngeal exudate or posterior oropharyngeal erythema.   Eyes:      General: No scleral icterus.        Right eye: No discharge.         Left eye: No discharge.   Cardiovascular:      Rate and Rhythm: Normal rate and regular rhythm.      Heart sounds: Normal heart sounds. No murmur heard.    No friction rub. No gallop.   Pulmonary:      Effort: Pulmonary effort is normal. No respiratory distress.      Breath sounds: No stridor. Decreased breath sounds present. No wheezing or rhonchi.   Chest:      Chest wall: No tenderness.   Abdominal:      General: There is no distension.      Palpations: Abdomen is soft. There is no mass.      Tenderness: There is no abdominal tenderness. There is no rebound.   Musculoskeletal:         General: No swelling or tenderness.      Cervical back: Neck supple. No rigidity.   Skin:     General: Skin is warm and dry.      Coloration: Skin is not cyanotic or jaundiced.      Nails: There is no clubbing.   Neurological:      Mental Status: She is alert.      Cranial Nerves: No cranial nerve deficit.      Comments: Oriented to self  Generalized weakness   Psychiatric:         Mood and Affect: Mood normal.         Behavior: Behavior normal.       Fluids    Intake/Output Summary (Last 24 hours) at 3/17/2023 1537  Last data filed at 3/17/2023 1400  Gross per 24 hour   Intake 418 ml   Output 700 ml   Net -282 ml         Laboratory        Recent Labs     03/16/23  0200 03/17/23  0409   SODIUM 143 143   POTASSIUM 3.0* 3.3*   CHLORIDE 103 103   CO2 26 28   GLUCOSE 114* 110*   BUN 10 10   CREATININE 0.40* 0.42*   CALCIUM 9.6 9.6                     Imaging  EC-ECHOCARDIOGRAM COMPLETE W/ CONT   Final Result      DX-LUMBAR SPINE-2 OR 3 VIEWS   Final Result      Mild lumbar spondylosis. No acute fracture or listhesis.       CT-HEAD W/O   Final Result      1.  Again seen hydrocephalus, not significantly changed from comparison.      2.  Interval placement of a right frontal ventriculoperitoneal shunt catheter.      3.  Periventricular chronic small vessel ischemic change.         DX-CHEST-PORTABLE (1 VIEW)   Final Result      Mild interstitial pulmonary edema.             Assessment/Plan  * Encephalopathy- (present on admission)  Assessment & Plan  Likely related to schizophrenia and   possible UTI  CT head reviewed with no acute pathology revealed stable hydrocephalus  Patient received 1 dose of ceftriaxone urine mildly abnormal    Urine culture growing Enterococcus on Augmentin will complete course on 3/18/2023         Elevated brain natriuretic peptide (BNP) level  Assessment & Plan  Echocardiogram reviewed with normal EF and no valvular abnormalities    Schizoaffective disorder (HCC)- (present on admission)  Assessment & Plan  Patient has hallucination and altered mental status  Her  and the caregiver  Patient has been refusing her medications recently    Continue Abilify and lamotrigine  Frequent orientation      Normal pressure hydrocephalus (HCC)- (present on admission)  Assessment & Plan  Worsening cognitive for last 2 years  Patient has  shunt with stable head CT   Outpatient follow-up with neurology    S/P  shunt- (present on admission)  Assessment & Plan  Follow-up with neurosurgeon as outpatient    Hypokalemia  Assessment & Plan  Hypomagnesemia     replete intravenously and monitor levels    Debility- (present on admission)  Assessment & Plan  With recurrent falls  PT and OT  SNF referral discussed with case management    Moderate persistent asthma without complication- (present on admission)  Assessment & Plan  No exacerbation  Continue albuterol as needed    Scoliosis- (present on admission)  Assessment & Plan  Chronic back pain    X-ray  negative for fracture  Tylenol as needed  Avoid narcotics    KIRA  (obstructive sleep apnea)- (present on admission)  Assessment & Plan  CPAP at night    Essential hypertension- (present on admission)  Assessment & Plan  Uncontrolled  Continue lisinopril and propranolol   Start amlodipine         VTE prophylaxis: Xarelto 10 mg daily as prophylaxis    I have performed a physical exam and reviewed and updated ROS and Plan today (3/17/2023). In review of yesterday's note (3/16/2023), there are no changes except as documented above.

## 2023-03-17 NOTE — PROGRESS NOTES
Received report from NOC RN, pt care assumed. VS stable on 2L NC. Call light within reach. Pt is aaox1. No signs of acute distress.

## 2023-03-17 NOTE — DISCHARGE PLANNING
Care Transition Team Discharge Planning            1505 CM called [facility] with no answer. Left message. CM will continue to follow.        Discharge Plan:  CM called [facility] with no answer. CM left message and will continue to follow with all discharge needs.

## 2023-03-17 NOTE — PROGRESS NOTES
Assumed care of patient and received report.  Patient is oriented x 1 and reports pain level of 0/0.  Patient is bed bound, high fall risk, and on 2 L of O2 via cannula.  No additional needs at this time.

## 2023-03-17 NOTE — DISCHARGE PLANNING
Per RN CM Carol referral sent to all [facility] SNF as per request at [phone].

## 2023-03-18 LAB
ANION GAP SERPL CALC-SCNC: 8 MMOL/L (ref 7–16)
BACTERIA BLD CULT: NORMAL
BACTERIA BLD CULT: NORMAL
BUN SERPL-MCNC: 16 MG/DL (ref 8–22)
CALCIUM SERPL-MCNC: 9.5 MG/DL (ref 8.5–10.5)
CHLORIDE SERPL-SCNC: 104 MMOL/L (ref 96–112)
CO2 SERPL-SCNC: 31 MMOL/L (ref 20–33)
CREAT SERPL-MCNC: 0.64 MG/DL (ref 0.5–1.4)
GFR SERPLBLD CREATININE-BSD FMLA CKD-EPI: 95 ML/MIN/1.73 M 2
GLUCOSE SERPL-MCNC: 104 MG/DL (ref 65–99)
MAGNESIUM SERPL-MCNC: 2 MG/DL (ref 1.5–2.5)
PHOSPHATE SERPL-MCNC: 3.6 MG/DL (ref 2.5–4.5)
POTASSIUM SERPL-SCNC: 3.5 MMOL/L (ref 3.6–5.5)
SIGNIFICANT IND 70042: NORMAL
SIGNIFICANT IND 70042: NORMAL
SITE SITE: NORMAL
SITE SITE: NORMAL
SODIUM SERPL-SCNC: 143 MMOL/L (ref 135–145)
SOURCE SOURCE: NORMAL
SOURCE SOURCE: NORMAL

## 2023-03-18 PROCEDURE — 80048 BASIC METABOLIC PNL TOTAL CA: CPT

## 2023-03-18 PROCEDURE — 94760 N-INVAS EAR/PLS OXIMETRY 1: CPT

## 2023-03-18 PROCEDURE — 84100 ASSAY OF PHOSPHORUS: CPT

## 2023-03-18 PROCEDURE — 700111 HCHG RX REV CODE 636 W/ 250 OVERRIDE (IP): Performed by: HOSPITALIST

## 2023-03-18 PROCEDURE — A9270 NON-COVERED ITEM OR SERVICE: HCPCS | Performed by: INTERNAL MEDICINE

## 2023-03-18 PROCEDURE — A9270 NON-COVERED ITEM OR SERVICE: HCPCS | Performed by: HOSPITALIST

## 2023-03-18 PROCEDURE — 770006 HCHG ROOM/CARE - MED/SURG/GYN SEMI*

## 2023-03-18 PROCEDURE — 700102 HCHG RX REV CODE 250 W/ 637 OVERRIDE(OP): Performed by: INTERNAL MEDICINE

## 2023-03-18 PROCEDURE — 700102 HCHG RX REV CODE 250 W/ 637 OVERRIDE(OP): Performed by: HOSPITALIST

## 2023-03-18 PROCEDURE — 99232 SBSQ HOSP IP/OBS MODERATE 35: CPT | Performed by: HOSPITALIST

## 2023-03-18 PROCEDURE — 36415 COLL VENOUS BLD VENIPUNCTURE: CPT

## 2023-03-18 PROCEDURE — 83735 ASSAY OF MAGNESIUM: CPT

## 2023-03-18 RX ORDER — POTASSIUM CHLORIDE 7.45 MG/ML
10 INJECTION INTRAVENOUS ONCE
Status: COMPLETED | OUTPATIENT
Start: 2023-03-18 | End: 2023-03-18

## 2023-03-18 RX ADMIN — LISINOPRIL 40 MG: 20 TABLET ORAL at 20:51

## 2023-03-18 RX ADMIN — DOCUSATE SODIUM 50 MG AND SENNOSIDES 8.6 MG 2 TABLET: 8.6; 5 TABLET, FILM COATED ORAL at 05:01

## 2023-03-18 RX ADMIN — ARIPIPRAZOLE 15 MG: 15 TABLET ORAL at 20:51

## 2023-03-18 RX ADMIN — POTASSIUM CHLORIDE 10 MEQ: 7.46 INJECTION, SOLUTION INTRAVENOUS at 08:04

## 2023-03-18 RX ADMIN — NYSTATIN: 100000 POWDER TOPICAL at 17:40

## 2023-03-18 RX ADMIN — NYSTATIN: 100000 POWDER TOPICAL at 05:15

## 2023-03-18 RX ADMIN — AMOXICILLIN AND CLAVULANATE POTASSIUM 1 TABLET: 875; 125 TABLET, FILM COATED ORAL at 05:01

## 2023-03-18 RX ADMIN — AMLODIPINE BESYLATE 2.5 MG: 5 TABLET ORAL at 05:01

## 2023-03-18 RX ADMIN — AMOXICILLIN AND CLAVULANATE POTASSIUM 1 TABLET: 875; 125 TABLET, FILM COATED ORAL at 17:22

## 2023-03-18 RX ADMIN — PROPRANOLOL HYDROCHLORIDE 10 MG: 10 TABLET ORAL at 20:51

## 2023-03-18 RX ADMIN — LAMOTRIGINE 100 MG: 100 TABLET ORAL at 17:22

## 2023-03-18 RX ADMIN — RIVAROXABAN 10 MG: 10 TABLET, FILM COATED ORAL at 17:22

## 2023-03-18 ASSESSMENT — FIBROSIS 4 INDEX: FIB4 SCORE: 2.19

## 2023-03-18 ASSESSMENT — PAIN DESCRIPTION - PAIN TYPE: TYPE: ACUTE PAIN

## 2023-03-18 NOTE — PROGRESS NOTES
Received report from previous shift RN, assumed care of patient. Patient is A&Ox4, vital signs are stable, on room air. Patient unable to verbalize pain, but grimaces and yells during repositioning. Sitting up in bed for breakfast. Call light and belongings within reach. Bed in lowest/locked position. Bed alarm on. Hourly rounding in place.

## 2023-03-18 NOTE — CARE PLAN
The patient is Stable - Low risk of patient condition declining or worsening    Shift Goals  Clinical Goals: skin integrity, maintain pt safety  Patient Goals: SHAZIA  Family Goals: shazia    Progress made toward(s) clinical / shift goals: Skin integrity maintained with use of Q2 turns with TAPs system. Pt safety maintained with use of bed alarm.    Problem: Fall Risk  Goal: Patient will remain free from falls  Outcome: Progressing     Problem: Skin Integrity  Goal: Skin integrity is maintained or improved  Outcome: Progressing       Patient is not progressing towards the following goals:

## 2023-03-18 NOTE — CARE PLAN
The patient is Stable - Low risk of patient condition declining or worsening    Shift Goals  Clinical Goals: skin integrity, rest  Patient Goals: SHAZIA  Family Goals: shazia    Progress made toward(s) clinical / shift goals:  Continues on 2L NC, no s/s of distress noted, able to make her needs known.     Patient is not progressing towards the following goals: N/A    Problem: Knowledge Deficit - Standard  Goal: Patient and family/care givers will demonstrate understanding of plan of care, disease process/condition, diagnostic tests and medications  Outcome: Progressing     Problem: Pain - Standard  Goal: Alleviation of pain or a reduction in pain to the patient’s comfort goal  Outcome: Progressing     Problem: Fall Risk  Goal: Patient will remain free from falls  Outcome: Progressing     Problem: Skin Integrity  Goal: Skin integrity is maintained or improved  Outcome: Progressing

## 2023-03-19 LAB
ANION GAP SERPL CALC-SCNC: 10 MMOL/L (ref 7–16)
BUN SERPL-MCNC: 10 MG/DL (ref 8–22)
CALCIUM SERPL-MCNC: 9.4 MG/DL (ref 8.5–10.5)
CHLORIDE SERPL-SCNC: 106 MMOL/L (ref 96–112)
CO2 SERPL-SCNC: 28 MMOL/L (ref 20–33)
CREAT SERPL-MCNC: 0.48 MG/DL (ref 0.5–1.4)
GFR SERPLBLD CREATININE-BSD FMLA CKD-EPI: 102 ML/MIN/1.73 M 2
GLUCOSE SERPL-MCNC: 115 MG/DL (ref 65–99)
POTASSIUM SERPL-SCNC: 3.6 MMOL/L (ref 3.6–5.5)
SODIUM SERPL-SCNC: 144 MMOL/L (ref 135–145)

## 2023-03-19 PROCEDURE — 770006 HCHG ROOM/CARE - MED/SURG/GYN SEMI*

## 2023-03-19 PROCEDURE — 700102 HCHG RX REV CODE 250 W/ 637 OVERRIDE(OP): Performed by: INTERNAL MEDICINE

## 2023-03-19 PROCEDURE — A9270 NON-COVERED ITEM OR SERVICE: HCPCS | Performed by: HOSPITALIST

## 2023-03-19 PROCEDURE — A9270 NON-COVERED ITEM OR SERVICE: HCPCS | Performed by: INTERNAL MEDICINE

## 2023-03-19 PROCEDURE — 36415 COLL VENOUS BLD VENIPUNCTURE: CPT

## 2023-03-19 PROCEDURE — 700102 HCHG RX REV CODE 250 W/ 637 OVERRIDE(OP): Performed by: HOSPITALIST

## 2023-03-19 PROCEDURE — 99232 SBSQ HOSP IP/OBS MODERATE 35: CPT | Performed by: HOSPITALIST

## 2023-03-19 PROCEDURE — 80048 BASIC METABOLIC PNL TOTAL CA: CPT

## 2023-03-19 RX ORDER — POTASSIUM CHLORIDE 20 MEQ/1
20 TABLET, EXTENDED RELEASE ORAL ONCE
Status: COMPLETED | OUTPATIENT
Start: 2023-03-19 | End: 2023-03-19

## 2023-03-19 RX ADMIN — AMOXICILLIN AND CLAVULANATE POTASSIUM 1 TABLET: 875; 125 TABLET, FILM COATED ORAL at 04:56

## 2023-03-19 RX ADMIN — AMLODIPINE BESYLATE 2.5 MG: 5 TABLET ORAL at 04:56

## 2023-03-19 RX ADMIN — LAMOTRIGINE 100 MG: 100 TABLET ORAL at 16:25

## 2023-03-19 RX ADMIN — ARIPIPRAZOLE 15 MG: 15 TABLET ORAL at 21:14

## 2023-03-19 RX ADMIN — NYSTATIN: 100000 POWDER TOPICAL at 16:25

## 2023-03-19 RX ADMIN — LISINOPRIL 40 MG: 20 TABLET ORAL at 21:14

## 2023-03-19 RX ADMIN — POTASSIUM CHLORIDE 20 MEQ: 1500 TABLET, EXTENDED RELEASE ORAL at 08:30

## 2023-03-19 RX ADMIN — NYSTATIN: 100000 POWDER TOPICAL at 04:58

## 2023-03-19 RX ADMIN — AMOXICILLIN AND CLAVULANATE POTASSIUM 1 TABLET: 875; 125 TABLET, FILM COATED ORAL at 16:25

## 2023-03-19 RX ADMIN — PROPRANOLOL HYDROCHLORIDE 10 MG: 10 TABLET ORAL at 21:14

## 2023-03-19 RX ADMIN — RIVAROXABAN 10 MG: 10 TABLET, FILM COATED ORAL at 16:25

## 2023-03-19 NOTE — CARE PLAN
The patient is Stable - Low risk of patient condition declining or worsening    Shift Goals  Clinical Goals: skin integrity, safety  Patient Goals: SHAZIA  Family Goals: shazia    Progress made toward(s) clinical / shift goals:  Frequent repositioning, maintaining skin integrity.     Patient is not progressing towards the following goals: N/A    Problem: Knowledge Deficit - Standard  Goal: Patient and family/care givers will demonstrate understanding of plan of care, disease process/condition, diagnostic tests and medications  Outcome: Progressing     Problem: Pain - Standard  Goal: Alleviation of pain or a reduction in pain to the patient’s comfort goal  Outcome: Progressing     Problem: Fall Risk  Goal: Patient will remain free from falls  Outcome: Progressing     Problem: Skin Integrity  Goal: Skin integrity is maintained or improved  Outcome: Progressing

## 2023-03-19 NOTE — CARE PLAN
The patient is Stable - Low risk of patient condition declining or worsening    Shift Goals  Clinical Goals: skin integrity, safety  Patient Goals: SHAZIA  Family Goals: SHAZIA    Progress made toward(s) clinical / shift goals:  pt meds passed per MAR, no injuries this shift      Problem: Knowledge Deficit - Standard  Goal: Patient and family/care givers will demonstrate understanding of plan of care, disease process/condition, diagnostic tests and medications  Outcome: Progressing     Problem: Pain - Standard  Goal: Alleviation of pain or a reduction in pain to the patient’s comfort goal  Outcome: Progressing     Problem: Fall Risk  Goal: Patient will remain free from falls  Outcome: Progressing     Problem: Skin Integrity  Goal: Skin integrity is maintained or improved  Outcome: Progressing       Patient is not progressing towards the following goals:

## 2023-03-19 NOTE — PROGRESS NOTES
Hospital Medicine Daily Progress Note    Date of Service  3/19/2023    Chief Complaint  Sheyla Garcia is a 72 y.o. female admitted 3/13/2023 with altered mental status    Hospital Course  72-year-old female with history of asthma, normal pressure hydrocephalus, schizophrenia, hypothyroidism and hypertension who presented 3/13 with worsening mentation.  Patient was not able to provide history and the history was taken from her  who she lives with.  Patient has history of schizophrenia with hallucination most of the time and also cognitive disorder, the  is the caregiver and recently patient has been refusing taking her medication and she is spitting the medication on the floor.  No significant fever or chest pain however patient has been lethargic.  On admission patient was found to have hypertension 200/100.  No fever.  Labs showed mildly leukocytosis 12.5 with normal kidney function and .  Urine showed normal white blood cell and CT head did not show any acute finding.    Interval Problem Update    Patient is alert oriented to self and place  She denies pain  Potassium 3.6 ordered p.o. K-Dur  Discussed with case management awaiting SNF acceptance    I have discussed this patient's plan of care and discharge plan at IDT rounds today with Case Management, Nursing, Nursing leadership, and other members of the IDT team.    Consultants/Specialty       Code Status  Full Code    Disposition  Patient is medically cleared for discharge.   Anticipate discharge to  TBD .  I have placed the appropriate orders for post-discharge needs.    Review of Systems  Review of Systems   Unable to perform ROS: Mental acuity      Physical Exam  Temp:  [36.2 °C (97.2 °F)-36.4 °C (97.6 °F)] 36.4 °C (97.5 °F)  Pulse:  [] 96  Resp:  [16-19] 18  BP: (132-152)/(76-90) 135/85  SpO2:  [94 %-98 %] 98 %    Physical Exam  Vitals and nursing note reviewed.   Constitutional:       General: She is not in acute distress.      Appearance: She is obese.   HENT:      Head: Normocephalic and atraumatic.      Nose: Nose normal. No rhinorrhea.      Mouth/Throat:      Pharynx: No oropharyngeal exudate or posterior oropharyngeal erythema.   Eyes:      General: No scleral icterus.        Right eye: No discharge.         Left eye: No discharge.   Cardiovascular:      Rate and Rhythm: Normal rate and regular rhythm.      Heart sounds: Normal heart sounds. No murmur heard.    No friction rub. No gallop.   Pulmonary:      Effort: Pulmonary effort is normal. No respiratory distress.      Breath sounds: No stridor. Decreased breath sounds present. No wheezing.   Chest:      Chest wall: No tenderness.   Abdominal:      General: Bowel sounds are normal. There is no distension.      Palpations: Abdomen is soft. There is no mass.      Tenderness: There is no abdominal tenderness. There is no rebound.   Musculoskeletal:         General: Swelling present. No tenderness.      Cervical back: Neck supple. No rigidity.   Skin:     General: Skin is warm and dry.      Coloration: Skin is not cyanotic or jaundiced.      Nails: There is no clubbing.   Neurological:      General: No focal deficit present.      Mental Status: She is alert and oriented to person, place, and time.      Cranial Nerves: No cranial nerve deficit.      Motor: Weakness present.      Comments: Oriented x2   Psychiatric:         Mood and Affect: Mood normal.         Behavior: Behavior normal.       Fluids    Intake/Output Summary (Last 24 hours) at 3/19/2023 1401  Last data filed at 3/19/2023 1000  Gross per 24 hour   Intake 238 ml   Output --   Net 238 ml         Laboratory        Recent Labs     03/17/23  0409 03/18/23  0200 03/19/23  0453   SODIUM 143 143 144   POTASSIUM 3.3* 3.5* 3.6   CHLORIDE 103 104 106   CO2 28 31 28   GLUCOSE 110* 104* 115*   BUN 10 16 10   CREATININE 0.42* 0.64 0.48*   CALCIUM 9.6 9.5 9.4                     Imaging  EC-ECHOCARDIOGRAM COMPLETE W/ CONT   Final Result       DX-LUMBAR SPINE-2 OR 3 VIEWS   Final Result      Mild lumbar spondylosis. No acute fracture or listhesis.      CT-HEAD W/O   Final Result      1.  Again seen hydrocephalus, not significantly changed from comparison.      2.  Interval placement of a right frontal ventriculoperitoneal shunt catheter.      3.  Periventricular chronic small vessel ischemic change.         DX-CHEST-PORTABLE (1 VIEW)   Final Result      Mild interstitial pulmonary edema.             Assessment/Plan  * Encephalopathy- (present on admission)  Assessment & Plan  Likely related to schizophrenia and   possible UTI  CT head reviewed with no acute pathology revealed stable hydrocephalus  Patient received 1 dose of ceftriaxone urine mildly abnormal    Urine culture growing Enterococcus completed course of Augmentin on 3/18/2023         Elevated brain natriuretic peptide (BNP) level  Assessment & Plan  Echocardiogram reviewed with normal EF and no valvular abnormalities    Schizoaffective disorder (HCC)- (present on admission)  Assessment & Plan  Patient has hallucination and altered mental status  Her  and the caregiver  Patient has been refusing her medications recently    Continue Abilify and lamotrigine  Frequent orientation      Normal pressure hydrocephalus (HCC)- (present on admission)  Assessment & Plan  Worsening cognitive for last 2 years  Patient has  shunt with stable head CT   Outpatient follow-up with neurology    S/P  shunt- (present on admission)  Assessment & Plan  Follow-up with neurosurgeon as outpatient    Hypokalemia  Assessment & Plan  Repleted monitor    Debility- (present on admission)  Assessment & Plan  With recurrent falls  PT and OT  SNF referral discussed with case management    Moderate persistent asthma without complication- (present on admission)  Assessment & Plan  No exacerbation  Continue albuterol as needed    Scoliosis- (present on admission)  Assessment & Plan  Chronic back pain    X-ray   negative for fracture  Tylenol as needed  Avoid narcotics    KIRA (obstructive sleep apnea)- (present on admission)  Assessment & Plan       Essential hypertension- (present on admission)  Assessment & Plan  Stable on amlodipine lisinopril and metoprolol propranolol  Continue to monitor         VTE prophylaxis: Xarelto 10 mg daily as prophylaxis    I have performed a physical exam and reviewed and updated ROS and Plan today (3/19/2023). In review of yesterday's note (3/18/2023), there are no changes except as documented above.

## 2023-03-20 PROCEDURE — 770006 HCHG ROOM/CARE - MED/SURG/GYN SEMI*

## 2023-03-20 PROCEDURE — 99232 SBSQ HOSP IP/OBS MODERATE 35: CPT | Performed by: HOSPITALIST

## 2023-03-20 PROCEDURE — 700102 HCHG RX REV CODE 250 W/ 637 OVERRIDE(OP): Performed by: HOSPITALIST

## 2023-03-20 PROCEDURE — 700102 HCHG RX REV CODE 250 W/ 637 OVERRIDE(OP): Performed by: INTERNAL MEDICINE

## 2023-03-20 PROCEDURE — A9270 NON-COVERED ITEM OR SERVICE: HCPCS | Performed by: HOSPITALIST

## 2023-03-20 PROCEDURE — A9270 NON-COVERED ITEM OR SERVICE: HCPCS | Performed by: INTERNAL MEDICINE

## 2023-03-20 RX ORDER — AMLODIPINE BESYLATE 5 MG/1
5 TABLET ORAL
Status: DISCONTINUED | OUTPATIENT
Start: 2023-03-21 | End: 2023-03-21

## 2023-03-20 RX ADMIN — LISINOPRIL 40 MG: 20 TABLET ORAL at 22:19

## 2023-03-20 RX ADMIN — LAMOTRIGINE 100 MG: 100 TABLET ORAL at 17:35

## 2023-03-20 RX ADMIN — ARIPIPRAZOLE 15 MG: 15 TABLET ORAL at 22:19

## 2023-03-20 RX ADMIN — DOCUSATE SODIUM 50 MG AND SENNOSIDES 8.6 MG 2 TABLET: 8.6; 5 TABLET, FILM COATED ORAL at 17:35

## 2023-03-20 RX ADMIN — AMOXICILLIN AND CLAVULANATE POTASSIUM 1 TABLET: 875; 125 TABLET, FILM COATED ORAL at 05:07

## 2023-03-20 RX ADMIN — LABETALOL HYDROCHLORIDE 10 MG: 5 INJECTION INTRAVENOUS at 05:15

## 2023-03-20 RX ADMIN — NYSTATIN: 100000 POWDER TOPICAL at 05:07

## 2023-03-20 RX ADMIN — PROPRANOLOL HYDROCHLORIDE 10 MG: 10 TABLET ORAL at 22:20

## 2023-03-20 RX ADMIN — RIVAROXABAN 10 MG: 10 TABLET, FILM COATED ORAL at 17:35

## 2023-03-20 RX ADMIN — AMLODIPINE BESYLATE 2.5 MG: 5 TABLET ORAL at 05:07

## 2023-03-20 ASSESSMENT — FIBROSIS 4 INDEX: FIB4 SCORE: 2.19

## 2023-03-20 NOTE — PROGRESS NOTES
Hospital Medicine Daily Progress Note    Date of Service  3/20/2023    Chief Complaint  Sheyla Garcia is a 72 y.o. female admitted 3/13/2023 with altered mental status    Hospital Course  72-year-old female with history of asthma, normal pressure hydrocephalus, schizophrenia, hypothyroidism and hypertension who presented 3/13 with worsening mentation.  Patient was not able to provide history and the history was taken from her  who she lives with.  Patient has history of schizophrenia with hallucination most of the time and also cognitive disorder, the  is the caregiver and recently patient has been refusing taking her medication and she is spitting the medication on the floor.  No significant fever or chest pain however patient has been lethargic.  On admission patient was found to have hypertension 200/100.  No fever.  Labs showed mildly leukocytosis 12.5 with normal kidney function and .  Urine showed normal white blood cell and CT head did not show any acute finding.    Interval Problem Update    Patient is asleep she is arousable oriented to self  She is afebrile on 2 L nasal cannula  Denies pain  Discussed with  awaiting available bed at SNF  Discussed with nursing staff and case management    I have discussed this patient's plan of care and discharge plan at IDT rounds today with Case Management, Nursing, Nursing leadership, and other members of the IDT team.    Consultants/Specialty       Code Status  Full Code    Disposition  Patient is medically cleared for discharge.   Anticipate discharge to  TBD .  I have placed the appropriate orders for post-discharge needs.    Review of Systems  Review of Systems   Unable to perform ROS: Mental acuity      Physical Exam  Temp:  [35.9 °C (96.7 °F)-36.6 °C (97.8 °F)] 35.9 °C (96.7 °F)  Pulse:  [] 85  Resp:  [18-19] 19  BP: (125-156)/() 150/77  SpO2:  [95 %-99 %] 95 %    Physical Exam  Vitals and nursing note reviewed.    Constitutional:       General: She is not in acute distress.  HENT:      Head: Normocephalic and atraumatic.      Nose: Nose normal. No rhinorrhea.      Mouth/Throat:      Pharynx: No oropharyngeal exudate or posterior oropharyngeal erythema.   Eyes:      General:         Right eye: No discharge.         Left eye: No discharge.   Cardiovascular:      Rate and Rhythm: Normal rate and regular rhythm.      Heart sounds: Normal heart sounds. No murmur heard.    No friction rub. No gallop.   Pulmonary:      Effort: Pulmonary effort is normal. No respiratory distress.      Breath sounds: Normal breath sounds. No stridor. No wheezing, rhonchi or rales.   Chest:      Chest wall: No tenderness.   Abdominal:      General: Bowel sounds are normal. There is no distension.      Palpations: Abdomen is soft. There is no mass.      Tenderness: There is no abdominal tenderness. There is no rebound.   Musculoskeletal:         General: No swelling or tenderness.      Cervical back: Neck supple. No rigidity.   Skin:     General: Skin is warm and dry.      Coloration: Skin is not cyanotic or jaundiced.      Nails: There is no clubbing.   Neurological:      General: No focal deficit present.      Mental Status: She is alert.      Cranial Nerves: No cranial nerve deficit.      Motor: Weakness (Generalized) present.      Comments: Oriented to self   Psychiatric:         Mood and Affect: Mood normal.         Behavior: Behavior normal.       Fluids    Intake/Output Summary (Last 24 hours) at 3/20/2023 1424  Last data filed at 3/20/2023 1300  Gross per 24 hour   Intake 240 ml   Output 500 ml   Net -260 ml         Laboratory        Recent Labs     03/18/23  0200 03/19/23  0453   SODIUM 143 144   POTASSIUM 3.5* 3.6   CHLORIDE 104 106   CO2 31 28   GLUCOSE 104* 115*   BUN 16 10   CREATININE 0.64 0.48*   CALCIUM 9.5 9.4                     Imaging  EC-ECHOCARDIOGRAM COMPLETE W/ CONT   Final Result      DX-LUMBAR SPINE-2 OR 3 VIEWS   Final  Result      Mild lumbar spondylosis. No acute fracture or listhesis.      CT-HEAD W/O   Final Result      1.  Again seen hydrocephalus, not significantly changed from comparison.      2.  Interval placement of a right frontal ventriculoperitoneal shunt catheter.      3.  Periventricular chronic small vessel ischemic change.         DX-CHEST-PORTABLE (1 VIEW)   Final Result      Mild interstitial pulmonary edema.             Assessment/Plan  * Encephalopathy- (present on admission)  Assessment & Plan  Likely related to schizophrenia and   possible UTI  CT head reviewed with no acute pathology revealed stable hydrocephalus  Patient received 1 dose of ceftriaxone urine mildly abnormal    Urine culture growing Enterococcus completed course of Augmentin on 3/18/2023         Elevated brain natriuretic peptide (BNP) level  Assessment & Plan  Echocardiogram reviewed with normal EF and no valvular abnormalities    Schizoaffective disorder (HCC)- (present on admission)  Assessment & Plan  Patient has hallucination and altered mental status  Her  and the caregiver  Patient has been refusing her medications recently    Continue Abilify and lamotrigine  Frequent orientation      Normal pressure hydrocephalus (HCC)- (present on admission)  Assessment & Plan  Worsening cognitive for last 2 years  Patient has  shunt with stable head CT   Outpatient follow-up with neurology    S/P  shunt- (present on admission)  Assessment & Plan  Follow-up with neurosurgeon as outpatient    Hypokalemia  Assessment & Plan  Improved recheck levels in a.m.    Debility- (present on admission)  Assessment & Plan  With recurrent falls  PT and OT  SNF referral discussed with case management    Moderate persistent asthma without complication- (present on admission)  Assessment & Plan  No exacerbation  Continue albuterol as needed    Scoliosis- (present on admission)  Assessment & Plan  Chronic back pain    X-ray  negative for fracture  Tylenol  as needed  Avoid narcotics    KIRA (obstructive sleep apnea)- (present on admission)  Assessment & Plan       Essential hypertension- (present on admission)  Assessment & Plan  Uncontrolled we will increase amlodipine  Continue lisinopril and propranolol monitor blood pressure         VTE prophylaxis: Xarelto 10 mg daily as prophylaxis    I have performed a physical exam and reviewed and updated ROS and Plan today (3/20/2023). In review of yesterday's note (3/19/2023), there are no changes except as documented above.

## 2023-03-20 NOTE — CARE PLAN
Sheyla is oriented to self on 2L nasal cannula. Denied pain. Q2 turns performed. Lethargic, resting most of shift. Tolerating meals. 1:1 assist with feeds. Incontinent care performed. Purewick in place. Vitals stable, hourly rounding performed.    Problem: Knowledge Deficit - Standard  Goal: Patient and family/care givers will demonstrate understanding of plan of care, disease process/condition, diagnostic tests and medications  Outcome: Progressing     Problem: Pain - Standard  Goal: Alleviation of pain or a reduction in pain to the patient’s comfort goal  Outcome: Progressing     Problem: Fall Risk  Goal: Patient will remain free from falls  Outcome: Progressing     Problem: Skin Integrity  Goal: Skin integrity is maintained or improved  Outcome: Progressing  Note: Q2 turns performed   The patient is Watcher - Medium risk of patient condition declining or worsening    Shift Goals  Clinical Goals: safety  Patient Goals: rest  Family Goals: SHAZIA

## 2023-03-20 NOTE — CARE PLAN
Problem: Fall Risk  Goal: Patient will remain free from falls  Outcome: Progressing   The patient is Stable - Low risk of patient condition declining or worsening    Shift Goals  Clinical Goals: safety  Patient Goals: rest  Family Goals: SHAZIA    Progress made toward(s) clinical / shift goals: patient stable. No events overnight.     Patient is not progressing towards the following goals:

## 2023-03-21 LAB
ANION GAP SERPL CALC-SCNC: 13 MMOL/L (ref 7–16)
BASOPHILS # BLD AUTO: 0.6 % (ref 0–1.8)
BASOPHILS # BLD: 0.05 K/UL (ref 0–0.12)
BUN SERPL-MCNC: 9 MG/DL (ref 8–22)
CALCIUM SERPL-MCNC: 9.9 MG/DL (ref 8.5–10.5)
CHLORIDE SERPL-SCNC: 105 MMOL/L (ref 96–112)
CO2 SERPL-SCNC: 26 MMOL/L (ref 20–33)
CREAT SERPL-MCNC: 0.44 MG/DL (ref 0.5–1.4)
EOSINOPHIL # BLD AUTO: 0.51 K/UL (ref 0–0.51)
EOSINOPHIL NFR BLD: 5.7 % (ref 0–6.9)
ERYTHROCYTE [DISTWIDTH] IN BLOOD BY AUTOMATED COUNT: 46.3 FL (ref 35.9–50)
GFR SERPLBLD CREATININE-BSD FMLA CKD-EPI: 104 ML/MIN/1.73 M 2
GLUCOSE SERPL-MCNC: 94 MG/DL (ref 65–99)
HCT VFR BLD AUTO: 51.9 % (ref 37–47)
HGB BLD-MCNC: 16.9 G/DL (ref 12–16)
IMM GRANULOCYTES # BLD AUTO: 0.03 K/UL (ref 0–0.11)
IMM GRANULOCYTES NFR BLD AUTO: 0.3 % (ref 0–0.9)
LYMPHOCYTES # BLD AUTO: 2.72 K/UL (ref 1–4.8)
LYMPHOCYTES NFR BLD: 30.3 % (ref 22–41)
MCH RBC QN AUTO: 30.7 PG (ref 27–33)
MCHC RBC AUTO-ENTMCNC: 32.6 G/DL (ref 33.6–35)
MCV RBC AUTO: 94.4 FL (ref 81.4–97.8)
MONOCYTES # BLD AUTO: 0.76 K/UL (ref 0–0.85)
MONOCYTES NFR BLD AUTO: 8.5 % (ref 0–13.4)
NEUTROPHILS # BLD AUTO: 4.92 K/UL (ref 2–7.15)
NEUTROPHILS NFR BLD: 54.6 % (ref 44–72)
NRBC # BLD AUTO: 0 K/UL
NRBC BLD-RTO: 0 /100 WBC
PLATELET # BLD AUTO: 231 K/UL (ref 164–446)
PMV BLD AUTO: 10.6 FL (ref 9–12.9)
POTASSIUM SERPL-SCNC: 4 MMOL/L (ref 3.6–5.5)
RBC # BLD AUTO: 5.5 M/UL (ref 4.2–5.4)
SODIUM SERPL-SCNC: 144 MMOL/L (ref 135–145)
WBC # BLD AUTO: 9 K/UL (ref 4.8–10.8)

## 2023-03-21 PROCEDURE — 700102 HCHG RX REV CODE 250 W/ 637 OVERRIDE(OP): Performed by: HOSPITALIST

## 2023-03-21 PROCEDURE — 85025 COMPLETE CBC W/AUTO DIFF WBC: CPT

## 2023-03-21 PROCEDURE — 97530 THERAPEUTIC ACTIVITIES: CPT | Mod: CQ

## 2023-03-21 PROCEDURE — 99231 SBSQ HOSP IP/OBS SF/LOW 25: CPT | Performed by: STUDENT IN AN ORGANIZED HEALTH CARE EDUCATION/TRAINING PROGRAM

## 2023-03-21 PROCEDURE — 97530 THERAPEUTIC ACTIVITIES: CPT | Mod: CO

## 2023-03-21 PROCEDURE — 80048 BASIC METABOLIC PNL TOTAL CA: CPT

## 2023-03-21 PROCEDURE — A9270 NON-COVERED ITEM OR SERVICE: HCPCS | Performed by: INTERNAL MEDICINE

## 2023-03-21 PROCEDURE — 770006 HCHG ROOM/CARE - MED/SURG/GYN SEMI*

## 2023-03-21 PROCEDURE — A9270 NON-COVERED ITEM OR SERVICE: HCPCS | Performed by: HOSPITALIST

## 2023-03-21 PROCEDURE — 36415 COLL VENOUS BLD VENIPUNCTURE: CPT

## 2023-03-21 PROCEDURE — 700102 HCHG RX REV CODE 250 W/ 637 OVERRIDE(OP): Performed by: INTERNAL MEDICINE

## 2023-03-21 RX ORDER — AMLODIPINE BESYLATE 10 MG/1
10 TABLET ORAL
Status: DISCONTINUED | OUTPATIENT
Start: 2023-03-22 | End: 2023-03-22 | Stop reason: HOSPADM

## 2023-03-21 RX ADMIN — RIVAROXABAN 10 MG: 10 TABLET, FILM COATED ORAL at 16:44

## 2023-03-21 RX ADMIN — ARIPIPRAZOLE 15 MG: 15 TABLET ORAL at 22:09

## 2023-03-21 RX ADMIN — LABETALOL HYDROCHLORIDE 10 MG: 5 INJECTION INTRAVENOUS at 05:29

## 2023-03-21 RX ADMIN — LAMOTRIGINE 100 MG: 100 TABLET ORAL at 16:44

## 2023-03-21 RX ADMIN — LISINOPRIL 40 MG: 20 TABLET ORAL at 22:09

## 2023-03-21 RX ADMIN — DOCUSATE SODIUM 50 MG AND SENNOSIDES 8.6 MG 2 TABLET: 8.6; 5 TABLET, FILM COATED ORAL at 05:29

## 2023-03-21 RX ADMIN — PROPRANOLOL HYDROCHLORIDE 10 MG: 10 TABLET ORAL at 22:09

## 2023-03-21 RX ADMIN — AMLODIPINE BESYLATE 5 MG: 5 TABLET ORAL at 05:28

## 2023-03-21 ASSESSMENT — COGNITIVE AND FUNCTIONAL STATUS - GENERAL
MOBILITY SCORE: 6
STANDING UP FROM CHAIR USING ARMS: TOTAL
TURNING FROM BACK TO SIDE WHILE IN FLAT BAD: UNABLE
PERSONAL GROOMING: TOTAL
DAILY ACTIVITIY SCORE: 6
EATING MEALS: TOTAL
HELP NEEDED FOR BATHING: TOTAL
DRESSING REGULAR LOWER BODY CLOTHING: TOTAL
CLIMB 3 TO 5 STEPS WITH RAILING: TOTAL
DRESSING REGULAR UPPER BODY CLOTHING: TOTAL
WALKING IN HOSPITAL ROOM: TOTAL
SUGGESTED CMS G CODE MODIFIER DAILY ACTIVITY: CN
MOVING TO AND FROM BED TO CHAIR: UNABLE
MOVING FROM LYING ON BACK TO SITTING ON SIDE OF FLAT BED: UNABLE
SUGGESTED CMS G CODE MODIFIER MOBILITY: CN
TOILETING: TOTAL

## 2023-03-21 ASSESSMENT — GAIT ASSESSMENTS: GAIT LEVEL OF ASSIST: UNABLE TO PARTICIPATE

## 2023-03-21 NOTE — DISCHARGE PLANNING
Agency/Facility Name: [facility]  Outcome: DPA left a voice message for Nikia in admissions.  Awaiting a call back.    Agency[facility]Facility Name: [facility]  Outcome: DPA left a voice message for admissions.  Awaiting a call back.    Agency/Facility Name: [facility]  Outcome: DPA left a voice message for admissions.  Awaiting a call back.

## 2023-03-21 NOTE — THERAPY
Physical Therapy   Daily Treatment     Patient Name: Sheyla Garcia  Age:  72 y.o., Sex:  female  Medical Record #: [id number]  Today's Date: 3/21/2023     Precautions  Precautions: Fall Risk;Swallow Precautions  Comments: schizophrenia    Assessment    Pt greeted and seen for PT treatment. Pt req'd MaxA for bed mobility, pt demo'd posterior lean and was unable to maintain sitting balance. Pt req'd TotalAx2 for partial stand. Pt did not follow any cues but demo'd purposeful LE and UE mvmt. Pt currently limited by impaired balance, weakness, decreased sequencing and coordination, decreased activity tolerance and impaired cognition which negatively impacts functional mobility. Pt will continue to benefit from skilled PT to address deficits.     Plan    Treatment Plan Status: Continue Current Treatment Plan  Type of Treatment: Bed Mobility, Gait Training, Neuro Re-Education / Balance, Therapeutic Exercise, Stair Training, Therapeutic Activities  Treatment Frequency: 3 Times per Week  Treatment Duration: Until Therapy Goals Met    DC Equipment Recommendations: Unable to determine at this time  Discharge Recommendations: Recommend post-acute placement for additional physical therapy services prior to discharge home       03/21/23 1204   Cognition    Comments pt followed very little cuing   Balance   Sitting Balance (Static) Trace +   Sitting Balance (Dynamic) Trace +   Standing Balance (Static) Dependent   Weight Shift Sitting Absent   Weight Shift Standing Absent   Skilled Intervention Verbal Cuing;Tactile Cuing;Sequencing;Postural Facilitation   Comments physical assist to stand   Bed Mobility    Supine to Sit Maximal Assist   Sit to Supine Maximal Assist   Scooting Maximal Assist   Rolling Maximum Assist to Lt.;Maximal Assist to Rt.   Skilled Intervention Verbal Cuing;Tactile Cuing;Sequencing;Postural Facilitation   Comments posterior lean in sitting, able to move extremeties but does not follow cues   Gait Analysis    Gait Level Of Assist Unable to Participate   Functional Mobility   Sit to Stand Total Assist  (x2 person)   Skilled Intervention Verbal Cuing;Tactile Cuing;Sequencing;Postural Facilitation   Comments pt demo'd no intiation or WBing during stand. only partial stand performed   Short Term Goals    Short Term Goal # 1 supine to/from sit EOB with min assist in 6 visits   Goal Outcome # 1 goal not met   Short Term Goal # 2 pt will demonstrate static sitting at EOB with feet on floor and SBA for 10 minutes in 6 visits   Goal Outcome # 2 Goal not met   Short Term Goal # 3 sit to stand from EOB with use of FWW and min assist in 6 visits   Goal Outcome # 3 Goal not met   Short Term Goal # 4 pt will ambulate with FWW and CGA 20 feet in 6 visits   Goal Outcome # 4 Goal not met

## 2023-03-21 NOTE — THERAPY
Occupational Therapy  Daily Treatment     Patient Name: Sheyla Garcia  Age:  72 y.o., Sex:  female  Medical Record #: [id number]  Today's Date: 3/21/2023       Precautions: Fall Risk, Swallow Precautions      Assessment    Pt seen for OT tx. Continues to be limited by decreased activity tolerance, balance deficits and impaired inattention impacting ability to complete ADLs and ADL transfers independently. Required max A supine > sit, once seated EOB pt w/ L lateral and posterior lean. Required max A to initiate seated grooming. Attempt to have pt brush her hair, pt required assist to initiate and fatigued. Will continue to follow while in house.     Plan    Treatment Plan Status: Continue Current Treatment Plan    DC Equipment Recommendations: Unable to determine at this time  Discharge Recommendations: Recommend post-acute placement for additional occupational therapy services prior to discharge home       03/21/23 1225   Cognition    Cognition / Consciousness X   Ability To Follow Commands Unable to Follow 1 Step Commands   New Learning Impaired   Attention Impaired   Active ROM Upper Body   Active ROM Upper Body  X   Comments limited ROM d/t weakness and limtied volitional movement   Strength Upper Body   Upper Body Strength  X   Gross Strength Generalized Weakness, Equal Bilaterally.    Balance   Sitting Balance (Static) Trace +   Sitting Balance (Dynamic) Trace +   Standing Balance (Static) Dependent   Weight Shift Sitting Absent   Weight Shift Standing Absent   Bed Mobility    Supine to Sit Maximal Assist   Sit to Supine Maximal Assist   Activities of Daily Living   Grooming Maximal Assist   Upper Body Dressing Maximal Assist   Lower Body Dressing Total Assist   Toileting Total Assist   Functional Mobility   Sit to Stand Total Assist   Short Term Goals   Short Term Goal # 1 Pt will complete ADL transfers with modA   Goal Outcome # 1 Goal not met   Short Term Goal # 2 Pt will complete LB dressing with modA    Goal Outcome # 2 Goal not met   Short Term Goal # 3 Pt will complete toileting with modA   Goal Outcome # 3 Goal not met   Anticipated Discharge Equipment and Recommendations   DC Equipment Recommendations Unable to determine at this time   Discharge Recommendations Recommend post-acute placement for additional occupational therapy services prior to discharge home

## 2023-03-21 NOTE — PROGRESS NOTES
[facility] Medicine Daily Progress Note    Date of Service  3/21/2023    Chief Complaint  Sheyla Garcia is a 72 y.o. female admitted 3/13/2023 with altered mental status    Hospital Course  72-year-old female with history of asthma, normal pressure hydrocephalus, schizophrenia, hypothyroidism and hypertension who presented 3/13 with worsening mentation.  Patient was not able to provide history and the history was taken from her  who she lives with.  Patient has history of schizophrenia with hallucination most of the time and also cognitive disorder, the  is the caregiver and recently patient has been refusing taking her medication and she is spitting the medication on the floor.  No significant fever or chest pain however patient has been lethargic.  On admission patient was found to have hypertension 200/100.  No fever.  Labs showed mildly leukocytosis 12.5 with normal kidney function and .  Urine showed normal white blood cell and CT head did not show any acute finding.    Interval Problem Update    Patient is asleep she is arousable oriented to self  She is afebrile on 2 L nasal cannula  Denies pain  Discussed with  awaiting available bed at SNF  Discussed with nursing staff and case management  3/21: Vitals stable.  No overnight events.  Blood pressure elevated 175/75.  Increasing amlodipine.    I have discussed this patient's plan of care and discharge plan at IDT rounds today with Case Management, Nursing, Nursing leadership, and other members of the IDT team.    Consultants/Specialty       Code Status  Full Code    Disposition  Patient is medically cleared for discharge.   Anticipate discharge to  SNF .  I have placed the appropriate orders for post-discharge needs.    Review of Systems  Review of Systems   Unable to perform ROS: Mental acuity      Physical Exam  Temp:  [36.1 °C (97 °F)-36.4 °C (97.5 °F)] 36.2 °C (97.2 °F)  Pulse:  [] 96  Resp:  [18-19] 19  BP:  (142-175)/() 175/75  SpO2:  [94 %-97 %] 97 %    Physical Exam  Vitals and nursing note reviewed.   Constitutional:       General: She is not in acute distress.  HENT:      Head: Normocephalic and atraumatic.      Nose: Nose normal. No rhinorrhea.      Mouth/Throat:      Pharynx: No oropharyngeal exudate or posterior oropharyngeal erythema.   Eyes:      General:         Right eye: No discharge.         Left eye: No discharge.   Cardiovascular:      Rate and Rhythm: Normal rate and regular rhythm.      Heart sounds: Normal heart sounds. No murmur heard.    No friction rub. No gallop.   Pulmonary:      Effort: Pulmonary effort is normal. No respiratory distress.      Breath sounds: Normal breath sounds. No stridor. No wheezing, rhonchi or rales.   Chest:      Chest wall: No tenderness.   Abdominal:      General: Bowel sounds are normal. There is no distension.      Palpations: Abdomen is soft. There is no mass.      Tenderness: There is no abdominal tenderness. There is no rebound.   Musculoskeletal:         General: No swelling or tenderness.      Cervical back: Neck supple. No rigidity.   Skin:     General: Skin is warm and dry.      Coloration: Skin is not cyanotic or jaundiced.      Nails: There is no clubbing.   Neurological:      General: No focal deficit present.      Mental Status: She is alert.      Cranial Nerves: No cranial nerve deficit.      Motor: Weakness (Generalized) present.      Comments: Oriented to self   Psychiatric:         Mood and Affect: Mood normal.         Behavior: Behavior normal.       Fluids    Intake/Output Summary (Last 24 hours) at 3/21/2023 0949  Last data filed at 3/21/2023 0423  Gross per 24 hour   Intake 180 ml   Output 400 ml   Net -220 ml         Laboratory  Recent Labs     03/21/23  0323   WBC 9.0   RBC 5.50*   HEMOGLOBIN 16.9*   HEMATOCRIT 51.9*   MCV 94.4   MCH 30.7   MCHC 32.6*   RDW 46.3   PLATELETCT 231   MPV 10.6       Recent Labs     03/19/23  0453 03/21/23  0323    SODIUM 144 144   POTASSIUM 3.6 4.0   CHLORIDE 106 105   CO2 28 26   GLUCOSE 115* 94   BUN 10 9   CREATININE 0.48* 0.44*   CALCIUM 9.4 9.9                     Imaging  EC-ECHOCARDIOGRAM COMPLETE W/ CONT   Final Result      DX-LUMBAR SPINE-2 OR 3 VIEWS   Final Result      Mild lumbar spondylosis. No acute fracture or listhesis.      CT-HEAD W/O   Final Result      1.  Again seen hydrocephalus, not significantly changed from comparison.      2.  Interval placement of a right frontal ventriculoperitoneal shunt catheter.      3.  Periventricular chronic small vessel ischemic change.         DX-CHEST-PORTABLE (1 VIEW)   Final Result      Mild interstitial pulmonary edema.             Assessment/Plan  * Encephalopathy- (present on admission)  Assessment & Plan  Likely related to schizophrenia and   possible UTI  CT head reviewed with no acute pathology revealed stable hydrocephalus  Patient received 1 dose of ceftriaxone urine mildly abnormal    Urine culture growing Enterococcus completed course of Augmentin on 3/18/2023         Elevated brain natriuretic peptide (BNP) level  Assessment & Plan  Echocardiogram reviewed with normal EF and no valvular abnormalities    Schizoaffective disorder (HCC)- (present on admission)  Assessment & Plan  Patient has hallucination and altered mental status  Her  and the caregiver  Patient has been refusing her medications recently    Continue Abilify and lamotrigine  Frequent orientation      Normal pressure hydrocephalus (HCC)- (present on admission)  Assessment & Plan  Worsening cognitive for last 2 years  Patient has  shunt with stable head CT   Outpatient follow-up with neurology    S/P  shunt- (present on admission)  Assessment & Plan  Follow-up with neurosurgeon as outpatient    Hypokalemia  Assessment & Plan  Improved recheck levels in a.m.    Debility- (present on admission)  Assessment & Plan  With recurrent falls  PT and OT  SNF referral discussed with case  management    Moderate persistent asthma without complication- (present on admission)  Assessment & Plan  No exacerbation  Continue albuterol as needed    Scoliosis- (present on admission)  Assessment & Plan  Chronic back pain    X-ray  negative for fracture  Tylenol as needed  Avoid narcotics    KIRA (obstructive sleep apnea)- (present on admission)  Assessment & Plan       Essential hypertension- (present on admission)  Assessment & Plan  Uncontrolled we will increase amlodipine  Continue lisinopril and propranolol monitor blood pressure         VTE prophylaxis: Xarelto 10 mg daily as prophylaxis    I have performed a physical exam and reviewed and updated ROS and Plan today (3/21/2023). In review of yesterday's note (3/20/2023), there are no changes except as documented above.

## 2023-03-21 NOTE — CARE PLAN
Sheyla is oriented to self on 2L nasal cannula. Denied pain. Q2 turns performed. Incontinent care performed. Purewick working will. Tolerating meals, 1:1 feed. Pills crushed in puree. Vitals stable, did not require PRN BP meds this shift. Hourly rounding performed.     The patient is Watcher - Medium risk of patient condition declining or worsening    Shift Goals  Clinical Goals: safety  Patient Goals: rest  Family Goals: SHAZIA      Problem: Knowledge Deficit - Standard  Goal: Patient and family/care givers will demonstrate understanding of plan of care, disease process/condition, diagnostic tests and medications  Outcome: Progressing     Problem: Pain - Standard  Goal: Alleviation of pain or a reduction in pain to the patient’s comfort goal  Outcome: Progressing     Problem: Fall Risk  Goal: Patient will remain free from falls  Outcome: Progressing     Problem: Skin Integrity  Goal: Skin integrity is maintained or improved  Outcome: Progressing  Note: Q2 turns performed

## 2023-03-21 NOTE — CARE PLAN
Problem: Fall Risk  Goal: Patient will remain free from falls  Outcome: Progressing   The patient is Stable - Low risk of patient condition declining or worsening    Shift Goals  Clinical Goals: safety  Patient Goals: rest  Family Goals: SHAZIA    Progress made toward(s) clinical / shift goals:no acute events overnight.    Patient is not progressing towards the following goals:

## 2023-03-22 ENCOUNTER — PHARMACY VISIT (OUTPATIENT)
Dept: PHARMACY | Facility: MEDICAL CENTER | Age: 73
End: 2023-03-22
Payer: COMMERCIAL

## 2023-03-22 VITALS
HEIGHT: 69 IN | DIASTOLIC BLOOD PRESSURE: 73 MMHG | RESPIRATION RATE: 19 BRPM | SYSTOLIC BLOOD PRESSURE: 133 MMHG | WEIGHT: 238.32 LBS | OXYGEN SATURATION: 99 % | BODY MASS INDEX: 35.3 KG/M2 | HEART RATE: 104 BPM | TEMPERATURE: 96.9 F

## 2023-03-22 PROCEDURE — RXMED WILLOW AMBULATORY MEDICATION CHARGE: Performed by: STUDENT IN AN ORGANIZED HEALTH CARE EDUCATION/TRAINING PROGRAM

## 2023-03-22 PROCEDURE — 99239 HOSP IP/OBS DSCHRG MGMT >30: CPT | Performed by: STUDENT IN AN ORGANIZED HEALTH CARE EDUCATION/TRAINING PROGRAM

## 2023-03-22 PROCEDURE — 700102 HCHG RX REV CODE 250 W/ 637 OVERRIDE(OP): Performed by: STUDENT IN AN ORGANIZED HEALTH CARE EDUCATION/TRAINING PROGRAM

## 2023-03-22 PROCEDURE — A9270 NON-COVERED ITEM OR SERVICE: HCPCS | Performed by: STUDENT IN AN ORGANIZED HEALTH CARE EDUCATION/TRAINING PROGRAM

## 2023-03-22 RX ORDER — AMLODIPINE BESYLATE 10 MG/1
10 TABLET ORAL DAILY
Qty: 30 TABLET | Refills: 2 | Status: ON HOLD | OUTPATIENT
Start: 2023-03-23 | End: 2023-08-22

## 2023-03-22 RX ORDER — OXYCODONE HYDROCHLORIDE AND ACETAMINOPHEN 5; 325 MG/1; MG/1
1 TABLET ORAL ONCE
Status: DISCONTINUED | OUTPATIENT
Start: 2023-03-22 | End: 2023-03-22 | Stop reason: HOSPADM

## 2023-03-22 RX ADMIN — LABETALOL HYDROCHLORIDE 10 MG: 5 INJECTION INTRAVENOUS at 05:21

## 2023-03-22 RX ADMIN — AMLODIPINE BESYLATE 10 MG: 10 TABLET ORAL at 05:21

## 2023-03-22 ASSESSMENT — FIBROSIS 4 INDEX: FIB4 SCORE: 2.249885212866287577

## 2023-03-22 NOTE — FACE TO FACE
Face to Face Supporting Documentation - Home Health    The encounter with this patient was in whole or in part the primary reason for home health admission.    Date of encounter:   Patient:                    MRN:                       YOB: 2021  Sheyla Garcia  [id number]  1950     Home health to see patient for:  Skilled Nursing care for assessment, interventions & education, Physical Therapy evaluation and treatment, and Occupational therapy evaluation and treatment    Skilled need for:  New Onset Medical Diagnosis encephalopathy, debility, falls    Skilled nursing interventions to include:  Comment: Assessment, intervention and education    Homebound status evidenced by:  Needs the assistance of another person in order to leave the home. Leaving home requires a considerable and taxing effort. There is a normal inability to leave the home.    Community Physician to provide follow up care: ALANNA Childers     Optional Interventions? No      I certify the face to face encounter for this home health care referral meets the CMS requirements and the encounter/clinical assessment with the patient was, in whole, or in part, for the medical condition(s) listed above, which is the primary reason for home health care. Based on my clinical findings: the service(s) are medically necessary, support the need for home health care, and the homebound criteria are met.  I certify that this patient has had a face to face encounter by myself.  Daniel Hobbs D.O. - NPI: [id number]

## 2023-03-22 NOTE — DISCHARGE SUMMARY
Discharge Summary    CHIEF COMPLAINT ON ADMISSION  Chief Complaint   Patient presents with    ALOC     LNW on Thursday, progressive decline since then, Aox1, Saturday GLF seen at ER for assessment/discharge, Sunday AMS became worse per partner    UTI     Rancid smell evident, EMS observed dark color       Reason for Admission  ems     Admission Date  3/13/2023    CODE STATUS  Full Code    HPI & HOSPITAL COURSE     72-year-old female with history of asthma, normal pressure hydrocephalus, schizophrenia, hypothyroidism and hypertension who presented 3/13 with worsening mentation.  Patient was not able to provide history and the history was taken from her  who she lives with.  Patient has history of schizophrenia with hallucination most of the time and also cognitive disorder, the  is the caregiver and recently patient has been refusing taking her medication and she is spitting the medication on the floor.  No significant fever or chest pain however patient has been lethargic.  On admission patient was found to have hypertension 200/100.  No fever.  Labs showed mildly leukocytosis 12.5 with normal kidney function and .  Urine showed normal white blood cell and CT head did not show any acute finding.    Mentation improved.  Completed treatment of UTI on 3/18/2023.  Patient frequently sleeping.  Evaluated by physical therapy they recommended SNF placement.  Unable to find local placement, therefore  has opted to take the patient home.  Vital stable.  Patient will follow-up with her primary care provider.    Therefore, she is discharged in good and stable condition to home with close outpatient follow-up.    The patient met 2-midnight criteria for an inpatient stay at the time of discharge.    Discharge Date  3/22/23    FOLLOW UP ITEMS POST DISCHARGE  Encephalopathy    DISCHARGE DIAGNOSES  Principal Problem:    Encephalopathy POA: Yes  Active Problems:    Essential hypertension POA: Yes    KIRA  (obstructive sleep apnea) POA: Yes    Scoliosis POA: Yes    Moderate persistent asthma without complication POA: Yes    Debility POA: Yes    Hypokalemia POA: Unknown    S/P  shunt POA: Yes    Normal pressure hydrocephalus (HCC) POA: Yes    Schizoaffective disorder (HCC) POA: Yes    Elevated brain natriuretic peptide (BNP) level POA: Unknown  Resolved Problems:    * No resolved hospital problems. *      FOLLOW UP  No future appointments.  Zaira Swenson, A.P.N.  [id number] [address]  [phone]    Follow up  Please call your primary care provider to schedule a hospital follow up. Thank you.      MEDICATIONS ON DISCHARGE     Medication List        START taking these medications        Instructions   amLODIPine 10 MG Tabs  Start taking on: March 23, 2023  Commonly known as: NORVASC   Take 1 Tablet by mouth every day.  Dose: 10 mg            CONTINUE taking these medications        Instructions   albuterol 108 (90 Base) MCG/ACT Aers inhalation aerosol   INHALE TWO PUFFS BY MOUTH EVERY SIX HOURS AS NEEDED FOR SHORTNESS OF BREATH     ARIPiprazole 15 MG Tabs  Commonly known as: Abilify   Doctor's comments: Dr. Spring  Take 1 Tablet by mouth every day.  Dose: 15 mg     DULoxetine 30 MG Cpep  Commonly known as: CYMBALTA   Take 30 mg by mouth at bedtime.  Dose: 30 mg     lamoTRIgine 100 MG Tabs  Commonly known as: LAMICTAL   Take 1 Tablet by mouth every evening.  Dose: 100 mg     lisinopril 10 MG Tabs  Commonly known as: PRINIVIL   Take 1 Tablet by mouth every day.  Dose: 10 mg     propranolol 10 MG Tabs  Commonly known as: INDERAL   Take 10 mg by mouth at bedtime.  Dose: 10 mg            STOP taking these medications      estradiol 1 MG Tabs  Commonly known as: ESTRACE     oxyCODONE-acetaminophen  MG Tabs  Commonly known as: PERCOCET-10              Allergies  Allergies   Allergen Reactions    Gabapentin Unspecified     seizure       DIET  Orders Placed This Encounter   Procedures     Diet Order Diet: Level 5 - Minced and Moist; Liquid level: Level 0 - Thin; Tray Modifications (optional): SLP - 1:1 Supervision by Nursing, SLP - Deliver to Nursing Station     Standing Status:   Standing     Number of Occurrences:   1     Order Specific Question:   Diet:     Answer:   Level 5 - Minced and Moist [24]     Order Specific Question:   Liquid level     Answer:   Level 0 - Thin     Order Specific Question:   Tray Modifications (optional)     Answer:   SLP - 1:1 Supervision by Nursing     Order Specific Question:   Tray Modifications (optional)     Answer:   SLP - Deliver to Nursing Station       ACTIVITY  As tolerated.  Weight bearing as tolerated    CONSULTATIONS  none    PROCEDURES  none    LABORATORY  Lab Results   Component Value Date    SODIUM 144 03/21/2023    POTASSIUM 4.0 03/21/2023    CHLORIDE 105 03/21/2023    CO2 26 03/21/2023    GLUCOSE 94 03/21/2023    BUN 9 03/21/2023    CREATININE 0.44 (L) 03/21/2023        Lab Results   Component Value Date    WBC 9.0 03/21/2023    HEMOGLOBIN 16.9 (H) 03/21/2023    HEMATOCRIT 51.9 (H) 03/21/2023    PLATELETCT 231 03/21/2023        Total time of the discharge process exceeds 32 minutes.

## 2023-03-22 NOTE — CARE PLAN
Problem: Fall Risk  Goal: Patient will remain free from falls  Outcome: Progressing   The patient is Stable - Low risk of patient condition declining or worsening    Shift Goals  Clinical Goals: SAFETY  Patient Goals: rest  Family Goals: SHAZIA    Progress made toward(s) clinical / shift goals: patient stable at this time.     Patient is not progressing towards the following goals:

## 2023-03-22 NOTE — DISCHARGE PLANNING
DC Transport Scheduled    Received request at: 1345     Transport Company Scheduled:  [employer]      Scheduled Date: 03/22/2023  Scheduled Time: 1530    Destination: [address] [facility] [address] [place]     Notified care team of scheduled transport via Vo[employer].     If there are any changes needed to the DC transportation scheduled, please contact [facility] at ext. [phone] between the hours of 7842-7625 Mon-Fri. If outside those hours, contact the ED Case Manager at ext. [phone].

## 2023-03-22 NOTE — CARE PLAN
Sheyla is oriented to self on 2L nasal cannula. Denied pain. Q2 turns performed. Incontinent care performed. Tolerating meals 1:1 feed. DC education provided to pt and , AVS sent home with medical transport. Vitals stable, hourly rounding performed.    Problem: Knowledge Deficit - Standard  Goal: Patient and family/care givers will demonstrate understanding of plan of care, disease process/condition, diagnostic tests and medications  Outcome: Progressing     Problem: Pain - Standard  Goal: Alleviation of pain or a reduction in pain to the patient’s comfort goal  Outcome: Progressing     Problem: Fall Risk  Goal: Patient will remain free from falls  Outcome: Progressing     Problem: Skin Integrity  Goal: Skin integrity is maintained or improved  Outcome: Progressing  Note: Q2 turns performed, incontinent care performed   The patient is Stable - Low risk of patient condition declining or worsening    Shift Goals  Clinical Goals: SAFETY  Patient Goals: rest  Family Goals: SHAZIA

## 2023-03-23 ENCOUNTER — HOSPITAL ENCOUNTER (INPATIENT)
Facility: MEDICAL CENTER | Age: 73
LOS: 7 days | DRG: 884 | End: 2023-03-31
Attending: EMERGENCY MEDICINE | Admitting: STUDENT IN AN ORGANIZED HEALTH CARE EDUCATION/TRAINING PROGRAM
Payer: MEDICARE

## 2023-03-23 ENCOUNTER — TELEPHONE (OUTPATIENT)
Dept: MEDICAL GROUP | Facility: LAB | Age: 73
End: 2023-03-23
Payer: MEDICARE

## 2023-03-23 ENCOUNTER — PATIENT OUTREACH (OUTPATIENT)
Dept: MEDICAL GROUP | Facility: LAB | Age: 73
End: 2023-03-23
Payer: MEDICARE

## 2023-03-23 DIAGNOSIS — G89.29 CHRONIC BILATERAL LOW BACK PAIN WITH LEFT-SIDED SCIATICA: ICD-10-CM

## 2023-03-23 DIAGNOSIS — M54.42 CHRONIC BILATERAL LOW BACK PAIN WITH LEFT-SIDED SCIATICA: ICD-10-CM

## 2023-03-23 PROBLEM — R00.0 TACHYCARDIA: Status: ACTIVE | Noted: 2023-03-23

## 2023-03-23 PROBLEM — F20.9 SCHIZOPHRENIA (HCC): Status: ACTIVE | Noted: 2022-12-08

## 2023-03-23 PROBLEM — D75.1 POLYCYTHEMIA: Status: ACTIVE | Noted: 2023-03-23

## 2023-03-23 PROBLEM — G93.40 ACUTE ENCEPHALOPATHY: Status: ACTIVE | Noted: 2023-03-23

## 2023-03-23 PROBLEM — D72.829 LEUKOCYTOSIS: Status: ACTIVE | Noted: 2023-03-23

## 2023-03-23 PROBLEM — E83.42 HYPOMAGNESEMIA: Status: ACTIVE | Noted: 2023-03-23

## 2023-03-23 PROBLEM — G47.00 INSOMNIA: Status: ACTIVE | Noted: 2023-03-23

## 2023-03-23 LAB
ALBUMIN SERPL BCP-MCNC: 3.9 G/DL (ref 3.2–4.9)
ALBUMIN/GLOB SERPL: 1 G/DL
ALP SERPL-CCNC: 107 U/L (ref 30–99)
ALT SERPL-CCNC: 10 U/L (ref 2–50)
AMPHET UR QL SCN: NEGATIVE
ANION GAP SERPL CALC-SCNC: 15 MMOL/L (ref 7–16)
APPEARANCE UR: CLEAR
AST SERPL-CCNC: 17 U/L (ref 12–45)
BARBITURATES UR QL SCN: NEGATIVE
BASOPHILS # BLD AUTO: 0.4 % (ref 0–1.8)
BASOPHILS # BLD: 0.05 K/UL (ref 0–0.12)
BENZODIAZ UR QL SCN: NEGATIVE
BILIRUB SERPL-MCNC: 0.5 MG/DL (ref 0.1–1.5)
BILIRUB UR QL STRIP.AUTO: NEGATIVE
BUN SERPL-MCNC: 21 MG/DL (ref 8–22)
BZE UR QL SCN: NEGATIVE
CALCIUM ALBUM COR SERPL-MCNC: 10.3 MG/DL (ref 8.5–10.5)
CALCIUM SERPL-MCNC: 10.2 MG/DL (ref 8.5–10.5)
CANNABINOIDS UR QL SCN: NEGATIVE
CHLORIDE SERPL-SCNC: 104 MMOL/L (ref 96–112)
CK SERPL-CCNC: 66 U/L (ref 0–154)
CO2 SERPL-SCNC: 25 MMOL/L (ref 20–33)
COLOR UR: YELLOW
CREAT SERPL-MCNC: 0.77 MG/DL (ref 0.5–1.4)
EOSINOPHIL # BLD AUTO: 0.32 K/UL (ref 0–0.51)
EOSINOPHIL NFR BLD: 2.7 % (ref 0–6.9)
ERYTHROCYTE [DISTWIDTH] IN BLOOD BY AUTOMATED COUNT: 44.8 FL (ref 35.9–50)
ERYTHROCYTE [SEDIMENTATION RATE] IN BLOOD BY WESTERGREN METHOD: 1 MM/HOUR (ref 0–25)
ETHANOL BLD-MCNC: <10.1 MG/DL
GFR SERPLBLD CREATININE-BSD FMLA CKD-EPI: 83 ML/MIN/1.73 M 2
GLOBULIN SER CALC-MCNC: 3.8 G/DL (ref 1.9–3.5)
GLUCOSE SERPL-MCNC: 99 MG/DL (ref 65–99)
GLUCOSE UR STRIP.AUTO-MCNC: NEGATIVE MG/DL
HCT VFR BLD AUTO: 49.6 % (ref 37–47)
HGB BLD-MCNC: 16.4 G/DL (ref 12–16)
IMM GRANULOCYTES # BLD AUTO: 0.04 K/UL (ref 0–0.11)
IMM GRANULOCYTES NFR BLD AUTO: 0.3 % (ref 0–0.9)
KETONES UR STRIP.AUTO-MCNC: 40 MG/DL
LACTATE SERPL-SCNC: 1.5 MMOL/L (ref 0.5–2)
LEUKOCYTE ESTERASE UR QL STRIP.AUTO: NEGATIVE
LYMPHOCYTES # BLD AUTO: 2.84 K/UL (ref 1–4.8)
LYMPHOCYTES NFR BLD: 24 % (ref 22–41)
MAGNESIUM SERPL-MCNC: 1.6 MG/DL (ref 1.5–2.5)
MCH RBC QN AUTO: 30.9 PG (ref 27–33)
MCHC RBC AUTO-ENTMCNC: 33.1 G/DL (ref 33.6–35)
MCV RBC AUTO: 93.6 FL (ref 81.4–97.8)
METHADONE UR QL SCN: NEGATIVE
MICRO URNS: ABNORMAL
MONOCYTES # BLD AUTO: 0.86 K/UL (ref 0–0.85)
MONOCYTES NFR BLD AUTO: 7.3 % (ref 0–13.4)
NEUTROPHILS # BLD AUTO: 7.72 K/UL (ref 2–7.15)
NEUTROPHILS NFR BLD: 65.3 % (ref 44–72)
NITRITE UR QL STRIP.AUTO: NEGATIVE
NRBC # BLD AUTO: 0 K/UL
NRBC BLD-RTO: 0 /100 WBC
OPIATES UR QL SCN: NEGATIVE
OXYCODONE UR QL SCN: NEGATIVE
PCP UR QL SCN: NEGATIVE
PH UR STRIP.AUTO: 5.5 [PH] (ref 5–8)
PHOSPHATE SERPL-MCNC: 2.6 MG/DL (ref 2.5–4.5)
PLATELET # BLD AUTO: 231 K/UL (ref 164–446)
PMV BLD AUTO: 11.4 FL (ref 9–12.9)
POTASSIUM SERPL-SCNC: 3.8 MMOL/L (ref 3.6–5.5)
PROCALCITONIN SERPL-MCNC: 0.06 NG/ML
PROPOXYPH UR QL SCN: NEGATIVE
PROT SERPL-MCNC: 7.7 G/DL (ref 6–8.2)
PROT UR QL STRIP: NEGATIVE MG/DL
RBC # BLD AUTO: 5.3 M/UL (ref 4.2–5.4)
RBC UR QL AUTO: NEGATIVE
SODIUM SERPL-SCNC: 144 MMOL/L (ref 135–145)
SP GR UR STRIP.AUTO: 1.02
UROBILINOGEN UR STRIP.AUTO-MCNC: 0.2 MG/DL
WBC # BLD AUTO: 11.8 K/UL (ref 4.8–10.8)

## 2023-03-23 PROCEDURE — 51798 US URINE CAPACITY MEASURE: CPT

## 2023-03-23 PROCEDURE — 99285 EMERGENCY DEPT VISIT HI MDM: CPT

## 2023-03-23 PROCEDURE — 84145 PROCALCITONIN (PCT): CPT

## 2023-03-23 PROCEDURE — 96374 THER/PROPH/DIAG INJ IV PUSH: CPT

## 2023-03-23 PROCEDURE — 96366 THER/PROPH/DIAG IV INF ADDON: CPT

## 2023-03-23 PROCEDURE — A9270 NON-COVERED ITEM OR SERVICE: HCPCS | Performed by: STUDENT IN AN ORGANIZED HEALTH CARE EDUCATION/TRAINING PROGRAM

## 2023-03-23 PROCEDURE — 83735 ASSAY OF MAGNESIUM: CPT

## 2023-03-23 PROCEDURE — 700111 HCHG RX REV CODE 636 W/ 250 OVERRIDE (IP): Performed by: EMERGENCY MEDICINE

## 2023-03-23 PROCEDURE — 83605 ASSAY OF LACTIC ACID: CPT

## 2023-03-23 PROCEDURE — 81003 URINALYSIS AUTO W/O SCOPE: CPT

## 2023-03-23 PROCEDURE — G0378 HOSPITAL OBSERVATION PER HR: HCPCS

## 2023-03-23 PROCEDURE — 96372 THER/PROPH/DIAG INJ SC/IM: CPT

## 2023-03-23 PROCEDURE — 80053 COMPREHEN METABOLIC PANEL: CPT

## 2023-03-23 PROCEDURE — 36415 COLL VENOUS BLD VENIPUNCTURE: CPT

## 2023-03-23 PROCEDURE — 84100 ASSAY OF PHOSPHORUS: CPT

## 2023-03-23 PROCEDURE — 700105 HCHG RX REV CODE 258: Performed by: EMERGENCY MEDICINE

## 2023-03-23 PROCEDURE — 96365 THER/PROPH/DIAG IV INF INIT: CPT

## 2023-03-23 PROCEDURE — 87040 BLOOD CULTURE FOR BACTERIA: CPT | Mod: 91

## 2023-03-23 PROCEDURE — 96375 TX/PRO/DX INJ NEW DRUG ADDON: CPT

## 2023-03-23 PROCEDURE — 82077 ASSAY SPEC XCP UR&BREATH IA: CPT

## 2023-03-23 PROCEDURE — 700102 HCHG RX REV CODE 250 W/ 637 OVERRIDE(OP): Performed by: STUDENT IN AN ORGANIZED HEALTH CARE EDUCATION/TRAINING PROGRAM

## 2023-03-23 PROCEDURE — 82550 ASSAY OF CK (CPK): CPT

## 2023-03-23 PROCEDURE — 700111 HCHG RX REV CODE 636 W/ 250 OVERRIDE (IP): Performed by: STUDENT IN AN ORGANIZED HEALTH CARE EDUCATION/TRAINING PROGRAM

## 2023-03-23 PROCEDURE — 85025 COMPLETE CBC W/AUTO DIFF WBC: CPT

## 2023-03-23 PROCEDURE — 80307 DRUG TEST PRSMV CHEM ANLYZR: CPT

## 2023-03-23 PROCEDURE — 85652 RBC SED RATE AUTOMATED: CPT

## 2023-03-23 PROCEDURE — 99223 1ST HOSP IP/OBS HIGH 75: CPT | Mod: GC | Performed by: STUDENT IN AN ORGANIZED HEALTH CARE EDUCATION/TRAINING PROGRAM

## 2023-03-23 RX ORDER — BISACODYL 10 MG
10 SUPPOSITORY, RECTAL RECTAL
Status: DISCONTINUED | OUTPATIENT
Start: 2023-03-23 | End: 2023-03-23

## 2023-03-23 RX ORDER — ACETAMINOPHEN 500 MG
1000 TABLET ORAL EVERY 6 HOURS PRN
Status: DISCONTINUED | OUTPATIENT
Start: 2023-03-23 | End: 2023-03-31 | Stop reason: HOSPADM

## 2023-03-23 RX ORDER — ENOXAPARIN SODIUM 100 MG/ML
40 INJECTION SUBCUTANEOUS DAILY
Status: DISCONTINUED | OUTPATIENT
Start: 2023-03-23 | End: 2023-03-31 | Stop reason: HOSPADM

## 2023-03-23 RX ORDER — POLYETHYLENE GLYCOL 3350 17 G/17G
1 POWDER, FOR SOLUTION ORAL
Status: DISCONTINUED | OUTPATIENT
Start: 2023-03-23 | End: 2023-03-31 | Stop reason: HOSPADM

## 2023-03-23 RX ORDER — LORAZEPAM 2 MG/ML
1 INJECTION INTRAMUSCULAR 3 TIMES DAILY
Status: DISPENSED | OUTPATIENT
Start: 2023-03-24 | End: 2023-03-24

## 2023-03-23 RX ORDER — LISINOPRIL 10 MG/1
10 TABLET ORAL DAILY
Status: DISCONTINUED | OUTPATIENT
Start: 2023-03-24 | End: 2023-03-31 | Stop reason: HOSPADM

## 2023-03-23 RX ORDER — AMLODIPINE BESYLATE 10 MG/1
10 TABLET ORAL DAILY
Status: DISCONTINUED | OUTPATIENT
Start: 2023-03-24 | End: 2023-03-31 | Stop reason: HOSPADM

## 2023-03-23 RX ORDER — METHOCARBAMOL 750 MG/1
750 TABLET, FILM COATED ORAL PRN
Status: ON HOLD | COMMUNITY
End: 2023-03-31

## 2023-03-23 RX ORDER — PROPRANOLOL HYDROCHLORIDE 10 MG/1
10 TABLET ORAL
Status: DISCONTINUED | OUTPATIENT
Start: 2023-03-23 | End: 2023-03-31 | Stop reason: HOSPADM

## 2023-03-23 RX ORDER — AMOXICILLIN 250 MG
2 CAPSULE ORAL 2 TIMES DAILY
Status: DISCONTINUED | OUTPATIENT
Start: 2023-03-23 | End: 2023-03-31 | Stop reason: HOSPADM

## 2023-03-23 RX ORDER — SODIUM CHLORIDE, SODIUM LACTATE, POTASSIUM CHLORIDE, AND CALCIUM CHLORIDE .6; .31; .03; .02 G/100ML; G/100ML; G/100ML; G/100ML
30 INJECTION, SOLUTION INTRAVENOUS ONCE
Status: COMPLETED | OUTPATIENT
Start: 2023-03-23 | End: 2023-03-23

## 2023-03-23 RX ORDER — DULOXETIN HYDROCHLORIDE 30 MG/1
30 CAPSULE, DELAYED RELEASE ORAL
Status: DISCONTINUED | OUTPATIENT
Start: 2023-03-23 | End: 2023-03-31 | Stop reason: HOSPADM

## 2023-03-23 RX ORDER — LORAZEPAM 2 MG/ML
1 INJECTION INTRAMUSCULAR 3 TIMES DAILY
Status: DISCONTINUED | OUTPATIENT
Start: 2023-03-24 | End: 2023-03-23

## 2023-03-23 RX ORDER — CEFTRIAXONE 1 G/1
1000 INJECTION, POWDER, FOR SOLUTION INTRAMUSCULAR; INTRAVENOUS ONCE
Status: COMPLETED | OUTPATIENT
Start: 2023-03-23 | End: 2023-03-23

## 2023-03-23 RX ORDER — ARIPIPRAZOLE 15 MG/1
15 TABLET ORAL DAILY
Status: DISCONTINUED | OUTPATIENT
Start: 2023-03-24 | End: 2023-03-24

## 2023-03-23 RX ORDER — MAGNESIUM SULFATE HEPTAHYDRATE 40 MG/ML
2 INJECTION, SOLUTION INTRAVENOUS ONCE
Status: COMPLETED | OUTPATIENT
Start: 2023-03-23 | End: 2023-03-24

## 2023-03-23 RX ADMIN — PROPRANOLOL HYDROCHLORIDE 10 MG: 10 TABLET ORAL at 22:17

## 2023-03-23 RX ADMIN — CEFTRIAXONE SODIUM 1000 MG: 1 INJECTION, POWDER, FOR SOLUTION INTRAMUSCULAR; INTRAVENOUS at 16:17

## 2023-03-23 RX ADMIN — SODIUM CHLORIDE, POTASSIUM CHLORIDE, SODIUM LACTATE AND CALCIUM CHLORIDE 1986 ML: 600; 310; 30; 20 INJECTION, SOLUTION INTRAVENOUS at 16:12

## 2023-03-23 RX ADMIN — MAGNESIUM SULFATE HEPTAHYDRATE 2 G: 40 INJECTION, SOLUTION INTRAVENOUS at 22:42

## 2023-03-23 RX ADMIN — ENOXAPARIN SODIUM 40 MG: 100 INJECTION SUBCUTANEOUS at 22:16

## 2023-03-23 RX ADMIN — DOCUSATE SODIUM 50 MG AND SENNOSIDES 8.6 MG 2 TABLET: 8.6; 5 TABLET, FILM COATED ORAL at 22:16

## 2023-03-23 ASSESSMENT — ENCOUNTER SYMPTOMS
COUGH: 0
ABDOMINAL PAIN: 0
HALLUCINATIONS: 1
HEADACHES: 0
NAUSEA: 0
DIZZINESS: 0
WEAKNESS: 1
CHILLS: 0
FEVER: 0
TREMORS: 1
SHORTNESS OF BREATH: 0
VOMITING: 0
PALPITATIONS: 0

## 2023-03-23 ASSESSMENT — LIFESTYLE VARIABLES
CONSUMPTION TOTAL: NEGATIVE
TOTAL SCORE: 0
SUBSTANCE_ABUSE: 0
TOTAL SCORE: 0
HAVE PEOPLE ANNOYED YOU BY CRITICIZING YOUR DRINKING: NO
HAVE YOU EVER FELT YOU SHOULD CUT DOWN ON YOUR DRINKING: NO
HOW MANY TIMES IN THE PAST YEAR HAVE YOU HAD 5 OR MORE DRINKS IN A DAY: 0
TOTAL SCORE: 0
ON A TYPICAL DAY WHEN YOU DRINK ALCOHOL HOW MANY DRINKS DO YOU HAVE: 0
DOES PATIENT WANT TO STOP DRINKING: CANNOT ASSESS
EVER HAD A DRINK FIRST THING IN THE MORNING TO STEADY YOUR NERVES TO GET RID OF A HANGOVER: NO
ALCOHOL_USE: NO
AVERAGE NUMBER OF DAYS PER WEEK YOU HAVE A DRINK CONTAINING ALCOHOL: 0
EVER FELT BAD OR GUILTY ABOUT YOUR DRINKING: NO

## 2023-03-23 ASSESSMENT — FIBROSIS 4 INDEX: FIB4 SCORE: 1.629052127965528747

## 2023-03-23 ASSESSMENT — PAIN DESCRIPTION - PAIN TYPE: TYPE: CHRONIC PAIN

## 2023-03-23 NOTE — ED TRIAGE NOTES
.  Chief Complaint   Patient presents with    ALOC      Pt BIB EMS from home. Per report Pt was d/c'd from our facility yesterday. Pt became altered over night, AAOx 1. Per report Pt normally AAOx4.  reports Pt is not taking medications and can become altered when not taking meds. Pt  states he is unable to care for her.

## 2023-03-23 NOTE — TELEPHONE ENCOUNTER
Received a call from Kurt with [facility] and also Ed today. She was sent back to the hospital because she will not allow him near her to take medications and to go to the bathroom. Home health also called about this and sent her to the ER. He wanted to know what you thought about sending her to [facility]. He states that he can no longer take care of her.

## 2023-03-23 NOTE — PROGRESS NOTES
Patient outreach for TCM follow up.  Reviewed discharge instructions and new and current medications. Also takes methocarbamol which is not on the active medication list. Looks like it was prescribed by [facility].  Patient verbalized understanding.  Patient stated will make appointment with PCP.  [facility] is coming today.   Advised to check BP every morning before taking medication.   Due to the patient's mentation, she has been having difficulty taking her medications.

## 2023-03-24 PROBLEM — G93.41 ACUTE METABOLIC ENCEPHALOPATHY: Status: ACTIVE | Noted: 2023-03-24

## 2023-03-24 PROCEDURE — A9270 NON-COVERED ITEM OR SERVICE: HCPCS | Performed by: STUDENT IN AN ORGANIZED HEALTH CARE EDUCATION/TRAINING PROGRAM

## 2023-03-24 PROCEDURE — 700102 HCHG RX REV CODE 250 W/ 637 OVERRIDE(OP): Performed by: STUDENT IN AN ORGANIZED HEALTH CARE EDUCATION/TRAINING PROGRAM

## 2023-03-24 PROCEDURE — 99232 SBSQ HOSP IP/OBS MODERATE 35: CPT | Performed by: HOSPITALIST

## 2023-03-24 PROCEDURE — 700111 HCHG RX REV CODE 636 W/ 250 OVERRIDE (IP): Performed by: STUDENT IN AN ORGANIZED HEALTH CARE EDUCATION/TRAINING PROGRAM

## 2023-03-24 PROCEDURE — 95816 EEG AWAKE AND DROWSY: CPT | Mod: 26 | Performed by: STUDENT IN AN ORGANIZED HEALTH CARE EDUCATION/TRAINING PROGRAM

## 2023-03-24 PROCEDURE — 770001 HCHG ROOM/CARE - MED/SURG/GYN PRIV*

## 2023-03-24 PROCEDURE — 4A10X4Z MONITORING OF CENTRAL NERVOUS ELECTRICAL ACTIVITY, EXTERNAL APPROACH: ICD-10-PCS | Performed by: STUDENT IN AN ORGANIZED HEALTH CARE EDUCATION/TRAINING PROGRAM

## 2023-03-24 PROCEDURE — 95816 EEG AWAKE AND DROWSY: CPT | Performed by: STUDENT IN AN ORGANIZED HEALTH CARE EDUCATION/TRAINING PROGRAM

## 2023-03-24 PROCEDURE — 96375 TX/PRO/DX INJ NEW DRUG ADDON: CPT

## 2023-03-24 RX ORDER — ARIPIPRAZOLE 10 MG/1
20 TABLET ORAL DAILY
Status: DISCONTINUED | OUTPATIENT
Start: 2023-03-25 | End: 2023-03-31 | Stop reason: HOSPADM

## 2023-03-24 RX ORDER — LORAZEPAM 2 MG/ML
0.5 INJECTION INTRAMUSCULAR EVERY 6 HOURS PRN
Status: DISCONTINUED | OUTPATIENT
Start: 2023-03-24 | End: 2023-03-31 | Stop reason: HOSPADM

## 2023-03-24 RX ADMIN — LORAZEPAM 1 MG: 2 INJECTION INTRAMUSCULAR; INTRAVENOUS at 00:10

## 2023-03-24 RX ADMIN — DOCUSATE SODIUM 50 MG AND SENNOSIDES 8.6 MG 2 TABLET: 8.6; 5 TABLET, FILM COATED ORAL at 05:14

## 2023-03-24 RX ADMIN — LISINOPRIL 10 MG: 10 TABLET ORAL at 05:14

## 2023-03-24 RX ADMIN — ENOXAPARIN SODIUM 40 MG: 100 INJECTION SUBCUTANEOUS at 17:47

## 2023-03-24 RX ADMIN — AMLODIPINE BESYLATE 10 MG: 10 TABLET ORAL at 05:14

## 2023-03-24 ASSESSMENT — PAIN DESCRIPTION - PAIN TYPE: TYPE: CHRONIC PAIN;ACUTE PAIN

## 2023-03-24 NOTE — CARE PLAN
Problem: Knowledge Deficit - Standard  Goal: Patient and family/care givers will demonstrate understanding of plan of care, disease process/condition, diagnostic tests and medications  Outcome: Progressing     Problem: Skin Integrity  Goal: Skin integrity is maintained or improved  Outcome: Progressing     Problem: Fall Risk  Goal: Patient will remain free from falls  Outcome: Progressing     Problem: Pain - Standard  Goal: Alleviation of pain or a reduction in pain to the patient’s comfort goal  Outcome: Progressing   The patient is Watcher - Medium risk of patient condition declining or worsening    Shift Goals  Clinical Goals: MRI brain  Patient Goals: sleep  Family Goals: Rest, get some answers/placement    Progress made toward(s) clinical / shift goals:  MRI brain, pysch consult    Patient is not progressing towards the following goals:

## 2023-03-24 NOTE — HOSPITAL COURSE
Patient is a 72 y.o. female w/ a PMHx of hypertension, hypothyroidism, asthma, normal pressure hydrocephalus w/  shunt followed by neurosurgery, uncontrolled schizophrenia,  recent admission from 03/13-03/22 for management of acute encephalopathy 2/2 UTI w/ abx completion on 3/18/23 who presented to the ED on 3/23/2023 with AMS.     History obtained from  as patient has nonsensical speech.   states she was admitted for altered mental status with UTI, she was evaluated by PT/OT and was referred for SNF, but was unable to take her due to her psychiatric condition.  When they came home yesterday, patient was refusing care.  She was resistant to move or take her medication, would spit them out.  She has been stuck on her chair for the past 30 hours.  Patient states she refused to move due to muscle weakness and slight rigidity.  She has been eating and  has been using a diaper. He also has a bedside commode for her.  Her  states she has been hearing voices telling her that her  is trying to kill her.  She has a long history of auditory hallucinations for the past 3 to 4 years, worse in the past 3 to 4 months.  She was diagnosed with schizophrenia and has been managed by a psychiatrist, Dr.Shaheen Spring. Unable to obtain records, however per PCP, on amitriptyline, abilify, lamictal, and propranolol. Has stopped Risperdal and Topamax. States she hasn't been taking her medications since discharge.     Patient does have a history of NPH s/p  shunt 1/13/22, followed by Dr. Glynn. She has been having persistent gait changes, urinary incontinence, and cognitive impairment. She does have a bladder sling years ago for urinary incontinence. She has recurrent falls and is fearful to walk. She has had imaging for her knees with moderate osteoarthritic changes.

## 2023-03-24 NOTE — ASSESSMENT & PLAN NOTE
*BP: (143-173)/() 148/67    -Continue home amlodipine 10 mg daily  -Continue home lisinopril 10 mg daily

## 2023-03-24 NOTE — H&P
[facility] Internal Medicine History & Physical Note    Date of Service  3/23/2023    [facility] Team: SERENA   Attending: Dr. Urbina  Senior Resident: Dr. Mae    Contact Number: [phone]    Primary Care Physician  ALANNA Childers    Consultants  LUCILA    Specialist Names: NA    Code Status  Full Code    Chief Complaint  Chief Complaint   Patient presents with    ALOC       History of Presenting Illness (HPI):   Patient is a 72 y.o. female w/ a PMHx of hypertension, hypothyroidism, asthma, normal pressure hydrocephalus w/  shunt followed by neurosurgery, uncontrolled schizophrenia,  recent admission from 03/13-03/22 for management of acute encephalopathy 2/2 UTI w/ abx completion on 3/18/23 who presented to the ED on 3/23/2023 with AMS.    History obtained from  as patient has nonsensical speech.   states she was admitted for altered mental status with UTI, she was evaluated by PT/OT and was referred for SNF, but was unable to take her due to her psychiatric condition.  When they came home yesterday, patient was refusing care.  She was resistant to move or take her medication, would spit them out.  She has been stuck on her chair for the past 30 hours.  Patient states she refused to move due to muscle weakness and slight rigidity.  She has been eating and  has been using a diaper. He also has a bedside commode for her.  Her  states she has been hearing voices telling her that her  is trying to kill her.  She has a long history of auditory hallucinations for the past 3 to 4 years, worse in the past 3 to 4 months.  She was diagnosed with schizophrenia and has been managed by a psychiatrist, Dr.Shaheen Spring. Unable to obtain records, however per PCP, on amitriptyline, abilify, lamictal, and propranolol. Has stopped Risperdal and Topamax. States she hasn't been taking her medications since discharge.    Patient does have a history of NPH s/p  shunt 1/13/22, followed by   [facility]. She has been having persistent gait changes, urinary incontinence, and cognitive impairment. She does have a bladder sling years ago for urinary incontinence. She has recurrent falls and is fearful to walk. She has had imaging for her knees with moderate osteoarthritic changes.     In the ED, Tachycardic in the 100s, hypertensive up to 170s  WBC 11.8 with left shift of 7.72, Hgb 16.4, MCV 93.6 (HgB 16.4 on 12/2022)  LA 1.5, Phosph 2.6, Mg 1.6, alcohol negative  UA negative, procal 0.06  S/p C3, LR 30 cc/kg (1986 mL) in the ED      I discussed the plan of care with patient, bedside RN, and pharmacy.    Review of Systems  Review of Systems   Constitutional:  Positive for malaise/fatigue. Negative for chills and fever.   Respiratory:  Negative for cough and shortness of breath.    Cardiovascular:  Negative for chest pain and palpitations.   Gastrointestinal:  Negative for abdominal pain, nausea and vomiting.   Genitourinary:  Negative for dysuria.   Skin:  Negative for rash.   Neurological:  Positive for tremors and weakness. Negative for dizziness and headaches.   Psychiatric/Behavioral:  Positive for hallucinations. Negative for substance abuse and suicidal ideas.    All other systems reviewed and are negative.    Past Medical History   has a past medical history of Asthma (08/05/2021), Bowel habit changes, Breath shortness (08/05/2021), Emphysema of lung (HCC) (08/05/2021), Fibromyalgia, Heart burn, Hypertension, Hypothyroidism (2008), Indigestion, Migraine, Obesity, Pneumonia (2012), Psychiatric problem, Renal disorder (08/05/2021), Seizure ([facility]) (2017), and Urinary incontinence.    Surgical History   has a past surgical history that includes nasal septal reconstruction; tonsillectomy; bladder sling female; other surgical procedure; orif, knee (1/3/08); other orthopedic surgery; gyn surgery; thoracic fusion o-arm (Right, 10/13/2021); and cath placement capd (N/A, 10/13/2021).     Family History  family  history includes Cancer in her father and maternal grandfather; Heart Disease in her mother; Stroke in her father.   Family history reviewed with patient.     Social History  Tobacco: Please see HPI  Alcohol: Please see HPI  Recreational drugs (illegal or prescription): Please see HPI  Employment: Please see HPI  Living Situation: Please see HPI  Recent Travel: Please see HPI  Primary Care Provider: Reviewed    Allergies  Allergies   Allergen Reactions    Gabapentin Unspecified     seizure       Medications  Prior to Admission Medications   Prescriptions Last Dose Informant Patient Reported? Taking?   ARIPiprazole (ABILIFY) 15 MG Tab 3/22/2023 at PM Significant Other No No   Sig: Take 1 Tablet by mouth every day.   DULoxetine (CYMBALTA) 30 MG Cap DR Particles 3/22/2023 at PM Significant Other Yes No   Sig: Take 30 mg by mouth at bedtime.   albuterol 108 (90 Base) MCG/ACT Aero Soln inhalation aerosol UNK at PRN Significant Other No No   Sig: INHALE TWO PUFFS BY MOUTH EVERY SIX HOURS AS NEEDED FOR SHORTNESS OF BREATH   amLODIPine (NORVASC) 10 MG Tab 3/22/2023 at PM Significant Other No No   Sig: Take 1 Tablet by mouth every day.   lamoTRIgine (LAMICTAL) 100 MG Tab 3/12/2023 at hs Significant Other No No   Sig: Take 1 Tablet by mouth every evening.   lisinopril (PRINIVIL) 10 MG Tab 3/12/2023 at hs Significant Other No No   Sig: Take 1 Tablet by mouth every day.   methocarbamol (ROBAXIN) 750 MG Tab 3/22/2023 at PM Significant Other Yes No   Sig: Take 750 mg by mouth as needed.   propranolol (INDERAL) 10 MG Tab 3/12/2023 at hs Significant Other Yes No   Sig: Take 10 mg by mouth at bedtime.      Facility-Administered Medications: None       Physical Exam  Temp:  [36.3 °C (97.4 °F)-36.6 °C (97.8 °F)] 36.6 °C (97.8 °F)  Pulse:  [] 94  Resp:  [15-19] 18  BP: (141-173)/() 141/87  SpO2:  [90 %-98 %] 93 %  Blood Pressure : (!) 148/67   Temperature: 36.3 °C (97.4 °F)   Pulse: (!) 110   Respiration: 18   Pulse  Oximetry: 94 %       Physical Exam  Vitals and nursing note reviewed.   Constitutional:       Appearance: Normal appearance.   HENT:      Head: Normocephalic and atraumatic.      Right Ear: External ear normal.      Left Ear: External ear normal.      Nose: Nose normal.      Mouth/Throat:      Mouth: Mucous membranes are moist.      Pharynx: Oropharynx is clear.   Eyes:      Extraocular Movements: Extraocular movements intact.      Pupils: Pupils are equal, round, and reactive to light.   Cardiovascular:      Rate and Rhythm: Normal rate and regular rhythm.      Pulses: Normal pulses.      Heart sounds: Normal heart sounds. No murmur heard.    No friction rub. No gallop.   Pulmonary:      Effort: Pulmonary effort is normal. No respiratory distress.      Breath sounds: Normal breath sounds. No stridor. No wheezing, rhonchi or rales.   Abdominal:      General: There is no distension.      Palpations: Abdomen is soft.      Tenderness: There is no abdominal tenderness. There is no guarding or rebound.   Musculoskeletal:         General: Normal range of motion.      Cervical back: Normal range of motion.   Skin:     General: Skin is warm.      Capillary Refill: Capillary refill takes less than 2 seconds.      Coloration: Skin is not jaundiced.   Neurological:      Mental Status: She is alert. She is disoriented.      Motor: Weakness (4/5 strength in all extremities) present.      Comments: Tremors BUE, no muscle rigidity noted   Psychiatric:      Comments: Auditory hallucinations, disorganized thoughts, negative symptoms       Laboratory:  Recent Labs     03/21/23  0323 03/23/23  1500   WBC 9.0 11.8*   RBC 5.50* 5.30   HEMOGLOBIN 16.9* 16.4*   HEMATOCRIT 51.9* 49.6*   MCV 94.4 93.6   MCH 30.7 30.9   MCHC 32.6* 33.1*   RDW 46.3 44.8   PLATELETCT 231 231   MPV 10.6 11.4     Recent Labs     03/21/23  0323 03/23/23  1610   SODIUM 144 144   POTASSIUM 4.0 3.8   CHLORIDE 105 104   CO2 26 25   GLUCOSE 94 99   BUN 9 21    CREATININE 0.44* 0.77   CALCIUM 9.9 10.2     Recent Labs     03/21/23  0323 03/23/23  1610   ALTSGPT  --  10   ASTSGOT  --  17   ALKPHOSPHAT  --  107*   TBILIRUBIN  --  0.5   GLUCOSE 94 99         No results for input(s): NTPROBNP in the last 72 hours.      No results for input(s): TROPONINT in the last 72 hours.    Imaging:  MR-BRAIN-WITH & W/O    (Results Pending)       X-Ray:  I have personally reviewed the images and compared with prior images.  EKG:  I have personally reviewed the images and compared with prior images.    Assessment/Plan:  Problem Representation:   I anticipate this patient is appropriate for observation status at this time because of manage of acute encephalopathy    Patient will need a Telemetry bed on EMERGENCY service .  The need is secondary to as above.    * Acute encephalopathy- (present on admission)  Assessment & Plan  *DDx: Drugs, CNS disease, metabolic disorders, infection, endocrine, infectious, fecal impaction, urinary retention   *Infectious work-up negative    -MRI brain w/ and w/o to evaluate NPH or any i  -Vitamin B12/Folate/ammonia ordered, TSH WNL  -Possibly 2/2 uncontrolled schizophrenia with negative symptoms  -Hold home SSRI/antipsychotics for now pending psychiatry evaluation  -Component of malignant catatonia, hypertensive, tachycardic, delirium, feeling like it's cold, trial Lorazepam 1 mg TID  -Bladder scan       Schizophrenia (HCC)  Assessment & Plan  *Schizoaffect vs Schizophrenia    -Hold home Abilify 15 mg daily for concerns for possible neurologic malignant syndrome  -Hold home Lamictal 100 mg  -Psychiatry evaluation for negative symptoms due to inability to care for herself    Tachycardia  Assessment & Plan  *HR 100s  *Unclear etiology, possible medication withdrawal/toxicity, malignant catatonia    -Hold SSRI/antipsychotics    Normal pressure hydrocephalus (HCC)- (present on admission)  Assessment & Plan  *s/p  shunt on 1/13/22, followed by   [facility]  *Still has difficulty with gait changes, urinary incontinence, and mental status changes    -Repeat MRI brain w/ and w/o    KIRA (obstructive sleep apnea)- (present on admission)  Assessment & Plan  *noncompliant     -CPAP ordered    Essential hypertension- (present on admission)  Assessment & Plan  *BP: (143-173)/() 148/67    -Continue home amlodipine 10 mg daily  -Continue home lisinopril 10 mg daily    Hypomagnesemia  Assessment & Plan  *Mg 1.6    -Replete as needed    Leukocytosis  Assessment & Plan  *WBC 11.8 with left shift of 7.72  *likely 2/2 reactive leukocytosis    -Continue to monitor for signs of infection    Polycythemia  Assessment & Plan  *Hgb 16.4, MCV 93.6 (HgB 16.4 on 12/2022)  *2/2 sleep apnea    Insomnia  Assessment & Plan    -Continue home propranolol 10 mg every night    Fibromyalgia  Assessment & Plan    -Hold home methocarbamol 750 PRN  -Hold home Duloxetine 30 mg, concern for possible serotonin syndrome?  -Acetaminophen 1g q8h PRN    Debility- (present on admission)  Assessment & Plan    -SNF placement,  unable to care for patient at home    Morbid obesity with BMI of 40.0-44.9, adult ([facility])- (present on admission)  Assessment & Plan    -Diet and exercise    Asthma  Assessment & Plan  *Controlled, exacerbated by pine pollen    -Hold albuterol        VTE prophylaxis: enoxaparin ppx

## 2023-03-24 NOTE — PROGRESS NOTES
"Patient with one episode overnight of severe agitation/confusion/paranoia/combativeness. Patient swung multiple times at this RN while attempting to flush IV and give scheduled IV ativan. Educated on indication for flush/scheduled medication. Patient screaming \"get him out of here!\" While pointing and swinging at the wall opposite this RN. Patient trying to grab at and pull anything she can reach, yelling for , swearing at this RN, behavior not-redirectable.  Agitation resolved s/p IV ativan administration, reported to provider Thomas Mae MD who is aware.   "

## 2023-03-24 NOTE — PROGRESS NOTES
Pt arrived to floor via bed. A&Ox1. Having active hallucintations. VSS, on 2L NC. Denies pain at this time.  Ed at bedside. 2 RN skin check completed. Bed alarm on and bed in lowest/locked position. Call light and belongings within reach. No further needs at this time. Dr. Nadia Love at bedside for psych consult.

## 2023-03-24 NOTE — CONSULTS
"[facility] PSYCHIATRIC CONSULT:Intake  Reason for admission: presenting with recurrence of acute encephalopathy, recently discharged however at home was unable to be cared for, would not move from chair for more than 30 hours, refusing medications, blank stare, not speaking. after benzo administration patient does appear improved as she is now speaking, though she is oriented only to person and does not show insight into her current situation  Consulting Physician/APN/PA:   Reason for Consult:\"possible negative symptoms of schizophrenia preventing SNF placement\"  Consultant: Nadia Love MD    Legal Status  vol      CC: she tries to answer but is confused  HPI: 71 yo female who thinks she is at [facility] and is confused about other things as well. She doesn't provide a clear hx.  present and he reports the following: they've been  for 45 years. Up until 4 years ago she never had any psychiatric issues of any kind. Then began having \"episodes\" of AVH, mainly hearing voices that she thought were him talking even at night when he was snoring. She was not depressed, SI, HI, violent, anxious or manic. Slowly the hallucinations have worsened in frequency and intensity. He is not sure that this combination of meds is that effective.  She also get confused intermittently and will not recognize her home. Her visual courtney are of people in the house.    When well she is fully oriented. . He uses propranolol for sleep which usually works. This past week he has been crushing meds to get her to take them.     Per Pharm: cymbalta 30 hs, lamictal 100 hs, abilify 15 mg hs, propranolol the latter used by  to sleep which usually works. Per , on risperdol and topamax as well.    Chart(s) Review:  None on file    Medical ROS:  Review of systems: cannot participate    Pain: back  Neurological:  , no CVAs, no TBIs despite falls because he is usually with her and stops her falling fully, no sz. " "Tremors are new, particularly her R hand. She was slurring her speech the first few days here, that was new. She is not slurring now.    [place] [place]  Vitals:Blood pressure (!) 140/65, pulse 82, temperature 36.9 °C (98.5 °F), temperature source Temporal, resp. rate 16, height 1.753 m (5' 9\"), weight 108 kg (237 lb 3.4 oz), SpO2 97 %, not currently breastfeeding.     General Appearance: obese, trying to cooperate, intermittent eye contact.  Musculoskeletal: lying in bed.  Alert/Orientation: alert but not oriented  Attn/Concentration: trouble focusing  Fund of Knowledge:not tested:  says she has a very high IQ.  Memory recent/remote: unable to assess  Speech: wnl though disorganized, mildly  Language:  fluent  Thought Content:+  psychosis , no  SI/HI     Thought Process: rambles a bit. loose  Insight/Judgement: impaired  Mood:not identified  Affect: euthymic    Past Medical Hx:     Past Medical History:   Diagnosis Date    Asthma 08/05/2021    Inhaler use daily and as needed.  \"Allergy induced\".    Bowel habit changes     diarrhea    Breath shortness 08/05/2021    \"Sometimes hard time catching my breath, usually from allergies\".    Emphysema of lung ([facility]) 08/05/2021    6-7 years ago, oxygen for about 4-5 months.  No longer needs O2.    Fibromyalgia     Heart burn     Hypertension     Hypothyroidism 2008    Indigestion     Migraine     Obesity     Pneumonia 2012    Psychiatric problem     Anxiety    Renal disorder 08/05/2021    Kidney stones - 20 years ago.    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 Father a .      Drugs/Alcohol: 1 glass wine daily. No drugs    Labs:  Lab Results   Component Value Date/Time    AMPHUR Negative 03/23/2023 1730    BARBSURINE Negative 03/23/2023 1730    BENZODIAZU Negative 03/23/2023 1730    COCAINEMET Negative 03/23/2023 1730    METHADONE Negative 03/23/2023 1730    OPIATES Negative 03/23/2023 1730    OXYCODN Negative 03/23/2023 1730    PCPURINE Negative 03/23/2023 1730    PROPOXY Negative 03/23/2023 1730    CANNABINOID Negative 03/23/2023 1730     Recent Labs     03/23/23  1500   WBC 11.8*   RBC 5.30   HEMOGLOBIN 16.4*   HEMATOCRIT 49.6*   MCV 93.6   MCH 30.9   RDW 44.8   PLATELETCT 231   MPV 11.4   NEUTSPOLYS 65.30   LYMPHOCYTES 24.00   MONOCYTES 7.30   EOSINOPHILS 2.70   BASOPHILS 0.40     Recent Labs     03/23/23  1610   SODIUM 144   POTASSIUM 3.8   CHLORIDE 104   CO2 25   GLUCOSE 99   BUN 21   CPKTOTAL 66       Cranial Imaging: personally reviewed  Cranial CT: hypdrocephalus, R  shunt, ischemic changes  Cranial MRI: 2021:\"marked\" ventriculometry     EKG: QTc:  492       Meds Current:  Scheduled Medications   Medication Dose Frequency    senna-docusate  2 Tablet BID    enoxaparin (LOVENOX) injection  40 mg DAILY AT 1800    [Held by provider] ARIPiprazole  15 mg DAILY    [Held by provider] DULoxetine  30 mg QHS    lisinopril  10 mg DAILY    propranolol  10 mg QHS    amLODIPine  10 mg DAILY     Allergies: Gabapentin      Assessement    1. Schizophrenia paranoid type   2  Encephalopathy, acute      Medical:    -back pain to such a degree that she has fallen, backwards, per  a few times and now doesn't want to walk  -NPH with  shunt       Recommendations:  Legal Status: vol       Discussed/voalted: REBECCA Mills MD    Medication and Other Recommendations: final orders as per Tx " "Tm  If pt is unable to go to a SNF it is because she does not meet criteria to go, medically/physically. It has nothing to do with her mental disorder  Increase abiilty to 20 mg/d when provider feels comfortable restarting.   3    no recommendations for now regarding cymbalta  4    was on risperdol and topamax as well  5    recommend EEG to see if she is encephalpathic: her \"schizophrenia\" without a family hx and at this age, is unusual. Furthermore she is disoriented. But that is not her baseline.  6  consider neurology consult for her new onset tremors particularly of R hand    Will continue to follow with you.    Thank you for the consult.        Discharge recommendations: per tx tm          "

## 2023-03-24 NOTE — ASSESSMENT & PLAN NOTE
*s/p  shunt on 1/13/22, followed by Dr. Glynn  *Still has difficulty with gait changes, urinary incontinence, and mental status changes

## 2023-03-24 NOTE — ED NOTES
Break RN: Called lab to inquire about procalcitonin lab order. Per sample handling, they will get it running with blood previously sent

## 2023-03-24 NOTE — CARE PLAN
The patient is Stable - Low risk of patient condition declining or worsening    Shift Goals  Clinical Goals: Safety, monitor observation, labs  Patient Goals: Rest  Family Goals: Rest, get some answers/placement      Problem: Knowledge Deficit - Standard  Goal: Patient and family/care givers will demonstrate understanding of plan of care, disease process/condition, diagnostic tests and medications  Outcome: Progressing     Problem: Skin Integrity  Goal: Skin integrity is maintained or improved  Outcome: Progressing     Problem: Fall Risk  Goal: Patient will remain free from falls  Outcome: Progressing     Problem: Pain - Standard  Goal: Alleviation of pain or a reduction in pain to the patient’s comfort goal  Outcome: Progressing

## 2023-03-24 NOTE — ASSESSMENT & PLAN NOTE
*Schizoaffect vs Schizophrenia    -Hold home Abilify 15 mg daily for concerns for possible neurologic malignant syndrome  -Hold home Lamictal 100 mg  -Psychiatry evaluation for negative symptoms due to inability to care for herself

## 2023-03-24 NOTE — THERAPY
Physical Therapy Contact Note    Patient Name: Sheyla Garcia  Age:  72 y.o., Sex:  female  Medical Record #: [id number]  Today's Date: 3/24/2023    PT Consult received/acknowledged. Pt pending psych consult, per chart review has been minimally responsive/cooperative. Will initiate PT eval as appropriate once pt able to participate. Of note, pt last seen 3/21 and was max A at that time with recommendations for post-acute placement prior to DC home.    Lisa Dougherty, PT, DPT  Ext. [phone]

## 2023-03-24 NOTE — ASSESSMENT & PLAN NOTE
Patient alert awake, AO x1, follows simple commands  Still has hallucination    -Vitamin B12 pending, TSH WNL    I discussed with psych, recommended to increase ability to 20 mg daily, no recommendation for Cymbalta, was on risperdol and topamax    EEG showed non-specific encephalopathy, negative for epileptiform. Psych also recommended neurology consult. I talked to les, however Dr Kaiser, is not convinced to see the patient    Previous physician spoke with neurology, Patient has memory issue before  shunt placement. Her acute encephalopathy could be related to NPH related dementia versus primary progressive dementia.  No further work-up indicated.     MRI brain no changes, unfortunately mental status changes likely patients new baseline

## 2023-03-24 NOTE — CARE PLAN
Problem: Knowledge Deficit - Standard  Goal: Patient and family/care givers will demonstrate understanding of plan of care, disease process/condition, diagnostic tests and medications  3/24/2023 1013 by Melita Escobedo R.N.  Outcome: Not Met  3/24/2023 0932 by Melita Escobedo R.N.  Outcome: Progressing     Problem: Skin Integrity  Goal: Skin integrity is maintained or improved  3/24/2023 1013 by Melita Escobedo R.N.  Outcome: Progressing  3/24/2023 0932 by Melita Escobedo R.N.  Outcome: Progressing     Problem: Fall Risk  Goal: Patient will remain free from falls  3/24/2023 1013 by Melita Escobedo R.N.  Outcome: Progressing  3/24/2023 0932 by Melita Escobedo R.N.  Outcome: Progressing     Problem: Pain - Standard  Goal: Alleviation of pain or a reduction in pain to the patient’s comfort goal  3/24/2023 1013 by Melita Escobedo R.N.  Outcome: Progressing  3/24/2023 0932 by Melita Escobedo R.N.  Outcome: Progressing   The patient is Watcher - Medium risk of patient condition declining or worsening    Shift Goals  Clinical Goals: MRI brain  Patient Goals: sleep  Family Goals: Rest, get some answers/placement    Progress made toward(s) clinical / shift goals:  MRI brain ordered, psych consult    Patient is not progressing towards the following goals:      Problem: Knowledge Deficit - Standard  Goal: Patient and family/care givers will demonstrate understanding of plan of care, disease process/condition, diagnostic tests and medications  3/24/2023 1013 by Melita Escobedo R.N.  Outcome: Not Met  3/24/2023 0932 by Melita Escobedo R.N.  Outcome: Progressing

## 2023-03-24 NOTE — PROGRESS NOTES
[facility] Medicine Daily Progress Note    Date of Service  3/24/2023    Chief Complaint  Sheyla Garcia is a 72 y.o. female admitted 3/23/2023 with change in mental status    Hospital Course  Patient is a 72 y.o. female w/ a PMHx of hypertension, hypothyroidism, asthma, normal pressure hydrocephalus w/  shunt followed by neurosurgery, uncontrolled schizophrenia,  recent admission from 03/13-03/22 for management of acute encephalopathy 2/2 UTI w/ abx completion on 3/18/23 who presented to the ED on 3/23/2023 with AMS.     History obtained from  as patient has nonsensical speech.   states she was admitted for altered mental status with UTI, she was evaluated by PT/OT and was referred for SNF, but was unable to take her due to her psychiatric condition.  When they came home yesterday, patient was refusing care.  She was resistant to move or take her medication, would spit them out.  She has been stuck on her chair for the past 30 hours.  Patient states she refused to move due to muscle weakness and slight rigidity.  She has been eating and  has been using a diaper. He also has a bedside commode for her.  Her  states she has been hearing voices telling her that her  is trying to kill her.  She has a long history of auditory hallucinations for the past 3 to 4 years, worse in the past 3 to 4 months.  She was diagnosed with schizophrenia and has been managed by a psychiatrist, Dr.Shaheen Spring. Unable to obtain records, however per PCP, on amitriptyline, abilify, lamictal, and propranolol. Has stopped Risperdal and Topamax. States she hasn't been taking her medications since discharge.     Patient does have a history of NPH s/p  shunt 1/13/22, followed by Dr. Glynn. She has been having persistent gait changes, urinary incontinence, and cognitive impairment. She does have a bladder sling years ago for urinary incontinence. She has recurrent falls and is fearful to walk. She has had  imaging for her knees with moderate osteoarthritic changes.     Interval Problem Update  Patient on 3/24/2023 is still altered    Patient occasionally mumbles,  eyes closed and minimally responsive    Psychiatry consult has been called    Her Abilify and Cymbalta on hold until seen by psychiatry        I have discussed this patient's plan of care and discharge plan at IDT rounds today with Case Management, Nursing, Nursing leadership, and other members of the IDT team.    Consultants/Specialty  psychiatry    Code Status  Full Code    Disposition  Patient is not medically cleared for discharge.   Anticipate discharge to to home with close outpatient follow-up.  I have placed the appropriate orders for post-discharge needs.    Review of Systems  Review of Systems   Unable to perform ROS: Mental status change      Physical Exam  Temp:  [36.1 °C (96.9 °F)-36.9 °C (98.5 °F)] 36.9 °C (98.5 °F)  Pulse:  [] 82  Resp:  [15-19] 16  BP: (130-173)/() 140/65  SpO2:  [90 %-98 %] 97 %    Physical Exam  Constitutional:       General: She is not in acute distress.     Appearance: She is not ill-appearing or toxic-appearing.   Eyes:      Extraocular Movements: Extraocular movements intact.      Pupils: Pupils are equal, round, and reactive to light.   Cardiovascular:      Rate and Rhythm: Normal rate and regular rhythm.      Pulses: Normal pulses.      Heart sounds: No murmur heard.    No friction rub. No gallop.   Pulmonary:      Effort: Pulmonary effort is normal. No respiratory distress.      Breath sounds: No wheezing.   Abdominal:      General: Abdomen is flat. There is no distension.      Palpations: There is no mass.      Tenderness: There is no abdominal tenderness. There is no guarding or rebound.      Hernia: No hernia is present.   Musculoskeletal:         General: No swelling or deformity. Normal range of motion.      Cervical back: Normal range of motion.      Right lower leg: No edema.   Skin:     General:  Skin is warm.      Capillary Refill: Capillary refill takes 2 to 3 seconds.      Coloration: Skin is not jaundiced or pale.      Findings: No bruising, erythema or rash.   Neurological:      Comments: Occasionally mumbles when spoken to    Will not follow commands       Fluids    Intake/Output Summary (Last 24 hours) at 3/24/2023 1253  Last data filed at 3/24/2023 0500  Gross per 24 hour   Intake 47.76 ml   Output 200 ml   Net -152.24 ml       Laboratory  Recent Labs     03/23/23  1500   WBC 11.8*   RBC 5.30   HEMOGLOBIN 16.4*   HEMATOCRIT 49.6*   MCV 93.6   MCH 30.9   MCHC 33.1*   RDW 44.8   PLATELETCT 231   MPV 11.4     Recent Labs     03/23/23  1610   SODIUM 144   POTASSIUM 3.8   CHLORIDE 104   CO2 25   GLUCOSE 99   BUN 21   CREATININE 0.77   CALCIUM 10.2                   Imaging  MR-BRAIN-WITH & W/O    (Results Pending)        Assessment/Plan  * Acute encephalopathy- (present on admission)  Assessment & Plan  *DDx: Drugs, CNS disease, metabolic disorders, infection, endocrine, infectious, fecal impaction, urinary retention   *Infectious work-up negative    -MRI brain w/ and w/o to evaluate NPH or for intracranial event  -Vitamin B12/Folate/ammonia ordered, TSH WNL  -Possibly 2/2 uncontrolled schizophrenia with negative symptoms  -Hold home SSRI/antipsychotics for now pending psychiatry evaluation          Hypomagnesemia  Assessment & Plan  *Mg 1.6    -Replete as needed    Leukocytosis- (present on admission)  Assessment & Plan  *WBC 11.8 with left shift of 7.72  *likely 2/2 reactive leukocytosis    -Continue to monitor for signs of infection    Polycythemia- (present on admission)  Assessment & Plan  *Hgb 16.4, MCV 93.6 (HgB 16.4 on 12/2022)  *2/2 sleep apnea    Insomnia  Assessment & Plan    -Continue home propranolol 10 mg every night    Tachycardia- (present on admission)  Assessment & Plan    *Unclear etiology, possible medication withdrawal/toxicity, malignant catatonia    -Hold  SSRI/antipsychotics    Schizophrenia ([facility])  Assessment & Plan  *Schizoaffect vs Schizophrenia    -Hold home Abilify 15 mg daily for concerns for possible neurologic malignant syndrome  -Hold home Lamictal 100 mg  -Psychiatry evaluation for negative symptoms due to inability to care for herself    Normal pressure hydrocephalus ([facility])- (present on admission)  Assessment & Plan  *s/p  shunt on 1/13/22, followed by Dr. Glynn  *Still has difficulty with gait changes, urinary incontinence, and mental status changes    -Repeat MRI brain w/ and w/o    Fibromyalgia  Assessment & Plan    -Hold home methocarbamol 750 PRN  -Hold home Duloxetine 30 mg, concern for possible serotonin syndrome?  -Acetaminophen 1g q8h PRN    Debility- (present on admission)  Assessment & Plan    -SNF placement,  unable to care for patient at home    Morbid obesity with BMI of 40.0-44.9, adult ([facility])- (present on admission)  Assessment & Plan    -Diet and exercise    KIRA (obstructive sleep apnea)- (present on admission)  Assessment & Plan  *noncompliant     -CPAP ordered    Asthma  Assessment & Plan  *Controlled, exacerbated by pine pollen    -Hold albuterol    Essential hypertension- (present on admission)  Assessment & Plan  *BP: (143-173)/() 148/67    -Continue home amlodipine 10 mg daily  -Continue home lisinopril 10 mg daily         VTE prophylaxis: SCDs/TEDs    I have performed a physical exam and reviewed and updated ROS and Plan today (3/24/2023). In review of yesterday's note (3/23/2023), there are no changes except as documented above.

## 2023-03-24 NOTE — ED NOTES
Med Rec complete per patient's spouse @ bedside  No oral antibiotics in the last 30 days per spouse  Allergies reviewed  Preferred Pharmacy: [facility] in [place]  Per spouse she has trouble swallowing her medications, she refused to take her morning medications (3/22/23)

## 2023-03-24 NOTE — ED PROVIDER NOTES
ED Provider Note    CHIEF COMPLAINT  Chief Complaint   Patient presents with    ALOC       EXTERNAL RECORDS REVIEWED  Inpatient Notes discharge summary was completed on 3/22/2023 patient ultimately status with urinary tract infection.  Full work-up ensued and she had a UTI received antibiotics.  Following that her mentation improved.  She was not a candidate for skilled nursing facility therefore she was discharged home with strict return precautions.     HPI/ROS  LIMITATION TO HISTORY   Select: Altered mental status / Confusion      Sheyla Garcia is a 72 y.o. female who presents with altered mental status.  Per the , the patient went home after her stay here was discharged on the 22nd and shortly thereafter had altered mental status dramatically.  She does have a history of schizophrenia and psychiatric abnormalities and he states that she has been acting extremely off in the last several hours.  When I talk to the patient, she has no complaints, she denies any pain, denies vomiting, denies loss of sensation or strength in her arms or legs yet is a very unreliable exam.    PAST MEDICAL HISTORY   has a past medical history of Asthma (08/05/2021), Bowel habit changes, Breath shortness (08/05/2021), Emphysema of lung (HCC) (08/05/2021), Fibromyalgia, Heart burn, Hypertension, Hypothyroidism (2008), Indigestion, Migraine, Obesity, Pneumonia (2012), Psychiatric problem, Renal disorder (08/05/2021), Seizure ([facility]) (2017), and Urinary incontinence.    SURGICAL HISTORY   has a past surgical history that includes nasal septal reconstruction; tonsillectomy; bladder sling female; other surgical procedure; orif, knee (1/3/08); other orthopedic surgery; gyn surgery; thoracic fusion o-arm (Right, 10/13/2021); and cath placement capd (N/A, 10/13/2021).    FAMILY HISTORY  Family History   Problem Relation Age of Onset    Heart Disease Mother         chf    Cancer Father         lung, berrylium exposure    Stroke Father          tia's    Cancer Maternal Grandfather         lung    Diabetes Neg Hx        SOCIAL HISTORY  Social History     Tobacco Use    Smoking status: Former     Years: 2.00     Types: Cigarettes     Quit date: 1986     Years since quittin.2    Smokeless tobacco: Never    Tobacco comments:     Age [age]    Vaping Use    Vaping Use: Never used   Substance and Sexual Activity    Alcohol use: Yes     Comment: Wine - daily    Drug use: Never    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:  , realtor       CURRENT MEDICATIONS  Home Medications       Reviewed by Andrew Dennison (Pharmacy Tech) on 23 at 1814  Med List Status: Complete     Medication Last Dose Status   albuterol 108 (90 Base) MCG/ACT Aero Soln inhalation aerosol UNK Active   amLODIPine (NORVASC) 10 MG Tab 3/22/2023 Active   ARIPiprazole (ABILIFY) 15 MG Tab 3/22/2023 Active   DULoxetine (CYMBALTA) 30 MG Cap DR Particles 3/22/2023 Active   lamoTRIgine (LAMICTAL) 100 MG Tab 3/12/2023 Active   lisinopril (PRINIVIL) 10 MG Tab 3/12/2023 Active   methocarbamol (ROBAXIN) 750 MG Tab 3/22/2023 Active   propranolol (INDERAL) 10 MG Tab 3/12/2023 Active                    ALLERGIES  Allergies   Allergen Reactions    Gabapentin Unspecified     seizure       PHYSICAL EXAM  VITAL SIGNS: BP (!) 161/69   Pulse (!) 101   Temp 36.3 °C (97.4 °F) (Temporal)   Resp 19   SpO2 93%      Nursing notes and vitals reviewed.  Constitutional: Well developed, Well nourished, No acute distress, Non-toxic appearance.  Disheveled  Eyes: PERRLA, EOMI, Conjunctiva normal, No discharge.   HENT: Normocephalic, Atraumatic, moist mucous membranes, no facial edema   Cardiovascular: Normal heart rate, Normal rhythm, No murmurs, No rubs, No gallops.   Thorax & Lungs: No respiratory distress, No rales, No rhonchi, No wheezing, No chest tenderness.   Abdomen: Bowel sounds normal, Soft, No tenderness, No guarding, No rebound, No masses, No pulsatile masses.   Skin: Warm, Dry, No  erythema, No rash.   Extremities: No deformity, no edema, good range of motion range of motion upper lower extremes bilaterally  Neurologic: Alert & oriented x she is telling her name and date of birth, she cannot tell me where she lives, where she is currently, she is moving arms and legs without difficulty.    Psychiatric: Pressured speech    DIAGNOSTIC STUDIES / PROCEDURES  EKG  I have independently interpreted this EKG  Sinus tachycardia on the monitor    LABS  Results for orders placed or performed during the hospital encounter of 03/23/23   CBC With Differential   Result Value Ref Range    WBC 11.8 (H) 4.8 - 10.8 K/uL    RBC 5.30 4.20 - 5.40 M/uL    Hemoglobin 16.4 (H) 12.0 - 16.0 g/dL    Hematocrit 49.6 (H) 37.0 - 47.0 %    MCV 93.6 81.4 - 97.8 fL    MCH 30.9 27.0 - 33.0 pg    MCHC 33.1 (L) 33.6 - 35.0 g/dL    RDW 44.8 35.9 - 50.0 fL    Platelet Count 231 164 - 446 K/uL    MPV 11.4 9.0 - 12.9 fL    Neutrophils-Polys 65.30 44.00 - 72.00 %    Lymphocytes 24.00 22.00 - 41.00 %    Monocytes 7.30 0.00 - 13.40 %    Eosinophils 2.70 0.00 - 6.90 %    Basophils 0.40 0.00 - 1.80 %    Immature Granulocytes 0.30 0.00 - 0.90 %    Nucleated RBC 0.00 /100 WBC    Neutrophils (Absolute) 7.72 (H) 2.00 - 7.15 K/uL    Lymphs (Absolute) 2.84 1.00 - 4.80 K/uL    Monos (Absolute) 0.86 (H) 0.00 - 0.85 K/uL    Eos (Absolute) 0.32 0.00 - 0.51 K/uL    Baso (Absolute) 0.05 0.00 - 0.12 K/uL    Immature Granulocytes (abs) 0.04 0.00 - 0.11 K/uL    NRBC (Absolute) 0.00 K/uL   Lactic Acid   Result Value Ref Range    Lactic Acid 1.5 0.5 - 2.0 mmol/L   Urinalysis    Specimen: Urine   Result Value Ref Range    Color Yellow     Character Clear     Specific Gravity 1.024 <1.035    Ph 5.5 5.0 - 8.0    Glucose Negative Negative mg/dL    Ketones 40 (A) Negative mg/dL    Protein Negative Negative mg/dL    Bilirubin Negative Negative    Urobilinogen, Urine 0.2 Negative    Nitrite Negative Negative    Leukocyte Esterase Negative Negative    Occult  Blood Negative Negative    Micro Urine Req see below    Magnesium   Result Value Ref Range    Magnesium 1.6 1.5 - 2.5 mg/dL   URINE DRUG SCREEN   Result Value Ref Range    Amphetamines Urine Negative Negative    Barbiturates Negative Negative    Benzodiazepines Negative Negative    Cocaine Metabolite Negative Negative    Methadone Negative Negative    Opiates Negative Negative    Oxycodone Negative Negative    Phencyclidine -Pcp Negative Negative    Propoxyphene Negative Negative    Cannabinoid Metab Negative Negative   Comp Metabolic Panel   Result Value Ref Range    Sodium 144 135 - 145 mmol/L    Potassium 3.8 3.6 - 5.5 mmol/L    Chloride 104 96 - 112 mmol/L    Co2 25 20 - 33 mmol/L    Anion Gap 15.0 7.0 - 16.0    Glucose 99 65 - 99 mg/dL    Bun 21 8 - 22 mg/dL    Creatinine 0.77 0.50 - 1.40 mg/dL    Calcium 10.2 8.5 - 10.5 mg/dL    AST(SGOT) 17 12 - 45 U/L    ALT(SGPT) 10 2 - 50 U/L    Alkaline Phosphatase 107 (H) 30 - 99 U/L    Total Bilirubin 0.5 0.1 - 1.5 mg/dL    Albumin 3.9 3.2 - 4.9 g/dL    Total Protein 7.7 6.0 - 8.2 g/dL    Globulin 3.8 (H) 1.9 - 3.5 g/dL    A-G Ratio 1.0 g/dL   PHOSPHORUS   Result Value Ref Range    Phosphorus 2.6 2.5 - 4.5 mg/dL   DIAGNOSTIC ALCOHOL   Result Value Ref Range    Diagnostic Alcohol <10.1 <10.1 mg/dL   CORRECTED CALCIUM   Result Value Ref Range    Correct Calcium 10.3 8.5 - 10.5 mg/dL   ESTIMATED GFR   Result Value Ref Range    GFR (CKD-EPI) 83 >60 mL/min/1.73 m 2   PROCALCITONIN   Result Value Ref Range    Procalcitonin 0.06 <0.25 ng/mL           COURSE & MEDICAL DECISION MAKING    ED Observation Status? Yes; I am placing the patient in to an observation status due to a diagnostic uncertainty as well as therapeutic intensity. Patient placed in observation status at 1457 PM, 3/23/2023.     Observation plan is as follows: IV, labs, antibiotics as needed, consultation.    Upon Reevaluation, the patient's condition has: not improved; and will be escalated to  hospitalization.    Patient discharged from ED Observation status at 2000 (Time) 3/23/23 (Date).     INITIAL ASSESSMENT, COURSE AND PLAN  Care Narrative: This is a pleasant 72-year-old female with altered mental status.  Here in the emergency department, the patient has no evidence of severe infection.  Initially thought she had sepsis secondary to urinary tract infection received IV antibiotics, IV fluids and cultures have been sent.  Her labs relatively came back as negative.  She recently had an evaluation including CT scan of the brain, multiple evaluation with labs.  This point do not believe she required further imaging.  We have observed the patient for several hours and she still has altered mental status and I do need her to be hospitalized for clearance for her ultimately status that she clear last time she was here.  She may need skilled nursing facility as per the nursing staff her  is saying that she is unable to be cared for by him at home.  I discussed the patient with [facility] internal medicine for hospitalization of this patient agrees excepts hospitalization.        FINAL DIAGNOSIS  Altered mental status       Electronically signed by: Kaiden Cameron D.O., 3/23/2023 8:37 PM

## 2023-03-24 NOTE — PROGRESS NOTES
4 Eyes Skin Assessment Completed by BERLIN Nelson and BERLIN Rizvi.    Head WDL  Ears WDL  Nose WDL  Mouth WDL  Neck WDL  Breast/Chest Redness  Shoulder Blades WDL  Spine WDL  (R) Arm/Elbow/Hand Bruising and Scab  (L) Arm/Elbow/Hand Bruising and Scab  Abdomen WDL  Groin WDL  Scrotum/Coccyx/Buttocks Redness and Blanching  (R) Leg WDL  (L) Leg WDL  (R) Heel/Foot/Toe WDL  (L) Heel/Foot/Toe WDL          Devices In Places Blood Pressure Cuff and Nasal Cannula      Interventions In Place Gray Ear Foams, Waffle Overlay, TAP System, Q2 Turns, Barrier Cream, and Heels Loaded W/Pillows    Possible Skin Injury No    Pictures Uploaded Into Epic N/A  Wound Consult Placed N/A  RN Wound Prevention Protocol Ordered Yes

## 2023-03-24 NOTE — ASSESSMENT & PLAN NOTE
-Hold home methocarbamol 750 PRN  -Hold home Duloxetine 30 mg, concern for possible serotonin syndrome?  -Acetaminophen 1g q8h PRN

## 2023-03-24 NOTE — PROGRESS NOTES
4 Eyes Skin Assessment Completed by BERLIN Morrison and BERLIN Smith.    Head WDL  Ears WDL  Nose WDL  Mouth WDL  Neck WDL  Breast/Chest Redness under breasts   Shoulder Blades WDL  Spine WDL  (R) Arm/Elbow/Hand Redness, Bruising, and Scab  (L) Arm/Elbow/Hand Redness, Bruising, and Scab  Abdomen WDL  Groin WDL  Scrotum/Coccyx/Buttocks Redness and Blanching  (R) Leg WDL  (L) Leg WDL  (R) Heel/Foot/Toe WDL  (L) Heel/Foot/Toe WDL          Devices In Places Blood Pressure Cuff and Pulse Ox      Interventions In Place Pressure Redistribution Mattress    Possible Skin Injury No    Pictures Uploaded Into Epic N/A  Wound Consult Placed N/A  RN Wound Prevention Protocol Ordered Yes

## 2023-03-24 NOTE — ASSESSMENT & PLAN NOTE
*Unclear etiology, possible medication withdrawal/toxicity, malignant catatonia    -Hold SSRI/antipsychotics

## 2023-03-24 NOTE — THERAPY
Occupational Therapy Contact Note    Patient Name: Sheyla Garcia  Age:  72 y.o., Sex:  female  Medical Record #: [id number]  Today's Date: 3/24/2023    OT consult rec'd. Pt is pending psychiatric consult and is minimally responsive/cooperative. Will hold OT eval and will re-attempt as appropriate/able.

## 2023-03-24 NOTE — PROGRESS NOTES
Breakfast served,pt said she cannot swallow bread if it is hard,pt stated she wants soft easy to chew food,diet changed.

## 2023-03-24 NOTE — RESPIRATORY CARE
"COPD EDUCATION by COPD CLINICAL EDUCATOR  3/24/2023 at 3:45 PM by Maryjo Whyte, RRT     Patient unable to participate in meaningful discussion at this time. Our team will be available upon request should her condition change.  An Action Plan was completed in the EMR to reflect current Respiratory Medication use.                      COPD Assessment  COPD Clinical Specialists ONLY  COPD Education Initiated: Yes--Short Intervention (attempted; pt AMS and with inability to engage; Psych is following Action Plan completed has KIRA and Asthma/Emphysema on CT team available when condition changes on request)  DME Company: [email or web address]  Physician Name: VAHID POLANCO  Pulmonologist Name: none prior  Is this a COPD exacerbation patient?: No    PFT Results    No results found for: PFT    Meds to Beds  Would the patient like to opt in for Bedside Medication Delivery at Discharge?: Yes, interested     MY COPD ACTION PLAN     It is recommended that patients and physicians /healthcare providers complete this action plan together. This plan should be discussed at each physician visit and updated as needed.    The green, yellow and red zones show groups of symptoms of COPD. This list of symptoms is not comprehensive, and you may experience other symptoms. In the \"Actions\" column, your healthcare provider has recommended actions for you to take based on your symptoms.    Patient Name: Sheyla Garcia   YOB: 1950   Last Updated on:     Green Zone:  I am doing well today Actions     Usual activitiy and exercise level   Take daily medications     Usual amounts of cough and phlegm/mucus   Use oxygen as prescribed     Sleep well at night   Continue regular exercise/diet plan     Appetite is good   At all times avoid cigarette smoke, inhaled irritants     Daily Medications (these medications are taken every day):                Yellow Zone:  I am having a bad day or a COPD flare Actions     More breathless than usual   " "Continue daily medications     I have less energy for my daily activities   Use quick relief inhaler as ordered     Increased or thicker phlegm/mucus   Use oxygen as prescribed     Using quick relief inhaler/nebulizer more often   Get plenty of rest     Swelling of ankles more than usual   Use pursed lip breathing     More coughing than usual   At all times avoid cigarette smoke, inhaled irritants     I feel like I have a \"chest cold\"     Poor sleep and my symptoms woke me up     My appetite is not good     My medicine is not helping      Call provider immediately if symptoms don’t improve     Continue daily medications, add rescue medications:   Albuterol 2 Puffs Every 6 hours PRN       Medications to be used during a flare up, (as Discussed with Provider):              Red Zone:  I need urgent medical care Actions     Severe shortness of breath even at rest   Call 911 or seek medical care immediately     Not able to do any activity because of breathing      Fever or shaking chills      Feeling confused or very drowsy       Chest pains      Coughing up blood                  " negative...

## 2023-03-25 PROCEDURE — 700111 HCHG RX REV CODE 636 W/ 250 OVERRIDE (IP): Performed by: STUDENT IN AN ORGANIZED HEALTH CARE EDUCATION/TRAINING PROGRAM

## 2023-03-25 PROCEDURE — 700111 HCHG RX REV CODE 636 W/ 250 OVERRIDE (IP)

## 2023-03-25 PROCEDURE — 99222 1ST HOSP IP/OBS MODERATE 55: CPT | Performed by: PSYCHIATRY & NEUROLOGY

## 2023-03-25 PROCEDURE — 700102 HCHG RX REV CODE 250 W/ 637 OVERRIDE(OP): Performed by: HOSPITALIST

## 2023-03-25 PROCEDURE — A9270 NON-COVERED ITEM OR SERVICE: HCPCS | Performed by: STUDENT IN AN ORGANIZED HEALTH CARE EDUCATION/TRAINING PROGRAM

## 2023-03-25 PROCEDURE — 99233 SBSQ HOSP IP/OBS HIGH 50: CPT | Performed by: STUDENT IN AN ORGANIZED HEALTH CARE EDUCATION/TRAINING PROGRAM

## 2023-03-25 PROCEDURE — 700102 HCHG RX REV CODE 250 W/ 637 OVERRIDE(OP): Performed by: STUDENT IN AN ORGANIZED HEALTH CARE EDUCATION/TRAINING PROGRAM

## 2023-03-25 PROCEDURE — A9270 NON-COVERED ITEM OR SERVICE: HCPCS | Performed by: HOSPITALIST

## 2023-03-25 PROCEDURE — 770001 HCHG ROOM/CARE - MED/SURG/GYN PRIV*

## 2023-03-25 RX ORDER — HALOPERIDOL 5 MG/ML
5 INJECTION INTRAMUSCULAR ONCE
Status: COMPLETED | OUTPATIENT
Start: 2023-03-25 | End: 2023-03-25

## 2023-03-25 RX ADMIN — LISINOPRIL 10 MG: 10 TABLET ORAL at 05:40

## 2023-03-25 RX ADMIN — ARIPIPRAZOLE 20 MG: 10 TABLET ORAL at 05:40

## 2023-03-25 RX ADMIN — DOCUSATE SODIUM 50 MG AND SENNOSIDES 8.6 MG 2 TABLET: 8.6; 5 TABLET, FILM COATED ORAL at 17:46

## 2023-03-25 RX ADMIN — ENOXAPARIN SODIUM 40 MG: 100 INJECTION SUBCUTANEOUS at 17:46

## 2023-03-25 RX ADMIN — AMLODIPINE BESYLATE 10 MG: 10 TABLET ORAL at 05:40

## 2023-03-25 RX ADMIN — HALOPERIDOL LACTATE 5 MG: 5 INJECTION, SOLUTION INTRAMUSCULAR at 00:43

## 2023-03-25 RX ADMIN — LORAZEPAM 0.5 MG: 2 INJECTION INTRAMUSCULAR; INTRAVENOUS at 10:26

## 2023-03-25 RX ADMIN — PROPRANOLOL HYDROCHLORIDE 10 MG: 10 TABLET ORAL at 22:14

## 2023-03-25 ASSESSMENT — COGNITIVE AND FUNCTIONAL STATUS - GENERAL
HELP NEEDED FOR BATHING: A LOT
WALKING IN HOSPITAL ROOM: A LOT
DRESSING REGULAR UPPER BODY CLOTHING: A LOT
SUGGESTED CMS G CODE MODIFIER DAILY ACTIVITY: CL
CLIMB 3 TO 5 STEPS WITH RAILING: A LOT
STANDING UP FROM CHAIR USING ARMS: A LOT
EATING MEALS: A LOT
SUGGESTED CMS G CODE MODIFIER MOBILITY: CL
DRESSING REGULAR LOWER BODY CLOTHING: A LOT
TOILETING: A LOT
DAILY ACTIVITIY SCORE: 12
TURNING FROM BACK TO SIDE WHILE IN FLAT BAD: A LOT
MOBILITY SCORE: 12
PERSONAL GROOMING: A LOT
MOVING FROM LYING ON BACK TO SITTING ON SIDE OF FLAT BED: A LOT
MOVING TO AND FROM BED TO CHAIR: A LOT

## 2023-03-25 ASSESSMENT — FIBROSIS 4 INDEX: FIB4 SCORE: 1.629052127965528747

## 2023-03-25 NOTE — PROGRESS NOTES
[facility] Medicine Daily Progress Note    Date of Service  3/25/2023    Chief Complaint  Sheyla Garcia is a 72 y.o. female admitted 3/23/2023 with change in mental status    Hospital Course  Patient is a 72 y.o. female w/ a PMHx of hypertension, hypothyroidism, asthma, normal pressure hydrocephalus w/  shunt followed by neurosurgery, uncontrolled schizophrenia,  recent admission from 03/13-03/22 for management of acute encephalopathy 2/2 UTI w/ abx completion on 3/18/23 who presented to the ED on 3/23/2023 with AMS.     History obtained from  as patient has nonsensical speech.   states she was admitted for altered mental status with UTI, she was evaluated by PT/OT and was referred for SNF, but was unable to take her due to her psychiatric condition.  When they came home yesterday, patient was refusing care.  She was resistant to move or take her medication, would spit them out.  She has been stuck on her chair for the past 30 hours.  Patient states she refused to move due to muscle weakness and slight rigidity.  She has been eating and  has been using a diaper. He also has a bedside commode for her.  Her  states she has been hearing voices telling her that her  is trying to kill her.  She has a long history of auditory hallucinations for the past 3 to 4 years, worse in the past 3 to 4 months.  She was diagnosed with schizophrenia and has been managed by a psychiatrist, Dr.Shaheen Spring. Unable to obtain records, however per PCP, on amitriptyline, abilify, lamictal, and propranolol. Has stopped Risperdal and Topamax. States she hasn't been taking her medications since discharge.     Patient does have a history of NPH s/p  shunt 1/13/22, followed by Dr. Glynn. She has been having persistent gait changes, urinary incontinence, and cognitive impairment. She does have a bladder sling years ago for urinary incontinence. She has recurrent falls and is fearful to walk. She has had  imaging for her knees with moderate osteoarthritic changes.     Interval Problem Update  Patient alert awake, AO x1, follows simple commands    Per the  at bedside, patient continues to have hallucination  Worsening right hand tremor    I discussed with psych, recommended to increase ability to 20 mg daily, no recommendation for Cymbalta, was on risperdol and topamax    EEG showed non-specific encephalopathy, negative for epileptiform. Psych also recommended neurology consult. I talked to les, however Dr Kaiser, is not convinced to see the patient.    MRI brain pending    I have discussed this patient's plan of care and discharge plan at IDT rounds today with Case Management, Nursing, Nursing leadership, and other members of the IDT team.    Consultants/Specialty  psychiatry    Code Status  Full Code    Disposition  Patient is not medically cleared for discharge.   Anticipate discharge to to home with close outpatient follow-up.  I have placed the appropriate orders for post-discharge needs.    Review of Systems  Review of Systems   Unable to perform ROS: Mental status change      Physical Exam  Temp:  [36.1 °C (96.9 °F)-36.8 °C (98.2 °F)] 36.1 °C (96.9 °F)  Pulse:  [] 100  Resp:  [17-18] 17  BP: (126-146)/(52-85) 146/85  SpO2:  [97 %-98 %] 98 %    Physical Exam  Constitutional:       General: She is not in acute distress.     Appearance: She is not ill-appearing or toxic-appearing.   Eyes:      Extraocular Movements: Extraocular movements intact.      Pupils: Pupils are equal, round, and reactive to light.   Cardiovascular:      Rate and Rhythm: Normal rate and regular rhythm.      Pulses: Normal pulses.      Heart sounds: No murmur heard.    No friction rub. No gallop.   Pulmonary:      Effort: Pulmonary effort is normal. No respiratory distress.      Breath sounds: No wheezing.   Abdominal:      General: Abdomen is flat. There is no distension.      Palpations: There is no mass.      Tenderness:  There is no abdominal tenderness. There is no guarding or rebound.      Hernia: No hernia is present.   Musculoskeletal:         General: No swelling or deformity. Normal range of motion.      Cervical back: Normal range of motion.      Right lower leg: No edema.   Skin:     General: Skin is warm.      Capillary Refill: Capillary refill takes 2 to 3 seconds.      Coloration: Skin is not jaundiced or pale.      Findings: No bruising, erythema or rash.   Neurological:      Mental Status: She is disoriented.      Motor: Weakness present.       Fluids    Intake/Output Summary (Last 24 hours) at 3/25/2023 1604  Last data filed at 3/25/2023 0600  Gross per 24 hour   Intake 100 ml   Output --   Net 100 ml       Laboratory  Recent Labs     03/23/23  1500   WBC 11.8*   RBC 5.30   HEMOGLOBIN 16.4*   HEMATOCRIT 49.6*   MCV 93.6   MCH 30.9   MCHC 33.1*   RDW 44.8   PLATELETCT 231   MPV 11.4     Recent Labs     03/23/23  1610   SODIUM 144   POTASSIUM 3.8   CHLORIDE 104   CO2 25   GLUCOSE 99   BUN 21   CREATININE 0.77   CALCIUM 10.2                   Imaging  MR-BRAIN-WITH & W/O    (Results Pending)        Assessment/Plan  * Acute encephalopathy- (present on admission)  Assessment & Plan  *DDx: Drugs, CNS disease, metabolic disorders, infection, endocrine, infectious, fecal impaction, urinary retention   *Infectious work-up negative    Patient alert awake, AO x1, follows simple commands    Per the  at bedside, patient continues to have hallucination    -Vitamin B12/Folate/ammonia ordered, TSH WNL  -Possibly 2/2 uncontrolled schizophrenia with negative symptoms    I discussed with psych, recommended to increase ability to 20 mg daily, no recommendation for Cymbalta, was on risperdol and topamax    EEG showed non-specific encephalopathy, negative for epileptiform. Psych also recommended neurology consult. I talked to les, however Dr Kaiser, is not convinced to see the patient    MRI brain  pending          Hypomagnesemia  Assessment & Plan  *Mg 1.6    -Replete as needed    Leukocytosis- (present on admission)  Assessment & Plan  *WBC 11.8 with left shift of 7.72  *likely 2/2 reactive leukocytosis    -Continue to monitor for signs of infection    Polycythemia- (present on admission)  Assessment & Plan  *Hgb 16.4, MCV 93.6 (HgB 16.4 on 12/2022)  *2/2 sleep apnea    Insomnia  Assessment & Plan    -Continue home propranolol 10 mg every night    Tachycardia- (present on admission)  Assessment & Plan    *Unclear etiology, possible medication withdrawal/toxicity, malignant catatonia    -Hold SSRI/antipsychotics    Schizophrenia ([facility])  Assessment & Plan  *Schizoaffect vs Schizophrenia    -Hold home Abilify 15 mg daily for concerns for possible neurologic malignant syndrome  -Hold home Lamictal 100 mg  -Psychiatry evaluation for negative symptoms due to inability to care for herself    Normal pressure hydrocephalus (HCC)- (present on admission)  Assessment & Plan  *s/p  shunt on 1/13/22, followed by Dr. Glynn  *Still has difficulty with gait changes, urinary incontinence, and mental status changes    -Repeat MRI brain w/ and w/o    Fibromyalgia  Assessment & Plan    -Hold home methocarbamol 750 PRN  -Hold home Duloxetine 30 mg, concern for possible serotonin syndrome?  -Acetaminophen 1g q8h PRN    Debility- (present on admission)  Assessment & Plan    -SNF placement,  unable to care for patient at home    Morbid obesity with BMI of 40.0-44.9, adult ([facility])- (present on admission)  Assessment & Plan    -Diet and exercise    KIRA (obstructive sleep apnea)- (present on admission)  Assessment & Plan  *noncompliant     -CPAP ordered    Asthma  Assessment & Plan  *Controlled, exacerbated by pine pollen    -Hold albuterol    Essential hypertension- (present on admission)  Assessment & Plan  *BP: (143-173)/() 148/67    -Continue home amlodipine 10 mg daily  -Continue home lisinopril 10 mg daily          VTE prophylaxis: SCDs/TEDs    I have performed a physical exam and reviewed and updated ROS and Plan today (3/25/2023). In review of yesterday's note (3/24/2023), there are no changes except as documented above.

## 2023-03-25 NOTE — CARE PLAN
The patient is Watcher - Medium risk of patient condition declining or worsening    Shift Goals  Clinical Goals: Safety  Patient Goals: Sleep  Family Goals: SHAZIA    Progress made toward(s) clinical / shift goals:  Pt. Became very combative tonight and did not want anyone to touch her. She also was very confused and having hallucinations of other people in the room. She pulled out both of her Ivs and had to be given IM haldol for a bedding change so that she wouldn't hurt staff.     Problem: Knowledge Deficit - Standard  Goal: Patient and family/care givers will demonstrate understanding of plan of care, disease process/condition, diagnostic tests and medications  Outcome: Progressing     Problem: Skin Integrity  Goal: Skin integrity is maintained or improved  Outcome: Progressing     Problem: Fall Risk  Goal: Patient will remain free from falls  Outcome: Progressing     Problem: Pain - Standard  Goal: Alleviation of pain or a reduction in pain to the patient’s comfort goal  Outcome: Progressing

## 2023-03-25 NOTE — PROCEDURES
INPATIENT ROUTINE VIDEO ELECTROENCEPHALOGRAM REPORT    REFERRING PROVIDER: Dr. Mills   DOS: 3/24/2023;   ROOM: [place] [id number]  TOTAL RECORDING TIME: 0 hours and 30 minutes of total recording time    INDICATION:  Sheyla Garcia 72 y.o. female presenting with altered mental status    RELEVANT TREATMENTS/MEDICATIONS:  No AEDs       TECHNIQUE:   Routine VEEG was set up by a Neurodiagnostic technologist who performed education to the patient and staff. A minimum of 23 electrodes and 23 channel recording was setup and performed by Neurodiagnostic technologist, in accordance with the international 10-20 system. The study was reviewed in bipolar and referential montages. The recording examined the patient in the  awake and drowsy state(s).     DESCRIPTION OF THE RECORD:  EEG background: During maximal wakefulness, the background was continuous, intermittently asymmetrical, and poorly defined and/or fragmented 6-7 Hz posterior dominant rhythm, with a mixture of sharply contoured theta and delta activity.  Reactivity  was seen. State changes were seen.  During drowsiness, a loss of myogenic artifact  and theta/delta frequencies were seen.     EEG Sleep: N2 sleep architecture was not seen.    ICTAL AND INTERICTAL FINDINGS:   No focal or generalized epileptiform activity noted.     Occasional right hemispheric theta/delta polymorphic slowing and mild attenuation of waveforms.    No definite seizures.     ACTIVATION PROCEDURES:   Intermittent Photic stimulation was performed in a stepwise fashion from 1 to 30 Hz and did not elicited any abnormalities on EEG.     EKG: Sampling of the EKG recording showed tachycardia when not obscured by artifact    EVENTS:  None    INTERPRETATION:   Abnormal video EEG recording in the awake and drowsy state(s):  - Mild to moderate background slowing suggestive of diffuse/multifocal cerebral dysfunction and consistent with a non-specific encephalopathy. Clinical correlation recommended.  - Occasional  right hemispheric polymorphic slowing suggestive of focal dysfunction and/or underlying structural lesion.   - No definitive epileptiform discharges were seen.  - No definitive seizures.   -Compared to the EEG done in June, 2021, the background is diffusely slower reflecting an underlying encephalopathy. Also, the right hemispheric slowing is similar to that described in the prior report.     Note: This EEG does not rule the possibility of seizures  If the clinical suspicion remains high for seizures, a prolonged recording to capture clinical or subclinical events may be helpful.      Wicho Saenz MD  Department of Neurology at [facility]  General Neurologist and Epileptologist  Director of [facility] Level III Comprehensive Epilepsy Program  Professor of Clinical Neurology, [facility].   Phone: [phone]  Fax: [phone]  E-mail: [email or web address]

## 2023-03-25 NOTE — CARE PLAN
The patient is Stable - Low risk of patient condition declining or worsening    Shift Goals  Clinical Goals: MRI brain  Patient Goals: sleep  Family Goals: Rest, get some answers/placement    Progress made toward(s) clinical / shift goals:    Problem: Knowledge Deficit - Standard  Goal: Patient and family/care givers will demonstrate understanding of plan of care, disease process/condition, diagnostic tests and medications  Outcome: Progressing     Problem: Skin Integrity  Goal: Skin integrity is maintained or improved  Outcome: Progressing     Problem: Fall Risk  Goal: Patient will remain free from falls  Outcome: Progressing     Problem: Pain - Standard  Goal: Alleviation of pain or a reduction in pain to the patient’s comfort goal  Outcome: Progressing       Patient is not progressing towards the following goals:

## 2023-03-25 NOTE — CONSULTS
Neurology Initial Consult H&P  Neurohospitalist Service, [facility]    Referring Physician: Becca Tinsley M.D.    Chief Complaint   Patient presents with    ALOC       HPI: 72-year-old female admitted for worsening encephalopathy and sedations.  Patient's been having memory issues for some time now.  Received a  shunt for NPH back and 2022 January.  She had hydrocephalus at least before 2019.  As shown on the MRI in 2019.  CT scan here on this admission was unremarkable for any new findings.  She was brought back here due to refusing care.  Hallucinations being managed by psychiatry.  At this time patient has no insight.  Condition.  Is awake and oriented to herself only.  EEG is unremarkable showed generalized slowing nonspecific findings.      Review of systems: Cannot obtain due to patient's clinical condition   past Medical History:    has a past medical history of Asthma (08/05/2021), Bowel habit changes, Breath shortness (08/05/2021), Emphysema of lung (HCC) (08/05/2021), Fibromyalgia, Heart burn, Hypertension, Hypothyroidism (2008), Indigestion, Migraine, Obesity, Pneumonia (2012), Psychiatric problem, Renal disorder (08/05/2021), Seizure ([facility]) (2017), and Urinary incontinence.    FHx:  family history includes Cancer in her father and maternal grandfather; Heart Disease in her mother; Stroke in her father.    SHx:   reports that she quit smoking about 37 years ago. Her smoking use included cigarettes. She has never used smokeless tobacco. She reports current alcohol use. She reports that she does not use drugs.    Allergies:  Allergies   Allergen Reactions    Gabapentin Unspecified     seizure       Medications:    Current Facility-Administered Medications:     ARIPiprazole (Abilify) tablet 20 mg, 20 mg, Oral, DAILY, Carlos Alberto Mills M.D., 20 mg at 03/25/23 0588    LORazepam (ATIVAN) injection 0.5 mg, 0.5 mg, Intravenous, Q6HRS PRN, Audrey Rangel, A.P.R.N., 0.5 mg at 03/25/23 0677     senna-docusate (PERICOLACE or SENOKOT S) 8.6-50 MG per tablet 2 Tablet, 2 Tablet, Oral, BID, 2 Tablet at 03/24/23 0514 **AND** polyethylene glycol/lytes (MIRALAX) PACKET 1 Packet, 1 Packet, Oral, QDAY PRN **AND** [DISCONTINUED] magnesium hydroxide (MILK OF MAGNESIA) suspension 30 mL, 30 mL, Oral, QDAY PRN **AND** [DISCONTINUED] bisacodyl (DULCOLAX) suppository 10 mg, 10 mg, Rectal, QDAY PRN, MARIA ELENA AldridgeO.    enoxaparin (Lovenox) inj 40 mg, 40 mg, Subcutaneous, DAILY AT 1800, MARIA ELENA AldridgeO., 40 mg at 03/24/23 1747    acetaminophen (TYLENOL) tablet 1,000 mg, 1,000 mg, Oral, Q6HRS PRN, Thomas Mae D.O.    [Held by provider] DULoxetine (CYMBALTA) capsule 30 mg, 30 mg, Oral, QHS, REBECCA Aldridge.O.    lisinopril (PRINIVIL) tablet 10 mg, 10 mg, Oral, DAILY, REBECCA Aldridge.O., 10 mg at 03/25/23 0540    propranolol (INDERAL) tablet 10 mg, 10 mg, Oral, QHS, REBECCA Aldridge.O., 10 mg at 03/23/23 2217    amLODIPine (NORVASC) tablet 10 mg, 10 mg, Oral, DAILY, Thomas Mae D.O., 10 mg at 03/25/23 0540    Physical Examination:     Vitals:    03/24/23 1535 03/25/23 0355 03/25/23 0744 03/25/23 0752   BP: 108/69 126/52 (!) 146/85    Pulse: 92 99 100    Resp: 18 18 17    Temp: 36.3 °C (97.3 °F) 36.8 °C (98.2 °F) 36.1 °C (96.9 °F)    TempSrc: Temporal Temporal Temporal    SpO2: 96% 97% 98%    Weight:    109 kg (239 lb 10.2 oz)   Height:         Cannot visualize fundus for ophthalmology emanation  Regular heart rhythm    NEUROLOGICAL EXAM:     Patient is awake and alert oriented to self only.  Thinks is 2022.  Not aware she is in hospital.  Does not know why she is in hospital.  She does follow commands though.  Knows her .  She knew the president's name with some mild prompting.  Pupils are equal reactive.  Face symmetrical.  Sensations intact.  Hearing intact.  Palate elevates.  Shoulder shrug equal.  Blinks to threat bilaterally.  Extraocular movements are intact.  Motor examination she has sustained antigravity  strength symmetrical in all 4.  Slight increased tone in both upper extremities.  Sensations intact to soft touch in all 4.  Reflexes are diminished with trace bilateral patellar's with absent ankle jerks.  Able equivocal toes.  No obvious signs ataxia.  She does have an intentional tremor on finger-nose testing.  Did not appreciate any resting tremor.    Objective Data:    Labs:  Lab Results   Component Value Date/Time    PROTHROMBTM 12.6 10/06/2021 03:29 PM    INR 0.97 10/06/2021 03:29 PM      Lab Results   Component Value Date/Time    WBC 11.8 (H) 03/23/2023 03:00 PM    RBC 5.30 03/23/2023 03:00 PM    HEMOGLOBIN 16.4 (H) 03/23/2023 03:00 PM    HEMATOCRIT 49.6 (H) 03/23/2023 03:00 PM    MCV 93.6 03/23/2023 03:00 PM    MCH 30.9 03/23/2023 03:00 PM    MCHC 33.1 (L) 03/23/2023 03:00 PM    MPV 11.4 03/23/2023 03:00 PM    NEUTSPOLYS 65.30 03/23/2023 03:00 PM    LYMPHOCYTES 24.00 03/23/2023 03:00 PM    MONOCYTES 7.30 03/23/2023 03:00 PM    EOSINOPHILS 2.70 03/23/2023 03:00 PM    BASOPHILS 0.40 03/23/2023 03:00 PM      Lab Results   Component Value Date/Time    SODIUM 144 03/23/2023 04:10 PM    POTASSIUM 3.8 03/23/2023 04:10 PM    CHLORIDE 104 03/23/2023 04:10 PM    CO2 25 03/23/2023 04:10 PM    GLUCOSE 99 03/23/2023 04:10 PM    BUN 21 03/23/2023 04:10 PM    CREATININE 0.77 03/23/2023 04:10 PM      No results found for: CHOLSTRLTOT, LDL, HDL, TRIGLYCERIDE    Lab Results   Component Value Date/Time    ALKPHOSPHAT 107 (H) 03/23/2023 04:10 PM    ASTSGOT 17 03/23/2023 04:10 PM    ALTSGPT 10 03/23/2023 04:10 PM    TBILIRUBIN 0.5 03/23/2023 04:10 PM        Imaging/Testing:  MR-BRAIN-WITH & W/O    (Results Pending)         Assessment and Plan:    72-year-old female admitted for worsening mental status and hallucinations.  History of  shunt placed over a year ago for NPH.  Laboratory work-up and here is unremarkable including a CT head.  Do not see any acute neurological needs at this time.  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  NPH can cause these type of issues after some time.  And a  shunt would not prevent the dementia from worsening.  I reviewed the neurosurgery notes and mentions that she was having memory issues before she placed a  shunt.  Which did not improve after a lumbar puncture at that time.  Patient has not been seen in neurology clinic here.  Not sure she seen an outpatient neurologist in the community.  Differential includes NPH related dementia or other primary progressive dementia disease such as frontotemporal dementia.  Did not see obvious parkinsonian signs.  Suggest a parkinsonian plus syndrome.  Tremor appears to be intentional tremor does not seem to be affecting her lifestyle at this time.  Patient can follow-up in neurology memory clinic.  Inpatient neurology will sign off.        The evaluation of the patient, and recommended management, was discussed with the resident staff.       This chart was partially generated using voice recognition technology and sound alike word replacement may be present, best efforts were made to make the chart accurate.    Uziel Kaiser MD  Board Certified Neurology, ABPN  t) [phone]

## 2023-03-25 NOTE — CONSULTS
[facility] PSYCHIATRIC FOLLOW-UP:(established)  *Reason for admission:   presenting with recurrence of acute encephalopathy, recently discharged however at home was unable to be cared for, would not move from chair for more than 30 hours, refusing medications, blank stare, not speaking. after benzo administration patient does appear improved as she is now speaking, though she is oriented only to person and does not show insight into her current situation  *Legal Hold Status: not applicable                S:  very confused. Knows her name but not her age, she doesn't know where she is, doesn't know the date. Smiles pleasantly but without reason. Rambles.    O: Medical ROS (as pertinent):                      *Psychiatric Examination:   Vitals:   Vitals:    03/24/23 1535 03/25/23 0355 03/25/23 0744 03/25/23 0752   BP: 108/69 126/52 (!) 146/85    Pulse: 92 99 100    Resp: 18 18 17    Temp: 36.3 °C (97.3 °F) 36.8 °C (98.2 °F) 36.1 °C (96.9 °F)    TempSrc: Temporal Temporal Temporal    SpO2: 96% 97% 98%    Weight:    109 kg (239 lb 10.2 oz)   Height:         General Appearance:   trying to cooperate, intermittent eye contact.  Musculoskeletal: lying in bed.  Alert/Orientation: alert but not oriented  Attn/Concentration: trouble focusing  Fund of Knowledge:not tested:  says she has a very high IQ.  Memory recent/remote: unable to assess  Speech: almost mumbling at times.   Language:  fluent  Thought Content: not reality based. Asked if she was seeing anything, she talks about a coffee pot  Thought Process: rambles a bit. loose  Insight/Judgement: impaired  Mood:not identified  Affect: euthymic     Current Medications:  Scheduled Medications   Medication Dose Frequency    ARIPiprazole  20 mg DAILY    senna-docusate  2 Tablet BID    enoxaparin (LOVENOX) injection  40 mg DAILY AT 1800    [Held by provider] DULoxetine  30 mg QHS    lisinopril  10 mg DAILY    propranolol  10 mg QHS    amLODIPine  10  "mg DAILY          *ASSESSMENT/RECOMENDATIONS:  1. Encephalopathy: : confirmed on EEG  2  Hx of schizophrenia: R/O other: she began having .AVH about 4 years ago. In 2021 found to have \"Marked\" ventriculometry determined to be NPH and a shunt was placed. Could her \"schizophrenia\" be related to the developing NPH?    Medical:     -back pain to such a degree that she has fallen, backwards, per  a few times and now doesn't want to walk  -NPH with  shunt  -encephalopathy    Legal hold: not applicable    Discussed/voalted: Becca Tinsley MD    Medication and Other Recommendations: final orders as per Tx Tm  Consider neurology consult for new onset tremors, R hand and perhaps for ideas around why she is encephalopathic. She was admitted in the past and also had a +EEG for delirium    Will continue to follow with you.         Discharge recommendations: per tx tm               "

## 2023-03-26 LAB
ANION GAP SERPL CALC-SCNC: 13 MMOL/L (ref 7–16)
BUN SERPL-MCNC: 8 MG/DL (ref 8–22)
CALCIUM SERPL-MCNC: 9.3 MG/DL (ref 8.5–10.5)
CHLORIDE SERPL-SCNC: 105 MMOL/L (ref 96–112)
CO2 SERPL-SCNC: 24 MMOL/L (ref 20–33)
CREAT SERPL-MCNC: 0.38 MG/DL (ref 0.5–1.4)
ERYTHROCYTE [DISTWIDTH] IN BLOOD BY AUTOMATED COUNT: 43.2 FL (ref 35.9–50)
GFR SERPLBLD CREATININE-BSD FMLA CKD-EPI: 108 ML/MIN/1.73 M 2
GLUCOSE SERPL-MCNC: 100 MG/DL (ref 65–99)
HCT VFR BLD AUTO: 46.7 % (ref 37–47)
HGB BLD-MCNC: 15.5 G/DL (ref 12–16)
MAGNESIUM SERPL-MCNC: 1.6 MG/DL (ref 1.5–2.5)
MCH RBC QN AUTO: 30.6 PG (ref 27–33)
MCHC RBC AUTO-ENTMCNC: 33.2 G/DL (ref 33.6–35)
MCV RBC AUTO: 92.3 FL (ref 81.4–97.8)
PHOSPHATE SERPL-MCNC: 3.2 MG/DL (ref 2.5–4.5)
PLATELET # BLD AUTO: 250 K/UL (ref 164–446)
PMV BLD AUTO: 11.3 FL (ref 9–12.9)
POTASSIUM SERPL-SCNC: 3.4 MMOL/L (ref 3.6–5.5)
RBC # BLD AUTO: 5.06 M/UL (ref 4.2–5.4)
SODIUM SERPL-SCNC: 142 MMOL/L (ref 135–145)
WBC # BLD AUTO: 8.8 K/UL (ref 4.8–10.8)

## 2023-03-26 PROCEDURE — 94760 N-INVAS EAR/PLS OXIMETRY 1: CPT

## 2023-03-26 PROCEDURE — 700102 HCHG RX REV CODE 250 W/ 637 OVERRIDE(OP): Performed by: HOSPITALIST

## 2023-03-26 PROCEDURE — 700102 HCHG RX REV CODE 250 W/ 637 OVERRIDE(OP): Performed by: STUDENT IN AN ORGANIZED HEALTH CARE EDUCATION/TRAINING PROGRAM

## 2023-03-26 PROCEDURE — 36415 COLL VENOUS BLD VENIPUNCTURE: CPT

## 2023-03-26 PROCEDURE — 770001 HCHG ROOM/CARE - MED/SURG/GYN PRIV*

## 2023-03-26 PROCEDURE — 83735 ASSAY OF MAGNESIUM: CPT

## 2023-03-26 PROCEDURE — 80048 BASIC METABOLIC PNL TOTAL CA: CPT

## 2023-03-26 PROCEDURE — A9270 NON-COVERED ITEM OR SERVICE: HCPCS | Performed by: STUDENT IN AN ORGANIZED HEALTH CARE EDUCATION/TRAINING PROGRAM

## 2023-03-26 PROCEDURE — 700111 HCHG RX REV CODE 636 W/ 250 OVERRIDE (IP): Performed by: STUDENT IN AN ORGANIZED HEALTH CARE EDUCATION/TRAINING PROGRAM

## 2023-03-26 PROCEDURE — A9270 NON-COVERED ITEM OR SERVICE: HCPCS | Performed by: HOSPITALIST

## 2023-03-26 PROCEDURE — 85027 COMPLETE CBC AUTOMATED: CPT

## 2023-03-26 PROCEDURE — 84100 ASSAY OF PHOSPHORUS: CPT

## 2023-03-26 PROCEDURE — 99232 SBSQ HOSP IP/OBS MODERATE 35: CPT | Performed by: STUDENT IN AN ORGANIZED HEALTH CARE EDUCATION/TRAINING PROGRAM

## 2023-03-26 RX ORDER — POTASSIUM CHLORIDE 20 MEQ/1
40 TABLET, EXTENDED RELEASE ORAL ONCE
Status: COMPLETED | OUTPATIENT
Start: 2023-03-26 | End: 2023-03-26

## 2023-03-26 RX ADMIN — LISINOPRIL 10 MG: 10 TABLET ORAL at 06:38

## 2023-03-26 RX ADMIN — ENOXAPARIN SODIUM 40 MG: 100 INJECTION SUBCUTANEOUS at 17:01

## 2023-03-26 RX ADMIN — ACETAMINOPHEN 1000 MG: 500 TABLET, FILM COATED ORAL at 11:28

## 2023-03-26 RX ADMIN — PROPRANOLOL HYDROCHLORIDE 10 MG: 10 TABLET ORAL at 21:46

## 2023-03-26 RX ADMIN — DOCUSATE SODIUM 50 MG AND SENNOSIDES 8.6 MG 2 TABLET: 8.6; 5 TABLET, FILM COATED ORAL at 17:01

## 2023-03-26 RX ADMIN — ARIPIPRAZOLE 20 MG: 10 TABLET ORAL at 06:38

## 2023-03-26 RX ADMIN — AMLODIPINE BESYLATE 10 MG: 10 TABLET ORAL at 06:39

## 2023-03-26 RX ADMIN — POTASSIUM CHLORIDE 40 MEQ: 1500 TABLET, EXTENDED RELEASE ORAL at 17:01

## 2023-03-26 ASSESSMENT — PAIN DESCRIPTION - PAIN TYPE: TYPE: CHRONIC PAIN

## 2023-03-26 NOTE — CARE PLAN
The patient is Stable - Low risk of patient condition declining or worsening    Shift Goals  Clinical Goals: safety  Patient Goals: rest  Family Goals: antonio    Progress made toward(s) clinical / shift goals:  patient resting at this time. No acute events overnight.   Problem: Fall Risk  Goal: Patient will remain free from falls  Outcome: Progressing       Patient is not progressing towards the following goals:

## 2023-03-26 NOTE — PROGRESS NOTES
[facility] Medicine Daily Progress Note    Date of Service  3/26/2023    Chief Complaint  Sheyla Garcia is a 72 y.o. female admitted 3/23/2023 with change in mental status    Hospital Course  Patient is a 72 y.o. female w/ a PMHx of hypertension, hypothyroidism, asthma, normal pressure hydrocephalus w/  shunt followed by neurosurgery, uncontrolled schizophrenia,  recent admission from 03/13-03/22 for management of acute encephalopathy 2/2 UTI w/ abx completion on 3/18/23 who presented to the ED on 3/23/2023 with AMS.     History obtained from  as patient has nonsensical speech.   states she was admitted for altered mental status with UTI, she was evaluated by PT/OT and was referred for SNF, but was unable to take her due to her psychiatric condition.  When they came home yesterday, patient was refusing care.  She was resistant to move or take her medication, would spit them out.  She has been stuck on her chair for the past 30 hours.  Patient states she refused to move due to muscle weakness and slight rigidity.  She has been eating and  has been using a diaper. He also has a bedside commode for her.  Her  states she has been hearing voices telling her that her  is trying to kill her.  She has a long history of auditory hallucinations for the past 3 to 4 years, worse in the past 3 to 4 months.  She was diagnosed with schizophrenia and has been managed by a psychiatrist, Dr.Shaheen Spring. Unable to obtain records, however per PCP, on amitriptyline, abilify, lamictal, and propranolol. Has stopped Risperdal and Topamax. States she hasn't been taking her medications since discharge.     Patient does have a history of NPH s/p  shunt 1/13/22, followed by Dr. Glynn. She has been having persistent gait changes, urinary incontinence, and cognitive impairment. She does have a bladder sling years ago for urinary incontinence. She has recurrent falls and is fearful to walk. She has had  imaging for her knees with moderate osteoarthritic changes.     Interval Problem Update  Patient alert awake, AO x1, follows simple commands  Still has hallucination    I talked to neurology,   Patient has memory issue before  shunt placement  Her acute encephalopathy could be related to NPH related dementia versus primary progressive dementia.    Intentional tremor  No further work-up indicated.     continue ability to 20 mg daily per psych    MRI brain pending    Consulted the palliative    I have discussed this patient's plan of care and discharge plan at IDT rounds today with Case Management, Nursing, Nursing leadership, and other members of the IDT team.    Consultants/Specialty  psychiatry    Code Status  Full Code    Disposition  Patient is not medically cleared for discharge.   Anticipate discharge to to home with close outpatient follow-up.  I have placed the appropriate orders for post-discharge needs.    Review of Systems  Review of Systems   Unable to perform ROS: Mental status change      Physical Exam  Temp:  [36 °C (96.8 °F)-36.6 °C (97.9 °F)] 36.6 °C (97.9 °F)  Pulse:  [] 88  Resp:  [18] 18  BP: (129-141)/(60-72) 129/72  SpO2:  [98 %-100 %] 98 %    Physical Exam  Constitutional:       General: She is not in acute distress.     Appearance: She is not ill-appearing or toxic-appearing.   Eyes:      Extraocular Movements: Extraocular movements intact.      Pupils: Pupils are equal, round, and reactive to light.   Cardiovascular:      Rate and Rhythm: Normal rate and regular rhythm.      Pulses: Normal pulses.      Heart sounds: No murmur heard.    No friction rub. No gallop.   Pulmonary:      Effort: Pulmonary effort is normal. No respiratory distress.      Breath sounds: No wheezing.   Abdominal:      General: Abdomen is flat. There is no distension.      Palpations: There is no mass.      Tenderness: There is no abdominal tenderness. There is no guarding or rebound.      Hernia: No hernia is  present.   Musculoskeletal:         General: No swelling or deformity. Normal range of motion.      Cervical back: Normal range of motion.      Right lower leg: No edema.   Skin:     General: Skin is warm.      Capillary Refill: Capillary refill takes 2 to 3 seconds.      Coloration: Skin is not jaundiced or pale.      Findings: No bruising, erythema or rash.   Neurological:      Mental Status: She is disoriented.      Motor: Weakness present.       Fluids    Intake/Output Summary (Last 24 hours) at 3/26/2023 1530  Last data filed at 3/25/2023 1849  Gross per 24 hour   Intake 240 ml   Output --   Net 240 ml       Laboratory  Recent Labs     03/26/23  0732   WBC 8.8   RBC 5.06   HEMOGLOBIN 15.5   HEMATOCRIT 46.7   MCV 92.3   MCH 30.6   MCHC 33.2*   RDW 43.2   PLATELETCT 250   MPV 11.3     Recent Labs     03/23/23  1610 03/26/23  0732   SODIUM 144 142   POTASSIUM 3.8 3.4*   CHLORIDE 104 105   CO2 25 24   GLUCOSE 99 100*   BUN 21 8   CREATININE 0.77 0.38*   CALCIUM 10.2 9.3                   Imaging  MR-BRAIN-WITH & W/O    (Results Pending)        Assessment/Plan  * Acute encephalopathy- (present on admission)  Assessment & Plan  Patient alert awake, AO x1, follows simple commands  Still has hallucination    -Vitamin B12 pending, TSH WNL    I discussed with psych, recommended to increase ability to 20 mg daily, no recommendation for Cymbalta, was on risperdol and topamax    EEG showed non-specific encephalopathy, negative for epileptiform. Psych also recommended neurology consult. I talked to les, however Dr Kaiser, is not convinced to see the patient    MRI brain pending    I talked to neurology, Patient has memory issue before  shunt placement. Her acute encephalopathy could be related to NPH related dementia versus primary progressive dementia.  No further work-up indicated.     Hypomagnesemia  Assessment & Plan  *Mg 1.6    -Replete as needed    Leukocytosis- (present on admission)  Assessment & Plan  *WBC  11.8 with left shift of 7.72  *likely 2/2 reactive leukocytosis    -Continue to monitor for signs of infection    Polycythemia- (present on admission)  Assessment & Plan  *Hgb 16.4, MCV 93.6 (HgB 16.4 on 12/2022)  *2/2 sleep apnea    Insomnia  Assessment & Plan    -Continue home propranolol 10 mg every night    Tachycardia- (present on admission)  Assessment & Plan    *Unclear etiology, possible medication withdrawal/toxicity, malignant catatonia    -Hold SSRI/antipsychotics    Schizophrenia (formerly [facility])  Assessment & Plan  *Schizoaffect vs Schizophrenia    -Hold home Abilify 15 mg daily for concerns for possible neurologic malignant syndrome  -Hold home Lamictal 100 mg  -Psychiatry evaluation for negative symptoms due to inability to care for herself    Normal pressure hydrocephalus (HCC)- (present on admission)  Assessment & Plan  *s/p  shunt on 1/13/22, followed by Dr. Glynn  *Still has difficulty with gait changes, urinary incontinence, and mental status changes    -Repeat MRI brain w/ and w/o    Fibromyalgia  Assessment & Plan    -Hold home methocarbamol 750 PRN  -Hold home Duloxetine 30 mg, concern for possible serotonin syndrome?  -Acetaminophen 1g q8h PRN    Hypokalemia- (present on admission)  Assessment & Plan  Replaced and monitor    Debility- (present on admission)  Assessment & Plan    -SNF placement,  unable to care for patient at home    Morbid obesity with BMI of 40.0-44.9, adult (formerly [facility])- (present on admission)  Assessment & Plan    -Diet and exercise    KIRA (obstructive sleep apnea)- (present on admission)  Assessment & Plan  *noncompliant     -CPAP ordered    Asthma  Assessment & Plan  *Controlled, exacerbated by pine pollen    -Hold albuterol    Essential hypertension- (present on admission)  Assessment & Plan  *BP: (143-173)/() 148/67    -Continue home amlodipine 10 mg daily  -Continue home lisinopril 10 mg daily         VTE prophylaxis: SCDs/TEDs    I have performed a physical exam and  reviewed and updated ROS and Plan today (3/26/2023). In review of yesterday's note (3/25/2023), there are no changes except as documented above.

## 2023-03-26 NOTE — PROGRESS NOTES
Pt. Has been confused all day, she was agitated this am, new IV site placed in rt. Wrist as she had pulled out her IV overnight.  Pt. Given IV Ativan for agitation this am.  Pt. Given bedbath today.  Swallows pills appropriately.

## 2023-03-26 NOTE — CARE PLAN
Problem: Knowledge Deficit - Standard  Goal: Patient and family/care givers will demonstrate understanding of plan of care, disease process/condition, diagnostic tests and medications  Outcome: Progressing     Problem: Skin Integrity  Goal: Skin integrity is maintained or improved  Outcome: Progressing     Problem: Fall Risk  Goal: Patient will remain free from falls  Outcome: Progressing     Problem: Pain - Standard  Goal: Alleviation of pain or a reduction in pain to the patient’s comfort goal  Outcome: Progressing     Problem: Knowledge Deficit - Standard  Goal: Patient and family/care givers will demonstrate understanding of plan of care, disease process/condition, diagnostic tests and medications  Outcome: Progressing     Problem: Skin Integrity  Goal: Skin integrity is maintained or improved  Outcome: Progressing     Problem: Fall Risk  Goal: Patient will remain free from falls  Outcome: Progressing     Problem: Pain - Standard  Goal: Alleviation of pain or a reduction in pain to the patient’s comfort goal  Outcome: Progressing   The patient is Watcher - Medium risk of patient condition declining or worsening    Shift Goals  Clinical Goals: safety  Patient Goals: SHAZIA pt. confused, does not answer appropriately  Family Goals: shazia    Progress made toward(s) clinical / shift goals:  safety, comfort    Patient is not progressing towards the following goals:

## 2023-03-26 NOTE — CONSULTS
"  [facility] PSYCHIATRIC FOLLOW-UP:(established)  *Reason for admission:   presenting with recurrence of acute encephalopathy, recently discharged however at home was unable to be cared for, would not move from chair for more than 30 hours, refusing medications, blank stare, not speaking. after benzo administration patient does appear improved as she is now speaking, though she is oriented only to person and does not show insight into her current situation  *Legal Hold Status: not applicable                  S:  totally disoriented except to self. \"I want to go shopping before the rain comes\"    Neurology: reviewed the neurosurgery notes and mentions that she was having memory issues before she placed a  shunt.  Which did not improve after a lumbar puncture at that time....  Differential includes NPH related dementia or other primary progressive dementia disease such as frontotemporal dementia.  Did not see obvious parkinsonian signs.  Suggest a parkinsonian plus syndrome.  Tremor appears to be intentional tremor   O: Medical ROS (as pertinent):                      *Psychiatric Examination:   Vitals:   Vitals:    03/25/23 1953 03/26/23 0521 03/26/23 0750 03/26/23 0905   BP: (!) 141/70 139/60 129/72    Pulse: 100 86 87 88   Resp: 18 18 18 18   Temp: 36.6 °C (97.9 °F) 36 °C (96.8 °F) 36.6 °C (97.9 °F)    TempSrc: Temporal Temporal Temporal    SpO2: 98% 100% 98% 98%   Weight:       Height:         General Appearance:  good eye contact  Abnormal Movements: tremors   Gait and Posture: not observed  Speech: minimal and soft  Thought Process: slow rate  Associations:   linear  Abnormal or Psychotic Thoughts: confused  Judgement and Insight: impaired   Orientation: disoriented  Recent and Remote Memory: unable to determine  Attention Span and Concentration: distracted  Language:fluent  Fund of Knowledge: not tested  Mood and Affect: anxious  SI/HI:  suicidal - no and homicidal - no        Current " "Medications:  Scheduled Medications   Medication Dose Frequency    ARIPiprazole  20 mg DAILY    senna-docusate  2 Tablet BID    enoxaparin (LOVENOX) injection  40 mg DAILY AT 1800    [Held by provider] DULoxetine  30 mg QHS    lisinopril  10 mg DAILY    propranolol  10 mg QHS    amLODIPine  10 mg DAILY          *ASSESSMENT/RECOMENDATIONS:  1. Encephalopathy: : confirmed on EEG with psychosis/confusion  2  Hx of schizophrenia: R/O other: she began having .AVH about 4 years ago. In 2021 found to have \"Marked\" ventriculometry determined to be NPH and a shunt was placed. Could her \"schizophrenia\" be related to the developing NPH?     Medical:     -back pain to such a degree that she has fallen, backwards, per  a few times and now doesn't want to walk  -NPH with  shunt  -encephalopathy    Legal hold: not applicable       Discussed/voalted: SHEBA Tinsley MD    Medication and Other Recommendations: final orders as per Tx Tm  The Abilify does not seem to help her hallucinations. Antipsychotics do not work well in encephalopathy    Will continue to follow with you.       Discharge recommendations: per tx tm              "

## 2023-03-26 NOTE — DISCHARGE PLANNING
Patient's  would like pt to be discharged to [facility] at [facility].

## 2023-03-27 LAB
ANION GAP SERPL CALC-SCNC: 12 MMOL/L (ref 7–16)
BUN SERPL-MCNC: 9 MG/DL (ref 8–22)
CALCIUM SERPL-MCNC: 9.4 MG/DL (ref 8.5–10.5)
CHLORIDE SERPL-SCNC: 107 MMOL/L (ref 96–112)
CO2 SERPL-SCNC: 26 MMOL/L (ref 20–33)
CREAT SERPL-MCNC: 0.33 MG/DL (ref 0.5–1.4)
ERYTHROCYTE [DISTWIDTH] IN BLOOD BY AUTOMATED COUNT: 44.4 FL (ref 35.9–50)
GFR SERPLBLD CREATININE-BSD FMLA CKD-EPI: 111 ML/MIN/1.73 M 2
GLUCOSE SERPL-MCNC: 97 MG/DL (ref 65–99)
HCT VFR BLD AUTO: 47.4 % (ref 37–47)
HGB BLD-MCNC: 15.3 G/DL (ref 12–16)
MCH RBC QN AUTO: 30.6 PG (ref 27–33)
MCHC RBC AUTO-ENTMCNC: 32.3 G/DL (ref 33.6–35)
MCV RBC AUTO: 94.8 FL (ref 81.4–97.8)
PLATELET # BLD AUTO: 261 K/UL (ref 164–446)
PMV BLD AUTO: 11 FL (ref 9–12.9)
POTASSIUM SERPL-SCNC: 3.7 MMOL/L (ref 3.6–5.5)
RBC # BLD AUTO: 5 M/UL (ref 4.2–5.4)
SARS-COV+SARS-COV-2 AG RESP QL IA.RAPID: NOTDETECTED
SODIUM SERPL-SCNC: 145 MMOL/L (ref 135–145)
SPECIMEN SOURCE: NORMAL
WBC # BLD AUTO: 8.2 K/UL (ref 4.8–10.8)

## 2023-03-27 PROCEDURE — 99232 SBSQ HOSP IP/OBS MODERATE 35: CPT | Performed by: STUDENT IN AN ORGANIZED HEALTH CARE EDUCATION/TRAINING PROGRAM

## 2023-03-27 PROCEDURE — 80048 BASIC METABOLIC PNL TOTAL CA: CPT

## 2023-03-27 PROCEDURE — 36415 COLL VENOUS BLD VENIPUNCTURE: CPT

## 2023-03-27 PROCEDURE — 97162 PT EVAL MOD COMPLEX 30 MIN: CPT

## 2023-03-27 PROCEDURE — 770001 HCHG ROOM/CARE - MED/SURG/GYN PRIV*

## 2023-03-27 PROCEDURE — 700111 HCHG RX REV CODE 636 W/ 250 OVERRIDE (IP): Performed by: STUDENT IN AN ORGANIZED HEALTH CARE EDUCATION/TRAINING PROGRAM

## 2023-03-27 PROCEDURE — 700111 HCHG RX REV CODE 636 W/ 250 OVERRIDE (IP)

## 2023-03-27 PROCEDURE — 97530 THERAPEUTIC ACTIVITIES: CPT

## 2023-03-27 PROCEDURE — A9270 NON-COVERED ITEM OR SERVICE: HCPCS | Performed by: STUDENT IN AN ORGANIZED HEALTH CARE EDUCATION/TRAINING PROGRAM

## 2023-03-27 PROCEDURE — 97167 OT EVAL HIGH COMPLEX 60 MIN: CPT

## 2023-03-27 PROCEDURE — 97535 SELF CARE MNGMENT TRAINING: CPT

## 2023-03-27 PROCEDURE — 85027 COMPLETE CBC AUTOMATED: CPT

## 2023-03-27 PROCEDURE — 700102 HCHG RX REV CODE 250 W/ 637 OVERRIDE(OP): Performed by: HOSPITALIST

## 2023-03-27 PROCEDURE — 87426 SARSCOV CORONAVIRUS AG IA: CPT

## 2023-03-27 PROCEDURE — 94760 N-INVAS EAR/PLS OXIMETRY 1: CPT

## 2023-03-27 PROCEDURE — A9270 NON-COVERED ITEM OR SERVICE: HCPCS | Performed by: HOSPITALIST

## 2023-03-27 PROCEDURE — 700102 HCHG RX REV CODE 250 W/ 637 OVERRIDE(OP): Performed by: STUDENT IN AN ORGANIZED HEALTH CARE EDUCATION/TRAINING PROGRAM

## 2023-03-27 RX ADMIN — AMLODIPINE BESYLATE 10 MG: 10 TABLET ORAL at 06:46

## 2023-03-27 RX ADMIN — ENOXAPARIN SODIUM 40 MG: 100 INJECTION SUBCUTANEOUS at 16:15

## 2023-03-27 RX ADMIN — LISINOPRIL 10 MG: 10 TABLET ORAL at 06:46

## 2023-03-27 RX ADMIN — ARIPIPRAZOLE 20 MG: 10 TABLET ORAL at 06:46

## 2023-03-27 RX ADMIN — LORAZEPAM 0.5 MG: 2 INJECTION INTRAMUSCULAR; INTRAVENOUS at 21:24

## 2023-03-27 ASSESSMENT — PAIN DESCRIPTION - PAIN TYPE: TYPE: CHRONIC PAIN

## 2023-03-27 ASSESSMENT — COGNITIVE AND FUNCTIONAL STATUS - GENERAL
PERSONAL GROOMING: A LOT
CLIMB 3 TO 5 STEPS WITH RAILING: TOTAL
EATING MEALS: A LITTLE
DRESSING REGULAR UPPER BODY CLOTHING: A LOT
HELP NEEDED FOR BATHING: A LOT
MOBILITY SCORE: 7
SUGGESTED CMS G CODE MODIFIER DAILY ACTIVITY: CL
TOILETING: A LOT
MOVING FROM LYING ON BACK TO SITTING ON SIDE OF FLAT BED: UNABLE
DAILY ACTIVITIY SCORE: 13
STANDING UP FROM CHAIR USING ARMS: A LOT
SUGGESTED CMS G CODE MODIFIER MOBILITY: CM
WALKING IN HOSPITAL ROOM: TOTAL
TURNING FROM BACK TO SIDE WHILE IN FLAT BAD: UNABLE
MOVING TO AND FROM BED TO CHAIR: UNABLE
DRESSING REGULAR LOWER BODY CLOTHING: A LOT

## 2023-03-27 ASSESSMENT — ACTIVITIES OF DAILY LIVING (ADL): TOILETING: INDEPENDENT

## 2023-03-27 ASSESSMENT — GAIT ASSESSMENTS: GAIT LEVEL OF ASSIST: REFUSED

## 2023-03-27 ASSESSMENT — PATIENT HEALTH QUESTIONNAIRE - PHQ9
2. FEELING DOWN, DEPRESSED, IRRITABLE, OR HOPELESS: NOT AT ALL
SUM OF ALL RESPONSES TO PHQ9 QUESTIONS 1 AND 2: 0
1. LITTLE INTEREST OR PLEASURE IN DOING THINGS: NOT AT ALL

## 2023-03-27 NOTE — CONSULTS
[facility] PSYCHIATRIC FOLLOW-UP:(established)  *Reason for admission:presenting with recurrence of acute encephalopathy, recently discharged however at home was unable to be cared for, would not move from chair for more than 30 hours, refusing medications, blank stare, not speaking. after benzo administration patient does appear improved as she is now speaking, though she is oriented only to person and does not show insight into her current situation  *Legal Hold Status: not applicable           S:  speech clearer but thinks she is at home, that some stranger came to the door....... the year: 2123.    O: Medical ROS (as pertinent):                      *Psychiatric Examination:   Vitals:   Vitals:    03/26/23 1948 03/27/23 0414 03/27/23 0800 03/27/23 1226   BP: 120/75 (!) 160/80 (!) 157/97    Pulse: 100 83 (!) 101 99   Resp: 18 18 19    Temp: 36.2 °C (97.1 °F) 36.3 °C (97.4 °F) 35.9 °C (96.6 °F)    TempSrc: Temporal Temporal Temporal    SpO2: 95% 99% 96% 96%   Weight:       Height:         General Appearance:  good eye contact  Abnormal Movements: tremors less noticeable today  Gait and Posture: not observed  Speech: wnl  Thought Process:wnl  Associations:   tends to ramble   Abnormal or Psychotic Thoughts: confused  Judgement and Insight: impaired   Orientation: disoriented  Recent and Remote Memory: unable to determine  Attention Span and Concentration: mildly distracted  Language:fluent  Fund of Knowledge: not tested  Mood and Affect: anxious  SI/HI:  suicidal - no and homicidal - no      Current Medications:  Scheduled Medications   Medication Dose Frequency    ARIPiprazole  20 mg DAILY    senna-docusate  2 Tablet BID    enoxaparin (LOVENOX) injection  40 mg DAILY AT 1800    [Held by provider] DULoxetine  30 mg QHS    lisinopril  10 mg DAILY    propranolol  10 mg QHS    amLODIPine  10 mg DAILY        *ASSESSMENT/RECOMENDATIONS:  1.Encephalopathy: : confirmed on EEG with psychosis/confusion  2  " Hx of schizophrenia: R/O other: she began having .AVH about 4 years ago. In 2021 found to have \"Marked\" ventriculometry determined to be NPH and a shunt was placed. Could her \"schizophrenia\" be related to the developing NPH?     Medical:     -back pain to such a degree that she has fallen, backwards, per  a few times and now doesn't want to walk  -NPH with  shunt  -encephalopathy  -polycythemia     Legal hold: not applicable       Medication and Other Recommendations: final orders as per Tx Tm  No changes    Will continue to follow with you.        Discharge recommendations: per tx tm  "

## 2023-03-27 NOTE — CARE PLAN
Problem: Knowledge Deficit - Standard  Goal: Patient and family/care givers will demonstrate understanding of plan of care, disease process/condition, diagnostic tests and medications  Description: Target End Date:  1-3 days or as soon as patient condition allows    Document in Patient Education    1.  Patient and family/caregiver oriented to unit, equipment, visitation policy and means for communicating concern  2.  Complete/review Learning Assessment  3.  Assess knowledge level of disease process/condition, treatment plan, diagnostic tests and medications  4.  Explain disease process/condition, treatment plan, diagnostic tests and medications  Outcome: Progressing   The patient is Stable - Low risk of patient condition declining or worsening    Shift Goals  Clinical Goals: Safety  Patient Goals: Rest  Family Goals: antonio    Progress made toward(s) clinical / shift goals:  Pt is confused , no s/s of pain or acute distress noted. Pt will remain free from falls. Skin care in place to maintain skin integrity. Safety measures in place.Pending brain MRI.

## 2023-03-27 NOTE — THERAPY
Occupational Therapy   Initial Evaluation     Patient Name: Sheyla Garcia  Age:  72 y.o., Sex:  female  Medical Record #: [id number]  Today's Date: 3/27/2023     Precautions: Fall Risk, Swallow Precautions  Comments: schizophrenia    Assessment  Patient is 72 y.o. female admitted for AMS. PMHX: hypertension, hypothyroidism, asthma, normal pressure hydrocephalus w/  shunt and uncontrolled schizophrenia. Pt was recently dc'd home from this facility and stuck in her chair. This admission pt is dx w/acute encephalopathy, leukocytosis, tachycardia, and debility.   Pt was able to follow 1 step commands, but is AO to self only. Pt had severe bowel incontinence and notified RN of concern of wounds in ying area. Pt has poor carry over w/completing self care tasks d/t psych/cogntive impairments.     Plan  Occupational Therapy Initial Treatment Plan   Treatment Interventions: Self Care / Activities of Daily Living, Adaptive Equipment, Neuro Re-Education / Balance, Therapeutic Exercises, Therapeutic Activity  Treatment Frequency: 2 Times per Week  Duration: Until Therapy Goals Met    DC Equipment Recommendations: Unable to determine at this time  Discharge Recommendations: Other - (post acute vs psych placement)     Objective     03/27/23 1456   Charge Group   OT Evaluation OT Evaluation High   OT Self Care / ADL (Units) 1   Total Time Spent   OT Time Spent Yes   OT Self Care / ADL (Minutes) 15   OT Evaluation (Minutes) 14   OT Total Time Spent (Calculated) 29   Initial Contact Note    Initial Contact Note Order Received and Verified, Occupational Therapy Evaluation in Progress with Full Report to Follow.   Prior Living Situation   Prior Services Intermittent Physical Support for ADL Per Family   Housing / Facility 1 Story House   Steps Into Home 1   Bathroom Set up Walk In Shower;Shower Chair   Equipment Owned Single Point Cane   Lives with - Patient's Self Care Capacity Spouse   Comments pt not able to give clear hx   Prior  Level of ADL Function   Self Feeding Independent   Grooming / Hygiene Independent   Bathing Requires Assist   Dressing Requires Assist   Toileting Independent   Comments per previous chart but pt was not able to care for self upon recent dc home   Prior Level of IADL Function   Medication Management Requires Assist   Laundry Dependent   Kitchen Mobility Dependent   Finances Dependent   Home Management Dependent   Shopping Dependent   Prior Level Of Mobility Unable to Determine At This Time   Comments apparently was stuck in chair for ~30hrs   History of Falls   History of Falls Yes   Precautions   Precautions Fall Risk;Swallow Precautions   Comments schizophrenia   Pain 0 - 10 Group   Therapist Pain Assessment During Activity;Nurse Notified;0   Cognition    Cognition / Consciousness X   Speech/ Communication Delayed Responses   Level of Consciousness Alert   Ability To Follow Commands 1 Step   New Learning Impaired   Attention Impaired   Sequencing Impaired   Comments Aox self but was able to follow commands   Active ROM Upper Body   Active ROM Upper Body  WDL   Strength Upper Body   Upper Body Strength  X   Gross Strength Generalized Weakness, Equal Bilaterally.    Comments intermittent tremors observed   Balance Assessment   Sitting Balance (Static) Fair +   Sitting Balance (Dynamic) Fair   Weight Shift Sitting Fair   Comments unable to statnd d/t bowel incontience   Bed Mobility    Supine to Sit Moderate Assist   Sit to Supine Moderate Assist   ADL Assessment   Grooming Moderate Assist;Seated   Upper Body Dressing Maximal Assist   Lower Body Dressing Maximal Assist   Toileting Total Assist   Comments severe bowel incontience, intiates self cares but does not follow through   How much help from another person does the patient currently need...   Putting on and taking off regular lower body clothing? 2   Bathing (including washing, rinsing, and drying)? 2   Toileting, which includes using a toilet, bedpan, or  urinal? 2   Putting on and taking off regular upper body clothing? 2   Taking care of personal grooming such as brushing teeth? 2   Eating meals? 3   6 Clicks Daily Activity Score 13   Functional Mobility   Sit to Stand Unable to Participate   Bed, Chair, Wheelchair Transfer Unable to Participate   Mobility EOB   Activity Tolerance   Comments no overt c/o pain or fatigue   Short Term Goals   Short Term Goal # 1 pt will complete grooming seated set up   Short Term Goal # 2 pt will complete txf to BSC w/min A   Short Term Goal # 3 pt will complete UB dressing w/spv   Education Group   Role of Occupational Therapist Patient Response Patient;Acceptance;Explanation   Occupational Therapy Initial Treatment Plan    Treatment Interventions Self Care / Activities of Daily Living;Adaptive Equipment;Neuro Re-Education / Balance;Therapeutic Exercises;Therapeutic Activity   Treatment Frequency 2 Times per Week   Duration Until Therapy Goals Met   Problem List   Problem List Decreased Active Daily Living Skills;Decreased Homemaking Skills;Decreased Upper Extremity Strength Right;Decreased Upper Extremity Strength Left;Decreased Functional Mobility;Impaired Postural Control / Balance;Impaired Cognitive Function;Safety Awareness Deficits / Cognition   Anticipated Discharge Equipment and Recommendations   DC Equipment Recommendations Unable to determine at this time   Discharge Recommendations Other -  (post acute vs psych placement)   Interdisciplinary Plan of Care Collaboration   IDT Collaboration with  Nursing;Physical Therapist   Patient Position at End of Therapy In Bed;Call Light within Reach;Tray Table within Reach;Phone within Reach   Collaboration Comments RN aware of OT eval and pts efforts   Session Information   Date / Session Number  3/27 #1 (1/2, 4/2)

## 2023-03-27 NOTE — WOUND TEAM
Wound consult received regarding patient labia and buttocks. Patient with some pink partial thickness open areas present, likely related to moisture and friction, no pressure injury identified. Bedside RN to apply barrier paste to area. Skin care orders placed. Updated Bedside RN Noy.

## 2023-03-27 NOTE — DISCHARGE PLANNING
Care Transition Team Discharge Planning                   Discharge Plan:  ELIZABETH spoke with Elenita from [facility] and she states that she will possibly take the patient upon discharge. Elenita is requesting the Covid PCR and she is discussing the patient with her MD. ELIZABETH will continue to follow.       Paperwork from [facility] signed by patient and faxed back to Elenita at [phone].

## 2023-03-27 NOTE — THERAPY
"Physical Therapy   Initial Evaluation     Patient Name: Sheyla Garcia  Age:  72 y.o., Sex:  female  Medical Record #: [id number]  Today's Date: 3/27/2023     Precautions  Precautions: Fall Risk;Swallow Precautions  Comments: schizophrenia    Assessment    Patient is 72 y.o. female who presented 3/13 with worsening mentation, UTI. Past medical history of asthma, normal pressure hydrocephalus, schizophrenia, hypothyroidism and hypertension. Recent admission and brief DC home, pt unable to leave chair for 30 + hours, readmitted.    Pt is agreeable to participation in therapy session and tolerates session well; is pleasant and cooperative but not oriented to situation or location, believes she is giving birth to a baby during toileting. Pt requires mod assist for supine to/from sit EOB. Standing and ambulation not attempted due to BM incontinence while sitting and need to return to supine for toileting. Pt does not indicate or report pain during mobility, participates in bed mobility but limited by strength of B LE/trunk. Pt with improved sitting balance compared with previous admission; decreased posterior lean in sitting and is able to correct with verbal cues. Pt will benefit from continued PT services in acute setting, as well as in post acute setting.       Plan    Physical Therapy Initial Treatment Plan   Treatment Plan : (P) Bed Mobility, Gait Training, Neuro Re-Education / Balance, Therapeutic Activities, Therapeutic Exercise, Stair Training  Treatment Frequency: (P) 3 Times per Week  Duration: (P) Until Therapy Goals Met    DC Equipment Recommendations: (P) Unable to determine at this time  Discharge Recommendations: (P) Recommend post-acute placement for additional physical therapy services prior to discharge home       Subjective    \"I don't think the baby will be here for another two days.\"      Objective       03/27/23 1447   Charge Group   PT Evaluation PT Evaluation Mod   Total Time Spent   PT Total Time " Yes   PT Evaluation Time Spent (Mins) 12   PT Therapeutic Activities Time Spent (Mins) 14    Services   Is patient using  services for this encounter? No   Initial Contact Note    Initial Contact Note Order Received and Verified, Physical Therapy Evaluation in Progress with Full Report to Follow.   Precautions   Precautions Fall Risk   Vitals   O2 (LPM) 2   O2 Delivery Device Silicone Nasal Cannula   Pain 0 - 10 Group   Therapist Pain Assessment 0;Post Activity Pain Same as Prior to Activity   Prior Living Situation   Prior Services Intermittent Physical Support for ADL Per Family   Housing / Facility 1 Story House   Steps Into Home 1   Bathroom Set up Walk In Shower;Shower Chair   Equipment Owned Single Point Cane   Lives with - Patient's Self Care Capacity Spouse   Comments   (pt not sufficiently oriented to give history)   Prior Level of Functional Mobility   Bed Mobility Unable To Determine At This Time   History of Falls   History of Falls Yes   Cognition    Cognition / Consciousness X   Speech/ Communication Delayed Responses  (non sensical comments for situation)   Orientation Level Not Oriented to Reason;Not Oriented to Place   Level of Consciousness Alert   Ability To Follow Commands 1 Step   New Learning Impaired   Attention Impaired   Sequencing Impaired   Comments AO x self   Strength Upper Body   Upper Body Strength  X   Gross Strength Generalized Weakness, Equal Bilaterally.    Strength Lower Body   Lower Body Strength  X   Gross Strength Generalized Weakness, Equal Bilaterally   Coordination Upper Body   Coordination WDL   Coordination Lower Body    Coordination Lower Body  X   Comments mildly decreased   Balance Assessment   Sitting Balance (Static) Fair   Sitting Balance (Dynamic) Fair   Weight Shift Sitting Fair   Comments did not attempt stand d/t bowel incontinence in sitting   Bed Mobility    Supine to Sit Moderate Assist   Sit to Supine Moderate Assist   Scooting Maximal  Assist   Rolling Maximal Assist to Rt.;Maximum Assist to Lt.   Gait Analysis   Gait Level Of Assist Refused   Functional Mobility   Sit to Stand Unable to Participate   Bed, Chair, Wheelchair Transfer Unable to Participate   Mobility supine to/from sit, rolling B   How much difficulty does the patient currently have...   Turning over in bed (including adjusting bedclothes, sheets and blankets)? 1   Sitting down on and standing up from a chair with arms (e.g., wheelchair, bedside commode, etc.) 1   Moving from lying on back to sitting on the side of the bed? 1   How much help from another person does the patient currently need...   Moving to and from a bed to a chair (including a wheelchair)? 2   Need to walk in a hospital room? 1   Climbing 3-5 steps with a railing? 1   6 clicks Mobility Score 7   Activity Tolerance   Sitting in Chair NT   Sitting Edge of Bed 6 min   Standing NT   Short Term Goals    Short Term Goal # 1 supine to/from sit EOB with min assist in 6 visits   Short Term Goal # 2 pt will demonstrate static sitting at EOB with feet on floor and SBA for 10 minutes in 6 visits   Short Term Goal # 3 sit to stand from EOB with use of FWW and min assist in 6 visits   Short Term Goal # 4 pt will ambulate with FWW and CGA 20 feet in 6 visits   Education Group   Role of Physical Therapist Patient Response Patient;Acceptance;Explanation;Verbal Demonstration   Transfer Status Patient Response Patient;Acceptance;Explanation;Verbal Demonstration;Action Demonstration   Physical Therapy Initial Treatment Plan    Treatment Plan  Bed Mobility;Gait Training;Neuro Re-Education / Balance;Therapeutic Activities;Therapeutic Exercise;Stair Training   Treatment Frequency 3 Times per Week   Duration Until Therapy Goals Met   Problem List    Problems Impaired Bed Mobility;Impaired Transfers;Impaired Ambulation;Functional Strength Deficit;Impaired Balance;Impaired Coordination;Decreased Activity Tolerance;Safety Awareness Deficits  / Cognition   Anticipated Discharge Equipment and Recommendations   DC Equipment Recommendations Unable to determine at this time   Discharge Recommendations Recommend post-acute placement for additional physical therapy services prior to discharge home   Interdisciplinary Plan of Care Collaboration   IDT Collaboration with  Nursing;Occupational Therapist   Patient Position at End of Therapy Call Light within Reach;Tray Table within Reach;Phone within Reach;In Bed;Chair Alarm On   Collaboration Comments RN updated   Session Information   Date / Session Number  3/27  -1  (1/3; 4/2)     Mara Nash DPT

## 2023-03-27 NOTE — PROGRESS NOTES
Pt care assumed, pt is A&Ox1. No s/s of pain or discomfort noted. VSS on O2 @ 2 L via NC, no SOB noted.  Bed in lowest position, call light within reach.

## 2023-03-27 NOTE — CARE PLAN
Problem: Fall Risk  Goal: Patient will remain free from falls  Outcome: Progressing   The patient is Stable - Low risk of patient condition declining or worsening    Shift Goals  Clinical Goals: safety  Patient Goals: rest  Family Goals: antonio    Progress made toward(s) clinical / shift goals:  patient is resting at this time. No acute events overnight.     Patient is not progressing towards the following goals:

## 2023-03-27 NOTE — DISCHARGE PLANNING
Care Transition Team Discharge Planning                   Discharge Plan:  Per nurse spouse is requesting that patient is sent to [facility] at [facility]. CM spoke with patient's spouse and he is going to be calling [facility] today to request a bed for patient. CM will continue to follow.

## 2023-03-27 NOTE — PROGRESS NOTES
[facility] Medicine Daily Progress Note    Date of Service  3/27/2023    Chief Complaint  Sheyla Garcia is a 72 y.o. female admitted 3/23/2023 with change in mental status    Hospital Course  Patient is a 72 y.o. female w/ a PMHx of hypertension, hypothyroidism, asthma, normal pressure hydrocephalus w/  shunt followed by neurosurgery, uncontrolled schizophrenia,  recent admission from 03/13-03/22 for management of acute encephalopathy 2/2 UTI w/ abx completion on 3/18/23 who presented to the ED on 3/23/2023 with AMS.     History obtained from  as patient has nonsensical speech.   states she was admitted for altered mental status with UTI, she was evaluated by PT/OT and was referred for SNF, but was unable to take her due to her psychiatric condition.  When they came home yesterday, patient was refusing care.  She was resistant to move or take her medication, would spit them out.  She has been stuck on her chair for the past 30 hours.  Patient states she refused to move due to muscle weakness and slight rigidity.  She has been eating and  has been using a diaper. He also has a bedside commode for her.  Her  states she has been hearing voices telling her that her  is trying to kill her.  She has a long history of auditory hallucinations for the past 3 to 4 years, worse in the past 3 to 4 months.  She was diagnosed with schizophrenia and has been managed by a psychiatrist, Dr.Shaheen Spring. Unable to obtain records, however per PCP, on amitriptyline, abilify, lamictal, and propranolol. Has stopped Risperdal and Topamax. States she hasn't been taking her medications since discharge.     Patient does have a history of NPH s/p  shunt 1/13/22, followed by Dr. Glynn. She has been having persistent gait changes, urinary incontinence, and cognitive impairment. She does have a bladder sling years ago for urinary incontinence. She has recurrent falls and is fearful to walk. She has had  imaging for her knees with moderate osteoarthritic changes.     Interval Problem Update  Patient alert awake, AO x1, follows simple commands  Still has hallucination    I talked to neurology,   Patient has memory issue before  shunt placement  Her acute encephalopathy could be related to NPH related dementia versus primary progressive dementia.    Intentional tremor  No further work-up indicated.     continue ability to 20 mg daily per psych    MRI brain pending    Consulted the palliative    I have discussed this patient's plan of care and discharge plan at IDT rounds today with Case Management, Nursing, Nursing leadership, and other members of the IDT team.    Consultants/Specialty  psychiatry    Code Status  Full Code    Disposition  Patient is not medically cleared for discharge.   Anticipate discharge to to home with close outpatient follow-up.  I have placed the appropriate orders for post-discharge needs.    Review of Systems  Review of Systems   Unable to perform ROS: Mental status change      Physical Exam  Temp:  [35.9 °C (96.6 °F)-36.3 °C (97.4 °F)] 35.9 °C (96.6 °F)  Pulse:  [] 99  Resp:  [17-19] 19  BP: (120-160)/(75-97) 157/97  SpO2:  [95 %-99 %] 96 %    Physical Exam  Constitutional:       General: She is not in acute distress.     Appearance: She is not ill-appearing or toxic-appearing.   Eyes:      Extraocular Movements: Extraocular movements intact.      Pupils: Pupils are equal, round, and reactive to light.   Cardiovascular:      Rate and Rhythm: Normal rate and regular rhythm.      Pulses: Normal pulses.      Heart sounds: No murmur heard.    No friction rub. No gallop.   Pulmonary:      Effort: Pulmonary effort is normal. No respiratory distress.      Breath sounds: No wheezing.   Abdominal:      General: Abdomen is flat. There is no distension.      Palpations: There is no mass.      Tenderness: There is no abdominal tenderness. There is no guarding or rebound.      Hernia: No hernia  is present.   Musculoskeletal:         General: No swelling or deformity. Normal range of motion.      Cervical back: Normal range of motion.      Right lower leg: No edema.   Skin:     General: Skin is warm.      Capillary Refill: Capillary refill takes 2 to 3 seconds.      Coloration: Skin is not jaundiced or pale.      Findings: No bruising, erythema or rash.   Neurological:      Mental Status: She is disoriented.      Motor: Weakness present.       Fluids    Intake/Output Summary (Last 24 hours) at 3/27/2023 1244  Last data filed at 3/27/2023 0900  Gross per 24 hour   Intake 180 ml   Output 1200 ml   Net -1020 ml       Laboratory  Recent Labs     03/26/23  0732 03/27/23  0717   WBC 8.8 8.2   RBC 5.06 5.00   HEMOGLOBIN 15.5 15.3   HEMATOCRIT 46.7 47.4*   MCV 92.3 94.8   MCH 30.6 30.6   MCHC 33.2* 32.3*   RDW 43.2 44.4   PLATELETCT 250 261   MPV 11.3 11.0     Recent Labs     03/26/23  0732 03/27/23  0717   SODIUM 142 145   POTASSIUM 3.4* 3.7   CHLORIDE 105 107   CO2 24 26   GLUCOSE 100* 97   BUN 8 9   CREATININE 0.38* 0.33*   CALCIUM 9.3 9.4                   Imaging  MR-BRAIN-WITH & W/O    (Results Pending)        Assessment/Plan  * Acute encephalopathy- (present on admission)  Assessment & Plan  Patient alert awake, AO x1, follows simple commands  Still has hallucination    -Vitamin B12 pending, TSH WNL    I discussed with psych, recommended to increase ability to 20 mg daily, no recommendation for Cymbalta, was on risperdol and topamax    EEG showed non-specific encephalopathy, negative for epileptiform. Psych also recommended neurology consult. I talked to les, however Dr Kaiser, is not convinced to see the patient    MRI brain pending    I talked to neurology, Patient has memory issue before  shunt placement. Her acute encephalopathy could be related to NPH related dementia versus primary progressive dementia.  No further work-up indicated.     Hypomagnesemia  Assessment & Plan  *Mg 1.6    -Replete as  needed    Leukocytosis- (present on admission)  Assessment & Plan  *WBC 11.8 with left shift of 7.72  *likely 2/2 reactive leukocytosis    resolved    Polycythemia- (present on admission)  Assessment & Plan  *Hgb 16.4, MCV 93.6 (HgB 16.4 on 12/2022)  *2/2 sleep apnea    Insomnia  Assessment & Plan    -Continue home propranolol 10 mg every night    Tachycardia- (present on admission)  Assessment & Plan    *Unclear etiology, possible medication withdrawal/toxicity, malignant catatonia    -Hold SSRI/antipsychotics    Schizophrenia ([facility])  Assessment & Plan  *Schizoaffect vs Schizophrenia    -Hold home Abilify 15 mg daily for concerns for possible neurologic malignant syndrome  -Hold home Lamictal 100 mg  -Psychiatry evaluation for negative symptoms due to inability to care for herself    Normal pressure hydrocephalus (HCC)- (present on admission)  Assessment & Plan  *s/p  shunt on 1/13/22, followed by Dr. Glynn  *Still has difficulty with gait changes, urinary incontinence, and mental status changes    -Repeat MRI brain w/ and w/o    Fibromyalgia  Assessment & Plan    -Hold home methocarbamol 750 PRN  -Hold home Duloxetine 30 mg, concern for possible serotonin syndrome?  -Acetaminophen 1g q8h PRN    Hypokalemia- (present on admission)  Assessment & Plan  Replaced and monitor    Debility- (present on admission)  Assessment & Plan    -SNF placement,  unable to care for patient at home    Morbid obesity with BMI of 40.0-44.9, adult ([facility])- (present on admission)  Assessment & Plan    -Diet and exercise    KIRA (obstructive sleep apnea)- (present on admission)  Assessment & Plan  *noncompliant     -CPAP ordered    Asthma  Assessment & Plan  *Controlled, exacerbated by pine pollen    -Hold albuterol    Essential hypertension- (present on admission)  Assessment & Plan  *BP: (143-173)/() 148/67    -Continue home amlodipine 10 mg daily  -Continue home lisinopril 10 mg daily         VTE prophylaxis: SCDs/TEDs    I  have performed a physical exam and reviewed and updated ROS and Plan today (3/27/2023). In review of yesterday's note (3/26/2023), there are no changes except as documented above.

## 2023-03-28 LAB
ANION GAP SERPL CALC-SCNC: 15 MMOL/L (ref 7–16)
BACTERIA BLD CULT: NORMAL
BACTERIA BLD CULT: NORMAL
BUN SERPL-MCNC: 10 MG/DL (ref 8–22)
CALCIUM SERPL-MCNC: 9.7 MG/DL (ref 8.5–10.5)
CHLORIDE SERPL-SCNC: 104 MMOL/L (ref 96–112)
CO2 SERPL-SCNC: 26 MMOL/L (ref 20–33)
CREAT SERPL-MCNC: 0.42 MG/DL (ref 0.5–1.4)
ERYTHROCYTE [DISTWIDTH] IN BLOOD BY AUTOMATED COUNT: 42.7 FL (ref 35.9–50)
FLUAV RNA SPEC QL NAA+PROBE: NEGATIVE
FLUBV RNA SPEC QL NAA+PROBE: NEGATIVE
GFR SERPLBLD CREATININE-BSD FMLA CKD-EPI: 105 ML/MIN/1.73 M 2
GLUCOSE SERPL-MCNC: 97 MG/DL (ref 65–99)
HCT VFR BLD AUTO: 50.2 % (ref 37–47)
HGB BLD-MCNC: 17.1 G/DL (ref 12–16)
MCH RBC QN AUTO: 31 PG (ref 27–33)
MCHC RBC AUTO-ENTMCNC: 34.1 G/DL (ref 33.6–35)
MCV RBC AUTO: 90.9 FL (ref 81.4–97.8)
PLATELET # BLD AUTO: 263 K/UL (ref 164–446)
PMV BLD AUTO: 10.9 FL (ref 9–12.9)
POTASSIUM SERPL-SCNC: 3.6 MMOL/L (ref 3.6–5.5)
RBC # BLD AUTO: 5.52 M/UL (ref 4.2–5.4)
RSV RNA SPEC QL NAA+PROBE: NEGATIVE
SARS-COV-2 RNA RESP QL NAA+PROBE: NOTDETECTED
SIGNIFICANT IND 70042: NORMAL
SIGNIFICANT IND 70042: NORMAL
SITE SITE: NORMAL
SITE SITE: NORMAL
SODIUM SERPL-SCNC: 145 MMOL/L (ref 135–145)
SOURCE SOURCE: NORMAL
SOURCE SOURCE: NORMAL
SPECIMEN SOURCE: NORMAL
WBC # BLD AUTO: 9.5 K/UL (ref 4.8–10.8)

## 2023-03-28 PROCEDURE — 85027 COMPLETE CBC AUTOMATED: CPT

## 2023-03-28 PROCEDURE — 0241U HCHG SARS-COV-2 COVID-19 NFCT DS RESP RNA 4 TRGT MIC: CPT

## 2023-03-28 PROCEDURE — 36415 COLL VENOUS BLD VENIPUNCTURE: CPT

## 2023-03-28 PROCEDURE — 700111 HCHG RX REV CODE 636 W/ 250 OVERRIDE (IP): Performed by: STUDENT IN AN ORGANIZED HEALTH CARE EDUCATION/TRAINING PROGRAM

## 2023-03-28 PROCEDURE — 99232 SBSQ HOSP IP/OBS MODERATE 35: CPT | Performed by: INTERNAL MEDICINE

## 2023-03-28 PROCEDURE — 80048 BASIC METABOLIC PNL TOTAL CA: CPT

## 2023-03-28 PROCEDURE — C9803 HOPD COVID-19 SPEC COLLECT: HCPCS | Performed by: INTERNAL MEDICINE

## 2023-03-28 PROCEDURE — A9270 NON-COVERED ITEM OR SERVICE: HCPCS | Performed by: STUDENT IN AN ORGANIZED HEALTH CARE EDUCATION/TRAINING PROGRAM

## 2023-03-28 PROCEDURE — 700102 HCHG RX REV CODE 250 W/ 637 OVERRIDE(OP): Performed by: HOSPITALIST

## 2023-03-28 PROCEDURE — A9270 NON-COVERED ITEM OR SERVICE: HCPCS | Performed by: HOSPITALIST

## 2023-03-28 PROCEDURE — 700102 HCHG RX REV CODE 250 W/ 637 OVERRIDE(OP): Performed by: STUDENT IN AN ORGANIZED HEALTH CARE EDUCATION/TRAINING PROGRAM

## 2023-03-28 PROCEDURE — 770006 HCHG ROOM/CARE - MED/SURG/GYN SEMI*

## 2023-03-28 RX ADMIN — AMLODIPINE BESYLATE 10 MG: 10 TABLET ORAL at 06:21

## 2023-03-28 RX ADMIN — PROPRANOLOL HYDROCHLORIDE 10 MG: 10 TABLET ORAL at 20:40

## 2023-03-28 RX ADMIN — LISINOPRIL 10 MG: 10 TABLET ORAL at 06:21

## 2023-03-28 RX ADMIN — ENOXAPARIN SODIUM 40 MG: 100 INJECTION SUBCUTANEOUS at 16:51

## 2023-03-28 RX ADMIN — ARIPIPRAZOLE 20 MG: 10 TABLET ORAL at 06:21

## 2023-03-28 ASSESSMENT — PAIN DESCRIPTION - PAIN TYPE: TYPE: CHRONIC PAIN

## 2023-03-28 NOTE — DISCHARGE PLANNING
@1711  Agency/Facility Name: [facility]  Outcome: GEORGINA received a voice message from Elenita to confirm pt was transporting to [facility].    GEORGINA checked and the negative covid result was in.  GEORGINA asked Priyanka in ER for help with the [facility] PCS FORM.  Priyanka completed and sent to this DPA.    GEORGINA called Elenita at [facility] and stated we are working on transport for 1900.  Elenita stated they are open 24/7 and pt can arrive at any time and pt will be accepted.    @1726  GEORGINA faxed the negative covid result to [facility], fax #[phone]  @1715    @1733  Agency/Facility Name: [facility]  Spoke To: Lillie  Outcome: Transport is scheduled for 3/27/23 at 1830 going to   [facility]  [address]  [place]  [address]    Bedside RN notified of transport via voalte and phone.    @1749  GEORGINA sent voalte message to bedside RN to see if she could check with the MD to see it the pt is medically clear to go to [facility].  RN ELIZABETH Jean will help with the cobra packet if the pt is medically clear.    @1757  Agency/Facility Name: [facility]  Spoke To: Rashmi  Outcome: GEORGINA requested the transport be placed on will call.  DPA needs to make sure pt is medically clear and then get help with the cobra packet.      @1759  Agency/Facility Name: [facility]  Spoke To:  Elenita  Outcome: GEORGINA left a voice message for Elenita stating the pt will not transport tonight.  We need to make sure the pt is medically clear and a cobra packet needs to be done.  Transport is on will call and we will work on this first thing in the morning.    Bedside RN notified via voalte at 1805

## 2023-03-28 NOTE — CARE PLAN
Problem: Knowledge Deficit - Standard  Goal: Patient and family/care givers will demonstrate understanding of plan of care, disease process/condition, diagnostic tests and medications  Description: Target End Date:  1-3 days or as soon as patient condition allows    Document in Patient Education    1.  Patient and family/caregiver oriented to unit, equipment, visitation policy and means for communicating concern  2.  Complete/review Learning Assessment  3.  Assess knowledge level of disease process/condition, treatment plan, diagnostic tests and medications  4.  Explain disease process/condition, treatment plan, diagnostic tests and medications  Outcome: Progressing   The patient is Stable - Low risk of patient condition declining or worsening    Shift Goals  Clinical Goals: Safety  Patient Goals: Rest  Family Goals: antonio    Progress made toward(s) clinical / shift goals:  Pending Brain MRI. Skin care in place to maintain skin integrity. Pt will remain free from falls. Q 2 turn in place. Keep pt hydrated.

## 2023-03-28 NOTE — PROGRESS NOTES
[facility] Medicine Daily Progress Note    Date of Service  3/28/2023    Chief Complaint  Sheyla Garcia is a 72 y.o. female admitted 3/23/2023 with change in mental status    Hospital Course  Patient is a 72 y.o. female w/ a PMHx of hypertension, hypothyroidism, asthma, normal pressure hydrocephalus w/  shunt followed by neurosurgery, uncontrolled schizophrenia,  recent admission from 03/13-03/22 for management of acute encephalopathy 2/2 UTI w/ abx completion on 3/18/23 who presented to the ED on 3/23/2023 with AMS.     History obtained from  as patient has nonsensical speech.   states she was admitted for altered mental status with UTI, she was evaluated by PT/OT and was referred for SNF, but was unable to take her due to her psychiatric condition.  When they came home yesterday, patient was refusing care.  She was resistant to move or take her medication, would spit them out.  She has been stuck on her chair for the past 30 hours.  Patient states she refused to move due to muscle weakness and slight rigidity.  She has been eating and  has been using a diaper. He also has a bedside commode for her.  Her  states she has been hearing voices telling her that her  is trying to kill her.  She has a long history of auditory hallucinations for the past 3 to 4 years, worse in the past 3 to 4 months.  She was diagnosed with schizophrenia and has been managed by a psychiatrist, Dr.Shaheen Spring. Unable to obtain records, however per PCP, on amitriptyline, abilify, lamictal, and propranolol. Has stopped Risperdal and Topamax. States she hasn't been taking her medications since discharge.     Patient does have a history of NPH s/p  shunt 1/13/22, followed by Dr. Glynn. She has been having persistent gait changes, urinary incontinence, and cognitive impairment. She does have a bladder sling years ago for urinary incontinence. She has recurrent falls and is fearful to walk. She has had  imaging for her knees with moderate osteoarthritic changes. Discussed with neurology patient had memory issues before  shunt placement, encephalopathy could be related to NPH related dementia versus primary progressive dementia.  Psychiatry recommended to increase 20 mg Abilify daily.      Interval Problem Update  Tachycardic overnight. MRI brain pending. Patient has been accepted for placement, pending MRI for medical clearance. Discussed with  at bedside, patient was seeing Dr. Glynn who had ordered an MRI to be done with anesthesia as the patient is unable to lit still as an outpatient. Nursing reached out to Dr. Jarvis office, ok for patient to get MRI with  shunt. Patient continues to have visual and auditory hallucinations.  reports these are not normal for her.     I have discussed this patient's plan of care and discharge plan at IDT rounds today with Case Management, Nursing, Nursing leadership, and other members of the IDT team.    Consultants/Specialty  psychiatry    Code Status  Full Code    Disposition  Patient is not medically cleared for discharge.   Anticipate discharge to to home with close outpatient follow-up.  I have placed the appropriate orders for post-discharge needs.    Review of Systems  Review of Systems   Unable to perform ROS: Mental status change      Physical Exam  Temp:  [36.1 °C (96.9 °F)-36.7 °C (98 °F)] 36.5 °C (97.7 °F)  Pulse:  [] 99  Resp:  [18] 18  BP: (118-130)/() 118/68  SpO2:  [91 %-93 %] 91 %    Physical Exam  Constitutional:       General: She is not in acute distress.     Appearance: She is not ill-appearing or toxic-appearing.   Eyes:      Conjunctiva/sclera: Conjunctivae normal.   Cardiovascular:      Rate and Rhythm: Normal rate and regular rhythm.      Pulses: Normal pulses.   Pulmonary:      Effort: Pulmonary effort is normal. No respiratory distress.      Breath sounds: No wheezing.   Abdominal:      General: Abdomen is flat. There  is no distension.      Tenderness: There is no abdominal tenderness. There is no guarding.   Musculoskeletal:         General: No swelling or deformity. Normal range of motion.      Cervical back: Normal range of motion.      Right lower leg: No edema.   Skin:     General: Skin is warm.   Neurological:      Mental Status: She is disoriented.      Motor: Weakness present.   Psychiatric:         Attention and Perception: She perceives auditory and visual hallucinations.       Fluids    Intake/Output Summary (Last 24 hours) at 3/28/2023 1702  Last data filed at 3/28/2023 1400  Gross per 24 hour   Intake 360 ml   Output --   Net 360 ml       Laboratory  Recent Labs     03/26/23  0732 03/27/23  0717 03/28/23  0323   WBC 8.8 8.2 9.5   RBC 5.06 5.00 5.52*   HEMOGLOBIN 15.5 15.3 17.1*   HEMATOCRIT 46.7 47.4* 50.2*   MCV 92.3 94.8 90.9   MCH 30.6 30.6 31.0   MCHC 33.2* 32.3* 34.1   RDW 43.2 44.4 42.7   PLATELETCT 250 261 263   MPV 11.3 11.0 10.9     Recent Labs     03/26/23  0732 03/27/23  0717 03/28/23  0738   SODIUM 142 145 145   POTASSIUM 3.4* 3.7 3.6   CHLORIDE 105 107 104   CO2 24 26 26   GLUCOSE 100* 97 97   BUN 8 9 10   CREATININE 0.38* 0.33* 0.42*   CALCIUM 9.3 9.4 9.7                   Imaging  MR-BRAIN-WITH & W/O    (Results Pending)        Assessment/Plan  * Acute encephalopathy- (present on admission)  Assessment & Plan  Patient alert awake, AO x1, follows simple commands  Still has hallucination    -Vitamin B12 pending, TSH WNL    I discussed with psych, recommended to increase ability to 20 mg daily, no recommendation for Cymbalta, was on risperdol and topamax    EEG showed non-specific encephalopathy, negative for epileptiform. Psych also recommended neurology consult. I talked to les, however Dr Kaiser, is not convinced to see the patient    MRI brain pending    I talked to neurology, Patient has memory issue before  shunt placement. Her acute encephalopathy could be related to NPH related dementia  versus primary progressive dementia.  No further work-up indicated.     Hypomagnesemia  Assessment & Plan  *Mg 1.6    -Replete as needed    Leukocytosis- (present on admission)  Assessment & Plan  *WBC 11.8 with left shift of 7.72  *likely 2/2 reactive leukocytosis    resolved    Polycythemia- (present on admission)  Assessment & Plan  *Hgb 16.4, MCV 93.6 (HgB 16.4 on 12/2022)  *2/2 sleep apnea    Insomnia  Assessment & Plan    -Continue home propranolol 10 mg every night    Tachycardia- (present on admission)  Assessment & Plan    *Unclear etiology, possible medication withdrawal/toxicity, malignant catatonia    -Hold SSRI/antipsychotics    Schizophrenia (HCC)  Assessment & Plan  *Schizoaffect vs Schizophrenia    -Hold home Abilify 15 mg daily for concerns for possible neurologic malignant syndrome  -Hold home Lamictal 100 mg  -Psychiatry evaluation for negative symptoms due to inability to care for herself    Normal pressure hydrocephalus (HCC)- (present on admission)  Assessment & Plan  *s/p  shunt on 1/13/22, followed by Dr. Glynn  *Still has difficulty with gait changes, urinary incontinence, and mental status changes    -Repeat MRI brain w/ and w/o    Fibromyalgia  Assessment & Plan    -Hold home methocarbamol 750 PRN  -Hold home Duloxetine 30 mg, concern for possible serotonin syndrome?  -Acetaminophen 1g q8h PRN    Hypokalemia- (present on admission)  Assessment & Plan  Replaced and monitor    Debility- (present on admission)  Assessment & Plan    -SNF placement,  unable to care for patient at home    Morbid obesity with BMI of 40.0-44.9, adult ([facility])- (present on admission)  Assessment & Plan    -Diet and exercise    KIRA (obstructive sleep apnea)- (present on admission)  Assessment & Plan  *noncompliant     -CPAP ordered    Asthma  Assessment & Plan  *Controlled, exacerbated by pine pollen    -Hold albuterol    Essential hypertension- (present on admission)  Assessment & Plan  *BP:  (231-173)/() 148/67    -Continue home amlodipine 10 mg daily  -Continue home lisinopril 10 mg daily         VTE prophylaxis: SCDs/TEDs    I have performed a physical exam and reviewed and updated ROS and Plan today (3/28/2023). In review of yesterday's note (3/27/2023), there are no changes except as documented above.

## 2023-03-28 NOTE — PROGRESS NOTES
Pt is A&Ox1, no s/s of pain or discomfort. VSS on O2 @ 2 L via NC. Safety measures in place, call light within reach.

## 2023-03-28 NOTE — DISCHARGE PLANNING
Agency/Facility Name: [facility]  Spoke To: Elenita  Outcome: Elenita called to see if pt is discharging today.  DPA stated the pt is pending an MRI, not medically clear at this time.

## 2023-03-28 NOTE — DISCHARGE PLANNING
Care Transition Team Discharge Planning                   Discharge Plan:  Per IDT rounds patient not medically cleared for discharge. MD states that we are pending an MRI.

## 2023-03-29 LAB
ANION GAP SERPL CALC-SCNC: 14 MMOL/L (ref 7–16)
BUN SERPL-MCNC: 12 MG/DL (ref 8–22)
CALCIUM SERPL-MCNC: 9.5 MG/DL (ref 8.5–10.5)
CHLORIDE SERPL-SCNC: 107 MMOL/L (ref 96–112)
CO2 SERPL-SCNC: 24 MMOL/L (ref 20–33)
CREAT SERPL-MCNC: 0.36 MG/DL (ref 0.5–1.4)
ERYTHROCYTE [DISTWIDTH] IN BLOOD BY AUTOMATED COUNT: 43.2 FL (ref 35.9–50)
GFR SERPLBLD CREATININE-BSD FMLA CKD-EPI: 109 ML/MIN/1.73 M 2
GLUCOSE SERPL-MCNC: 104 MG/DL (ref 65–99)
HCT VFR BLD AUTO: 50.1 % (ref 37–47)
HGB BLD-MCNC: 16.5 G/DL (ref 12–16)
MCH RBC QN AUTO: 30.4 PG (ref 27–33)
MCHC RBC AUTO-ENTMCNC: 32.9 G/DL (ref 33.6–35)
MCV RBC AUTO: 92.4 FL (ref 81.4–97.8)
PLATELET # BLD AUTO: 281 K/UL (ref 164–446)
PMV BLD AUTO: 10.6 FL (ref 9–12.9)
POTASSIUM SERPL-SCNC: 3.5 MMOL/L (ref 3.6–5.5)
RBC # BLD AUTO: 5.42 M/UL (ref 4.2–5.4)
SODIUM SERPL-SCNC: 145 MMOL/L (ref 135–145)
WBC # BLD AUTO: 10.7 K/UL (ref 4.8–10.8)

## 2023-03-29 PROCEDURE — 99497 ADVNCD CARE PLAN 30 MIN: CPT | Performed by: NURSE PRACTITIONER

## 2023-03-29 PROCEDURE — 700102 HCHG RX REV CODE 250 W/ 637 OVERRIDE(OP): Performed by: INTERNAL MEDICINE

## 2023-03-29 PROCEDURE — 80048 BASIC METABOLIC PNL TOTAL CA: CPT

## 2023-03-29 PROCEDURE — 700111 HCHG RX REV CODE 636 W/ 250 OVERRIDE (IP): Performed by: STUDENT IN AN ORGANIZED HEALTH CARE EDUCATION/TRAINING PROGRAM

## 2023-03-29 PROCEDURE — 99232 SBSQ HOSP IP/OBS MODERATE 35: CPT | Performed by: INTERNAL MEDICINE

## 2023-03-29 PROCEDURE — A9270 NON-COVERED ITEM OR SERVICE: HCPCS | Performed by: HOSPITALIST

## 2023-03-29 PROCEDURE — 700102 HCHG RX REV CODE 250 W/ 637 OVERRIDE(OP): Performed by: HOSPITALIST

## 2023-03-29 PROCEDURE — A9270 NON-COVERED ITEM OR SERVICE: HCPCS | Performed by: INTERNAL MEDICINE

## 2023-03-29 PROCEDURE — 700102 HCHG RX REV CODE 250 W/ 637 OVERRIDE(OP): Performed by: STUDENT IN AN ORGANIZED HEALTH CARE EDUCATION/TRAINING PROGRAM

## 2023-03-29 PROCEDURE — 770006 HCHG ROOM/CARE - MED/SURG/GYN SEMI*

## 2023-03-29 PROCEDURE — 85027 COMPLETE CBC AUTOMATED: CPT

## 2023-03-29 PROCEDURE — 36415 COLL VENOUS BLD VENIPUNCTURE: CPT

## 2023-03-29 PROCEDURE — A9270 NON-COVERED ITEM OR SERVICE: HCPCS | Performed by: STUDENT IN AN ORGANIZED HEALTH CARE EDUCATION/TRAINING PROGRAM

## 2023-03-29 RX ORDER — POTASSIUM CHLORIDE 20 MEQ/1
40 TABLET, EXTENDED RELEASE ORAL ONCE
Status: COMPLETED | OUTPATIENT
Start: 2023-03-29 | End: 2023-03-29

## 2023-03-29 RX ADMIN — ARIPIPRAZOLE 20 MG: 10 TABLET ORAL at 04:44

## 2023-03-29 RX ADMIN — LISINOPRIL 10 MG: 10 TABLET ORAL at 04:44

## 2023-03-29 RX ADMIN — PROPRANOLOL HYDROCHLORIDE 10 MG: 10 TABLET ORAL at 21:54

## 2023-03-29 RX ADMIN — ACETAMINOPHEN 1000 MG: 500 TABLET, FILM COATED ORAL at 14:24

## 2023-03-29 RX ADMIN — ENOXAPARIN SODIUM 40 MG: 100 INJECTION SUBCUTANEOUS at 16:48

## 2023-03-29 RX ADMIN — DOCUSATE SODIUM 50 MG AND SENNOSIDES 8.6 MG 2 TABLET: 8.6; 5 TABLET, FILM COATED ORAL at 04:43

## 2023-03-29 RX ADMIN — AMLODIPINE BESYLATE 10 MG: 10 TABLET ORAL at 04:43

## 2023-03-29 RX ADMIN — POTASSIUM CHLORIDE 40 MEQ: 1500 TABLET, EXTENDED RELEASE ORAL at 10:01

## 2023-03-29 ASSESSMENT — PATIENT HEALTH QUESTIONNAIRE - PHQ9
SUM OF ALL RESPONSES TO PHQ9 QUESTIONS 1 AND 2: 0
1. LITTLE INTEREST OR PLEASURE IN DOING THINGS: NOT AT ALL
2. FEELING DOWN, DEPRESSED, IRRITABLE, OR HOPELESS: NOT AT ALL

## 2023-03-29 ASSESSMENT — PAIN DESCRIPTION - PAIN TYPE
TYPE: ACUTE PAIN
TYPE: ACUTE PAIN

## 2023-03-29 NOTE — CARE PLAN
Problem: Knowledge Deficit - Standard  Goal: Patient and family/care givers will demonstrate understanding of plan of care, disease process/condition, diagnostic tests and medications  Description: Target End Date:  1-3 days or as soon as patient condition allows    Document in Patient Education    1.  Patient and family/caregiver oriented to unit, equipment, visitation policy and means for communicating concern  2.  Complete/review Learning Assessment  3.  Assess knowledge level of disease process/condition, treatment plan, diagnostic tests and medications  4.  Explain disease process/condition, treatment plan, diagnostic tests and medications  Outcome: Progressing   The patient is Stable - Low risk of patient condition declining or worsening    Shift Goals  Clinical Goals: Safety  Patient Goals: Rest  Family Goals: antonio    Progress made toward(s) clinical / shift goals:  Pending MRI. Pt will remain free from falls. Skin care, keep pt hydrated.

## 2023-03-29 NOTE — CONSULTS
"Reason for Palliative Care Consult: Advance Care Planning    Consulted by: Becca Tinsley M.D.    HPI: Sheyla Garcia is a 72 year-old female pt with a PMHx of schizophrenia who presented to the ED 3/23/2023 with acute encephalopathy, catatonia was suspected and lorazepam trial initiated. She has had some improvement in the quality of her speech but remains A&Ox1.     Pertinent PMHx: NPH with  shunt, HTN, hypothyroidism, asthma     Additional consults: psychiatry, neurology    Assessment:  Neuro: Alert, pleasant, oriented to self only.  Dyspnea:  No  GI/Last BM: 03/27/23  Pain:  Yes - pt endorses mild headache  Depression:  Unable to determine  Dementia:  Unable to determine    Living situation & psychosocial: Patient lives at home with her spouse, Ed.     Spiritual:  Is Episcopal or spirituality important for coping with this illness:  Not addressed during this encounter  Has a  or spiritual provider visit been requested:  No  Sources of guillermo/meaning: Will address at future encounter    Palliative Performance Scale:  50%    Healthcare Directive Information:  Advance Directive:  None  DPOA:  No  POLST: No    Code Status:  Full    Discussion:   Consult received and EMR reviewed. Case discussed with Dr. Cole, updates appreciated.    Met with patient at bedside. She is sitting up in bed, eating, and visiting with her roommate. Sheyla is pleasant and agreeable to conversation at this time. She is oriented to self but believes that she is on a boat. When PC APRN clarified patient confirmed. \"Yes, a long boat. This is the owners boat. She is a surgeon. She wanted me on this boat specifically because she is overseeing my surgery.\" Pt does admit to feeling somewhat confused. She states \"you get a bit befuddled when you have brain surgery.\" Empathetic support provided. Sheyla is agreeable to PC APRN calling her , Ed, for further discussion regarding goals of care.    PC APRN discussed patient's symptoms. Patient " "denies any shortness of breath at this time. She does endorse a headache. She pointed to the back of her head. She is unable to rate the severity of her headache but states it \"hurts pretty good.\" Reached out to RN via voalte who will medicate per EMR.     Call placed to patient's spouse. No answer, LVM requesting return call.     Plan: Will continue to monitor clinical picture and discuss GOC as able.    Updated: MD, RN    Thank you for allowing Palliative Care to participate in this patient's care. Please call our team with questions and/or additional needs.    Total visit time was 16 minutes discussing advance care planning.     SHIRA Moncada  Palliative Care Nurse Practitioner   [phone]    "

## 2023-03-29 NOTE — CARE PLAN
The patient is Stable - Low risk of patient condition declining or worsening    Shift Goals  Clinical Goals: skin integrity, safety  Patient Goals: SHAZIA  Family Goals: shazia    Progress made toward(s) clinical / shift goals:  Bed alarm on, frequent repositioning, maintaining skin integrity.     Patient is not progressing towards the following goals: N/A    Problem: Knowledge Deficit - Standard  Goal: Patient and family/care givers will demonstrate understanding of plan of care, disease process/condition, diagnostic tests and medications  Outcome: Progressing     Problem: Skin Integrity  Goal: Skin integrity is maintained or improved  Outcome: Progressing     Problem: Fall Risk  Goal: Patient will remain free from falls  Outcome: Progressing     Problem: Pain - Standard  Goal: Alleviation of pain or a reduction in pain to the patient’s comfort goal  Outcome: Progressing

## 2023-03-29 NOTE — CARE PLAN
Problem: Knowledge Deficit - Standard  Goal: Patient and family/care givers will demonstrate understanding of plan of care, disease process/condition, diagnostic tests and medications  Description: Target End Date:  1-3 days or as soon as patient condition allows    Document in Patient Education    1.  Patient and family/caregiver oriented to unit, equipment, visitation policy and means for communicating concern  2.  Complete/review Learning Assessment  3.  Assess knowledge level of disease process/condition, treatment plan, diagnostic tests and medications  4.  Explain disease process/condition, treatment plan, diagnostic tests and medications  Outcome: Progressing   The patient is Stable - Low risk of patient condition declining or worsening    Shift Goals  Clinical Goals: Maintain skin integrity  Patient Goals: SHAZIA  Family Goals: shazia    Progress made toward(s) clinical / shift goals:  Pt is alert to self and person, c/o headaches and PRN Tylenol 1000 mg administered with effectiveness. Continues with skin care, pillows uses for support and repositioning. Barrier cream applied, Q 2 turn to maintain skin integrity. Pt will remain free from falls. Medicated per MAR.  Safety measures in place.

## 2023-03-30 ENCOUNTER — APPOINTMENT (OUTPATIENT)
Dept: RADIOLOGY | Facility: MEDICAL CENTER | Age: 73
DRG: 884 | End: 2023-03-30
Attending: INTERNAL MEDICINE
Payer: MEDICARE

## 2023-03-30 LAB
ANION GAP SERPL CALC-SCNC: 16 MMOL/L (ref 7–16)
BUN SERPL-MCNC: 15 MG/DL (ref 8–22)
CALCIUM SERPL-MCNC: 9.8 MG/DL (ref 8.5–10.5)
CHLORIDE SERPL-SCNC: 108 MMOL/L (ref 96–112)
CO2 SERPL-SCNC: 23 MMOL/L (ref 20–33)
CREAT SERPL-MCNC: 0.4 MG/DL (ref 0.5–1.4)
ERYTHROCYTE [DISTWIDTH] IN BLOOD BY AUTOMATED COUNT: 44.5 FL (ref 35.9–50)
GFR SERPLBLD CREATININE-BSD FMLA CKD-EPI: 106 ML/MIN/1.73 M 2
GLUCOSE SERPL-MCNC: 88 MG/DL (ref 65–99)
HCT VFR BLD AUTO: 52 % (ref 37–47)
HGB BLD-MCNC: 16.9 G/DL (ref 12–16)
MCH RBC QN AUTO: 30.5 PG (ref 27–33)
MCHC RBC AUTO-ENTMCNC: 32.5 G/DL (ref 33.6–35)
MCV RBC AUTO: 93.9 FL (ref 81.4–97.8)
PLATELET # BLD AUTO: 278 K/UL (ref 164–446)
PMV BLD AUTO: 11 FL (ref 9–12.9)
POTASSIUM SERPL-SCNC: 3.7 MMOL/L (ref 3.6–5.5)
RBC # BLD AUTO: 5.54 M/UL (ref 4.2–5.4)
SODIUM SERPL-SCNC: 147 MMOL/L (ref 135–145)
WBC # BLD AUTO: 10.9 K/UL (ref 4.8–10.8)

## 2023-03-30 PROCEDURE — 700111 HCHG RX REV CODE 636 W/ 250 OVERRIDE (IP)

## 2023-03-30 PROCEDURE — 700102 HCHG RX REV CODE 250 W/ 637 OVERRIDE(OP): Performed by: HOSPITALIST

## 2023-03-30 PROCEDURE — 700102 HCHG RX REV CODE 250 W/ 637 OVERRIDE(OP): Performed by: STUDENT IN AN ORGANIZED HEALTH CARE EDUCATION/TRAINING PROGRAM

## 2023-03-30 PROCEDURE — A9270 NON-COVERED ITEM OR SERVICE: HCPCS | Performed by: STUDENT IN AN ORGANIZED HEALTH CARE EDUCATION/TRAINING PROGRAM

## 2023-03-30 PROCEDURE — 97530 THERAPEUTIC ACTIVITIES: CPT | Mod: CO

## 2023-03-30 PROCEDURE — 99497 ADVNCD CARE PLAN 30 MIN: CPT | Performed by: NURSE PRACTITIONER

## 2023-03-30 PROCEDURE — 36415 COLL VENOUS BLD VENIPUNCTURE: CPT

## 2023-03-30 PROCEDURE — 700117 HCHG RX CONTRAST REV CODE 255: Performed by: INTERNAL MEDICINE

## 2023-03-30 PROCEDURE — 80048 BASIC METABOLIC PNL TOTAL CA: CPT

## 2023-03-30 PROCEDURE — 97530 THERAPEUTIC ACTIVITIES: CPT | Mod: CQ

## 2023-03-30 PROCEDURE — A9270 NON-COVERED ITEM OR SERVICE: HCPCS | Performed by: HOSPITALIST

## 2023-03-30 PROCEDURE — 99232 SBSQ HOSP IP/OBS MODERATE 35: CPT | Performed by: INTERNAL MEDICINE

## 2023-03-30 PROCEDURE — 70553 MRI BRAIN STEM W/O & W/DYE: CPT

## 2023-03-30 PROCEDURE — 770006 HCHG ROOM/CARE - MED/SURG/GYN SEMI*

## 2023-03-30 PROCEDURE — A9579 GAD-BASE MR CONTRAST NOS,1ML: HCPCS | Performed by: INTERNAL MEDICINE

## 2023-03-30 PROCEDURE — 85027 COMPLETE CBC AUTOMATED: CPT

## 2023-03-30 RX ADMIN — DOCUSATE SODIUM 50 MG AND SENNOSIDES 8.6 MG 2 TABLET: 8.6; 5 TABLET, FILM COATED ORAL at 05:07

## 2023-03-30 RX ADMIN — GADOTERIDOL 20 ML: 279.3 INJECTION, SOLUTION INTRAVENOUS at 11:29

## 2023-03-30 RX ADMIN — ARIPIPRAZOLE 20 MG: 10 TABLET ORAL at 05:07

## 2023-03-30 RX ADMIN — AMLODIPINE BESYLATE 10 MG: 10 TABLET ORAL at 05:07

## 2023-03-30 RX ADMIN — PROPRANOLOL HYDROCHLORIDE 10 MG: 10 TABLET ORAL at 20:09

## 2023-03-30 RX ADMIN — LORAZEPAM 0.5 MG: 2 INJECTION INTRAMUSCULAR; INTRAVENOUS at 10:08

## 2023-03-30 RX ADMIN — LISINOPRIL 10 MG: 10 TABLET ORAL at 05:07

## 2023-03-30 ASSESSMENT — COGNITIVE AND FUNCTIONAL STATUS - GENERAL
STANDING UP FROM CHAIR USING ARMS: A LOT
EATING MEALS: A LITTLE
CLIMB 3 TO 5 STEPS WITH RAILING: TOTAL
TURNING FROM BACK TO SIDE WHILE IN FLAT BAD: UNABLE
HELP NEEDED FOR BATHING: A LOT
DRESSING REGULAR LOWER BODY CLOTHING: A LOT
DAILY ACTIVITIY SCORE: 13
SUGGESTED CMS G CODE MODIFIER DAILY ACTIVITY: CL
TOILETING: A LOT
SUGGESTED CMS G CODE MODIFIER MOBILITY: CM
PERSONAL GROOMING: A LOT
WALKING IN HOSPITAL ROOM: TOTAL
MOVING TO AND FROM BED TO CHAIR: UNABLE
MOVING FROM LYING ON BACK TO SITTING ON SIDE OF FLAT BED: UNABLE
MOBILITY SCORE: 7
DRESSING REGULAR UPPER BODY CLOTHING: A LOT

## 2023-03-30 ASSESSMENT — PAIN DESCRIPTION - PAIN TYPE
TYPE: ACUTE PAIN

## 2023-03-30 ASSESSMENT — GAIT ASSESSMENTS: GAIT LEVEL OF ASSIST: UNABLE TO PARTICIPATE

## 2023-03-30 NOTE — THERAPY
Physical Therapy   Daily Treatment     Patient Name: Sheyla Garcia  Age:  72 y.o., Sex:  female  Medical Record #: [id number]  Today's Date: 3/30/2023     Precautions  Precautions: Fall Risk;Swallow Precautions  Comments: schizophrenia    Assessment    Pt greeted and seen for PT treatment. Pt presented with increased mentation today, was able to converse today. Pt initiated bed mobility but req'd ModA for bed mobility, able to maintain sitting balance EOB. Performed STSx2 w/ physical assist and B blocking of feet due to feet slipping. Pt currently limited by impaired balance, weakness, decreased sequencing and coordination, decreased activity tolerance, and impaired cognition which negatively impacts functional mobility. Pt will continue to benefit from skilled PT to address deficits.      Plan    Treatment Plan Status: Continue Current Treatment Plan  Type of Treatment: Bed Mobility, Gait Training, Neuro Re-Education / Balance, Therapeutic Activities, Therapeutic Exercise, Stair Training  Treatment Frequency: 3 Times per Week  Treatment Duration: Until Therapy Goals Met    DC Equipment Recommendations: Unable to determine at this time  Discharge Recommendations: Recommend post-acute placement for additional physical therapy services prior to discharge home       03/30/23 0910   Cognition    Comments slightly improved mentation and alertness (able to converse), pt having visual hallucinations during session   Balance   Sitting Balance (Static) Fair   Sitting Balance (Dynamic) Fair   Standing Balance (Static) Trace +   Standing Balance (Dynamic) Trace   Weight Shift Sitting Poor   Weight Shift Standing Poor   Skilled Intervention Verbal Cuing;Tactile Cuing;Sequencing;Postural Facilitation   Comments physical assist x2 to stand   Bed Mobility    Supine to Sit Moderate Assist   Sit to Supine Moderate Assist   Scooting Moderate Assist   Skilled Intervention Verbal Cuing;Tactile Cuing;Sequencing;Postural Facilitation    Comments pt did initiate bed mobility and can sit EOB w/o assist   Gait Analysis   Gait Level Of Assist Unable to Participate   Functional Mobility   Sit to Stand Moderate Assist   Skilled Intervention Verbal Cuing;Tactile Cuing;Sequencing;Postural Facilitation   Comments req'd blocking of B feet, feet slipping forward during stand, req'd assist for foot placement.   Short Term Goals    Short Term Goal # 1 supine to/from sit EOB with min assist in 6 visits   Goal Outcome # 1 goal not met   Short Term Goal # 2 pt will demonstrate static sitting at EOB with feet on floor and SBA for 10 minutes in 6 visits   Goal Outcome # 2 Goal not met   Short Term Goal # 3 sit to stand from EOB with use of FWW and min assist in 6 visits   Goal Outcome # 3 Goal not met   Short Term Goal # 4 pt will ambulate with FWW and CGA 20 feet in 6 visits   Goal Outcome # 4 Goal not met   Supervising Physical Therapist (PTA Treatments Only)   Supervising Physical Therapist Mara Nash

## 2023-03-30 NOTE — CARE PLAN
Problem: Knowledge Deficit - Standard  Goal: Patient and family/care givers will demonstrate understanding of plan of care, disease process/condition, diagnostic tests and medications  Outcome: Progressing     Problem: Skin Integrity  Goal: Skin integrity is maintained or improved  Outcome: Progressing     Problem: Fall Risk  Goal: Patient will remain free from falls  Outcome: Progressing     Problem: Pain - Standard  Goal: Alleviation of pain or a reduction in pain to the patient’s comfort goal  Outcome: Progressing     Problem: Discharge Barriers/Planning  Goal: Patient's continuum of care needs are met  Outcome: Progressing     Problem: Mobility  Goal: Patient's capacity to carry out activities will improve  Outcome: Progressing   The patient is Stable - Low risk of patient condition declining or worsening    Shift Goals  Clinical Goals: safety, maintain skin integrity, comfort  Patient Goals: SHAZIA  Family Goals: shazia    Progress made toward(s) clinical / shift goals:  improving    Patient is not progressing towards the following goals:

## 2023-03-30 NOTE — DISCHARGE PLANNING
Care Transition Team Discharge Planning    Anticipated Discharge Information  Discharge Disposition: D/T to another type of health care inst. (72)              Discharge Plan:  Call placed to Elenita at [facility] [phone] regarding bed availability. ELIZABETH will let Mikael know if a bed is available and set up transport.

## 2023-03-30 NOTE — THERAPY
Occupational Therapy  Daily Treatment     Patient Name: Sheyla Garcia  Age:  72 y.o., Sex:  female  Medical Record #: [id number]  Today's Date: 3/30/2023       Precautions: Fall Risk, Swallow Precautions  Comments: schizophrenia    Assessment    Pt seen for OT tx. Continues to be limited by decreased activity tolerance, balance deficits, inattention and poor safety awareness impacting ability to complete ADLs and ADL transfers independently. Following commands appropriately but w/ nonsensical conversation. Min A seated grooming. Mod A sit > stand to initiate weight shifting for BSC txfs. Will continue to follow while in house.     Plan    Treatment Plan Status: Continue Current Treatment Plan    DC Equipment Recommendations: Unable to determine at this time  Discharge Recommendations: Recommend post-acute placement for additional occupational therapy services prior to discharge home       03/30/23 0915   Cognition    Cognition / Consciousness X   New Learning Impaired   Attention Impaired   Sequencing Impaired   Balance   Sitting Balance (Static) Fair   Sitting Balance (Dynamic) Fair   Standing Balance (Static) Trace +   Standing Balance (Dynamic) Trace   Weight Shift Sitting Poor   Weight Shift Standing Poor   Bed Mobility    Supine to Sit Moderate Assist   Sit to Supine Moderate Assist   Activities of Daily Living   Grooming Seated;Minimal Assist   Upper Body Dressing Maximal Assist   Lower Body Dressing Maximal Assist   Toileting Maximal Assist   Functional Mobility   Sit to Stand Moderate Assist   Bed, Chair, Wheelchair Transfer Unable to Participate   Short Term Goals   Short Term Goal # 1 pt will complete grooming seated set up   Goal Outcome # 1 Progressing as expected   Short Term Goal # 2 pt will complete txf to BSC w/min A   Goal Outcome # 2 Goal not met   Short Term Goal # 3 pt will complete UB dressing w/spv   Goal Outcome # 3 Goal not met   Anticipated Discharge Equipment and Recommendations   DC  Equipment Recommendations Unable to determine at this time   Discharge Recommendations Recommend post-acute placement for additional occupational therapy services prior to discharge home

## 2023-03-30 NOTE — CARE PLAN
Problem: Knowledge Deficit - Standard  Goal: Patient and family/care givers will demonstrate understanding of plan of care, disease process/condition, diagnostic tests and medications  3/30/2023 0555 by Alyssa Felix R.N.  Outcome: Progressing  3/30/2023 0553 by Alyssa Felix R.N.  Outcome: Progressing     Problem: Skin Integrity  Goal: Skin integrity is maintained or improved  3/30/2023 0555 by Alyssa Felix R.N.  Outcome: Progressing  3/30/2023 0553 by Alyssa Felix R.N.  Outcome: Progressing     Problem: Fall Risk  Goal: Patient will remain free from falls  3/30/2023 0555 by Alyssa Felix R.N.  Outcome: Progressing  3/30/2023 0553 by Alyssa Felix R.N.  Outcome: Progressing     Problem: Pain - Standard  Goal: Alleviation of pain or a reduction in pain to the patient’s comfort goal  3/30/2023 0555 by Alyssa Felix R.N.  Outcome: Progressing  3/30/2023 0553 by Alyssa Felix R.N.  Outcome: Progressing     Problem: Discharge Barriers/Planning  Goal: Patient's continuum of care needs are met  3/30/2023 0555 by Alyssa Felix R.N.  Outcome: Progressing  Flowsheets (Taken 3/30/2023 0555)  Continuum of Care Needs:   Assessed for discharge barriers   Communicated discharge barriers to interdisciplinary tream   Involved patient/family/support system in discharge process  3/30/2023 0553 by Alyssa Felix R.N.  Outcome: Progressing     Problem: Mobility  Goal: Patient's capacity to carry out activities will improve  3/30/2023 0555 by Alyssa Felix R.N.  Outcome: Progressing  Flowsheets (Taken 3/30/2023 0555)  Mobility:   Encouraged mobilization per interdisciplinary team recommendations   Monitored for signs of activity intolerance   Provided assistive devices   Provided rest periods between activities   Administered pain management to allow progressive mobilization   Collaborated with PT/OT  3/30/2023 0553 by Alyssa Felix R.N.  Outcome: Progressing   The patient is Stable - Low risk of patient condition  declining or worsening    Shift Goals  Clinical Goals: safety, maintain skin integrity, comfort  Patient Goals: SHAZIA  Family Goals: shazia    Progress made toward(s) clinical / shift goals:  improving    Patient is not progressing towards the following goals:

## 2023-03-30 NOTE — PROGRESS NOTES
[facility] Medicine Daily Progress Note    Date of Service  3/30/2023    Chief Complaint  Sheyla Garcia is a 72 y.o. female admitted 3/23/2023 with change in mental status    Hospital Course  Patient is a 72 y.o. female w/ a PMHx of hypertension, hypothyroidism, asthma, normal pressure hydrocephalus w/  shunt followed by neurosurgery, uncontrolled schizophrenia,  recent admission from 03/13-03/22 for management of acute encephalopathy 2/2 UTI w/ abx completion on 3/18/23 who presented to the ED on 3/23/2023 with AMS.     History obtained from  as patient has nonsensical speech.   states she was admitted for altered mental status with UTI, she was evaluated by PT/OT and was referred for SNF, but was unable to take her due to her psychiatric condition.  When they came home yesterday, patient was refusing care.  She was resistant to move or take her medication, would spit them out.  She has been stuck on her chair for the past 30 hours.  Patient states she refused to move due to muscle weakness and slight rigidity.  She has been eating and  has been using a diaper. He also has a bedside commode for her.  Her  states she has been hearing voices telling her that her  is trying to kill her.  She has a long history of auditory hallucinations for the past 3 to 4 years, worse in the past 3 to 4 months.  She was diagnosed with schizophrenia and has been managed by a psychiatrist, Dr.Shaheen Spring. Unable to obtain records, however per PCP, on amitriptyline, abilify, lamictal, and propranolol. Has stopped Risperdal and Topamax. States she hasn't been taking her medications since discharge.     Patient does have a history of NPH s/p  shunt 1/13/22, followed by Dr. Glynn. She has been having persistent gait changes, urinary incontinence, and cognitive impairment. She does have a bladder sling years ago for urinary incontinence. She has recurrent falls and is fearful to walk. She has had  imaging for her knees with moderate osteoarthritic changes. Discussed with neurology patient had memory issues before  shunt placement, encephalopathy could be related to NPH related dementia versus primary progressive dementia.  Psychiatry recommended to increase 20 mg Abilify daily.      Interval Problem Update  3/28 Tachycardic overnight. MRI brain pending. Patient has been accepted for placement, pending MRI for medical clearance. Discussed with  at bedside, patient was seeing Dr. Glynn who had ordered an MRI to be done with anesthesia as the patient is unable to lit still as an outpatient. Nursing reached out to Dr. Jarvis office, ok for patient to get MRI with  shunt. Patient continues to have visual and auditory hallucinations.  reports these are not normal for her.     3/29 MRI pending. Potassium 3.5, oral replacement ordered. Patient sleeping, awakes to voice. Denies any complaints, minimal participation. Discussed case with palliative care.     3/30 MRI brain done this morning shows stable NPH and shunt catheter. No acute edema or changed. Discussed with patient  unfortunately the changes may be her new baseline due to worsening dementia from the NPH. Patient is medically clear for discharge once a bed is available and  is agreeable to transfer.      I have discussed this patient's plan of care and discharge plan at IDT rounds today with Case Management, Nursing, Nursing leadership, and other members of the IDT team.    Consultants/Specialty  psychiatry    Code Status  Full Code    Disposition  Patient is not medically cleared for discharge.   Anticipate discharge to to home with close outpatient follow-up.  I have placed the appropriate orders for post-discharge needs.    Review of Systems  Review of Systems   Unable to perform ROS: Mental status change      Physical Exam  Temp:  [35.9 °C (96.6 °F)-36.6 °C (97.9 °F)] 35.9 °C (96.6 °F)  Pulse:  [] 99  Resp:   [17-18] 18  BP: (116-151)/(65-84) 151/84  SpO2:  [92 %-95 %] 95 %    Physical Exam  Vitals and nursing note reviewed.   Constitutional:       General: She is not in acute distress.     Appearance: She is not ill-appearing.   Eyes:      Conjunctiva/sclera: Conjunctivae normal.   Cardiovascular:      Rate and Rhythm: Regular rhythm. Tachycardia present.      Pulses: Normal pulses.   Pulmonary:      Effort: Pulmonary effort is normal. No respiratory distress.      Breath sounds: No wheezing.   Abdominal:      General: Abdomen is flat. There is no distension.      Tenderness: There is no abdominal tenderness. There is no guarding.   Musculoskeletal:         General: No swelling.      Cervical back: Normal range of motion.   Skin:     General: Skin is warm and dry.   Neurological:      Mental Status: She is alert and easily aroused. She is disoriented.      Motor: Weakness present.      Comments: Follows commands    Psychiatric:         Attention and Perception: She perceives auditory and visual hallucinations.       Fluids    Intake/Output Summary (Last 24 hours) at 3/30/2023 1433  Last data filed at 3/30/2023 0850  Gross per 24 hour   Intake 120 ml   Output --   Net 120 ml       Laboratory  Recent Labs     03/28/23  0323 03/29/23  0400 03/30/23  0348   WBC 9.5 10.7 10.9*   RBC 5.52* 5.42* 5.54*   HEMOGLOBIN 17.1* 16.5* 16.9*   HEMATOCRIT 50.2* 50.1* 52.0*   MCV 90.9 92.4 93.9   MCH 31.0 30.4 30.5   MCHC 34.1 32.9* 32.5*   RDW 42.7 43.2 44.5   PLATELETCT 263 281 278   MPV 10.9 10.6 11.0     Recent Labs     03/28/23  0738 03/29/23  0400 03/30/23  0348   SODIUM 145 145 147*   POTASSIUM 3.6 3.5* 3.7   CHLORIDE 104 107 108   CO2 26 24 23   GLUCOSE 97 104* 88   BUN 10 12 15   CREATININE 0.42* 0.36* 0.40*   CALCIUM 9.7 9.5 9.8                   Imaging  MR-BRAIN-WITH & W/O   Final Result      1.  Right frontal ventriculoperitoneal shunt catheter.   2.  Shunt catheter in place with catheter tip at the anterior third ventricle.  Minimal gliosis surrounding the catheter tract across the right frontal lobe.   3.  Marked stable hydrocephalus with panventriculomegaly. No change compared with MRI study 6/28/2021.   4.  Mild periventricular white matter FLAIR signal changes, stable. No acute or new transependymal edema.   5.  Prominent sylvian fissures and paucity of sulcal markings at the vertex in keeping with the diagnosis of NPH.           Assessment/Plan  * Acute encephalopathy- (present on admission)  Assessment & Plan  Patient alert awake, AO x1, follows simple commands  Still has hallucination    -Vitamin B12 pending, TSH WNL    I discussed with psych, recommended to increase ability to 20 mg daily, no recommendation for Cymbalta, was on risperdol and topamax    EEG showed non-specific encephalopathy, negative for epileptiform. Psych also recommended neurology consult. I talked to les, however Dr Kaiser, is not convinced to see the patient    Previous physician spoke with neurology, Patient has memory issue before  shunt placement. Her acute encephalopathy could be related to NPH related dementia versus primary progressive dementia.  No further work-up indicated.     MRI brain no changes, unfortunately mental status changes likely patients new baseline    Hypomagnesemia  Assessment & Plan  *Mg 1.6    -Replete as needed    Leukocytosis- (present on admission)  Assessment & Plan  *WBC 11.8 with left shift of 7.72  *likely 2/2 reactive leukocytosis    resolved    Polycythemia- (present on admission)  Assessment & Plan  *Hgb 16.4, MCV 93.6 (HgB 16.4 on 12/2022)  *2/2 sleep apnea    Insomnia  Assessment & Plan    -Continue home propranolol 10 mg every night    Tachycardia- (present on admission)  Assessment & Plan    *Unclear etiology, possible medication withdrawal/toxicity, malignant catatonia    -Hold SSRI/antipsychotics    Schizophrenia ([facility])  Assessment & Plan  *Schizoaffect vs Schizophrenia    -Hold home Abilify 15 mg daily for  concerns for possible neurologic malignant syndrome  -Hold home Lamictal 100 mg  -Psychiatry evaluation for negative symptoms due to inability to care for herself    Normal pressure hydrocephalus (HCC)- (present on admission)  Assessment & Plan  *s/p  shunt on 1/13/22, followed by Dr. Glynn  *Still has difficulty with gait changes, urinary incontinence, and mental status changes    Fibromyalgia  Assessment & Plan    -Hold home methocarbamol 750 PRN  -Hold home Duloxetine 30 mg, concern for possible serotonin syndrome?  -Acetaminophen 1g q8h PRN    Hypokalemia- (present on admission)  Assessment & Plan  Replaced and monitor    Debility- (present on admission)  Assessment & Plan    -SNF placement,  unable to care for patient at home    Morbid obesity with BMI of 40.0-44.9, adult (HCC)- (present on admission)  Assessment & Plan    -Diet and exercise    KIRA (obstructive sleep apnea)- (present on admission)  Assessment & Plan  *noncompliant     -CPAP ordered    Asthma  Assessment & Plan  *Controlled, exacerbated by pine pollen    -Hold albuterol    Essential hypertension- (present on admission)  Assessment & Plan  *BP: (143-173)/() 148/67    -Continue home amlodipine 10 mg daily  -Continue home lisinopril 10 mg daily         VTE prophylaxis: SCDs/TEDs    I have performed a physical exam and reviewed and updated ROS and Plan today (3/30/2023). In review of yesterday's note (3/29/2023), there are no changes except as documented above.

## 2023-03-30 NOTE — DISCHARGE PLANNING
Care Transition Team Assessment    Information Source  Orientation Level: Oriented to place  Information Given By: Spouse  Informant's Name: Roque Garcia  Who is responsible for making decisions for patient? : Spouse    Readmission Evaluation  Is this a readmission?: No    Elopement Risk  Legal Hold: No  Ambulatory or Self Mobile in Wheelchair: No-Not an Elopement Risk  Elopement Risk: Not at Risk for Elopement    Interdisciplinary Discharge Planning  Lives with - Patient's Self Care Capacity: Spouse  Patient or legal guardian wants to designate a caregiver: Yes  Caregiver name: Roque Garcia  Caregiver contact info: [phone]  Support Systems: Spouse / Significant Other, Children  Housing / Facility: [address] [place] [address]  Prior Services: Intermittent Physical Support for ADL Per Family  Durable Medical Equipment: Not Applicable    Discharge Preparedness  What is your plan after discharge?: Other (comment) ([facility])  What are your discharge supports?: Spouse, Child  Prior Functional Level: Total Assist    Functional Assesment  Prior Functional Level: Total Assist    Finances  Financial Barriers to Discharge: No  Prescription Coverage: Yes    Vision / Hearing Impairment  Vision Impairment : No  Hearing Impairment : No         Advance Directive  Advance Directive?: DPOA for Health Care    Domestic Abuse  Have you ever been the victim of abuse or violence?: No  Physical Abuse or Sexual Abuse: No  Verbal Abuse or Emotional Abuse: No  Possible Abuse/Neglect Reported to:: Not Applicable    Psychological Assessment  History of Substance Abuse: None  History of Psychiatric Problems: Yes  Newly Diagnosed Illness: No    Discharge Risks or Barriers  Discharge risks or barriers?: Post-acute placement / services, Mental health  Patient risk factors: Mental health    Anticipated Discharge Information  Discharge Disposition: D/T to another type of health care inst. (72)

## 2023-03-30 NOTE — DISCHARGE SUMMARY
Discharge Summary    CHIEF COMPLAINT ON ADMISSION  Chief Complaint   Patient presents with    ALOC       Reason for Admission  ems     Admission Date  3/23/2023    CODE STATUS  Full Code    HPI & HOSPITAL COURSE  This is a 72 y.o. female here with altered mental status     Patient is a 72 y.o. female w/ a PMHx of hypertension, hypothyroidism, asthma, normal pressure hydrocephalus w/  shunt followed by neurosurgery, uncontrolled schizophrenia,  recent admission from 03/13-03/22 for management of acute encephalopathy 2/2 UTI w/ abx completion on 3/18/23 who presented to the ED on 3/23/2023 with AMS.     History obtained from  as patient has nonsensical speech.   states she was admitted for altered mental status with UTI, she was evaluated by PT/OT and was referred for SNF, but was unable to take her due to her psychiatric condition.  When they came home yesterday, patient was refusing care.  She was resistant to move or take her medication, would spit them out.  She has been stuck on her chair for the past 30 hours.  Patient states she refused to move due to muscle weakness and slight rigidity.  She has been eating and  has been using a diaper. He also has a bedside commode for her.  Her  states she has been hearing voices telling her that her  is trying to kill her.  She has a long history of auditory hallucinations for the past 3 to 4 years, worse in the past 3 to 4 months.  She was diagnosed with schizophrenia and has been managed by a psychiatrist, Dr.Shaheen Spring. Unable to obtain records, however per PCP, on amitriptyline, abilify, lamictal, and propranolol. Has stopped Risperdal and Topamax. States she hasn't been taking her medications since discharge.     Patient does have a history of NPH s/p  shunt 1/13/22, followed by Dr. Glynn. She has been having persistent gait changes, urinary incontinence, and cognitive impairment. She does have a bladder sling years ago for  urinary incontinence. She has recurrent falls and is fearful to walk. She has had imaging for her knees with moderate osteoarthritic changes. Discussed with neurology patient had memory issues before  shunt placement, encephalopathy could be related to NPH related dementia versus primary progressive dementia.  Psychiatry recommended to increase 20 mg Abilify daily.  MRI brain showed stable NPH and shunt catheter, no acute edema or changes. Discussed with patient's  unfortunately the changes may be her new baseline due to worsening dementia. Patient is medically clear for discharge, plan to dc to [facility] as chosen by the patient's . Patient's vital signs are stable ans she is clear for discharge.    Therefore, she is discharged in fair and stable condition to home with close outpatient follow-up.    The patient met 2-midnight criteria for an inpatient stay at the time of discharge.    Discharge Date  3/31/2023    FOLLOW UP ITEMS POST DISCHARGE  Follow up with primary care and neurosurgery   Follow up with neurology memory clinic     DISCHARGE DIAGNOSES  Principal Problem:    Acute encephalopathy POA: Yes  Active Problems:    Essential hypertension POA: Yes    Asthma POA: Unknown    KIRA (obstructive sleep apnea) POA: Yes    Morbid obesity with BMI of 40.0-44.9, adult (HCC) POA: Yes    Debility POA: Yes    Hypokalemia POA: Yes    Fibromyalgia POA: Unknown    Normal pressure hydrocephalus (HCC) POA: Yes    Tachycardia POA: Yes    Insomnia POA: Unknown    Polycythemia POA: Yes    Leukocytosis POA: Yes    Hypomagnesemia POA: Unknown    Acute metabolic encephalopathy POA: Yes  Resolved Problems:    * No resolved hospital problems. *      FOLLOW UP  No future appointments.  No follow-up provider specified.    MEDICATIONS ON DISCHARGE     Medication List        START taking these medications        Instructions   acetaminophen 500 MG Tabs  Commonly known as: TYLENOL   Take 2 Tablets by mouth every 6  hours as needed for Mild Pain, Moderate Pain or Fever.  Dose: 1,000 mg            CONTINUE taking these medications        Instructions   albuterol 108 (90 Base) MCG/ACT Aers inhalation aerosol   INHALE TWO PUFFS BY MOUTH EVERY SIX HOURS AS NEEDED FOR SHORTNESS OF BREATH     amLODIPine 10 MG Tabs  Commonly known as: NORVASC   Take 1 Tablet by mouth every day.  Dose: 10 mg     ARIPiprazole 15 MG Tabs  Commonly known as: Abilify   Doctor's comments: Dr. Spring  Take 1 Tablet by mouth every day.  Dose: 15 mg     DULoxetine 30 MG Cpep  Commonly known as: CYMBALTA   Take 30 mg by mouth at bedtime.  Dose: 30 mg     lisinopril 10 MG Tabs  Commonly known as: PRINIVIL   Take 1 Tablet by mouth every day.  Dose: 10 mg     propranolol 10 MG Tabs  Commonly known as: INDERAL   Take 10 mg by mouth at bedtime.  Dose: 10 mg            STOP taking these medications      lamoTRIgine 100 MG Tabs  Commonly known as: LAMICTAL     methocarbamol 750 MG Tabs  Commonly known as: ROBAXIN              Allergies  Allergies   Allergen Reactions    Gabapentin Unspecified     seizure       DIET  Orders Placed This Encounter   Procedures    Diet Order Diet: Level 7 - Easy to Chew (on patient request wants soft diet); Liquid level: Level 0 - Thin; Second Modifier: (optional): Cardiac     Standing Status:   Standing     Number of Occurrences:   1     Order Specific Question:   Diet:     Answer:   Level 7 - Easy to Chew [22]     Comments:   on patient request wants soft diet     Order Specific Question:   Liquid level     Answer:   Level 0 - Thin     Order Specific Question:   Second Modifier: (optional)     Answer:   Cardiac [6]       ACTIVITY  As tolerated.  Weight bearing as tolerated    CONSULTATIONS  Palliative care   Psychiatry   Neurology     PROCEDURES  3/24/2023  EEG     LABORATORY  Lab Results   Component Value Date    SODIUM 147 (H) 03/30/2023    POTASSIUM 3.7 03/30/2023    CHLORIDE 108 03/30/2023    CO2 23 03/30/2023    GLUCOSE 88  03/30/2023    BUN 15 03/30/2023    CREATININE 0.40 (L) 03/30/2023        Lab Results   Component Value Date    WBC 10.9 (H) 03/30/2023    HEMOGLOBIN 16.9 (H) 03/30/2023    HEMATOCRIT 52.0 (H) 03/30/2023    PLATELETCT 278 03/30/2023        Total time of the discharge process exceeds 37 minutes.

## 2023-03-31 VITALS
RESPIRATION RATE: 18 BRPM | OXYGEN SATURATION: 94 % | HEART RATE: 98 BPM | SYSTOLIC BLOOD PRESSURE: 112 MMHG | HEIGHT: 69 IN | BODY MASS INDEX: 35.49 KG/M2 | TEMPERATURE: 97 F | DIASTOLIC BLOOD PRESSURE: 90 MMHG | WEIGHT: 239.64 LBS

## 2023-03-31 PROCEDURE — A9270 NON-COVERED ITEM OR SERVICE: HCPCS | Performed by: HOSPITALIST

## 2023-03-31 PROCEDURE — 99239 HOSP IP/OBS DSCHRG MGMT >30: CPT | Performed by: INTERNAL MEDICINE

## 2023-03-31 PROCEDURE — 700102 HCHG RX REV CODE 250 W/ 637 OVERRIDE(OP): Performed by: STUDENT IN AN ORGANIZED HEALTH CARE EDUCATION/TRAINING PROGRAM

## 2023-03-31 PROCEDURE — A9270 NON-COVERED ITEM OR SERVICE: HCPCS | Performed by: STUDENT IN AN ORGANIZED HEALTH CARE EDUCATION/TRAINING PROGRAM

## 2023-03-31 PROCEDURE — 700102 HCHG RX REV CODE 250 W/ 637 OVERRIDE(OP): Performed by: HOSPITALIST

## 2023-03-31 RX ORDER — ACETAMINOPHEN 500 MG
1000 TABLET ORAL EVERY 6 HOURS PRN
Qty: 30 TABLET | Refills: 0 | Status: ON HOLD | COMMUNITY
Start: 2023-03-31 | End: 2023-08-22

## 2023-03-31 RX ADMIN — LISINOPRIL 10 MG: 10 TABLET ORAL at 06:15

## 2023-03-31 RX ADMIN — ARIPIPRAZOLE 20 MG: 10 TABLET ORAL at 06:15

## 2023-03-31 RX ADMIN — AMLODIPINE BESYLATE 10 MG: 10 TABLET ORAL at 06:15

## 2023-03-31 NOTE — CARE PLAN
The patient is Stable - Low risk of patient condition declining or worsening    Shift Goals  Clinical Goals: safety, dc plan  Patient Goals: comfort  Family Goals: dc plan    Progress made toward(s) clinical / shift goals:        Problem: Knowledge Deficit - Standard  Goal: Patient and family/care givers will demonstrate understanding of plan of care, disease process/condition, diagnostic tests and medications  3/31/2023 1415 by Na Loza R.N.  Outcome: Progressing  3/31/2023 1415 by Na Loza R.N.  Outcome: Progressing     Problem: Skin Integrity  Goal: Skin integrity is maintained or improved  3/31/2023 1415 by Na Loza R.N.  Outcome: Progressing  3/31/2023 1415 by Na Loza R.N.  Outcome: Progressing     Problem: Fall Risk  Goal: Patient will remain free from falls  3/31/2023 1415 by Na Loza R.N.  Outcome: Progressing  3/31/2023 1415 by Na Loza R.N.  Outcome: Progressing     Problem: Pain - Standard  Goal: Alleviation of pain or a reduction in pain to the patient’s comfort goal  3/31/2023 1415 by Na Loza R.N.  Outcome: Progressing  3/31/2023 1415 by Na Loza R.N.  Outcome: Progressing     Problem: Discharge Barriers/Planning  Goal: Patient's continuum of care needs are met  Outcome: Progressing       Patient is not progressing towards the following goals: Pt knowledge of POC, self care. However,  aware and participates in POC.      Problem: Mobility  Goal: Patient's capacity to carry out activities will improve  3/31/2023 1415 by Na Loza R.N.  Outcome: Not Met  3/31/2023 1415 by Na Loza R.N.  Outcome: Progressing

## 2023-03-31 NOTE — PROGRESS NOTES
Right  shunt checked after recent MRI brain. MRI brain demonstrated ventriculomegaly. Shunt at 2.5 level, valve easily depresses, fills briskly. Adjusted to 0.5 performance level to allow for more drainage.   Patient tolerated adjustment well.   Please call [facility]surgery with any questions or concerns.

## 2023-03-31 NOTE — PROGRESS NOTES
Report given to Elsa at [facility]. Pt IV removed, changed into own clothing. , Ed, aware of POC and anticipated transfer time.

## 2023-03-31 NOTE — CARE PLAN
The patient is Stable - Low risk of patient condition declining or worsening    Shift Goals  Clinical Goals: MRI complete, safety, DC  Patient Goals: comfort  Family Goals: SHAZIA    Progress made toward(s) clinical / shift goals:    Problem: Skin Integrity  Goal: Skin integrity is maintained or improved  Outcome: Progressing     Problem: Fall Risk  Goal: Patient will remain free from falls  Outcome: Progressing     Problem: Pain - Standard  Goal: Alleviation of pain or a reduction in pain to the patient’s comfort goal  Outcome: Progressing     Problem: Discharge Barriers/Planning  Goal: Patient's continuum of care needs are met  Outcome: Progressing       Patient is educated on plan of care during course of hospital stay. Patient is educated on risks for falls and is educated on use of call light for assistance. Pain will be managed within patient's comfort zone. Hourly rounding is in place. Bedside table and call light are within reach. DC possible on Friday 3/31

## 2023-03-31 NOTE — CONSULTS
"  [facility] PSYCHIATRIC FOLLOW-UP:(established)  *Reason for admission:  presenting with recurrence of acute encephalopathy, recently discharged however at home was unable to be cared for, would not move from chair for more than 30 hours, refusing medications, blank stare, not speaking. after benzo administration patient does appear improved as she is now speaking, though she is oriented only to person and does not show insight into her current situation  *Legal Hold Status: not applicable                       S:  very confused, not oriented except to self. Thought there was an officer asking to come into the room and she said \"come on in\". She ca't focus, answers at times are non sequitur.     O: Medical ROS (as pertinent):                      *Psychiatric Examination:   Vitals:   Vitals:    03/30/23 1536 03/30/23 1907 03/31/23 0423 03/31/23 0754   BP: 118/73 (!) 146/73 (!) 142/81 (!) 112/90   Pulse: (!) 115 (!) 112 99 98   Resp: 18 17 17 18   Temp: 36.2 °C (97.2 °F) 36.4 °C (97.5 °F) 36.7 °C (98 °F) 36.1 °C (97 °F)   TempSrc: Temporal Temporal Temporal Temporal   SpO2: 93% 93% 94% 94%   Weight:       Height:         General Appearance:  intermittent eye contact  Abnormal Movements: tremors  in R foot, resting.  Gait and Posture: not observed. She is lying as she always is when I see her, slightly propped up on the bed, hands crossed on her abdomen, confused  Speech: slightly slurred but understandable  Thought Process:wnl  Associations:   tends to ramble   Abnormal or Psychotic Thoughts: confused  Judgement and Insight: impaired   Orientation: disoriented  Recent and Remote Memory: unable to determine  Attention Span and Concentration: mildly distracted  Language:fluent  Fund of Knowledge: not tested  Mood and Affect: anxious  SI/HI:  suicidal - no and homicidal - no      Current Medications:  Scheduled Medications   Medication Dose Frequency    ARIPiprazole  20 mg DAILY    senna-docusate  2 " "Tablet BID    enoxaparin (LOVENOX) injection  40 mg DAILY AT 1800    [Held by provider] DULoxetine  30 mg QHS    lisinopril  10 mg DAILY    propranolol  10 mg QHS    amLODIPine  10 mg DAILY          *ASSESSMENT/RECOMENDATIONS:  1.Neurocognitive disorder unspc:      R/O Encephalopathy: : confirmed on EEG with      Psychosis and confuson        R/O lewy body dementia  2  Hx of schizophrenia: R/O other: she began having .AVH about 4 years ago. In 2021 found to have \"Marked\" ventriculometry determined to be NPH and a shunt was placed. Could her \"schizophrenia\" be related to the developing NPH?       Medical:     -back pain to such a degree that she has fallen, backwards, per  a few times and now doesn't want to walk  -NPH with  shunt  -encephalopathy  -polycythemia     Legal hold: not applicable       Medication and Other Recommendations: final orders as per Tx Tm  No changes: this is NOT a common presentation for schizophrenia especially when  says she was oriented in the recent past.     Signing off, reconsult as needed.        Discharge recommendations: per tx tm         "

## 2023-03-31 NOTE — DISCHARGE PLANNING
DC Transport Scheduled    Received request at: 3/31/2023 at 1133    Transport Company Scheduled:  [employer]  Spoke with Angelica at [facility] to schedule transport.    Scheduled Date: 3/31/2023  Scheduled Time: 1400    Destination: [facility] at [address]    Notified care team of scheduled transport via [employer].     If there are any changes needed to the DC transportation scheduled, please contact [facility] at ext. [phone] between the hours of 8316-3483 Mon-Fri. If outside those hours, contact the ED Case Manager at ext. [phone].

## 2023-03-31 NOTE — DOCUMENTATION QUERY
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     Can this diagnosis be further clarified?       The patient's Clinical Indicators include:  Findings: 3/23 H&P: nonsensical speech, rigidity    3/30 PN: hallucinations, acute encephalopathy present on admission, possible neurologic malignant syndrome     Treatment: 3/30 PN: hold home abilify 15mg, lamictal 100mg     Risk Factors: takes abilify 15mg daily, lamictal 100mg, history of schizophrenia    Bernice Birch RN BSN  Clinical Documentation   [email or web address]  Connect via Voalte Messenger  Options provided:   -- neurologic malignant syndrome is ruled in   -- neurologic malignant syndrome is  ruled out   -- Other explanation, (please specify other explanation)   -- Unable to determine      Query created by: Bernice Martell on 3/31/2023 7:01 AM    RESPONSE TEXT:    neurologic malignant syndrome is ruled out          Electronically signed by:  JIMBO CATES MD 3/31/2023 3:51 PM

## 2023-03-31 NOTE — DISCHARGE PLANNING
Care Transition Team Discharge Planning    Anticipated Discharge Information  Discharge Disposition: D/T to another type of health care inst. (72)              Discharge Plan:  ELIZABETH spoke with Beata at [facility] at [phone] regarding acceptance today. Beata is asking for another referral to be sent. Referral sent. ELIZABETH will continue to follow.

## 2023-03-31 NOTE — PROGRESS NOTES
"Palliative Care Advance Care Planning Progress Note    General: Sheyla Garcia is a 72 year-old female pt with a PMHx of NPH with  shunt and schizophrenia who presented to the ED 3/23/2023 with acute encephalopathy, catatonia was suspected and lorazepam trial initiated. She has had some improvement in the quality of her speech but remains A&Ox1.     Discussion: Call placed to patient's spouse, Ed. Introduced self and role of palliative care. Ed agreeable to ACP discussion at this time.    Ed states that he and Sheyla have been  for 45 years. They have one daughter in [place], [place]. Patient began experiencing \"hospital hallucinations\" that resolved when she was discharged. About 2 years ago she began having hallucinations at night, even at home. Ed states that patient's hallucinations and memory issues have progressed despite  shunt placement.    Ed has been able to care for her at home until the last couple of weeks. They do have home health. He states that now patient is fearful of her  and \"she has literally forgotten how to walk.\" She has fallen several times and Ed feels that her fear of falling prevents her from walking. He does not feel that he can provide the care that she requires any more. He is agreeable to discharge to [facility], he is hopeful that she will have more social interaction there to improve her mental status.     Ed was informed that patient's hallucinations and other symptoms are likely secondary to dementia. He states \"this is so tough, her intelligence is right around genius.\" Empathetic support provided. Lengthy discussion regarding dementia including it's progressive and terminal nature, disease trajectory, expectations for further advancement (including loss of safe swallowing), the sadness of loss experienced by loved ones even prior to death, and the difficulties for spouses/family in facing the realities of decline/loss of independence, and the care available. " "    Discussed the issue of code status. Ed states that prior to this patient would want to have been a full code. However, given current medical condition Ed is not sure that this is the right step. He needs more time to consider code status. Brief discussion regarding benefit versus burden of resuscitation attempt. Ed verbalized understanding and will consider this in the coming days. He states \"I am not ready to make that decision yet.\"  Empathetic support provided and discussed that Ed can take more time to make this decision. Reassurance provided that, per chart review, pt is medically cleared for discharge. Discussed that PC team is here to discuss this further should he have further questions or need support.     Ed states that patient never \"put pen to paper\" to complete an AD. Advised that the patient lacks capacity to complete AD at this time and family was educated on NOK order. Discussed that following NOK order, Ed would be patient's default healthcare surrogate.  Ed verbalized understanding.    Active listening, reflection, reminiscing, validation & normalization, and empathic support utilized throughout this encounter.  All questions answered and contact information provided, encouraged to call with any questions or concerns.      Outcome: Code status updated.     Plan: POLST prior to d/c if possible.    Updated: MD and RN    Please call our team with questions and/or additional needs.    Total visit time was 30 minutes discussing advance care planning.    SHIRA Moncada  Palliative Care Nurse Practitioner   [phone]       "

## 2023-05-04 PROBLEM — F31.9 BIPOLAR DISORDER (HCC): Status: ACTIVE | Noted: 2023-05-04

## 2023-08-15 ENCOUNTER — HOSPITAL ENCOUNTER (OUTPATIENT)
Facility: MEDICAL CENTER | Age: 73
End: 2023-08-15
Attending: FAMILY MEDICINE
Payer: COMMERCIAL

## 2023-08-16 ENCOUNTER — HOSPITAL ENCOUNTER (OUTPATIENT)
Facility: MEDICAL CENTER | Age: 73
End: 2023-08-16
Attending: NURSE PRACTITIONER
Payer: MEDICARE

## 2023-08-16 PROCEDURE — 87186 SC STD MICRODIL/AGAR DIL: CPT

## 2023-08-16 PROCEDURE — 87205 SMEAR GRAM STAIN: CPT

## 2023-08-16 PROCEDURE — 87070 CULTURE OTHR SPECIMN AEROBIC: CPT

## 2023-08-16 PROCEDURE — 87077 CULTURE AEROBIC IDENTIFY: CPT

## 2023-08-16 PROCEDURE — 87076 CULTURE ANAEROBE IDENT EACH: CPT

## 2023-08-17 LAB
BACTERIA UR CULT: NORMAL
SIGNIFICANT IND 70042: NORMAL
SITE SITE: NORMAL
SOURCE SOURCE: NORMAL

## 2023-08-18 ENCOUNTER — HOSPITAL ENCOUNTER (OUTPATIENT)
Facility: MEDICAL CENTER | Age: 73
End: 2023-08-18
Attending: FAMILY MEDICINE
Payer: COMMERCIAL

## 2023-08-18 LAB
GRAM STN SPEC: NORMAL
SIGNIFICANT IND 70042: NORMAL
SITE SITE: NORMAL
SOURCE SOURCE: NORMAL

## 2023-08-21 ENCOUNTER — APPOINTMENT (OUTPATIENT)
Dept: RADIOLOGY | Facility: MEDICAL CENTER | Age: 73
DRG: 871 | End: 2023-08-21
Attending: EMERGENCY MEDICINE
Payer: MEDICARE

## 2023-08-21 ENCOUNTER — HOSPITAL ENCOUNTER (INPATIENT)
Facility: MEDICAL CENTER | Age: 73
LOS: 4 days | DRG: 871 | End: 2023-08-25
Attending: EMERGENCY MEDICINE | Admitting: STUDENT IN AN ORGANIZED HEALTH CARE EDUCATION/TRAINING PROGRAM
Payer: MEDICARE

## 2023-08-21 DIAGNOSIS — L89.159 PRESSURE INJURY OF SKIN OF SACRAL REGION, UNSPECIFIED INJURY STAGE: ICD-10-CM

## 2023-08-21 DIAGNOSIS — E87.6 HYPOKALEMIA: ICD-10-CM

## 2023-08-21 DIAGNOSIS — N39.0 ACUTE UTI: ICD-10-CM

## 2023-08-21 DIAGNOSIS — R41.82 ALTERED MENTAL STATUS, UNSPECIFIED ALTERED MENTAL STATUS TYPE: ICD-10-CM

## 2023-08-21 LAB
ALBUMIN SERPL BCP-MCNC: 2.8 G/DL (ref 3.2–4.9)
ALBUMIN/GLOB SERPL: 0.9 G/DL
ALP SERPL-CCNC: 79 U/L (ref 30–99)
ALT SERPL-CCNC: 10 U/L (ref 2–50)
AMORPH CRY #/AREA URNS HPF: PRESENT /HPF
ANION GAP SERPL CALC-SCNC: 12 MMOL/L (ref 7–16)
APPEARANCE UR: ABNORMAL
AST SERPL-CCNC: 17 U/L (ref 12–45)
BACTERIA #/AREA URNS HPF: ABNORMAL /HPF
BASOPHILS # BLD AUTO: 0.2 % (ref 0–1.8)
BASOPHILS # BLD: 0.03 K/UL (ref 0–0.12)
BILIRUB SERPL-MCNC: 0.4 MG/DL (ref 0.1–1.5)
BILIRUB UR QL STRIP.AUTO: NEGATIVE
BUN SERPL-MCNC: 6 MG/DL (ref 8–22)
CALCIUM ALBUM COR SERPL-MCNC: 9.9 MG/DL (ref 8.5–10.5)
CALCIUM SERPL-MCNC: 8.9 MG/DL (ref 8.5–10.5)
CAOX CRY #/AREA URNS HPF: ABNORMAL /HPF
CHLORIDE SERPL-SCNC: 99 MMOL/L (ref 96–112)
CO2 SERPL-SCNC: 29 MMOL/L (ref 20–33)
COLOR UR: YELLOW
CREAT SERPL-MCNC: 0.5 MG/DL (ref 0.5–1.4)
CRP SERPL HS-MCNC: 10.44 MG/DL (ref 0–0.75)
EOSINOPHIL # BLD AUTO: 0.21 K/UL (ref 0–0.51)
EOSINOPHIL NFR BLD: 1.4 % (ref 0–6.9)
EPI CELLS #/AREA URNS HPF: ABNORMAL /HPF
ERYTHROCYTE [DISTWIDTH] IN BLOOD BY AUTOMATED COUNT: 45.6 FL (ref 35.9–50)
ERYTHROCYTE [SEDIMENTATION RATE] IN BLOOD BY WESTERGREN METHOD: 78 MM/HOUR (ref 0–25)
FLUAV RNA SPEC QL NAA+PROBE: NEGATIVE
FLUBV RNA SPEC QL NAA+PROBE: NEGATIVE
GFR SERPLBLD CREATININE-BSD FMLA CKD-EPI: 100 ML/MIN/1.73 M 2
GLOBULIN SER CALC-MCNC: 3.2 G/DL (ref 1.9–3.5)
GLUCOSE SERPL-MCNC: 114 MG/DL (ref 65–99)
GLUCOSE UR STRIP.AUTO-MCNC: NEGATIVE MG/DL
GRAM STN SPEC: NORMAL
HCT VFR BLD AUTO: 37.1 % (ref 37–47)
HGB BLD-MCNC: 12.2 G/DL (ref 12–16)
HYALINE CASTS #/AREA URNS LPF: ABNORMAL /LPF
IMM GRANULOCYTES # BLD AUTO: 0.1 K/UL (ref 0–0.11)
IMM GRANULOCYTES NFR BLD AUTO: 0.7 % (ref 0–0.9)
KETONES UR STRIP.AUTO-MCNC: ABNORMAL MG/DL
LACTATE SERPL-SCNC: 0.9 MMOL/L (ref 0.5–2)
LACTATE SERPL-SCNC: 1.2 MMOL/L (ref 0.5–2)
LEUKOCYTE ESTERASE UR QL STRIP.AUTO: ABNORMAL
LIPASE SERPL-CCNC: 13 U/L (ref 11–82)
LYMPHOCYTES # BLD AUTO: 2.5 K/UL (ref 1–4.8)
LYMPHOCYTES NFR BLD: 16.7 % (ref 22–41)
MAGNESIUM SERPL-MCNC: 1.7 MG/DL (ref 1.5–2.5)
MCH RBC QN AUTO: 30.4 PG (ref 27–33)
MCHC RBC AUTO-ENTMCNC: 32.9 G/DL (ref 32.2–35.5)
MCV RBC AUTO: 92.5 FL (ref 81.4–97.8)
MICRO URNS: ABNORMAL
MONOCYTES # BLD AUTO: 1.52 K/UL (ref 0–0.85)
MONOCYTES NFR BLD AUTO: 10.1 % (ref 0–13.4)
MUCOUS THREADS #/AREA URNS HPF: ABNORMAL /HPF
NEUTROPHILS # BLD AUTO: 10.65 K/UL (ref 1.82–7.42)
NEUTROPHILS NFR BLD: 70.9 % (ref 44–72)
NITRITE UR QL STRIP.AUTO: NEGATIVE
NRBC # BLD AUTO: 0 K/UL
NRBC BLD-RTO: 0 /100 WBC (ref 0–0.2)
PH UR STRIP.AUTO: 6 [PH] (ref 5–8)
PLATELET # BLD AUTO: 381 K/UL (ref 164–446)
PMV BLD AUTO: 9.4 FL (ref 9–12.9)
POTASSIUM SERPL-SCNC: 2.7 MMOL/L (ref 3.6–5.5)
PROT SERPL-MCNC: 6 G/DL (ref 6–8.2)
PROT UR QL STRIP: NEGATIVE MG/DL
RBC # BLD AUTO: 4.01 M/UL (ref 4.2–5.4)
RBC # URNS HPF: ABNORMAL /HPF
RBC UR QL AUTO: NEGATIVE
RSV RNA SPEC QL NAA+PROBE: NEGATIVE
SARS-COV-2 RNA RESP QL NAA+PROBE: NOTDETECTED
SIGNIFICANT IND 70042: NORMAL
SITE SITE: NORMAL
SODIUM SERPL-SCNC: 140 MMOL/L (ref 135–145)
SOURCE SOURCE: NORMAL
SP GR UR STRIP.AUTO: 1.01
SPECIMEN SOURCE: NORMAL
UROBILINOGEN UR STRIP.AUTO-MCNC: 1 MG/DL
WBC # BLD AUTO: 15 K/UL (ref 4.8–10.8)
WBC #/AREA URNS HPF: ABNORMAL /HPF

## 2023-08-21 PROCEDURE — 87040 BLOOD CULTURE FOR BACTERIA: CPT

## 2023-08-21 PROCEDURE — 81001 URINALYSIS AUTO W/SCOPE: CPT

## 2023-08-21 PROCEDURE — 83690 ASSAY OF LIPASE: CPT

## 2023-08-21 PROCEDURE — 36415 COLL VENOUS BLD VENIPUNCTURE: CPT

## 2023-08-21 PROCEDURE — 85652 RBC SED RATE AUTOMATED: CPT

## 2023-08-21 PROCEDURE — 74177 CT ABD & PELVIS W/CONTRAST: CPT

## 2023-08-21 PROCEDURE — 74018 RADEX ABDOMEN 1 VIEW: CPT

## 2023-08-21 PROCEDURE — 85025 COMPLETE CBC W/AUTO DIFF WBC: CPT

## 2023-08-21 PROCEDURE — 93005 ELECTROCARDIOGRAM TRACING: CPT | Performed by: EMERGENCY MEDICINE

## 2023-08-21 PROCEDURE — 70250 X-RAY EXAM OF SKULL: CPT

## 2023-08-21 PROCEDURE — 99223 1ST HOSP IP/OBS HIGH 75: CPT | Mod: AI,25 | Performed by: STUDENT IN AN ORGANIZED HEALTH CARE EDUCATION/TRAINING PROGRAM

## 2023-08-21 PROCEDURE — C9803 HOPD COVID-19 SPEC COLLECT: HCPCS | Performed by: EMERGENCY MEDICINE

## 2023-08-21 PROCEDURE — 99497 ADVNCD CARE PLAN 30 MIN: CPT | Performed by: STUDENT IN AN ORGANIZED HEALTH CARE EDUCATION/TRAINING PROGRAM

## 2023-08-21 PROCEDURE — 96367 TX/PROPH/DG ADDL SEQ IV INF: CPT

## 2023-08-21 PROCEDURE — 700117 HCHG RX CONTRAST REV CODE 255: Mod: UD | Performed by: EMERGENCY MEDICINE

## 2023-08-21 PROCEDURE — 700111 HCHG RX REV CODE 636 W/ 250 OVERRIDE (IP): Mod: JZ,UD | Performed by: EMERGENCY MEDICINE

## 2023-08-21 PROCEDURE — 770020 HCHG ROOM/CARE - TELE (206)

## 2023-08-21 PROCEDURE — 80053 COMPREHEN METABOLIC PANEL: CPT

## 2023-08-21 PROCEDURE — 87077 CULTURE AEROBIC IDENTIFY: CPT

## 2023-08-21 PROCEDURE — 96365 THER/PROPH/DIAG IV INF INIT: CPT

## 2023-08-21 PROCEDURE — 87186 SC STD MICRODIL/AGAR DIL: CPT

## 2023-08-21 PROCEDURE — 87086 URINE CULTURE/COLONY COUNT: CPT

## 2023-08-21 PROCEDURE — 700105 HCHG RX REV CODE 258: Mod: UD | Performed by: EMERGENCY MEDICINE

## 2023-08-21 PROCEDURE — 83605 ASSAY OF LACTIC ACID: CPT | Mod: 91

## 2023-08-21 PROCEDURE — 0241U HCHG SARS-COV-2 COVID-19 NFCT DS RESP RNA 4 TRGT MIC: CPT

## 2023-08-21 PROCEDURE — 71045 X-RAY EXAM CHEST 1 VIEW: CPT

## 2023-08-21 PROCEDURE — 99285 EMERGENCY DEPT VISIT HI MDM: CPT

## 2023-08-21 PROCEDURE — 83735 ASSAY OF MAGNESIUM: CPT

## 2023-08-21 PROCEDURE — 86140 C-REACTIVE PROTEIN: CPT

## 2023-08-21 PROCEDURE — 87070 CULTURE OTHR SPECIMN AEROBIC: CPT

## 2023-08-21 PROCEDURE — 70450 CT HEAD/BRAIN W/O DYE: CPT

## 2023-08-21 PROCEDURE — 87150 DNA/RNA AMPLIFIED PROBE: CPT

## 2023-08-21 PROCEDURE — 87205 SMEAR GRAM STAIN: CPT

## 2023-08-21 PROCEDURE — 72020 X-RAY EXAM OF SPINE 1 VIEW: CPT

## 2023-08-21 RX ORDER — SODIUM CHLORIDE, SODIUM LACTATE, POTASSIUM CHLORIDE, CALCIUM CHLORIDE 600; 310; 30; 20 MG/100ML; MG/100ML; MG/100ML; MG/100ML
1000 INJECTION, SOLUTION INTRAVENOUS ONCE
Status: COMPLETED | OUTPATIENT
Start: 2023-08-21 | End: 2023-08-21

## 2023-08-21 RX ORDER — POTASSIUM CHLORIDE 7.45 MG/ML
10 INJECTION INTRAVENOUS ONCE
Status: COMPLETED | OUTPATIENT
Start: 2023-08-21 | End: 2023-08-21

## 2023-08-21 RX ADMIN — PIPERACILLIN AND TAZOBACTAM 3.38 G: 3; .375 INJECTION, POWDER, FOR SOLUTION INTRAVENOUS at 18:17

## 2023-08-21 RX ADMIN — SODIUM CHLORIDE, POTASSIUM CHLORIDE, SODIUM LACTATE AND CALCIUM CHLORIDE 1000 ML: 600; 310; 30; 20 INJECTION, SOLUTION INTRAVENOUS at 18:20

## 2023-08-21 RX ADMIN — POTASSIUM CHLORIDE 10 MEQ: 7.46 INJECTION, SOLUTION INTRAVENOUS at 20:58

## 2023-08-21 RX ADMIN — IOHEXOL 100 ML: 350 INJECTION, SOLUTION INTRAVENOUS at 21:38

## 2023-08-21 ASSESSMENT — FIBROSIS 4 INDEX: FIB4 SCORE: 1.35

## 2023-08-21 NOTE — ED PROVIDER NOTES
ER Provider Note    Scribed for Sylvia Phelps MD by Nayely Bentley. 8/21/2023  4:46 PM    Primary Care Provider: ALANNA Childers    CHIEF COMPLAINT  Chief Complaint   Patient presents with    Wound Check     Pt sent here for sacral wound evaluation. Concern for infection.  at [facility] recommending debridement and wound vac placement.      EXTERNAL RECORDS REVIEWED  Outpatient Notes Patient was last hospitalized here from 3/23/23 to 3/31/23 for acute encephalopathy. She has a history of hypertension, hypothyroidism, uncontrolled schizophrenia, asthma, UTI causing altered mental status. She has normal pressure hydrocephalus with  shunt and is followed by neurosurgery. She has presented at a time with non sensical speech and refusal to take medicine or move around. Patient had memory issues before the  shunt was placed. Thought encephalopathy could be related to NPH related dementia vs primary progressive dementia. She was discharged to [facility], and her paranoia resolved here at this time but she was still delusional. Her  reported she hadn't walked in nearly 2 months. Last communication with her APRN was in May. Per nursing report, patient was A&O x1 with her nursing home requesting an evaluation of her brain and sacral would which is concerning for an infection. Patient had an echocardiogram in May 2022 which showed a normal EF.      HPI/ROS  LIMITATION TO HISTORY   Select: Altered mental status / Confusion  OUTSIDE HISTORIAN(S):  None    Sheyla Garcia is a 72 y.o. female who presents to the ED for a wound check of a sacral decubitus ulcer onset prior to arrival. Per nursing report, patient is A&O x1 at baseline.  She arrives via EMS with report that her nursing home requesting an that patient have a debridement of her sacral decubitus ulcer.  They are concerned the sacral decubitus ulcer is infected.  She was seen by her doctor at [facility] and was recommended debridement and  "wound vac replacement.  EMS also reports that nursing home staff wants the shunt evaluated because they think it is \"distended.\"  RN here at [facility] contacted the nursing staff at [facility] and they could not further clarify what they were concerned about and could not say what was \"distended.\"  She has a history of hypertension, hypothyroidism, uncontrolled schizophrenia, asthma, UTI causing altered mental status. She has normal pressure hydrocephalus with  shunt and is followed by neurosurgery. No alleviating or exacerbating factors noted.      arrives to the ER.  He says over the last 4 days she has been sleeping a lot more than normal.  He says that she is not opening her eyes as much when he talks to her, which is unusual.  He says that she does talk, although she has pretty severe dementia and really cannot carry on much of a conversation.  He thinks she is more tired than normal.  He said she was treated for urinary tract infection about a week ago and is unsure whether or not she still on antibiotic.    PAST MEDICAL HISTORY  Past Medical History:   Diagnosis Date    Asthma 08/05/2021    Inhaler use daily and as needed.  \"Allergy induced\".    Bowel habit changes     diarrhea    Breath shortness 08/05/2021    \"Sometimes hard time catching my breath, usually from allergies\".    Emphysema of lung (formerly [facility]) 08/05/2021    6-7 years ago, oxygen for about 4-5 months.  No longer needs O2.    Fibromyalgia     Heart burn     Hypertension     Hypothyroidism 2008    Indigestion     Migraine     Obesity     Pneumonia 2012    Psychiatric problem     Anxiety    Renal disorder 08/05/2021    Kidney stones - 20 years ago.    Seizure (formerly [facility]) 2017    \"Caused from gabapentin\"    Urinary incontinence      SURGICAL HISTORY  Past Surgical History:   Procedure Laterality Date    THORACIC FUSION O-ARM Right 10/13/2021    Procedure: INSERTION, SHUNT, VENTRICULOPERITONEAL, USING FRAMELESS STEREOTAXY - FRONTAL;  Surgeon: Shady " "JOHN Glynn;  Location: SURGERY [facility];  Service: Neurosurgery    CATH PLACEMENT CAPD N/A 10/13/2021    Procedure: INSERTION, CATHETER, CAPD;  Surgeon: Shady Glynn M.D.;  Location: SURGERY [facility];  Service: Neurosurgery    ORIF, KNEE  1/3/08    tibial plateau fracture    BLADDER SLING FEMALE      GYN SURGERY      Hysterectomy    NASAL SEPTAL RECONSTRUCTION      age 26    OTHER ORTHOPEDIC SURGERY      Bilateral tibial fracture    OTHER SURGICAL PROCEDURE      SI joint fusion - Dr. Pineda 1/2019    TONSILLECTOMY       FAMILY HISTORY  Family History   Problem Relation Age of Onset    Heart Disease Mother         chf    Cancer Father         lung, berrylium exposure    Stroke Father         tia's    Cancer Maternal Grandfather         lung    Diabetes Neg Hx      SOCIAL HISTORY   reports that she quit smoking about 37 years ago. Her smoking use included cigarettes. She has never used smokeless tobacco. She reports current alcohol use. She reports that she does not use drugs.    CURRENT MEDICATIONS  Current Outpatient Medications   Medication Instructions    acetaminophen (TYLENOL) 1,000 mg, Oral, EVERY 6 HOURS PRN    albuterol 108 (90 Base) MCG/ACT Aero Soln inhalation aerosol INHALE TWO PUFFS BY MOUTH EVERY SIX HOURS AS NEEDED FOR SHORTNESS OF BREATH    amLODIPine (NORVASC) 10 mg, Oral, DAILY    ARIPiprazole (ABILIFY) 15 mg, Oral, DAILY    DULoxetine (CYMBALTA) 30 mg, Oral, EVERY BEDTIME    lisinopril (PRINIVIL) 10 mg, Oral, DAILY    propranolol (INDERAL) 10 mg, Oral, EVERY BEDTIME     ALLERGIES  Gabapentin    PHYSICAL EXAM  BP (!) 145/74   Pulse (!) 102   Temp 37.1 °C (98.7 °F) (Temporal)   Resp 16   Ht 1.753 m (5' 9\")   Wt 108 kg (239 lb)   SpO2 96%   BMI 35.29 kg/m²   Constitutional: Well developed, well nourished; No acute distress; Non-toxic appearance. Follows commands. When she speaks which is infrequent, her speech is clear. Appears slightly somnolent  HENT: Normocephalic, atraumatic; " Bilateral external ears normal; Oropharynx with dry membranes; No erythema or exudates in the posterior oropharynx.   Eyes: PERRL, EOMI, Conjunctiva normal. No discharge.   Neck:  Supple, nontender midline; No stridor; No nuchal rigidity.   Lymphatic: No cervical lymphadenopathy noted.   Cardiovascular: Tachycardic rate and rhythm without murmurs, rubs, or gallop.   Thorax & Lungs: No respiratory distress, breath sounds without wheezing, rales or rhonchi. Nontender chest wall. No crepitus or subcutaneous air. Decreased breath sounds throughout.   Abdomen: Soft. No obvious masses; No pulsatile masses; no rebound, guarding, or peritoneal signs. Moans and grimaces with palpation of abdomen. Decreased bowel sounds to abdomen.   Skin: Good color; dry without rash or petechia. Warm to touch  Back: Nontender, No CVA tenderness. Very large, deep sacral decubitus ulcer with some drainage on the dressing.   Extremities: Distal radial, dorsalis pedis, posterior tibial pulses are equal bilaterally; Non tender calves or saphenous, No cyanosis, No clubbing. Traced 1+ pitting edema.  Strong radial pulse with a rate in the low 100s which correlates with monitor when patient is not tremulous and instigating significant artifact on monitor or EKG.  Musculoskeletal: Good range of motion in all major joints. No tenderness to palpation or major deformities noted.   Neurologic: Alert & awake.  Follows simple commands.  Will speak on occasion.  When she does speak, speech is clear.  Frequent tremulousness of right upper extremity more so than left upper extremity, which has been says is normal for her.   are 5 out of 5 and equal bilaterally.  No facial asymmetry.    DIAGNOSTIC STUDIES  Labs:   Results for orders placed or performed during the hospital encounter of 08/21/23   CBC WITH DIFFERENTIAL   Result Value Ref Range    WBC 15.0 (H) 4.8 - 10.8 K/uL    RBC 4.01 (L) 4.20 - 5.40 M/uL    Hemoglobin 12.2 12.0 - 16.0 g/dL    Hematocrit  37.1 37.0 - 47.0 %    MCV 92.5 81.4 - 97.8 fL    MCH 30.4 27.0 - 33.0 pg    MCHC 32.9 32.2 - 35.5 g/dL    RDW 45.6 35.9 - 50.0 fL    Platelet Count 381 164 - 446 K/uL    MPV 9.4 9.0 - 12.9 fL    Neutrophils-Polys 70.90 44.00 - 72.00 %    Lymphocytes 16.70 (L) 22.00 - 41.00 %    Monocytes 10.10 0.00 - 13.40 %    Eosinophils 1.40 0.00 - 6.90 %    Basophils 0.20 0.00 - 1.80 %    Immature Granulocytes 0.70 0.00 - 0.90 %    Nucleated RBC 0.00 0.00 - 0.20 /100 WBC    Neutrophils (Absolute) 10.65 (H) 1.82 - 7.42 K/uL    Lymphs (Absolute) 2.50 1.00 - 4.80 K/uL    Monos (Absolute) 1.52 (H) 0.00 - 0.85 K/uL    Eos (Absolute) 0.21 0.00 - 0.51 K/uL    Baso (Absolute) 0.03 0.00 - 0.12 K/uL    Immature Granulocytes (abs) 0.10 0.00 - 0.11 K/uL    NRBC (Absolute) 0.00 K/uL   COMP METABOLIC PANEL   Result Value Ref Range    Sodium 140 135 - 145 mmol/L    Potassium 2.7 (LL) 3.6 - 5.5 mmol/L    Chloride 99 96 - 112 mmol/L    Co2 29 20 - 33 mmol/L    Anion Gap 12.0 7.0 - 16.0    Glucose 114 (H) 65 - 99 mg/dL    Bun 6 (L) 8 - 22 mg/dL    Creatinine 0.50 0.50 - 1.40 mg/dL    Calcium 8.9 8.5 - 10.5 mg/dL    Correct Calcium 9.9 8.5 - 10.5 mg/dL    AST(SGOT) 17 12 - 45 U/L    ALT(SGPT) 10 2 - 50 U/L    Alkaline Phosphatase 79 30 - 99 U/L    Total Bilirubin 0.4 0.1 - 1.5 mg/dL    Albumin 2.8 (L) 3.2 - 4.9 g/dL    Total Protein 6.0 6.0 - 8.2 g/dL    Globulin 3.2 1.9 - 3.5 g/dL    A-G Ratio 0.9 g/dL   LIPASE   Result Value Ref Range    Lipase 13 11 - 82 U/L   URINALYSIS (UA)    Specimen: Urine   Result Value Ref Range    Color Yellow     Character Cloudy (A)     Specific Gravity 1.015 <1.035    Ph 6.0 5.0 - 8.0    Glucose Negative Negative mg/dL    Ketones Trace (A) Negative mg/dL    Protein Negative Negative mg/dL    Bilirubin Negative Negative    Urobilinogen, Urine 1.0 Negative    Nitrite Negative Negative    Leukocyte Esterase Moderate (A) Negative    Occult Blood Negative Negative    Micro Urine Req Microscopic    Lactic Acid   Result  Value Ref Range    Lactic Acid 1.2 0.5 - 2.0 mmol/L   Lactic Acid   Result Value Ref Range    Lactic Acid 0.9 0.5 - 2.0 mmol/L   Culture Wound With Gram Stain    Specimen: Decubitus; Wound   Result Value Ref Range    Significant Indicator NEG     Source WND     Site DECUBITUS     Culture Result -     Gram Stain Result       Rare WBCs.  Moderate Gram positive cocci.  Rare Gram negative rods.     CoV-2, Flu A/B, And RSV by PCR ([place])    Specimen: Respirate   Result Value Ref Range    Influenza virus A RNA Negative Negative    Influenza virus B, PCR Negative Negative    RSV, PCR Negative Negative    SARS-CoV-2 by PCR NotDetected     SARS-CoV-2 Source NP Swab    Sed Rate   Result Value Ref Range    Sed Rate Westergren 78 (H) 0 - 25 mm/hour   C Reactive Protein Quantitative (Non-Cardiac)   Result Value Ref Range    Stat C-Reactive Protein 10.44 (H) 0.00 - 0.75 mg/dL   ESTIMATED GFR   Result Value Ref Range    GFR (CKD-EPI) 100 >60 mL/min/1.73 m 2   MAGNESIUM   Result Value Ref Range    Magnesium 1.7 1.5 - 2.5 mg/dL   URINE MICROSCOPIC (W/UA)   Result Value Ref Range    WBC 20-50 (A) /hpf    RBC 2-5 (A) /hpf    Bacteria Few (A) None /hpf    Epithelial Cells Few /hpf    Mucous Threads Many /hpf    Amorphous Crystal Present /hpf    Ca Oxalate Crystal Few /hpf    Hyaline Cast 3-5 (A) /lpf   GRAM STAIN    Specimen: Wound   Result Value Ref Range    Significant Indicator .     Source WND     Site DECUBITUS     Gram Stain Result       Rare WBCs.  Moderate Gram positive cocci.  Rare Gram negative rods.     EKG (NOW)   Result Value Ref Range    Report       [facility] Emergency Dept.    Test Date:  2023  Pt Name:    MENDOZA ROMAN                 Department: ER  MRN:        [id number]                      Room:       T722  Gender:     Female                       Technician: [id number]  :        1950                   Requested By:JORGE LUIS MACIEL  Order #:    [id number]                    Lisy MD: Jorge Luis  Mattie    Measurements  Intervals                                Axis  Rate:       109                          P:          0  WY:         0                            QRS:        67  QRSD:       91                           T:          79  QT:         317  QTc:        427    Interpretive Statements  Increased baseline artifact (pt tremulous)  NORMAL SINUS RHYTHM  Ventricular premature complex  No obvious ST elevation or depression, but difficult to assess given amount  of baseline artifact.  Low voltage, extremity and precordial leads  RSR' in V1 or V2, probably normal variant  Artifact in lead(s) I,II,III,aVR,aVF,V1 ,V2,V3,V4,V5,V6  Compared to ECG 2023 20:34:03        Electronically Signed On 2023 00:57:40 PDT by Jorge Luis Phelps     EKG   Result Value Ref Range    Report       [facility] Emergency Dept.    Test Date:  2023  Pt Name:    MENDOZA ROMAN                 Department: ER  MRN:        [id number]                      Room:       [facility]  Gender:     Female                       Technician: [id number]  :        1950                   Requested By:JORGE LUIS PHELPS  Order #:    [id number]                    Reading MD: Jorge Luis Phelps    Measurements  Intervals                                Axis  Rate:       109                          P:          40  WY:         164                          QRS:        14  QRSD:       104                          T:          85  QT:         354  QTc:        477    Interpretive Statements  Sinus tachycardia rate 109  Increased baseline artifact (pt tremulous)  Normal axis  Normal intervals  No obvious ST elevation or depression but difficult to access due to artifact  Artifact in lead(s) II,III,aVR,aVL,aVF,V2,V3,V4,V5,V6  Compared to ECG 2023 18:19:00  Myocardial infarct finding now present  Sinus rh ythm no longer present  Ventricular premature complex(es) no longer present  ST (T wave) deviation no longer present  Electronically Signed On  2023 01:00:14 PDT by Sylvia Phelps       EK Lead EKG interpreted by me as shown below.    Radiology:   The attending emergency physician has independently interpreted the diagnostic imaging associated with this visit and am waiting the final reading from the radiologist.     Preliminary interpretation is a follows: ER MD is reviewed the patient's chest x-ray.  There appears to be some increased interstitial infiltrates.    Radiologist interpretation:   CT-ABDOMEN-PELVIS WITH   Final Result   Addendum (preliminary) 1 of 1   Addendum: There is slight hazy fat stranding seen posterior to the sacrum    and coccyx, no discrete fluid collection or definite sacral wound is    appreciated.      Final         1.  Large quantity of stool in the rectal vault compatible with constipation, likely fecal impaction.   2.  Indeterminate left adrenal nodule, follow-up adrenal protocol CT or MRI for further characterization as clinically appropriate   3.  Diverticulosis      CT-HEAD W/O   Final Result         1.  No acute intracranial abnormality is identified, there are nonspecific white matter changes, commonly associated with small vessel ischemic disease.  Associated mild cerebral atrophy is noted.   2.  Moderate bilateral ventricular dilatation, overall appears slightly increased since prior study particularly of the temporal horns, could represent component of shunt dysfunction and/or evolving hydrocephalus.   3.  Atherosclerosis.         [id number] VIEW   Final Result      RIGHT-sided  shunt catheter intact throughout its visualized course.      DX-SKULL-LIMITED 3-   Final Result      RIGHT-sided  shunt catheter intact throughout its visualized course.      DX-SPINE-ANY ONE VIEW   Final Result      RIGHT-sided  shunt catheter intact throughout its visualized course.      DX-CHEST-LIMITED (1 VIEW)   Final Result      RIGHT-sided  shunt catheter intact throughout its visualized course.        COURSE & MEDICAL  "DECISION MAKING     ED Observation Status? Yes; I am placing the patient in to an observation status due to a diagnostic uncertainty as well as therapeutic intensity. Patient placed in observation status at 5:01 PM, 8/21/2023.     Observation plan is as follows: We will manage their symptoms, evaluate with lab work and imaging, and then reassess after results are reviewed     Upon Reevaluation, the patient's condition has: not improved; and will be escalated to hospitalization.    Patient discharged from ED Observation status at 2300 (Time) August 21, 2023 (Date).     INITIAL ASSESSMENT, COURSE AND PLAN  Care Narrative: Patient presents to the ER from [facility] for debridement and wound VAC placement for a significant sacral decubitus ulcer which they thought perhaps might be infected.  The patient is ANO x1 at baseline.  She has had quite a lot of memory issues over the years.  She had a  shunt placed by Dr. Glynn in October 2021 for normal pressure hydrocephalus.  From review of the records, it does not sound like her memory issues improved much after placement of the  shunt.  The patient was admitted here in March for altered mental status.  Neurology was consulted and did not feel that there was any acute neurologic need at that time and that she probably had progressive worsening dementia.  The patient at that time appeared to be in good position and there was no real change on her CT scan to suggest worsening NPH.  EMS reports that the nursing home staff at [facility] also wanted her  shunt checked.  We called over to the nursing home and the nursing home staff told the emergency department nurse here at [facility] who is taking care of the patient that they were worried about the shunt because it was \"distended.\"  They could not tell as what part of the shunt was distended or what exactly was distended (her abdomen or something else?).   reports that over the last 4 days the patient has been " sleeping more than normal and will not really open her eyes much when he talks to her.  Shunt series was performed and is normal.  CT brain was performed and shows some slight increase in size of the ventricles suggesting a possible shunt malfunction.  I spoke with Dr. Box, neurosurgeon on-call.  He will see the patient in consultation in the morning.  With respect to the patient's sacral decubitus ulcer, she does have a large cavernous sacral decubitus ulcer which appears to be infected.  White count is elevated at 15,000.  Sed rate and CRP are also elevated.  She does not have a fever here in the ER.  She is tachycardic and remained so despite IV fluids.  She has quite a lot of baseline artifact due to a tremor.  Sometimes the monitor looks like she has atrial flutter or even V. tach, but EKG reveals the artifact in some of the leads with normal-appearing sinus rhythm complexes in other leads.  Patient has bounding radial pulses that correlate with a mild tachycardia, even when she is exhibiting the artifact on the monitor.  The patient will speak, but really does not answer many questions.  Her speech is clear when she does speak.  She follows simple commands.  Her  are equal.  There is no facial asymmetry.  Again, she does not have a fever and I do not think the shunt needs to be emergently tapped.  CT scan of the abdomen was obtained as the patient would moan when I would push on her abdomen.  CT scan reveals some fat stranding in the area of the sacral decubitus ulcer but otherwise is negative for any acute intra-abdominal pathology.  Patient was given Zosyn down here in the ER.  I had inadvertently forgot to write for vancomycin, but I advised the hospitalist of this oversight at time of admission and Dr. Colbert said that he will go ahead and get that ordered ASAP as an inpatient.  At this time patient will be admitted for an infected sacral decubitus ulcer as well as for evaluation of  shunt.  She  "also be admitted for hypokalemia, which was partially supplemented here in the ER.  Additionally, patient was found to have a urinary tract infection.  Zosyn should cover that.  Culture is pending.  I spoke with Dr. Cr, hospitalist on-call, and he will kindly evaluate the patient hospitalization.    5:01 PM - Patient was seen and evaluated at bedside. Patient presents to the ED for a wound check. After my exam, I discussed with the patient the plan of care, which includes treating the patient with medication for their symptoms, as well as obtaining lab work and imaging for further evaluation. Patient understands and verbalizes agreement to plan of care.     9:15 PM - Patient was reevaluated at bedside. Family member is present at bedside. He stated she has been sleeping for about 4 days straight. He notes she has always had a bit of tremor. He states she speaks but it is difficult to have a conversation with her.     10:57 PM  - Neurosurgery paged.     10:57 PM - Hospitalist paged.     2310: Discussed with Dr. Box, neurosurgeon on-call.  He will see the patient in consultation in the morning.    2315: Discussed with Dr. Colbert, hospitalist on-call.  He will kindly evaluate the patient for admission to his service.    HYDRATION: Based on the patient's presentation of Tachycardia the patient was given IV fluids. IV Hydration was used because oral hydration was not adequate alone. Upon recheck following hydration, the patient was improved.    ADDITIONAL PROBLEM LIST  Problem #1: Large sacral decubitus ulcer requiring debridement and wound VAC per MD at [facility] that saw the patient earlier today.  Problem #2: Concern over patient's  shunt (unsure exactly what the concern is as nursing staff at [facility] did not have any other information other than it was \"distended.\")  Problem #3: Increased somnolence per  over the last 4 days    DISPOSITION AND DISCUSSIONS  I have discussed management of the patient with " the following physicians and PILAR's: Hospitalist and neurosurgery    Discussion of management with other [facility] or appropriate source(s): None     Escalation of care considered, and ultimately not performed: diagnostic imaging.  Patient is not hypoxic.  No trouble breathing.  Chest x-ray is unremarkable.  At this time I do not think she needs a CT scan of the chest.    Barriers to care at this time, including but not limited to:  None known .     Decision tools and prescription drugs considered including, but not limited to: Antibiotics patient was given Zosyn for her sacral decubitus ulcer. .    FINAL DIAGNOSIS  1. Pressure injury of skin of sacral region, unspecified injury stage Acute   2. Acute UTI Acute   3. Altered mental status, unspecified altered mental status type Acute   4. Hypokalemia Acute          This dictation has been created using voice recognition software. The accuracy of the dictation is limited by the abilities of the software. I expect there may be some errors of grammar and possibly content. I made every attempt to manually correct the errors within my dictation. However, errors related to voice recognition software may still exist and should be interpreted within the appropriate context.     The note accurately reflects work and decisions made by me.  Sylvia Phelps M.D.  8/22/2023  12:51 AM

## 2023-08-21 NOTE — ED TRIAGE NOTES
"Chief Complaint   Patient presents with    Wound Check     Pt sent here for sacral wound evaluation. Concern for infection.  at [facility] recommending debridement and wound vac placement.      Pt sen from [facility] nursing. Received report from Jaylene SLADE. A&Ox1 - baseline.   [facility] requesting evaluation of R brain shunt.     BP (!) 145/74   Pulse (!) 102   Temp 37.1 °C (98.7 °F) (Temporal)   Resp 16   Ht 1.753 m (5' 9\")   Wt 108 kg (239 lb)   SpO2 96%   BMI 35.29 kg/m²     " Prepped: left chest. Prepped with: DuraPrep. The patient was draped. .Pre-procedure site marking:Insertion site not predetermined

## 2023-08-22 PROBLEM — L89.159 PRESSURE INJURY OF SKIN OF SACRAL REGION: Status: ACTIVE | Noted: 2023-08-22

## 2023-08-22 PROBLEM — Z71.89 ACP (ADVANCE CARE PLANNING): Status: ACTIVE | Noted: 2023-08-22

## 2023-08-22 PROBLEM — K59.00 CONSTIPATED: Status: ACTIVE | Noted: 2023-08-22

## 2023-08-22 PROBLEM — R65.10 SIRS (SYSTEMIC INFLAMMATORY RESPONSE SYNDROME) (HCC): Status: ACTIVE | Noted: 2023-08-22

## 2023-08-22 LAB
ANION GAP SERPL CALC-SCNC: 9 MMOL/L (ref 7–16)
BACTERIA WND AEROBE CULT: ABNORMAL
BASOPHILS # BLD AUTO: 0.3 % (ref 0–1.8)
BASOPHILS # BLD: 0.04 K/UL (ref 0–0.12)
BUN SERPL-MCNC: 5 MG/DL (ref 8–22)
CALCIUM SERPL-MCNC: 8.2 MG/DL (ref 8.5–10.5)
CHLORIDE SERPL-SCNC: 103 MMOL/L (ref 96–112)
CO2 SERPL-SCNC: 27 MMOL/L (ref 20–33)
CREAT SERPL-MCNC: 0.41 MG/DL (ref 0.5–1.4)
EKG IMPRESSION: NORMAL
EKG IMPRESSION: NORMAL
EOSINOPHIL # BLD AUTO: 0.12 K/UL (ref 0–0.51)
EOSINOPHIL NFR BLD: 0.9 % (ref 0–6.9)
ERYTHROCYTE [DISTWIDTH] IN BLOOD BY AUTOMATED COUNT: 45.1 FL (ref 35.9–50)
GFR SERPLBLD CREATININE-BSD FMLA CKD-EPI: 105 ML/MIN/1.73 M 2
GLUCOSE SERPL-MCNC: 117 MG/DL (ref 65–99)
GRAM STN SPEC: ABNORMAL
HCT VFR BLD AUTO: 34.2 % (ref 37–47)
HGB BLD-MCNC: 11.1 G/DL (ref 12–16)
IMM GRANULOCYTES # BLD AUTO: 0.09 K/UL (ref 0–0.11)
IMM GRANULOCYTES NFR BLD AUTO: 0.7 % (ref 0–0.9)
LYMPHOCYTES # BLD AUTO: 2.38 K/UL (ref 1–4.8)
LYMPHOCYTES NFR BLD: 17.4 % (ref 22–41)
MAGNESIUM SERPL-MCNC: 1.6 MG/DL (ref 1.5–2.5)
MCH RBC QN AUTO: 29.8 PG (ref 27–33)
MCHC RBC AUTO-ENTMCNC: 32.5 G/DL (ref 32.2–35.5)
MCV RBC AUTO: 91.7 FL (ref 81.4–97.8)
MONOCYTES # BLD AUTO: 1.49 K/UL (ref 0–0.85)
MONOCYTES NFR BLD AUTO: 10.9 % (ref 0–13.4)
NEUTROPHILS # BLD AUTO: 9.54 K/UL (ref 1.82–7.42)
NEUTROPHILS NFR BLD: 69.8 % (ref 44–72)
NRBC # BLD AUTO: 0 K/UL
NRBC BLD-RTO: 0 /100 WBC (ref 0–0.2)
PLATELET # BLD AUTO: 358 K/UL (ref 164–446)
PMV BLD AUTO: 9.3 FL (ref 9–12.9)
POTASSIUM SERPL-SCNC: 3 MMOL/L (ref 3.6–5.5)
POTASSIUM SERPL-SCNC: 3.1 MMOL/L (ref 3.6–5.5)
RBC # BLD AUTO: 3.73 M/UL (ref 4.2–5.4)
SIGNIFICANT IND 70042: ABNORMAL
SITE SITE: ABNORMAL
SODIUM SERPL-SCNC: 139 MMOL/L (ref 135–145)
SOURCE SOURCE: ABNORMAL
WBC # BLD AUTO: 13.7 K/UL (ref 4.8–10.8)

## 2023-08-22 PROCEDURE — 700102 HCHG RX REV CODE 250 W/ 637 OVERRIDE(OP): Performed by: STUDENT IN AN ORGANIZED HEALTH CARE EDUCATION/TRAINING PROGRAM

## 2023-08-22 PROCEDURE — 99233 SBSQ HOSP IP/OBS HIGH 50: CPT | Performed by: INTERNAL MEDICINE

## 2023-08-22 PROCEDURE — 80048 BASIC METABOLIC PNL TOTAL CA: CPT

## 2023-08-22 PROCEDURE — 36415 COLL VENOUS BLD VENIPUNCTURE: CPT

## 2023-08-22 PROCEDURE — 97602 WOUND(S) CARE NON-SELECTIVE: CPT

## 2023-08-22 PROCEDURE — 770020 HCHG ROOM/CARE - TELE (206)

## 2023-08-22 PROCEDURE — 700111 HCHG RX REV CODE 636 W/ 250 OVERRIDE (IP): Performed by: INTERNAL MEDICINE

## 2023-08-22 PROCEDURE — A9270 NON-COVERED ITEM OR SERVICE: HCPCS | Performed by: INTERNAL MEDICINE

## 2023-08-22 PROCEDURE — 85025 COMPLETE CBC W/AUTO DIFF WBC: CPT

## 2023-08-22 PROCEDURE — 700101 HCHG RX REV CODE 250: Performed by: INTERNAL MEDICINE

## 2023-08-22 PROCEDURE — 92610 EVALUATE SWALLOWING FUNCTION: CPT

## 2023-08-22 PROCEDURE — A9270 NON-COVERED ITEM OR SERVICE: HCPCS | Performed by: STUDENT IN AN ORGANIZED HEALTH CARE EDUCATION/TRAINING PROGRAM

## 2023-08-22 PROCEDURE — 83735 ASSAY OF MAGNESIUM: CPT

## 2023-08-22 PROCEDURE — 700102 HCHG RX REV CODE 250 W/ 637 OVERRIDE(OP): Performed by: INTERNAL MEDICINE

## 2023-08-22 PROCEDURE — 700105 HCHG RX REV CODE 258: Performed by: STUDENT IN AN ORGANIZED HEALTH CARE EDUCATION/TRAINING PROGRAM

## 2023-08-22 PROCEDURE — 84132 ASSAY OF SERUM POTASSIUM: CPT

## 2023-08-22 PROCEDURE — 700111 HCHG RX REV CODE 636 W/ 250 OVERRIDE (IP): Performed by: STUDENT IN AN ORGANIZED HEALTH CARE EDUCATION/TRAINING PROGRAM

## 2023-08-22 RX ORDER — AMOXICILLIN 250 MG
2 CAPSULE ORAL 2 TIMES DAILY
Status: DISCONTINUED | OUTPATIENT
Start: 2023-08-22 | End: 2023-08-22

## 2023-08-22 RX ORDER — BISACODYL 10 MG
10 SUPPOSITORY, RECTAL RECTAL
Status: DISCONTINUED | OUTPATIENT
Start: 2023-08-22 | End: 2023-08-22

## 2023-08-22 RX ORDER — SODIUM CHLORIDE, SODIUM LACTATE, POTASSIUM CHLORIDE, CALCIUM CHLORIDE 600; 310; 30; 20 MG/100ML; MG/100ML; MG/100ML; MG/100ML
INJECTION, SOLUTION INTRAVENOUS CONTINUOUS
Status: DISCONTINUED | OUTPATIENT
Start: 2023-08-22 | End: 2023-08-22

## 2023-08-22 RX ORDER — RISPERIDONE 1 MG/1
1 TABLET ORAL 2 TIMES DAILY
Status: ON HOLD | COMMUNITY
End: 2023-09-10

## 2023-08-22 RX ORDER — BISACODYL 10 MG
10 SUPPOSITORY, RECTAL RECTAL EVERY EVENING
Status: DISCONTINUED | OUTPATIENT
Start: 2023-08-22 | End: 2023-08-25 | Stop reason: HOSPADM

## 2023-08-22 RX ORDER — LABETALOL HYDROCHLORIDE 5 MG/ML
10 INJECTION, SOLUTION INTRAVENOUS EVERY 6 HOURS PRN
Status: DISCONTINUED | OUTPATIENT
Start: 2023-08-22 | End: 2023-08-25 | Stop reason: HOSPADM

## 2023-08-22 RX ORDER — LAMOTRIGINE 100 MG/1
100 TABLET ORAL DAILY
Status: ON HOLD | COMMUNITY
End: 2023-09-10

## 2023-08-22 RX ORDER — METOPROLOL SUCCINATE 25 MG/1
25 TABLET, EXTENDED RELEASE ORAL DAILY
Status: DISCONTINUED | OUTPATIENT
Start: 2023-08-23 | End: 2023-08-25 | Stop reason: HOSPADM

## 2023-08-22 RX ORDER — AMOXICILLIN 250 MG
2 CAPSULE ORAL 2 TIMES DAILY
Status: DISCONTINUED | OUTPATIENT
Start: 2023-08-22 | End: 2023-08-25 | Stop reason: HOSPADM

## 2023-08-22 RX ORDER — UMECLIDINIUM 62.5 UG/1
1 AEROSOL, POWDER ORAL DAILY
Status: ON HOLD | COMMUNITY
Start: 2023-07-16 | End: 2023-09-10

## 2023-08-22 RX ORDER — TIZANIDINE 2 MG/1
4 TABLET ORAL 2 TIMES DAILY
Status: ON HOLD | COMMUNITY
End: 2023-09-10

## 2023-08-22 RX ORDER — METOPROLOL SUCCINATE 25 MG/1
25 TABLET, EXTENDED RELEASE ORAL DAILY
Status: ON HOLD | COMMUNITY
End: 2023-09-10

## 2023-08-22 RX ORDER — POTASSIUM CHLORIDE 3 G/15ML
15 SOLUTION ORAL DAILY
COMMUNITY
End: 2023-09-06

## 2023-08-22 RX ORDER — ACETAMINOPHEN 325 MG/1
650 TABLET ORAL EVERY 8 HOURS PRN
Status: ON HOLD | COMMUNITY
End: 2023-09-10

## 2023-08-22 RX ORDER — ENOXAPARIN SODIUM 100 MG/ML
40 INJECTION SUBCUTANEOUS DAILY
Status: DISCONTINUED | OUTPATIENT
Start: 2023-08-22 | End: 2023-08-25 | Stop reason: HOSPADM

## 2023-08-22 RX ORDER — SODIUM HYPOCHLORITE 1.25 MG/ML
SOLUTION TOPICAL 2 TIMES DAILY
Status: DISCONTINUED | OUTPATIENT
Start: 2023-08-22 | End: 2023-08-25 | Stop reason: HOSPADM

## 2023-08-22 RX ORDER — POTASSIUM CHLORIDE 7.45 MG/ML
10 INJECTION INTRAVENOUS
Status: COMPLETED | OUTPATIENT
Start: 2023-08-22 | End: 2023-08-22

## 2023-08-22 RX ORDER — LIDOCAINE 4 G/G
1 PATCH TOPICAL DAILY
Status: ON HOLD | COMMUNITY
End: 2023-09-10

## 2023-08-22 RX ORDER — ALBUTEROL SULFATE 90 UG/1
2 AEROSOL, METERED RESPIRATORY (INHALATION) EVERY 6 HOURS PRN
Status: DISCONTINUED | OUTPATIENT
Start: 2023-08-22 | End: 2023-08-25 | Stop reason: HOSPADM

## 2023-08-22 RX ORDER — POLYETHYLENE GLYCOL 3350 17 G/17G
1 POWDER, FOR SOLUTION ORAL
Status: DISCONTINUED | OUTPATIENT
Start: 2023-08-22 | End: 2023-08-22

## 2023-08-22 RX ORDER — HYDROMORPHONE HYDROCHLORIDE 1 MG/ML
0.5 INJECTION, SOLUTION INTRAMUSCULAR; INTRAVENOUS; SUBCUTANEOUS EVERY 4 HOURS PRN
Status: DISCONTINUED | OUTPATIENT
Start: 2023-08-22 | End: 2023-08-22

## 2023-08-22 RX ORDER — LAMOTRIGINE 100 MG/1
100 TABLET ORAL DAILY
Status: DISCONTINUED | OUTPATIENT
Start: 2023-08-22 | End: 2023-08-25 | Stop reason: HOSPADM

## 2023-08-22 RX ORDER — OXYCODONE HYDROCHLORIDE 5 MG/1
5-10 TABLET ORAL EVERY 4 HOURS PRN
Status: DISCONTINUED | OUTPATIENT
Start: 2023-08-22 | End: 2023-08-25 | Stop reason: HOSPADM

## 2023-08-22 RX ORDER — AMOXICILLIN 250 MG
2 CAPSULE ORAL DAILY
Status: ON HOLD | COMMUNITY
End: 2023-08-25

## 2023-08-22 RX ORDER — HYDROMORPHONE HYDROCHLORIDE 1 MG/ML
0.5 INJECTION, SOLUTION INTRAMUSCULAR; INTRAVENOUS; SUBCUTANEOUS EVERY 4 HOURS PRN
Status: DISCONTINUED | OUTPATIENT
Start: 2023-08-22 | End: 2023-08-25 | Stop reason: HOSPADM

## 2023-08-22 RX ORDER — AMINO ACIDS/PROTEIN HYDROLYS 15G-100/30
30 LIQUID (ML) ORAL DAILY
Status: ON HOLD | COMMUNITY
End: 2023-09-10

## 2023-08-22 RX ORDER — LEVOFLOXACIN 750 MG/1
750 TABLET, FILM COATED ORAL DAILY
Status: ON HOLD | COMMUNITY
Start: 2023-08-15 | End: 2023-08-25

## 2023-08-22 RX ORDER — POLYETHYLENE GLYCOL 3350 17 G/17G
1 POWDER, FOR SOLUTION ORAL
Status: DISCONTINUED | OUTPATIENT
Start: 2023-08-22 | End: 2023-08-25 | Stop reason: HOSPADM

## 2023-08-22 RX ORDER — MAGNESIUM SULFATE HEPTAHYDRATE 40 MG/ML
2 INJECTION, SOLUTION INTRAVENOUS ONCE
Status: COMPLETED | OUTPATIENT
Start: 2023-08-22 | End: 2023-08-22

## 2023-08-22 RX ORDER — SODIUM CHLORIDE AND POTASSIUM CHLORIDE 150; 900 MG/100ML; MG/100ML
INJECTION, SOLUTION INTRAVENOUS CONTINUOUS
Status: DISCONTINUED | OUTPATIENT
Start: 2023-08-22 | End: 2023-08-25

## 2023-08-22 RX ORDER — NYSTATIN 100000 [USP'U]/G
1 POWDER TOPICAL 2 TIMES DAILY
Status: ON HOLD | COMMUNITY
End: 2023-09-10

## 2023-08-22 RX ADMIN — POTASSIUM CHLORIDE 10 MEQ: 7.46 INJECTION, SOLUTION INTRAVENOUS at 04:50

## 2023-08-22 RX ADMIN — SENNOSIDES AND DOCUSATE SODIUM 2 TABLET: 50; 8.6 TABLET ORAL at 17:21

## 2023-08-22 RX ADMIN — POTASSIUM CHLORIDE 10 MEQ: 7.46 INJECTION, SOLUTION INTRAVENOUS at 03:23

## 2023-08-22 RX ADMIN — VANCOMYCIN HYDROCHLORIDE 1000 MG: 5 INJECTION, POWDER, LYOPHILIZED, FOR SOLUTION INTRAVENOUS at 16:55

## 2023-08-22 RX ADMIN — POTASSIUM CHLORIDE AND SODIUM CHLORIDE: 900; 150 INJECTION, SOLUTION INTRAVENOUS at 13:41

## 2023-08-22 RX ADMIN — SODIUM HYPOCHLORITE 50 ML: 1.25 SOLUTION TOPICAL at 08:18

## 2023-08-22 RX ADMIN — ENOXAPARIN SODIUM 40 MG: 100 INJECTION SUBCUTANEOUS at 02:30

## 2023-08-22 RX ADMIN — MAGNESIUM SULFATE HEPTAHYDRATE 2 G: 2 INJECTION, SOLUTION INTRAVENOUS at 05:51

## 2023-08-22 RX ADMIN — BISACODYL 10 MG: 10 SUPPOSITORY RECTAL at 17:21

## 2023-08-22 RX ADMIN — PIPERACILLIN AND TAZOBACTAM 3.38 G: 3; .375 INJECTION, POWDER, FOR SOLUTION INTRAVENOUS at 13:33

## 2023-08-22 RX ADMIN — OXYCODONE HYDROCHLORIDE 5 MG: 5 TABLET ORAL at 15:16

## 2023-08-22 RX ADMIN — POTASSIUM CHLORIDE 10 MEQ: 7.46 INJECTION, SOLUTION INTRAVENOUS at 08:08

## 2023-08-22 RX ADMIN — VANCOMYCIN HYDROCHLORIDE 2750 MG: 5 INJECTION, POWDER, LYOPHILIZED, FOR SOLUTION INTRAVENOUS at 04:11

## 2023-08-22 RX ADMIN — TIOTROPIUM BROMIDE INHALATION SPRAY 5 MCG: 3.12 SPRAY, METERED RESPIRATORY (INHALATION) at 07:35

## 2023-08-22 RX ADMIN — PIPERACILLIN AND TAZOBACTAM 3.38 G: 3; .375 INJECTION, POWDER, FOR SOLUTION INTRAVENOUS at 02:21

## 2023-08-22 RX ADMIN — POTASSIUM CHLORIDE AND SODIUM CHLORIDE: 900; 150 INJECTION, SOLUTION INTRAVENOUS at 22:29

## 2023-08-22 RX ADMIN — POTASSIUM CHLORIDE 10 MEQ: 7.46 INJECTION, SOLUTION INTRAVENOUS at 05:45

## 2023-08-22 RX ADMIN — PIPERACILLIN AND TAZOBACTAM 3.38 G: 3; .375 INJECTION, POWDER, FOR SOLUTION INTRAVENOUS at 22:28

## 2023-08-22 RX ADMIN — POTASSIUM CHLORIDE 10 MEQ: 7.46 INJECTION, SOLUTION INTRAVENOUS at 02:12

## 2023-08-22 RX ADMIN — SODIUM CHLORIDE, POTASSIUM CHLORIDE, SODIUM LACTATE AND CALCIUM CHLORIDE: 600; 310; 30; 20 INJECTION, SOLUTION INTRAVENOUS at 06:47

## 2023-08-22 RX ADMIN — ENOXAPARIN SODIUM 40 MG: 100 INJECTION SUBCUTANEOUS at 17:22

## 2023-08-22 RX ADMIN — PIPERACILLIN AND TAZOBACTAM 3.38 G: 3; .375 INJECTION, POWDER, FOR SOLUTION INTRAVENOUS at 08:02

## 2023-08-22 ASSESSMENT — PAIN DESCRIPTION - PAIN TYPE
TYPE: ACUTE PAIN

## 2023-08-22 ASSESSMENT — ENCOUNTER SYMPTOMS
CONSTIPATION: 1
MYALGIAS: 1
HALLUCINATIONS: 1
WEAKNESS: 1

## 2023-08-22 ASSESSMENT — FIBROSIS 4 INDEX: FIB4 SCORE: 1.05

## 2023-08-22 NOTE — CARE PLAN
Problem: Knowledge Deficit - Standard  Goal: Patient and family/care givers will demonstrate understanding of plan of care, disease process/condition, diagnostic tests and medications  Outcome: Progressing     Problem: Knowledge Deficit - Standard  Goal: Patient and family/care givers will demonstrate understanding of plan of care, disease process/condition, diagnostic tests and medications  Outcome: Progressing     Problem: Skin Integrity  Goal: Skin integrity is maintained or improved  Outcome: Progressing     Problem: Fall Risk  Goal: Patient will remain free from falls  Outcome: Progressing   The patient is Watcher - Medium risk of patient condition declining or worsening    Shift Goals  Clinical Goals: neuro checks

## 2023-08-22 NOTE — H&P
Hospital Medicine History & Physical Note    Date of Service  8/21/2023    Primary Care Physician  JENNIFER Childers.    Consultants  MARILYN - Dr. Box    Code Status  Full Code    Chief Complaint  Chief Complaint   Patient presents with    Wound Check     Pt sent here for sacral wound evaluation. Concern for infection.  at [facility] recommending debridement and wound vac placement.        History of Presenting Illness  Sheyla Garcia is a 72 y.o. female past medical history of hypothyroidism, hypertension, obesity, normal pressure hydrocephalus status post  shunt, schizophrenia who presented 8/21/2023 with concern for sacral wound infection from [facility].  Dr. Carroll there was also concerned of a  shunt patency.  History obtained entirely from significant other who is bedside.  He reports her mental status waxes/wanes and is more lethargic.  She is chronically bed dependent.     In the ER, she was noted to have positive UA, and started on IV Zosyn to cover her sacral wound as well. CT Head was done as well for which neurosurgery has been consulted. She will also require potassium replacement.     I discussed the plan of care with family and Dr. Phelps, ERP .    Review of Systems  Review of Systems   Unable to perform ROS: Mental status change       Past Medical History   has a past medical history of Asthma (08/05/2021), Bowel habit changes, Breath shortness (08/05/2021), Emphysema of lung ([facility]) (08/05/2021), Fibromyalgia, Heart burn, Hypertension, Hypothyroidism (2008), Indigestion, Migraine, Obesity, Pneumonia (2012), Psychiatric problem, Renal disorder (08/05/2021), Seizure ([facility]) (2017), and Urinary incontinence.    Surgical History   has a past surgical history that includes nasal septal reconstruction; tonsillectomy; bladder sling female; other surgical procedure; orif, knee (1/3/08); other orthopedic surgery; gyn surgery; thoracic fusion o-arm (Right, 10/13/2021); and cath placement capd (N/A,  10/13/2021).     Family History  family history includes Cancer in her father and maternal grandfather; Heart Disease in her mother; Stroke in her father.   Family history reviewed with patient. There is no family history that is pertinent to the chief complaint.     Social History   reports that she quit smoking about 37 years ago. Her smoking use included cigarettes. She has never used smokeless tobacco. She reports current alcohol use. She reports that she does not use drugs.    Allergies  Allergies   Allergen Reactions    Gabapentin Unspecified     seizure       Medications  Prior to Admission Medications   Prescriptions Last Dose Informant Patient Reported? Taking?   ARIPiprazole (ABILIFY) 15 MG Tab  Significant Other No No   Sig: Take 1 Tablet by mouth every day.   DULoxetine (CYMBALTA) 30 MG Cap DR Particles  Significant Other Yes No   Sig: Take 30 mg by mouth at bedtime.   acetaminophen (TYLENOL) 500 MG Tab   No No   Sig: Take 2 Tablets by mouth every 6 hours as needed for Mild Pain, Moderate Pain or Fever.   albuterol 108 (90 Base) MCG/ACT Aero Soln inhalation aerosol  Significant Other No No   Sig: INHALE TWO PUFFS BY MOUTH EVERY SIX HOURS AS NEEDED FOR SHORTNESS OF BREATH   amLODIPine (NORVASC) 10 MG Tab  Significant Other No No   Sig: Take 1 Tablet by mouth every day.   lisinopril (PRINIVIL) 10 MG Tab  Significant Other No No   Sig: Take 1 Tablet by mouth every day.   propranolol (INDERAL) 10 MG Tab  Significant Other Yes No   Sig: Take 10 mg by mouth at bedtime.      Facility-Administered Medications: None       Physical Exam  Temp:  [37.1 °C (98.7 °F)] 37.1 °C (98.7 °F)  Pulse:  [100-108] 105  Resp:  [16-20] 16  BP: (112-171)/(60-99) 171/99  SpO2:  [95 %-98 %] 97 %  Blood Pressure : (!) 171/99   Temperature: 37.1 °C (98.7 °F)   Pulse: (!) 105   Respiration: 16   Pulse Oximetry: 97 %       Physical Exam  Vitals and nursing note reviewed.   Constitutional:       Appearance: She is obese. She is  "ill-appearing.   HENT:      Head: Normocephalic and atraumatic.      Right Ear: Tympanic membrane normal.      Left Ear: Tympanic membrane normal.      Nose: Nose normal.      Mouth/Throat:      Mouth: Mucous membranes are dry.      Pharynx: Oropharynx is clear.   Eyes:      Extraocular Movements: Extraocular movements intact.      Pupils: Pupils are equal, round, and reactive to light.   Cardiovascular:      Rate and Rhythm: Regular rhythm. Tachycardia present.      Pulses: Normal pulses.      Heart sounds: Normal heart sounds.   Pulmonary:      Effort: Pulmonary effort is normal.      Breath sounds: Normal breath sounds.   Abdominal:      General: Bowel sounds are normal. There is no distension.      Palpations: Abdomen is soft. There is no mass.   Musculoskeletal:         General: Normal range of motion.      Cervical back: Neck supple.   Skin:     General: Skin is warm.      Capillary Refill: Capillary refill takes less than 2 seconds.   Neurological:      Mental Status: Mental status is at baseline. She is disoriented.      Comments: Somnolent but arouses to verbal and painful stimuli    Psychiatric:         Mood and Affect: Mood normal.         Behavior: Behavior normal.           Laboratory:  Recent Labs     08/21/23  1810   WBC 15.0*   RBC 4.01*   HEMOGLOBIN 12.2   HEMATOCRIT 37.1   MCV 92.5   MCH 30.4   MCHC 32.9   RDW 45.6   PLATELETCT 381   MPV 9.4     Recent Labs     08/21/23  1810   SODIUM 140   POTASSIUM 2.7*   CHLORIDE 99   CO2 29   GLUCOSE 114*   BUN 6*   CREATININE 0.50   CALCIUM 8.9     Recent Labs     08/21/23  1810   ALTSGPT 10   ASTSGOT 17   ALKPHOSPHAT 79   TBILIRUBIN 0.4   LIPASE 13   GLUCOSE 114*         No results for input(s): \"NTPROBNP\" in the last 72 hours.      No results for input(s): \"TROPONINT\" in the last 72 hours.    Imaging:  CT-ABDOMEN-PELVIS WITH   Final Result   Addendum (preliminary) 1 of 1   Addendum: There is slight hazy fat stranding seen posterior to the sacrum    and " coccyx, no discrete fluid collection or definite sacral wound is    appreciated.      Final         1.  Large quantity of stool in the rectal vault compatible with constipation, likely fecal impaction.   2.  Indeterminate left adrenal nodule, follow-up adrenal protocol CT or MRI for further characterization as clinically appropriate   3.  Diverticulosis      CT-HEAD W/O   Final Result         1.  No acute intracranial abnormality is identified, there are nonspecific white matter changes, commonly associated with small vessel ischemic disease.  Associated mild cerebral atrophy is noted.   2.  Moderate bilateral ventricular dilatation, overall appears slightly increased since prior study particularly of the temporal horns, could represent component of shunt dysfunction and/or evolving hydrocephalus.   3.  Atherosclerosis.         [id number] VIEW   Final Result      RIGHT-sided  shunt catheter intact throughout its visualized course.      DX-SKULL-LIMITED 3-   Final Result      RIGHT-sided  shunt catheter intact throughout its visualized course.      DX-SPINE-ANY ONE VIEW   Final Result      RIGHT-sided  shunt catheter intact throughout its visualized course.      DX-CHEST-LIMITED (1 VIEW)   Final Result      RIGHT-sided  shunt catheter intact throughout its visualized course.          X-Ray:  I have personally reviewed the images and compared with prior images.    Assessment/Plan:  Justification for Admission Status  I anticipate this patient will require at least two midnights for appropriate medical management, necessitating inpatient admission because SIRS, UTI, infected sacral ulcer requiring IV abx and wound consult may need ortho consult.    Patient will need a Telemetry bed on MEDICAL service .  The need is secondary to see above.    * Hypokalemia- (present on admission)  Assessment & Plan  Repletion in progress     Pressure injury of skin of sacral region  Assessment & Plan  IV Vancomycin/Zosyn   F/u  cultures   Wound care   May need ortho non emergent consult for debridement       SIRS (systemic inflammatory response syndrome) ([facility])  Assessment & Plan  SIRS criteria identified on my evaluation include:  Tachycardia, with heart rate greater than 90 BPM and Leukocytosis, with WBC greater than 12,000        Bipolar disorder ([facility])- (present on admission)  Assessment & Plan  Resume home medications     Hypomagnesemia- (present on admission)  Assessment & Plan  Replete     Acute encephalopathy- (present on admission)  Assessment & Plan  CT head neg for bleed or mass but does show increased ventricular dilation for which NSGY has been consulted.    Likely secondary to UTI and sacral wound  IV abx   Neuro checks every 4 hours  Fall, seizure, aspiration precautions   Limit sedatives         Schizophrenia ([facility])- (present on admission)  Assessment & Plan  History of   Resume home medications after confirmation     S/P  shunt- (present on admission)  Assessment & Plan  Hx of Dr. Ugo campbell concerned for patency  CT Head w/o showing bilateral mod increased ventricular dilation  NSGY Consulted by ERP - official f/u appreciated  Neuro checks every 4 hours     Asthma- (present on admission)  Assessment & Plan  Resume home inhalers     Obesity- (present on admission)  Assessment & Plan  BMI 35.29     Essential hypertension- (present on admission)  Assessment & Plan  Resume PO medications once able to tolerate PO medications   SLP  IV labetalol as needed     ACP (advance care planning)  Assessment & Plan  I discussed advance care planning with the patient's family for at least 16 minutes, including diagnosis, prognosis, plan of care, risks and benefits of any therapies that could be offered, as well as alternatives including palliation and hospice, as appropriate.    reports patient wanted to be full code. Advised palliative consultation.          VTE prophylaxis: SCDs/TEDs

## 2023-08-22 NOTE — THERAPY
"Speech Language Pathology   Clinical Swallow Evaluation     Patient Name: Sheyla Garcia  AGE:  72 y.o., SEX:  female  Medical Record #: [id number]  Date of Service: 2023      History of Present Illness  73 y/o female presented  with concern sacral wound. Also concern for  stunt patency. Positive UA.     Last seen by SLP in 3/2023 with recommendations for MM/TN and meds crushed.    CMHx: Hypokalemia, HTN, obesity, s/p  shunt, asthma, schizophrenia, acute encephalopathy, bipolar, SIRS, preesure injury  PMHx: KIRA, scoliosis, obesity, debility, hypoxia w home O2 usage, cerebral ventriculomegaly, NPH, chronic therapeutic use of opiate    CXR :  \"RIGHT-sided  shunt catheter intact throughout its visualized course.\"    CT Abdomen/Pelvis :  \"1.  Large quantity of stool in the rectal vault compatible with constipation, likely fecal impaction.  2.  Indeterminate left adrenal nodule, follow-up adrenal protocol CT or MRI for further characterization as clinically appropriate  3.  Diverticulosis\"    CT Head w/o :  \"1.  No acute intracranial abnormality is identified, there are nonspecific white matter changes, commonly associated with small vessel ischemic disease.  Associated mild cerebral atrophy is noted.  2.  Moderate bilateral ventricular dilatation, overall appears slightly increased since prior study particularly of the temporal horns, could represent component of shunt dysfunction and/or evolving hydrocephalus.  3.  Atherosclerosis.\"      General Information:  Vitals  O2 (LPM): 2  O2 Delivery Device: Nasal Cannula  Level of Consciousness: Awake  Patient Behaviors: Confused, Withdrawn, Flat Affect  Orientation: , Self, General place  Follows Directives: Inconsistent      Prior Living Situation & Level of Function:  Prior Services:  (SNF - [facility])  Housing / Facility: Skilled Nursing Facility  Lives with - Patient's Self Care Capacity: Attendant / Paid Care Giver  Comments: bedbound baseline, " "transfer from [facility]  Communication: Unclear PLOF  Swallowing: Previously on PO diet at [facility], no documented hx of instrumentation. Pt reports RG/TN at [facility] and that \"I don't like crushed pills.\"       Oral Mechanism Evaluation:  Dentition: Fair, Natural dentition   Facial Symmetry:  (Mild central L droop)  Facial Sensation: Pt did not follow commands to assess     Labial Observations: Open mouth posture   Lingual Observations: Midline  Motor Speech: WFL for limited verbalizations            Laryngeal Function:  Secretion Management: Adequate  Voice Quality: Hoarse  Cough: Pt did not follow commands to assess  Comments: Pt stated she would not cough because it hurt her abdomen. Was agreeable to throat clear, which was perceptually WFL.      Subjective  Pt agreeable and cooperative with SLP evaluation tasks. Disoriented to reason for hospitalization. Delayed reactions. Roused by RN at onset of session and then maintained arousal without difficulty. \"That's so cold; it makes my teeth tingle.\"      Assessment  Current Method of Nutrition: NPO until cleared by speech pathology  Positioning: Bed - Chair Position  Bolus Administration: SLP  O2 (LPM): 2 O2 Delivery Device: Nasal Cannula  Factor(s) Affecting Performance: Impaired mental status, Impaired command following  Tracheostomy : No      Swallowing Trials:  Thin Liquid (TN0): WFL  Liquidised (LQ3): WFL  Easy to Chew (EC7): Impaired      Comments: Pt did not attempt to self-feed. Appropriate oral bolus stripping and containment. Total AP transit and effective bolus formation. No attempted bite with cracker, mastication fair. No overt s/sx of aspiration noted w/ single and sequential sips of TN0 water over ~5 oz. No increase in WOB, no cough/clear, no change in vocal quality. One to two swallows appreciated per bolus.       Clinical Impressions  Pt appears to be approaching her baseline for PO. Pt does not demonstrate bedside indicators of pharyngeal dysphagia this date; " "recommend SB/TN. Would recommend 1:1 spv given AMS and fluctuating alertness in addition to need for A with feeding related to baseline w ADLs (previously reported ADL Six Clicks score 6). Swallow outcomes can be further maximized with use of frequent, thorough oral care.      Recommendations  Soft and bite-sized with thin liquids  Instrumentation: None indicated at this time  Medication: Whole with puree  Supervision: 1:1 feeding with constant supervision  Positioning: Fully upright and midline during oral intake  Risk Management : Small bites/sips, Slow rate of intake (Must be alert !)  Oral Care: Q8h         SLP Treatment Plan  Treatment Plan: Dysphagia Treatment  SLP Frequency: 2x Per Week  Estimated Duration: Until Therapy Goals Met      Anticipated Discharge Needs  Discharge Recommendations: Anticipate that the patient will have no further speech therapy needs after discharge from the hospital   Therapy Recommendations Upon DC: Not Indicated        Patient / Family Goals  Patient / Family Goal #1: \"I don't like crushed meds.\"  Short Term Goals  Short Term Goal # 1: Pt will consume diet of SB/TN given 1:1 spv w no overt s/sx of aspiration or worsening of lung status.      Nguyen Marx, SLP   "

## 2023-08-22 NOTE — THERAPY
Speech Language Therapy Contact Note    Patient Name: Sheyla Garcia  Age:  72 y.o., Sex:  female  Medical Record #: [id number]  Today's Date: 8/22/2023    Orders recieved and chart reviewed for clinical swallow evaluation. Per RN, pt is not at the level of alertness to reasonably participate in CSE.     Will HOLD at this time and follow up when pt's alertness improves.     08/22/23 0948   Treatment Variance   Reason For Missed Therapy Medical - Patient not Able To Participate;Medical - Patient Unarousable   Interdisciplinary Plan of Care Collaboration   IDT Collaboration with  Nursing

## 2023-08-22 NOTE — WOUND TEAM
[facility]  Inpatient Services  Initial Wound and Skin Care Evaluation    Admission Date: 2023     Last order of IP CONSULT TO WOUND CARE was found on 2023 from Hospital Encounter on 2023     HPI, PMH, SH: Reviewed    Past Surgical History:   Procedure Laterality Date    THORACIC FUSION O-ARM Right 10/13/2021    Procedure: INSERTION, SHUNT, VENTRICULOPERITONEAL, USING FRAMELESS STEREOTAXY - FRONTAL;  Surgeon: Shady Glynn M.D.;  Location: SURGERY [facility];  Service: Neurosurgery    CATH PLACEMENT CAPD N/A 10/13/2021    Procedure: INSERTION, CATHETER, CAPD;  Surgeon: Shady Glynn M.D.;  Location: SURGERY [facility];  Service: Neurosurgery    ORIF, KNEE  1/3/08    tibial plateau fracture    BLADDER SLING FEMALE      GYN SURGERY      Hysterectomy    NASAL SEPTAL RECONSTRUCTION      age 26    OTHER ORTHOPEDIC SURGERY      Bilateral tibial fracture    OTHER SURGICAL PROCEDURE      SI joint fusion - Dr. Pineda 2019    TONSILLECTOMY       Social History     Tobacco Use    Smoking status: Former     Current packs/day: 0.00     Types: Cigarettes     Start date: 1984     Quit date: 1986     Years since quittin.6    Smokeless tobacco: Never    Tobacco comments:     Age [age]    Substance Use Topics    Alcohol use: Yes     Comment: Wine - daily     Chief Complaint   Patient presents with    Wound Check     Pt sent here for sacral wound evaluation. Concern for infection. Dr chantal Gunderson recommending debridement and wound vac placement.      Diagnosis: Hypokalemia [E87.6]    Unit where seen by Wound Team: T7     WOUND CONSULT RELATED TO:  Sacrococcygeal area    WOUND TEAM PLAN OF CARE - Frequency of Follow-up:   Nursing to follow dressing orders written for wound care. Contact wound team if area fails to progress, deteriorates or with any questions/concerns if something comes up before next scheduled follow up (See below as to whether wound is following and frequency of  wound follow up)   Weekly - Sacrococcygeal area    WOUND HISTORY:   Pt is a 72yr old female admitted with history of KIRA, Scoliosis, HTN, Obesity, Asthma, Schizophrenia, Bipolar, Encephalopathy, and Cerebral ventriculomegaly. Pt was at [facility] when they became concerned about her sacral wound and therefore transferred her to [facility] for wound consult, possible debridement and vac application.         WOUND ASSESSMENT/LDA  Wound 08/22/23 Pressure Injury Coccyx Mid POA Unstageable (Active)   Date First Assessed/Time First Assessed: 08/22/23 1507   Present on Original Admission: Yes  Primary Wound Type: Pressure Injury  Location: Coccyx  Wound Orientation: Mid  Wound Description (Comments): POA Unstageable      Assessments 8/22/2023  3:00 PM   Wound Image       Site Assessment Red;Yellow;Painful;Non-healing;Slough   Periwound Assessment Clean;Intact;Dry   Margins Attached edges;Defined edges   Closure Adhesive bandage   Drainage Amount Small   Drainage Description Serosanguineous   Treatments Cleansed;Offloading;Site care   Wound Cleansing Approved Wound Cleanser   Periwound Protectant Barrier Paste   Dressing Status Clean;Dry;Intact   Dressing Changed New   Dressing Cleansing/Solutions 1/4 Strength Dakin's Solution   Dressing Options Moist Roll Gauze;Offloading Dressing - Sacral   Dressing Change/Treatment Frequency Every Shift, and As Needed   NEXT Dressing Change/Treatment Date 08/22/23   NEXT Weekly Photo (Inpatient Only) 08/29/23   Wound Team Following Weekly   Pressure Injury Stage Unstageable   Wound Length (cm) 9.5 cm   Wound Width (cm) 9 cm   Wound Depth (cm) 3 cm   Wound Surface Area (cm^2) 85.5 cm^2   Wound Volume (cm^3) 256.5 cm^3   Shape Circular   Wound Odor None   WOUND NURSE ONLY - Time Spent with Patient (mins) 60        Vascular:    RIVER:   No results found.    Lab Values:    Lab Results   Component Value Date/Time    WBC 13.7 (H) 08/22/2023 06:22 AM    RBC 3.73 (L) 08/22/2023 06:22 AM    HEMOGLOBIN  11.1 (L) 08/22/2023 06:22 AM    HEMATOCRIT 34.2 (L) 08/22/2023 06:22 AM    CREACTPROT 10.44 (H) 08/21/2023 06:10 PM    SEDRATEWES 78 (H) 08/21/2023 06:10 PM    HBA1C 5.6 05/03/2021 04:08 PM         Culture Results show:  No results found for this or any previous visit (from the past 720 hour(s)).    Pain Level/Medicated:  None, Tolerated without pain medication       INTERVENTIONS BY WOUND TEAM:  Chart and images reviewed. Discussed with bedside RN. All areas of concern (based on picture review, LDA review and discussion with bedside RN) have been thoroughly assessed. Documentation of areas based on significant findings. This RN in to assess patient. Performed standard wound care which includes appropriate positioning, dressing removal and non-selective debridement. Pictures and measurements obtained weekly if/when required.    Wound:  SACROCOCCYGEAL AREA  Preparation for Dressing removal: Removed without difficulty  Cleansed/Non-selectively Debrided with:  Dakins and Gauze  Shannan wound: Cleansed with Dakins and Gauze, Prepped with Barrier paste  Primary Dressing:  One piece of dakins moistened roll gauze was packed into wound bed.   Secondary (Outer) Dressing: Dressing was then secured in place with a sacral offloading dressing.    Pt was digitally disimpacted by this RN prior to dressing application.     Advanced Wound Care Discharge Planning  Number of Clinicians necessary to complete wound care: 1-2  Is patient requiring IV pain medications for dressing changes:  No   Length of time for dressing change 30 min. (This does not include chart review, pre-medication time, set up, clean up or time spent charting.)    Interdisciplinary consultation: Patient, Bedside RN (Yuri), Yohana REDDING (Wound RN).  Pressure injury and staging reviewed with Yohana REDDING (Wound RN).    EVALUATION / RATIONALE FOR TREATMENT:     Date:  08/22/23  Wound Status:  Initial evaluation    Pt with POA unstageable with large amount of slough. Dakins  applied to chemically debride. Pt would benefit from veraflo vac application at next assessment.         Goals: Steady decrease in wound area and depth weekly.    NURSING PLAN OF CARE ORDERS:  Dressing changes: See Dressing Care orders    NUTRITION RECOMMENDATIONS   Wound Team Recommendations:  N/A    DIET ORDERS (From admission to next 24h)       Start     Ordered    08/22/23 1345  Diet Order Diet: Level 6 - Soft and Bite Sized (meds whole in puree per pt); Liquid level: Level 0 - Thin; Tray Modifications (optional): SLP - 1:1 Supervision by Nursing, SLP - Deliver to Nursing Station  ALL MEALS        Question Answer Comment   Diet: Level 6 - Soft and Bite Sized meds whole in puree per pt   Liquid level Level 0 - Thin    Tray Modifications (optional) SLP - 1:1 Supervision by Nursing    Tray Modifications (optional) SLP - Deliver to Nursing Station        08/22/23 1345                    PREVENTATIVE INTERVENTIONS:    Q shift Taye - performed per nursing policy  Q shift pressure point assessments - performed per nursing policy    Surface/Positioning  Low Airloss - Currently in Place  Reposition q 2 hours - Currently in Place  TAPs Turning system - Currently in Place    Offloading/Redistribution  Sacral offloading dressing (Silicone dressing) - Currently in Place  Heel offloading dressing (Silicone dressing) - Applied this Visit  Heel float boots (Prevalon boot) - Currently in Place      Respiratory  Silicone O2 tubing - Currently in Place  Gray Foam Ear protectors - Currently in Place    Containment/Moisture Prevention    Dri-kanika pad - Currently in Place  Interdry - Ordered    Anticipated discharge plans:  Skilled Nursing        Vac Discharge Needs:  Vac Discharge plan is purely a recommendation from wound team and not a requirement for discharge unless otherwise stated by physician.  Will likely need a vac prior to DC back to SNF

## 2023-08-22 NOTE — ASSESSMENT & PLAN NOTE
Wound culture had no growth  Transition Sam to oral Augmentin for additional 5 days  Wound care apply wound VAC tomorrow

## 2023-08-22 NOTE — PROGRESS NOTES
Hospital Medicine Daily Progress Note    Date of Service  8/22/2023    Chief Complaint  Sheyla Garcia is a 72 y.o. female admitted 8/21/2023 with lethargy    Hospital Course  73 yo woman with hypothyroidism, HTN, NPH s/p  shunt, schizophrenia who presented from [place] with lethargy and altered mental status.  Per , patient has been declining mentally for many months.  For the last 4 days she has been lethargic and not waking up per usual.  She is noted to have a possible UTI and sacral wound.  CTAP showed slight hazy fat stranding posterior to sacrum, coccyx with large amount of stool in the rectal vault, indeterminate left adrenal nodule.  Patient was started on Zosyn and Vanco.  CT head showed moderate bilateral ventricular dilation overall slightly increased in size.  Dr. Box with neurosurgery was consulted.  X-ray series showed expected course of  shunt.    Interval Problem Update  Sinus on telemetry, afebrile  Hypo-K 3.1, replete  Urine and wound culture pending  Blood cultures so far no growth  N.p.o., speech swallow eval pending  Patient awake with  bedside, she says her hip hurts.  He notes that she has chronic joint pains and fibromyalgia.   says at [place], she continues to be very weak and unable to stand.  She also has been not wanting to eat, requires hand feeding and only eats a few bites at a time.  She started sleeping much more and that is when he became concerned.  She continues to have hallucinations and paranoia.  I discussed goals of care, her  is aware of end-of-life care if she were to worsen but for now he wants to see if antibiotics will improve her back to her prior baseline.      I have discussed this patient's plan of care and discharge plan at IDT rounds today with Case Management, Nursing, Nursing leadership, and other members of the IDT team.    Consultants/Specialty  neurosurgery    Code Status  Full Code    Disposition  The patient is not  medically cleared for discharge to home or a post-acute facility.  Anticipate discharge to: skilled nursing facility    I have placed the appropriate orders for post-discharge needs.    Review of Systems  Review of Systems   Unable to perform ROS: Medical condition (Confused)   Constitutional:  Positive for malaise/fatigue.   Gastrointestinal:  Positive for constipation.   Musculoskeletal:  Positive for joint pain and myalgias.   Neurological:  Positive for weakness.   Psychiatric/Behavioral:  Positive for hallucinations.         Physical Exam  Temp:  [36.6 °C (97.9 °F)-37.1 °C (98.7 °F)] 36.6 °C (97.9 °F)  Pulse:  [] 98  Resp:  [13-20] 18  BP: (112-171)/(60-99) 157/95  SpO2:  [93 %-98 %] 96 %    Physical Exam  Vitals and nursing note reviewed.   Constitutional:       Appearance: She is obese. She is ill-appearing.   HENT:      Head: Normocephalic.      Mouth/Throat:      Mouth: Mucous membranes are moist.   Eyes:      General:         Right eye: No discharge.         Left eye: No discharge.   Cardiovascular:      Rate and Rhythm: Normal rate and regular rhythm.   Pulmonary:      Effort: Pulmonary effort is normal. No respiratory distress.      Breath sounds: No wheezing or rales.   Abdominal:      Palpations: Abdomen is soft.      Tenderness: There is no abdominal tenderness. There is no guarding or rebound.   Musculoskeletal:         General: No swelling.      Cervical back: Neck supple.   Skin:     General: Skin is warm and dry.      Comments: Wound photos reviewed   Neurological:      Mental Status: She is alert.      Comments: Oriented to self and .  Weakly moving hands and feet.  Says a few words to her , answers a few questions.         Fluids    Intake/Output Summary (Last 24 hours) at 8/22/2023 1315  Last data filed at 8/22/2023 1000  Gross per 24 hour   Intake 200 ml   Output --   Net 200 ml       Laboratory  Recent Labs     08/21/23  1810 08/22/23  0622   WBC 15.0* 13.7*   RBC 4.01*  3.73*   HEMOGLOBIN 12.2 11.1*   HEMATOCRIT 37.1 34.2*   MCV 92.5 91.7   MCH 30.4 29.8   MCHC 32.9 32.5   RDW 45.6 45.1   PLATELETCT 381 358   MPV 9.4 9.3     Recent Labs     08/21/23  1810 08/22/23  0622 08/22/23  1139   SODIUM 140 139  --    POTASSIUM 2.7* 3.1* 3.0*   CHLORIDE 99 103  --    CO2 29 27  --    GLUCOSE 114* 117*  --    BUN 6* 5*  --    CREATININE 0.50 0.41*  --    CALCIUM 8.9 8.2*  --                    Imaging  CT-ABDOMEN-PELVIS WITH   Final Result   Addendum (preliminary) 1 of 1   Addendum: There is slight hazy fat stranding seen posterior to the sacrum    and coccyx, no discrete fluid collection or definite sacral wound is    appreciated.      Final         1.  Large quantity of stool in the rectal vault compatible with constipation, likely fecal impaction.   2.  Indeterminate left adrenal nodule, follow-up adrenal protocol CT or MRI for further characterization as clinically appropriate   3.  Diverticulosis      CT-HEAD W/O   Final Result         1.  No acute intracranial abnormality is identified, there are nonspecific white matter changes, commonly associated with small vessel ischemic disease.  Associated mild cerebral atrophy is noted.   2.  Moderate bilateral ventricular dilatation, overall appears slightly increased since prior study particularly of the temporal horns, could represent component of shunt dysfunction and/or evolving hydrocephalus.   3.  Atherosclerosis.         [id number] VIEW   Final Result      RIGHT-sided  shunt catheter intact throughout its visualized course.      DX-SKULL-LIMITED 3-   Final Result      RIGHT-sided  shunt catheter intact throughout its visualized course.      DX-SPINE-ANY ONE VIEW   Final Result      RIGHT-sided  shunt catheter intact throughout its visualized course.      DX-CHEST-LIMITED (1 VIEW)   Final Result      RIGHT-sided  shunt catheter intact throughout its visualized course.           Assessment/Plan  * Hypokalemia- (present on  admission)  Assessment & Plan  Remains low, continue repletion.  Speech to eval and start a diet    Constipated  Assessment & Plan  Chronic issue per    digitally disimpacted by wound care bedside  Bowel regimen ordered    ACP (advance care planning)  Assessment & Plan   wants full code at this time  If patient's mentation and oral intake does not improve, may need to rediscuss goals of care      Pressure injury of skin of sacral region  Assessment & Plan  IV Vancomycin/Zosyn   Wound care was able to debride bedside, continue to monitor  Follow wound culture      SIRS (systemic inflammatory response syndrome) ([facility])  Assessment & Plan  SIRS criteria identified on my evaluation include:  Tachycardia, with heart rate greater than 90 BPM and Leukocytosis, with WBC greater than 12,000        Bipolar disorder ([facility])- (present on admission)  Assessment & Plan  Holding Risperdal for now until mentation improves    Hypomagnesemia- (present on admission)  Assessment & Plan  Poor oral intake, monitor level    Acute encephalopathy- (present on admission)  Assessment & Plan  Has had slow decline of mentation.  Neurology was consulted in 3/2023 that this may be the progression of her dementia.  Now with increased lethargy and decreased oral intake  CT head neg for bleed or mass but does show increased ventricular dilation for which NSGY has been consulted.  May be secondary to UTI and sacral wound  Continue Vanco and Zosyn  Follow urine and wound cultures  Neuro checks every 4 hours  Fall, seizure, aspiration precautions   Limit sedatives, holding Risperdal and Zanaflex  PT OT ST        Schizophrenia ([facility])- (present on admission)  Assessment & Plan  History of   Resume home medications once mentation improves    S/P  shunt- (present on admission)  Assessment & Plan  Hx of Dr. Ugo campbell concerned for patency  CT Head w/o showing bilateral mod increased ventricular dilation  NSGY Consulted -follow-up recs  Neuro  checks every 4 hours     Asthma- (present on admission)  Assessment & Plan  Resume home inhalers     Obesity- (present on admission)  Assessment & Plan  BMI 35.29     Essential hypertension- (present on admission)  Assessment & Plan  Intermittently hypertensive, resume metoprolol          VTE prophylaxis:    enoxaparin ppx      I have performed a physical exam and reviewed and updated ROS and Plan today (8/22/2023). In review of yesterday's note (8/21/2023), there are no changes except as documented above.

## 2023-08-22 NOTE — PROGRESS NOTES
Pharmacy Vancomycin Kinetics Note for 8/22/2023     72 y.o. female on Vancomycin day # 1     Vancomycin Indication (AUC Dosing): Skin/skin structure infection    Provider specified end date: 08/27/23    Active Antibiotics (From admission, onward)      Ordered     Ordering Provider       Tue Aug 22, 2023  1:57 AM    08/22/23 0157  vancomycin (Vancocin) 1,000 mg in  mL IVPB  (vancomycin (VANCOCIN) IV (LD + Maintenance))  EVERY 12 HOURS        Note to Pharmacy: Per P&T vanco per pharmacy Protocol    Manohar Colbert M.D.       Tue Aug 22, 2023  1:56 AM    08/22/23 0156  vancomycin (Vancocin) 2,750 mg in  mL IVPB  (vancomycin (VANCOCIN) IV (LD + Maintenance))  ONCE        Note to Pharmacy: Per P&T vanco per pharmacy Protocol    Manohar Colbert M.D.       Tue Aug 22, 2023  1:49 AM    08/22/23 0149  piperacillin-tazobactam (Zosyn) 3.375 g in  mL IVPB  (piperacillin-tazobactam (ZOSYN) IV (Extended-infusion) PANEL )  ONCE        See Hyperspace for full Linked Orders Report.    Manohar Colbert M.D.    08/22/23 0149  piperacillin-tazobactam (Zosyn) 3.375 g in  mL IVPB  (piperacillin-tazobactam (ZOSYN) IV (Extended-infusion) PANEL )  EVERY 8 HOURS        See Hyperspace for full Linked Orders Report.    Manohar Colbert M.D.       Tue Aug 22, 2023  1:36 AM    08/22/23 0136  MD Alert...Vancomycin per Pharmacy  PHARMACY TO DOSE        Question:  Indication(s) for vancomycin?  Answer:  Skin and soft tissue infection    Manohar Colbert M.D.            Dosing Weight: 108 kg (238 lb 1.6 oz)      Admission History: Admitted on 8/21/2023 for Hypokalemia [E87.6]  Pertinent history: Patient presenting from nursing home for sacral ulcer wound check. Physician at nursing home recommending I&D and wound vac placement. WBC elevated at 15.0. Empiric zosyn and vanco therapy ordered.    Allergies:     Gabapentin     Pertinent cultures to date:     Results       Procedure Component Value Units Date/Time    GRAM STAIN  "[[id number]] Collected: 08/21/23 1815    Order Status: Completed Specimen: Wound Updated: 08/21/23 2154     Significant Indicator .     Source WND     Site DECUBITUS     Gram Stain Result Rare WBCs.  Moderate Gram positive cocci.  Rare Gram negative rods.      Narrative:      Release to patient->Immediate  Release to patient->Immediate    Culture Wound With Gram Stain [[id number]] Collected: 08/21/23 1815    Order Status: Sent Specimen: Wound from Decubitus Updated: 08/21/23 2154     Significant Indicator NEG     Source WND     Site DECUBITUS     Culture Result -     Gram Stain Result Rare WBCs.  Moderate Gram positive cocci.  Rare Gram negative rods.      Narrative:      Release to patient->Immediate  Release to patient->Immediate    URINALYSIS (UA) [[id number]]  (Abnormal) Collected: 08/21/23 2015    Order Status: Completed Specimen: Urine Updated: 08/21/23 2137     Color Yellow     Character Cloudy     Specific Gravity 1.015     Ph 6.0     Glucose Negative mg/dL      Ketones Trace mg/dL      Protein Negative mg/dL      Bilirubin Negative     Urobilinogen, Urine 1.0     Nitrite Negative     Leukocyte Esterase Moderate     Occult Blood Negative     Micro Urine Req Microscopic    Narrative:      Release to patient->Immediate  Indication for culture:->Evaluation for sepsis without a  clear source of infection    Urine Culture (NEW) [[id number]] Collected: 08/21/23 2015    Order Status: Sent Specimen: Urine, [place] Cath Updated: 08/21/23 2050    Narrative:      Release to patient->Immediate  Indication for culture:->Evaluation for sepsis without a  clear source of infection    BLOOD CULTURE [[id number]] Collected: 08/21/23 1815    Order Status: Sent Specimen: Blood from Peripheral Updated: 08/21/23 1949    Narrative:      Per Hospital Policy: Only change Specimen Src: to \"Line\" if  specified by physician order.  Release to patient->Immediate    BLOOD CULTURE [[id number]] Collected: 08/21/23 1810    Order Status: Sent " "Specimen: Blood from Peripheral Updated: 23    Narrative:      Per Hospital Policy: Only change Specimen Src: to \"Line\" if  specified by physician order.  Release to patient->Immediate    CoV-2, Flu A/B, And RSV by PCR ([employer]) [[id number]] Collected: 23    Order Status: Completed Specimen: Respirate Updated: 23     Influenza virus A RNA Negative     Influenza virus B, PCR Negative     RSV, PCR Negative     SARS-CoV-2 by PCR NotDetected     Comment: [facility] providers: PLEASE REFER TO DE-ESCALATION AND RETESTING PROTOCOL  on [email or web address]    **The [employer] GeneXpert Xpress SARS-CoV-2 RT-PCR Test has been made  available for use under the Emergency Use Authorization (EUA) only.          SARS-CoV-2 Source NP Swab    Narrative:      Release to patient->Immediate            Labs:     Estimated Creatinine Clearance: 137 mL/min (by C-G formula based on SCr of 0.5 mg/dL).  Recent Labs     23   WBC 15.0*   NEUTSPOLYS 70.90     Recent Labs     23   BUN 6*   CREATININE 0.50   ALBUMIN 2.8*     No intake or output data in the 24 hours ending 23 0157   /80   Pulse 98   Temp 36.6 °C (97.9 °F) (Temporal)   Resp 20   Ht 1.753 m (5' 9\")   Wt 108 kg (239 lb)   SpO2 97%  Temp (24hrs), Av.8 °C (98.3 °F), Min:36.6 °C (97.9 °F), Max:37.1 °C (98.7 °F)      List concerns for Vancomycin clearance:     Age;Malnutrition/Low albumin;Obesity;Nephrotoxic drugs    Pharmacokinetics:     AUC kinetics:   Ke (hr ^-1): 0.0849 hr^-1  Half life: 8.16 hr  Clearance: 4.13  Estimated TDD:   Estimated Dose: 878  Estimated interval: 10.2    A/P:     -  Vancomycin dose: 2750mg loading dose followed by 1000mg Q12h    -  Next vancomycin level(s):    - None ordered at this time. Likely obtain levels after the 4th maintenance dose unless clinical status or renal function changes.     -  Predicted vancomycin AUC from initial AUC test calculator: 484 mg·hr/L    -  Comments: Concerns " for accumulation listed above. MRSA nares swab ordered. Pharmacy will continue to follow and adjust therapy as clinically appropriate.    Suman Figueredo, [place]

## 2023-08-22 NOTE — ASSESSMENT & PLAN NOTE
SIRS criteria identified on my evaluation include:  Tachycardia, with heart rate greater than 90 BPM and Leukocytosis, with WBC greater than 12,000  With encephalopathy  Due to UTI versus sacral wound versus bacteremia  Leukocytosis improved.  Urine culture growing Enterococcus.  Blood culture with contaminant.  Wound culture with no growth.  Transition to augmentin

## 2023-08-22 NOTE — ED NOTES
Patient remains comfortable on gurney, no identifiable needs at this time. Equal chest rise and fall bilaterally, pt connected to cardiac monitor. Safety measures in place, call light within reach.   Safety measures in place. Bed alarm in place

## 2023-08-22 NOTE — ASSESSMENT & PLAN NOTE
Hx of Dr. Arizmendi [place] concerned for patency  CT Head w/o showing bilateral mod increased ventricular dilation  NSGY Consulted -they have adjusted shunt settings  Neuro checks every 4 hours

## 2023-08-22 NOTE — WOUND TEAM
Assisted Yevgeniy CAMPBELL (Wound RN), with wound care, non-selective debridement performed using wound cleanser/NS and gauze. Please see Yevgeniy CAMPBELL (Wound RN) wound note for further wound care details.

## 2023-08-22 NOTE — ED NOTES
.Pt transferred out of ED at this time with [facility] Le via lillie. Pt is A&Ox1 - baseline, no apparent distress upon transfer. Pt transferred on tele monitor and 2L O2 with adequate O2 volume in bed tank. All paperwork and personal belongings sent with pt to Tele 7.

## 2023-08-22 NOTE — ASSESSMENT & PLAN NOTE
Has had slow decline of mentation.  Neurology was consulted in 3/2023 that this may be the progression of her dementia.  Now with increased lethargy and decreased oral intake  CT head neg for bleed or mass but does show increased ventricular dilation for which NSGY has been consulted.  They have adjusted shunt setting  May be secondary to UTI   Fall, seizure, aspiration precautions   Limit sedatives  Her mentation has improved to baseline  PT OT ST

## 2023-08-22 NOTE — PROGRESS NOTES
4 Eyes Skin Assessment Completed by Mimi RN and Mini RN.    Head Lump on right side  Ears WDL  Nose WDL  Mouth WDL  Neck Redness  Breast/Chest WDL  Shoulder Blades WDL  Spine WDL  (R) Arm/Elbow/Hand Redness, dry sloughing skin  (L) Arm/Elbow/Hand Redness and Abrasion, dry sloughing skin  Abdomen WDL  Groin Redness  Scrotum/Coccyx/Buttocks Redness  (R) Leg Redness, Bruising, and Swelling  (L) Leg Redness, Bruising, and Swelling  (R) Heel/Foot/Toe Blanching and Boggy  (L) Heel/Foot/Toe Blanching and Boggy          Devices In Places Tele Box, Blood Pressure Cuff, Pulse Ox, and Nasal Cannula      Interventions In Place Gray Ear Foams, Heel Float Boots, Waffle Overlay, TAP System, Low Air Loss Mattress, and Barrier Cream    Possible Skin Injury Yes    Pictures Uploaded Into Epic Yes  Wound Consult Placed Yes  RN Wound Prevention Protocol Ordered Yes

## 2023-08-22 NOTE — ED NOTES
.Assumed bedside report and patient care from BERLIN Reyes. Patient is resting comfortably in bed with no signs of labored breathing or restlessness. No questions or concerns at this time. Whiteboard updated.  Safety measures in place, call light within reach.   Bed alarm in place, all fall precautions in place. Safety measures in place. Pt on 2L O2 NC. Call light in reach

## 2023-08-23 ENCOUNTER — APPOINTMENT (OUTPATIENT)
Dept: RADIOLOGY | Facility: MEDICAL CENTER | Age: 73
DRG: 871 | End: 2023-08-23
Payer: MEDICARE

## 2023-08-23 PROBLEM — A41.9 SEPSIS (HCC): Status: ACTIVE | Noted: 2023-08-22

## 2023-08-23 LAB
ANION GAP SERPL CALC-SCNC: 8 MMOL/L (ref 7–16)
BACTERIA BLD CULT: ABNORMAL
BACTERIA BLD CULT: ABNORMAL
BACTERIA WND AEROBE CULT: NORMAL
BASOPHILS # BLD AUTO: 0.4 % (ref 0–1.8)
BASOPHILS # BLD: 0.05 K/UL (ref 0–0.12)
BUN SERPL-MCNC: 4 MG/DL (ref 8–22)
CALCIUM SERPL-MCNC: 8.3 MG/DL (ref 8.5–10.5)
CHLORIDE SERPL-SCNC: 106 MMOL/L (ref 96–112)
CO2 SERPL-SCNC: 27 MMOL/L (ref 20–33)
CREAT SERPL-MCNC: 0.5 MG/DL (ref 0.5–1.4)
EOSINOPHIL # BLD AUTO: 0.58 K/UL (ref 0–0.51)
EOSINOPHIL NFR BLD: 4.1 % (ref 0–6.9)
ERYTHROCYTE [DISTWIDTH] IN BLOOD BY AUTOMATED COUNT: 46.5 FL (ref 35.9–50)
GFR SERPLBLD CREATININE-BSD FMLA CKD-EPI: 100 ML/MIN/1.73 M 2
GLUCOSE BLD STRIP.AUTO-MCNC: 96 MG/DL (ref 65–99)
GLUCOSE SERPL-MCNC: 100 MG/DL (ref 65–99)
GRAM STN SPEC: NORMAL
HCT VFR BLD AUTO: 34.9 % (ref 37–47)
HGB BLD-MCNC: 11.3 G/DL (ref 12–16)
IMM GRANULOCYTES # BLD AUTO: 0.07 K/UL (ref 0–0.11)
IMM GRANULOCYTES NFR BLD AUTO: 0.5 % (ref 0–0.9)
LYMPHOCYTES # BLD AUTO: 2.52 K/UL (ref 1–4.8)
LYMPHOCYTES NFR BLD: 17.6 % (ref 22–41)
MAGNESIUM SERPL-MCNC: 2 MG/DL (ref 1.5–2.5)
MCH RBC QN AUTO: 30.5 PG (ref 27–33)
MCHC RBC AUTO-ENTMCNC: 32.4 G/DL (ref 32.2–35.5)
MCV RBC AUTO: 94.1 FL (ref 81.4–97.8)
MONOCYTES # BLD AUTO: 1.64 K/UL (ref 0–0.85)
MONOCYTES NFR BLD AUTO: 11.5 % (ref 0–13.4)
NEUTROPHILS # BLD AUTO: 9.42 K/UL (ref 1.82–7.42)
NEUTROPHILS NFR BLD: 65.9 % (ref 44–72)
NRBC # BLD AUTO: 0 K/UL
NRBC BLD-RTO: 0 /100 WBC (ref 0–0.2)
PHOSPHATE SERPL-MCNC: 2.7 MG/DL (ref 2.5–4.5)
PLATELET # BLD AUTO: 350 K/UL (ref 164–446)
PMV BLD AUTO: 9 FL (ref 9–12.9)
POTASSIUM SERPL-SCNC: 3.2 MMOL/L (ref 3.6–5.5)
RBC # BLD AUTO: 3.71 M/UL (ref 4.2–5.4)
SIGNIFICANT IND 70042: ABNORMAL
SIGNIFICANT IND 70042: NORMAL
SITE SITE: ABNORMAL
SITE SITE: NORMAL
SODIUM SERPL-SCNC: 141 MMOL/L (ref 135–145)
SOURCE SOURCE: ABNORMAL
SOURCE SOURCE: NORMAL
WBC # BLD AUTO: 14.3 K/UL (ref 4.8–10.8)

## 2023-08-23 PROCEDURE — A9270 NON-COVERED ITEM OR SERVICE: HCPCS | Mod: JZ | Performed by: INTERNAL MEDICINE

## 2023-08-23 PROCEDURE — 700105 HCHG RX REV CODE 258: Performed by: STUDENT IN AN ORGANIZED HEALTH CARE EDUCATION/TRAINING PROGRAM

## 2023-08-23 PROCEDURE — 700101 HCHG RX REV CODE 250: Performed by: INTERNAL MEDICINE

## 2023-08-23 PROCEDURE — 85025 COMPLETE CBC W/AUTO DIFF WBC: CPT

## 2023-08-23 PROCEDURE — 36415 COLL VENOUS BLD VENIPUNCTURE: CPT

## 2023-08-23 PROCEDURE — 83735 ASSAY OF MAGNESIUM: CPT

## 2023-08-23 PROCEDURE — 80048 BASIC METABOLIC PNL TOTAL CA: CPT

## 2023-08-23 PROCEDURE — 82962 GLUCOSE BLOOD TEST: CPT

## 2023-08-23 PROCEDURE — 700111 HCHG RX REV CODE 636 W/ 250 OVERRIDE (IP): Mod: JZ | Performed by: STUDENT IN AN ORGANIZED HEALTH CARE EDUCATION/TRAINING PROGRAM

## 2023-08-23 PROCEDURE — 99233 SBSQ HOSP IP/OBS HIGH 50: CPT | Performed by: INTERNAL MEDICINE

## 2023-08-23 PROCEDURE — 70450 CT HEAD/BRAIN W/O DYE: CPT

## 2023-08-23 PROCEDURE — 700102 HCHG RX REV CODE 250 W/ 637 OVERRIDE(OP): Mod: JZ | Performed by: INTERNAL MEDICINE

## 2023-08-23 PROCEDURE — 770020 HCHG ROOM/CARE - TELE (206)

## 2023-08-23 PROCEDURE — 84100 ASSAY OF PHOSPHORUS: CPT

## 2023-08-23 RX ORDER — POTASSIUM CHLORIDE 20 MEQ/1
40 TABLET, EXTENDED RELEASE ORAL ONCE
Status: COMPLETED | OUTPATIENT
Start: 2023-08-23 | End: 2023-08-23

## 2023-08-23 RX ORDER — POTASSIUM CHLORIDE 20 MEQ/1
40 TABLET, EXTENDED RELEASE ORAL ONCE
Status: DISCONTINUED | OUTPATIENT
Start: 2023-08-23 | End: 2023-08-23

## 2023-08-23 RX ADMIN — PIPERACILLIN AND TAZOBACTAM 3.38 G: 3; .375 INJECTION, POWDER, FOR SOLUTION INTRAVENOUS at 14:14

## 2023-08-23 RX ADMIN — VANCOMYCIN HYDROCHLORIDE 1000 MG: 5 INJECTION, POWDER, LYOPHILIZED, FOR SOLUTION INTRAVENOUS at 03:55

## 2023-08-23 RX ADMIN — SODIUM HYPOCHLORITE 0.12 ML: 1.25 SOLUTION TOPICAL at 05:09

## 2023-08-23 RX ADMIN — POTASSIUM CHLORIDE AND SODIUM CHLORIDE: 900; 150 INJECTION, SOLUTION INTRAVENOUS at 20:20

## 2023-08-23 RX ADMIN — LABETALOL HYDROCHLORIDE 10 MG: 5 INJECTION INTRAVENOUS at 23:36

## 2023-08-23 RX ADMIN — PIPERACILLIN AND TAZOBACTAM 3.38 G: 3; .375 INJECTION, POWDER, FOR SOLUTION INTRAVENOUS at 06:33

## 2023-08-23 RX ADMIN — POTASSIUM CHLORIDE 40 MEQ: 1500 TABLET, EXTENDED RELEASE ORAL at 10:35

## 2023-08-23 RX ADMIN — ENOXAPARIN SODIUM 40 MG: 100 INJECTION SUBCUTANEOUS at 17:18

## 2023-08-23 RX ADMIN — POTASSIUM CHLORIDE AND SODIUM CHLORIDE: 900; 150 INJECTION, SOLUTION INTRAVENOUS at 10:36

## 2023-08-23 RX ADMIN — SODIUM HYPOCHLORITE 473 ML: 1.25 SOLUTION TOPICAL at 17:19

## 2023-08-23 RX ADMIN — PIPERACILLIN AND TAZOBACTAM 3.38 G: 3; .375 INJECTION, POWDER, FOR SOLUTION INTRAVENOUS at 20:23

## 2023-08-23 ASSESSMENT — ENCOUNTER SYMPTOMS
MEMORY LOSS: 1
WEAKNESS: 1
ABDOMINAL PAIN: 0
MYALGIAS: 0
HALLUCINATIONS: 1
SHORTNESS OF BREATH: 0
NAUSEA: 0

## 2023-08-23 ASSESSMENT — PAIN DESCRIPTION - PAIN TYPE: TYPE: ACUTE PAIN

## 2023-08-23 ASSESSMENT — COGNITIVE AND FUNCTIONAL STATUS - GENERAL
WALKING IN HOSPITAL ROOM: A LOT
SUGGESTED CMS G CODE MODIFIER MOBILITY: CL
MOBILITY SCORE: 14
CLIMB 3 TO 5 STEPS WITH RAILING: A LOT
MOVING TO AND FROM BED TO CHAIR: A LOT
MOVING FROM LYING ON BACK TO SITTING ON SIDE OF FLAT BED: A LOT
STANDING UP FROM CHAIR USING ARMS: A LOT

## 2023-08-23 ASSESSMENT — FIBROSIS 4 INDEX: FIB4 SCORE: 1.08

## 2023-08-23 NOTE — PROGRESS NOTES
Neurosurgery Progress Note    Subjective:  No events overnight   Shuntogram negative for any kinks or disconnections   CT head stable     Exam:  Alert, awake, oriented to self only, confused.  PERRL 3mm, EOMI   -drift, tongue midline   SANTOS, sensation intact throughout   Monson cloudy   Right  shunt valve, depresses, refills briskly     BP  Min: 120/69  Max: 141/46  Pulse  Av.2  Min: 98  Max: 109  Resp  Av.2  Min: 18  Max: 20  Temp  Av.7 °C (98 °F)  Min: 36.6 °C (97.9 °F)  Max: 36.8 °C (98.2 °F)  SpO2  Av.6 %  Min: 94 %  Max: 97 %    No data recorded    Recent Labs     23  0220   WBC 15.0* 13.7* 14.3*   RBC 4.01* 3.73* 3.71*   HEMOGLOBIN 12.2 11.1* 11.3*   HEMATOCRIT 37.1 34.2* 34.9*   MCV 92.5 91.7 94.1   MCH 30.4 29.8 30.5   MCHC 32.9 32.5 32.4   RDW 45.6 45.1 46.5   PLATELETCT 381 358 350   MPV 9.4 9.3 9.0     Recent Labs     23  1139 23  0220   SODIUM 140 139  --  141   POTASSIUM 2.7* 3.1* 3.0* 3.2*   CHLORIDE 99 103  --  106   CO2 29 27  --  27   GLUCOSE 114* 117*  --  100*   BUN 6* 5*  --  4*   CREATININE 0.50 0.41*  --  0.50   CALCIUM 8.9 8.2*  --  8.3*               Intake/Output                         23 - 23 - 23 Total  Total                 Intake    P.O.  360  -- 360  240  -- 240    P.O. 360 -- 360 240 -- 240    I.V.  200  -- 200  --  -- --    Volume (mL) (LR infusion (bolus)) 200 -- 200 -- -- --    Total Intake 560 -- 560 240 -- 240       Output    Urine  --  300 300  --  -- --    Output (mL) (Urethral Catheter Non-latex;Straight-tip 16 Fr.) -- 300 300 -- -- --    Stool  --  -- --  --  -- --    Number of Times Stooled 1 x 1 x 2 x -- -- --    Total Output -- 300 300 -- -- --       Net I/O     560 -300 260 240 -- 240              Intake/Output Summary (Last 24 hours) at 2023 1244  Last data filed at 2023  0923  Gross per 24 hour   Intake 600 ml   Output 300 ml   Net 300 ml             albuterol  2 Puff Q6HRS PRN    tiotropium  5 mcg DAILY    MD Alert...Vancomycin per Pharmacy   PHARMACY TO DOSE    enoxaparin (LOVENOX) injection  40 mg DAILY AT 1800    piperacillin-tazobactam  3.375 g Q8HRS    vancomycin  1,000 mg Q12HR    lamoTRIgine  100 mg DAILY    labetalol  10 mg Q6HRS PRN    dakins 0.125% (1/4 strength)   BID    0.9 % NaCl with KCl 20 mEq 1,000 mL   Continuous    metoprolol SR  25 mg DAILY    senna-docusate  2 Tablet BID    And    polyethylene glycol/lytes  1 Packet QDAY PRN    And    magnesium hydroxide  30 mL QDAY PRN    And    bisacodyl  10 mg Q EVENING    oxyCODONE immediate-release  5-10 mg Q4HRS PRN    HYDROmorphone  0.5 mg Q4HRS PRN       Assessment and Plan:  Hospital day #2  POD #NA   Prophylactic anticoagulation: yes         Start date/time: Per medicine     Plan  Stable    setting checked, at performance level 1.0, set to 0.5 to allow more drainage  +UTI, sacral wound cultures, confusion most likely due to infection   Per medicine is more cognizant today   No further neurosurgical needs, will s/o at this time   Patient can follow up in our clinic or will need shunt check after any MRI.

## 2023-08-23 NOTE — PROGRESS NOTES
Hospital Medicine Daily Progress Note    Date of Service  8/23/2023    Chief Complaint  Sheyla Garcia is a 72 y.o. female admitted 8/21/2023 with lethargy    Hospital Course  71 yo woman with hypothyroidism, HTN, NPH s/p  shunt, schizophrenia who presented from [facility] with lethargy and altered mental status.  Per , patient has been declining mentally for many months.  For the last 4 days she has been lethargic and not waking up per usual.  She is noted to have a possible UTI and sacral wound.  CTAP showed slight hazy fat stranding posterior to sacrum, coccyx with large amount of stool in the rectal vault, indeterminate left adrenal nodule.  Patient was started on Zosyn and Vanco.  CT head showed moderate bilateral ventricular dilation overall slightly increased in size.  Dr. Box with neurosurgery was consulted.  X-ray series showed expected course of  shunt.    Interval Problem Update  8/22 - Sinus on telemetry, afebrile  Hypo-K 3.1, replete  Urine and wound culture pending  Blood cultures so far no growth  N.p.o., speech swallow eval pending  Patient awake with  bedside, she says her hip hurts.  He notes that she has chronic joint pains and fibromyalgia.   says at [facility], she continues to be very weak and unable to stand.  She also has been not wanting to eat, requires hand feeding and only eats a few bites at a time.  She started sleeping much more and that is when he became concerned.  She continues to have hallucinations and paranoia.  I discussed goals of care, her  is aware of end-of-life care if she were to worsen but for now he wants to see if antibiotics will improve her back to her prior baseline.      8/23 -she had lethargy overnight, rapid response called.  CT head showed moderate bilateral ventricular dilation unchanged from prior CT.  Okay 3.2, replete.  Blood culture growing gram-positive cocci.  Leukocytosis 14.3.  She is alert and conversing today, disoriented  and hallucinating.  She tells me she has been invited to an acid event because she is alumni.  She says her pain is doing better today.  I spoke with neurosurgery who has adjusted settings of  shunt.    I have discussed this patient's plan of care and discharge plan at IDT rounds today with Case Management, Nursing, Nursing leadership, and other members of the IDT team.    Consultants/Specialty  neurosurgery    Code Status  Full Code    Disposition  The patient is not medically cleared for discharge to home or a post-acute facility.  Anticipate discharge to: skilled nursing facility    I have placed the appropriate orders for post-discharge needs.    Review of Systems  Review of Systems   Unable to perform ROS: Medical condition (Confused)   Constitutional:  Positive for malaise/fatigue.   Respiratory:  Negative for shortness of breath.    Cardiovascular:  Negative for chest pain.   Gastrointestinal:  Negative for abdominal pain and nausea.   Musculoskeletal:  Negative for joint pain and myalgias.   Neurological:  Positive for weakness.   Psychiatric/Behavioral:  Positive for hallucinations and memory loss.         Physical Exam  Temp:  [36.6 °C (97.9 °F)-36.8 °C (98.2 °F)] 36.6 °C (97.9 °F)  Pulse:  [] 98  Resp:  [18-20] 20  BP: (120-141)/(46-84) 123/68  SpO2:  [94 %-97 %] 94 %    Physical Exam  Vitals and nursing note reviewed.   Constitutional:       Appearance: She is obese. She is ill-appearing.   HENT:      Head: Normocephalic.      Mouth/Throat:      Mouth: Mucous membranes are moist.   Eyes:      General:         Right eye: No discharge.         Left eye: No discharge.   Cardiovascular:      Rate and Rhythm: Normal rate and regular rhythm.   Pulmonary:      Effort: Pulmonary effort is normal. No respiratory distress.      Breath sounds: No wheezing or rales.   Abdominal:      Palpations: Abdomen is soft.      Tenderness: There is no abdominal tenderness. There is no guarding or rebound.    Musculoskeletal:         General: No swelling.      Cervical back: Neck supple.   Skin:     General: Skin is warm and dry.      Comments: Wound photos reviewed   Neurological:      Mental Status: She is alert.      Comments: Oriented to self, disoriented to city and month and year.  Weakly moving hands and feet but unable to lift off the bed, extremities are tremulous.   Psychiatric:         Attention and Perception: She perceives visual hallucinations.         Fluids    Intake/Output Summary (Last 24 hours) at 8/23/2023 1201  Last data filed at 8/23/2023 0923  Gross per 24 hour   Intake 600 ml   Output 300 ml   Net 300 ml       Laboratory  Recent Labs     08/21/23  1810 08/22/23  0622 08/23/23  0220   WBC 15.0* 13.7* 14.3*   RBC 4.01* 3.73* 3.71*   HEMOGLOBIN 12.2 11.1* 11.3*   HEMATOCRIT 37.1 34.2* 34.9*   MCV 92.5 91.7 94.1   MCH 30.4 29.8 30.5   MCHC 32.9 32.5 32.4   RDW 45.6 45.1 46.5   PLATELETCT 381 358 350   MPV 9.4 9.3 9.0     Recent Labs     08/21/23  1810 08/22/23  0622 08/22/23  1139 08/23/23  0220   SODIUM 140 139  --  141   POTASSIUM 2.7* 3.1* 3.0* 3.2*   CHLORIDE 99 103  --  106   CO2 29 27  --  27   GLUCOSE 114* 117*  --  100*   BUN 6* 5*  --  4*   CREATININE 0.50 0.41*  --  0.50   CALCIUM 8.9 8.2*  --  8.3*                   Imaging  CT-HEAD W/O   Final Result         1.  Moderate bilateral ventricular dilatation with ventriculostomy tube in place, stable since prior study. Consider component of hydrocephalus and/or shunt dysfunction.   2.  Unspecific white matter changes, commonly associated with small vessel ischemic disease.  Associated mild cerebral atrophy is noted.   3.  Atherosclerosis.         CT-ABDOMEN-PELVIS WITH   Final Result   Addendum (preliminary) 1 of 1   Addendum: There is slight hazy fat stranding seen posterior to the sacrum    and coccyx, no discrete fluid collection or definite sacral wound is    appreciated.      Final         1.  Large quantity of stool in the rectal vault  compatible with constipation, likely fecal impaction.   2.  Indeterminate left adrenal nodule, follow-up adrenal protocol CT or MRI for further characterization as clinically appropriate   3.  Diverticulosis      CT-HEAD W/O   Final Result         1.  No acute intracranial abnormality is identified, there are nonspecific white matter changes, commonly associated with small vessel ischemic disease.  Associated mild cerebral atrophy is noted.   2.  Moderate bilateral ventricular dilatation, overall appears slightly increased since prior study particularly of the temporal horns, could represent component of shunt dysfunction and/or evolving hydrocephalus.   3.  Atherosclerosis.         [id number] VIEW   Final Result      RIGHT-sided  shunt catheter intact throughout its visualized course.      DX-SKULL-LIMITED 3-   Final Result      RIGHT-sided  shunt catheter intact throughout its visualized course.      DX-SPINE-ANY ONE VIEW   Final Result      RIGHT-sided  shunt catheter intact throughout its visualized course.      DX-CHEST-LIMITED (1 VIEW)   Final Result      RIGHT-sided  shunt catheter intact throughout its visualized course.           Assessment/Plan  * Hypokalemia- (present on admission)  Assessment & Plan  Remains low, continue repletion and recheck level  Encourage oral intake    Constipated  Assessment & Plan  Chronic issue per    digitally disimpacted by wound care bedside  Bowel regimen ordered    ACP (advance care planning)  Assessment & Plan   wants full code at this time  If patient were to worsen, will need to rediscuss goals of care      Pressure injury of skin of sacral region  Assessment & Plan  IV Vancomycin/Zosyn   Wound care was able to debride bedside, continue to monitor  Follow wound culture      Sepsis (HCC)  Assessment & Plan  SIRS criteria identified on my evaluation include:  Tachycardia, with heart rate greater than 90 BPM and Leukocytosis, with WBC greater than  12,000  With encephalopathy  Due to UTI versus sacral wound versus bacteremia  Trend leukocytosis and continue antibiotics  Follow cultures      Bipolar disorder (HCC)- (present on admission)  Assessment & Plan  Holding Risperdal for now until mentation improves    Hypomagnesemia- (present on admission)  Assessment & Plan  Poor oral intake, monitor level    Acute encephalopathy- (present on admission)  Assessment & Plan  Has had slow decline of mentation.  Neurology was consulted in 3/2023 that this may be the progression of her dementia.  Now with increased lethargy and decreased oral intake  Lethargy is improving  CT head neg for bleed or mass but does show increased ventricular dilation for which NSGY has been consulted.  They have adjusted shunt setting  May be secondary to UTI and sacral wound  Continue Vanco and Zosyn  Follow urine and wound cultures, pending  Blood culture now growing gram-positive cocci  Fall, seizure, aspiration precautions   Limit sedatives, holding Risperdal and Zanaflex  PT OT ST        Schizophrenia (HCC)- (present on admission)  Assessment & Plan  History of   Resume home medications once mentation improves    S/P  shunt- (present on admission)  Assessment & Plan  Hx of Dr. Ugo campbell concerned for patency  CT Head w/o showing bilateral mod increased ventricular dilation  NSGY Consulted -they have adjusted shunt settings  Neuro checks every 4 hours     Asthma- (present on admission)  Assessment & Plan  Resume home inhalers     Obesity- (present on admission)  Assessment & Plan  BMI 35.29     Essential hypertension- (present on admission)  Assessment & Plan  Intermittently hypertensive, resume metoprolol          VTE prophylaxis:    enoxaparin ppx      I have performed a physical exam and reviewed and updated ROS and Plan today (8/23/2023). In review of yesterday's note (8/22/2023), there are no changes except as documented above.

## 2023-08-23 NOTE — PROGRESS NOTES
Received bedside report from RN, pt care assumed, VSS, pt assessment complete. Pt AAOx1, oriented to self. No signs of acute distress noted at this time. Pt denies any additional needs at this time. Bed locked/in lowest position, bed alarm on, call light within reach, hourly rounding initiated. Q2 hour turns in place

## 2023-08-23 NOTE — DISCHARGE PLANNING
Care Transition Team Assessment    LMSW called pt's spouse to verify pt's facesheet. Pt lives with her spouse. Pt was at [facility] prior to this hospitalization. Pt uses cane, walker, and wheelchair; per spouse, pt has been dependent on wheelchair since February. Per spouse, pt's supports are him and their daughter, Alanis. Pt is retired and receives SSI monthly deposits. Pt denies any SA concerns. Per spouse, pt has had bouts of depression for the past 3 years.     LSW discussed dcp with spouse. Per spouse, he wants pt to return to [facility]. LSW completed and faxed choice form to [facility]. Verbal consent as it was through the phone.    Information Source  Orientation Level: Oriented to person, Disoriented to time, Disoriented to place, Disoriented to situation  Information Given By: Spouse  Informant's Name: Jasiel Garcia  Who is responsible for making decisions for patient? : Legal next of kin  Name(s) of Primary Decision Maker: Jasiel Garcia    Readmission Evaluation  Is this a readmission?: No    Elopement Risk  Legal Hold: No  Ambulatory or Self Mobile in Wheelchair: No-Not an Elopement Risk  Elopement Risk: Not at Risk for Elopement    Interdisciplinary Discharge Planning  Primary Care Physician: Zaira Swenson  Lives with - Patient's Self Care Capacity: Attendant / Paid Care Giver  Support Systems: Spouse / Significant Other, Children  Housing / Facility: Skilled Nursing Facility  Prior Services:  ([facility])  Durable Medical Equipment: Walker    Discharge Preparedness  What is your plan after discharge?: Skilled nursing facility  Prior Functional Level: Uses Walker, Uses Cane, Uses Wheelchair, Needs Assist with Activities of Daily Living, Needs Assist with Medication Management    Functional Assesment  Prior Functional Level: Uses Walker, Uses Cane, Uses Wheelchair, Needs Assist with Activities of Daily Living, Needs Assist with Medication Management    Finances  Financial Barriers to Discharge: No    Vision /  Hearing Impairment  Vision Impairment : Yes  Right Eye Vision: Impaired, Wears Glasses  Left Eye Vision: Impaired, Wears Glasses    Domestic Abuse  Have you ever been the victim of abuse or violence?: No    Psychological Assessment  History of Substance Abuse: None  History of Psychiatric Problems: Yes    Discharge Risks or Barriers  Discharge risks or barriers?: Post-acute placement / services    Anticipated Discharge Information  Discharge Disposition: D/T to SNF with Medicare cert in anticipation of skilled care (03)

## 2023-08-23 NOTE — DISCHARGE PLANNING
0910  Received Choice form at [phone]  Agency/Facility Name: [facility]   Referral sent per Choice form @ [phone]

## 2023-08-23 NOTE — PROGRESS NOTES
This RN called to bedside by bedside RN for concern of worsening mentation of pt. Upon assessment, pt oriented to place, however unable to communicate situation, time, or person. Pt yawning frequently and drowsy. Pupils 3mm and brisk bilat. Able to follow commands to give two thumbs up and wiggle toes after extensive physical and verbal stimulation. Page made to [facility] Neurosurgery group as this RN concerned that  shunt potentially not functioning properly based off poor neuro exam.

## 2023-08-24 LAB
ANION GAP SERPL CALC-SCNC: 9 MMOL/L (ref 7–16)
BACTERIA UR CULT: ABNORMAL
BACTERIA UR CULT: ABNORMAL
BASOPHILS # BLD AUTO: 0.5 % (ref 0–1.8)
BASOPHILS # BLD: 0.06 K/UL (ref 0–0.12)
BUN SERPL-MCNC: 3 MG/DL (ref 8–22)
CALCIUM SERPL-MCNC: 8.6 MG/DL (ref 8.5–10.5)
CHLORIDE SERPL-SCNC: 108 MMOL/L (ref 96–112)
CO2 SERPL-SCNC: 25 MMOL/L (ref 20–33)
CREAT SERPL-MCNC: 0.41 MG/DL (ref 0.5–1.4)
EOSINOPHIL # BLD AUTO: 0.81 K/UL (ref 0–0.51)
EOSINOPHIL NFR BLD: 7.1 % (ref 0–6.9)
ERYTHROCYTE [DISTWIDTH] IN BLOOD BY AUTOMATED COUNT: 45.9 FL (ref 35.9–50)
GFR SERPLBLD CREATININE-BSD FMLA CKD-EPI: 105 ML/MIN/1.73 M 2
GLUCOSE SERPL-MCNC: 97 MG/DL (ref 65–99)
HCT VFR BLD AUTO: 35.2 % (ref 37–47)
HGB BLD-MCNC: 11.6 G/DL (ref 12–16)
IMM GRANULOCYTES # BLD AUTO: 0.06 K/UL (ref 0–0.11)
IMM GRANULOCYTES NFR BLD AUTO: 0.5 % (ref 0–0.9)
LYMPHOCYTES # BLD AUTO: 3 K/UL (ref 1–4.8)
LYMPHOCYTES NFR BLD: 26.2 % (ref 22–41)
MAGNESIUM SERPL-MCNC: 1.8 MG/DL (ref 1.5–2.5)
MCH RBC QN AUTO: 30.2 PG (ref 27–33)
MCHC RBC AUTO-ENTMCNC: 33 G/DL (ref 32.2–35.5)
MCV RBC AUTO: 91.7 FL (ref 81.4–97.8)
MONOCYTES # BLD AUTO: 1.12 K/UL (ref 0–0.85)
MONOCYTES NFR BLD AUTO: 9.8 % (ref 0–13.4)
NEUTROPHILS # BLD AUTO: 6.38 K/UL (ref 1.82–7.42)
NEUTROPHILS NFR BLD: 55.9 % (ref 44–72)
NRBC # BLD AUTO: 0 K/UL
NRBC BLD-RTO: 0 /100 WBC (ref 0–0.2)
PHOSPHATE SERPL-MCNC: 2.3 MG/DL (ref 2.5–4.5)
PLATELET # BLD AUTO: 352 K/UL (ref 164–446)
PMV BLD AUTO: 8.9 FL (ref 9–12.9)
POTASSIUM SERPL-SCNC: 3 MMOL/L (ref 3.6–5.5)
RBC # BLD AUTO: 3.84 M/UL (ref 4.2–5.4)
SIGNIFICANT IND 70042: ABNORMAL
SITE SITE: ABNORMAL
SODIUM SERPL-SCNC: 142 MMOL/L (ref 135–145)
SOURCE SOURCE: ABNORMAL
WBC # BLD AUTO: 11.4 K/UL (ref 4.8–10.8)

## 2023-08-24 PROCEDURE — 36415 COLL VENOUS BLD VENIPUNCTURE: CPT

## 2023-08-24 PROCEDURE — A9270 NON-COVERED ITEM OR SERVICE: HCPCS | Performed by: INTERNAL MEDICINE

## 2023-08-24 PROCEDURE — 80048 BASIC METABOLIC PNL TOTAL CA: CPT

## 2023-08-24 PROCEDURE — 84100 ASSAY OF PHOSPHORUS: CPT

## 2023-08-24 PROCEDURE — 99232 SBSQ HOSP IP/OBS MODERATE 35: CPT | Performed by: INTERNAL MEDICINE

## 2023-08-24 PROCEDURE — 700102 HCHG RX REV CODE 250 W/ 637 OVERRIDE(OP): Mod: JZ

## 2023-08-24 PROCEDURE — 87040 BLOOD CULTURE FOR BACTERIA: CPT | Mod: 91

## 2023-08-24 PROCEDURE — 83735 ASSAY OF MAGNESIUM: CPT

## 2023-08-24 PROCEDURE — 700105 HCHG RX REV CODE 258

## 2023-08-24 PROCEDURE — A9270 NON-COVERED ITEM OR SERVICE: HCPCS | Performed by: STUDENT IN AN ORGANIZED HEALTH CARE EDUCATION/TRAINING PROGRAM

## 2023-08-24 PROCEDURE — A9270 NON-COVERED ITEM OR SERVICE: HCPCS | Mod: JZ

## 2023-08-24 PROCEDURE — 700102 HCHG RX REV CODE 250 W/ 637 OVERRIDE(OP): Performed by: STUDENT IN AN ORGANIZED HEALTH CARE EDUCATION/TRAINING PROGRAM

## 2023-08-24 PROCEDURE — 700105 HCHG RX REV CODE 258: Performed by: STUDENT IN AN ORGANIZED HEALTH CARE EDUCATION/TRAINING PROGRAM

## 2023-08-24 PROCEDURE — 700102 HCHG RX REV CODE 250 W/ 637 OVERRIDE(OP): Performed by: INTERNAL MEDICINE

## 2023-08-24 PROCEDURE — 700111 HCHG RX REV CODE 636 W/ 250 OVERRIDE (IP): Mod: JZ | Performed by: STUDENT IN AN ORGANIZED HEALTH CARE EDUCATION/TRAINING PROGRAM

## 2023-08-24 PROCEDURE — 700101 HCHG RX REV CODE 250: Performed by: INTERNAL MEDICINE

## 2023-08-24 PROCEDURE — 85025 COMPLETE CBC W/AUTO DIFF WBC: CPT

## 2023-08-24 PROCEDURE — 770020 HCHG ROOM/CARE - TELE (206)

## 2023-08-24 PROCEDURE — 700101 HCHG RX REV CODE 250

## 2023-08-24 RX ORDER — AMOXICILLIN AND CLAVULANATE POTASSIUM 875; 125 MG/1; MG/1
1 TABLET, FILM COATED ORAL EVERY 12 HOURS
Status: DISCONTINUED | OUTPATIENT
Start: 2023-08-24 | End: 2023-08-25 | Stop reason: HOSPADM

## 2023-08-24 RX ORDER — POTASSIUM CHLORIDE 20 MEQ/1
40 TABLET, EXTENDED RELEASE ORAL ONCE
Status: COMPLETED | OUTPATIENT
Start: 2023-08-24 | End: 2023-08-24

## 2023-08-24 RX ORDER — AMOXICILLIN 500 MG/1
500 CAPSULE ORAL EVERY 8 HOURS
Status: DISCONTINUED | OUTPATIENT
Start: 2023-08-24 | End: 2023-08-24

## 2023-08-24 RX ADMIN — PIPERACILLIN AND TAZOBACTAM 3.38 G: 3; .375 INJECTION, POWDER, FOR SOLUTION INTRAVENOUS at 04:56

## 2023-08-24 RX ADMIN — POTASSIUM PHOSPHATE, MONOBASIC AND POTASSIUM PHOSPHATE, DIBASIC 15 MMOL: 224; 236 INJECTION, SOLUTION, CONCENTRATE INTRAVENOUS at 02:42

## 2023-08-24 RX ADMIN — METOPROLOL SUCCINATE 25 MG: 25 TABLET, EXTENDED RELEASE ORAL at 04:58

## 2023-08-24 RX ADMIN — SODIUM HYPOCHLORITE 0.01 ML: 1.25 SOLUTION TOPICAL at 05:00

## 2023-08-24 RX ADMIN — POTASSIUM CHLORIDE 40 MEQ: 1500 TABLET, EXTENDED RELEASE ORAL at 02:12

## 2023-08-24 RX ADMIN — LAMOTRIGINE 100 MG: 100 TABLET ORAL at 04:58

## 2023-08-24 RX ADMIN — SODIUM HYPOCHLORITE 473 ML: 1.25 SOLUTION TOPICAL at 17:09

## 2023-08-24 RX ADMIN — TIOTROPIUM BROMIDE INHALATION SPRAY 5 MCG: 3.12 SPRAY, METERED RESPIRATORY (INHALATION) at 04:59

## 2023-08-24 RX ADMIN — AMOXICILLIN AND CLAVULANATE POTASSIUM 1 TABLET: 875; 125 TABLET, FILM COATED ORAL at 17:09

## 2023-08-24 RX ADMIN — PIPERACILLIN AND TAZOBACTAM 3.38 G: 3; .375 INJECTION, POWDER, FOR SOLUTION INTRAVENOUS at 12:47

## 2023-08-24 RX ADMIN — ENOXAPARIN SODIUM 40 MG: 100 INJECTION SUBCUTANEOUS at 17:08

## 2023-08-24 RX ADMIN — POTASSIUM CHLORIDE AND SODIUM CHLORIDE: 900; 150 INJECTION, SOLUTION INTRAVENOUS at 10:45

## 2023-08-24 ASSESSMENT — ENCOUNTER SYMPTOMS
MYALGIAS: 0
NAUSEA: 0
SHORTNESS OF BREATH: 0
MEMORY LOSS: 1
ABDOMINAL PAIN: 0
HALLUCINATIONS: 1
WEAKNESS: 1

## 2023-08-24 ASSESSMENT — FIBROSIS 4 INDEX: FIB4 SCORE: 1.07

## 2023-08-24 NOTE — PROGRESS NOTES
Hospital Medicine Daily Progress Note    Date of Service  8/24/2023    Chief Complaint  Sheyla Garcia is a 72 y.o. female admitted 8/21/2023 with lethargy    Hospital Course  73 yo woman with hypothyroidism, HTN, NPH s/p  shunt, schizophrenia who presented from [facility] with lethargy and altered mental status.  Per , patient has been declining mentally for many months.  For the last 4 days she has been lethargic and not waking up per usual.  She is noted to have a possible UTI and sacral wound.  CTAP showed slight hazy fat stranding posterior to sacrum, coccyx with large amount of stool in the rectal vault, indeterminate left adrenal nodule.  Patient was started on Zosyn and Vanco.  CT head showed moderate bilateral ventricular dilation overall slightly increased in size.  Dr. Box with neurosurgery was consulted.  X-ray series showed expected course of  shunt.    Interval Problem Update  8/22 - Sinus on telemetry, afebrile  Hypo-K 3.1, replete  Urine and wound culture pending  Blood cultures so far no growth  N.p.o., speech swallow eval pending  Patient awake with  bedside, she says her hip hurts.  He notes that she has chronic joint pains and fibromyalgia.   says at [facility], she continues to be very weak and unable to stand.  She also has been not wanting to eat, requires hand feeding and only eats a few bites at a time.  She started sleeping much more and that is when he became concerned.  She continues to have hallucinations and paranoia.  I discussed goals of care, her  is aware of end-of-life care if she were to worsen but for now he wants to see if antibiotics will improve her back to her prior baseline.      8/23 -she had lethargy overnight, rapid response called.  CT head showed moderate bilateral ventricular dilation unchanged from prior CT.  Okay 3.2, replete.  Blood culture growing gram-positive cocci.  Leukocytosis 14.3.  She is alert and conversing today, disoriented  and hallucinating.  She tells me she has been invited to an acid event because she is alumni.  She says her pain is doing better today.  I spoke with neurosurgery who has adjusted settings of  shunt.    8/24 - WBC decreased to 11.4. HypoK 3.0, hypophos 2.3, replete. Urine culture growing enterococcus. Blood cx with coag neg staph, likely contaminant.  Patient is conversing some, eating some of her meals.  I spoke with her , patient seems to be back at her prior baseline.  Wound care to place wound VAC tomorrow.    I have discussed this patient's plan of care and discharge plan at IDT rounds today with Case Management, Nursing, Nursing leadership, and other members of the IDT team.    Consultants/Specialty  neurosurgery    Code Status  Full Code    Disposition  The patient is not medically cleared for discharge to home or a post-acute facility.  Anticipate discharge to: skilled nursing facility    I have placed the appropriate orders for post-discharge needs.    Review of Systems  Review of Systems   Unable to perform ROS: Medical condition (Confused)   Constitutional:  Positive for malaise/fatigue.   Respiratory:  Negative for shortness of breath.    Cardiovascular:  Negative for chest pain.   Gastrointestinal:  Negative for abdominal pain and nausea.   Musculoskeletal:  Negative for joint pain and myalgias.   Neurological:  Positive for weakness.   Psychiatric/Behavioral:  Positive for hallucinations and memory loss.         Physical Exam  Temp:  [36.5 °C (97.7 °F)-36.6 °C (97.9 °F)] 36.5 °C (97.7 °F)  Pulse:  [] 86  Resp:  [18-20] 18  BP: (140-163)/(68-88) 140/85  SpO2:  [90 %-96 %] 90 %    Physical Exam  Vitals and nursing note reviewed.   Constitutional:       Appearance: She is obese. She is ill-appearing.   HENT:      Head: Normocephalic.      Mouth/Throat:      Mouth: Mucous membranes are moist.   Eyes:      General:         Right eye: No discharge.         Left eye: No discharge.    Cardiovascular:      Rate and Rhythm: Normal rate and regular rhythm.   Pulmonary:      Effort: Pulmonary effort is normal. No respiratory distress.      Breath sounds: No wheezing or rales.   Abdominal:      Palpations: Abdomen is soft.      Tenderness: There is no abdominal tenderness. There is no guarding or rebound.   Musculoskeletal:         General: Swelling (generalized edema) present.      Cervical back: Neck supple.   Skin:     General: Skin is warm and dry.      Comments: Wound photos reviewed   Neurological:      Mental Status: She is alert.      Comments: Oriented to self, disoriented to city and month and year.  Weakly moving hands and feet but unable to lift off the bed, extremities are tremulous.   Psychiatric:         Attention and Perception: She perceives visual hallucinations.         Fluids    Intake/Output Summary (Last 24 hours) at 8/24/2023 1422  Last data filed at 8/24/2023 0936  Gross per 24 hour   Intake 0 ml   Output 2095 ml   Net -2095 ml       Laboratory  Recent Labs     08/22/23  0622 08/23/23  0220 08/24/23  0031   WBC 13.7* 14.3* 11.4*   RBC 3.73* 3.71* 3.84*   HEMOGLOBIN 11.1* 11.3* 11.6*   HEMATOCRIT 34.2* 34.9* 35.2*   MCV 91.7 94.1 91.7   MCH 29.8 30.5 30.2   MCHC 32.5 32.4 33.0   RDW 45.1 46.5 45.9   PLATELETCT 358 350 352   MPV 9.3 9.0 8.9*     Recent Labs     08/22/23  0622 08/22/23  1139 08/23/23  0220 08/24/23  0031   SODIUM 139  --  141 142   POTASSIUM 3.1* 3.0* 3.2* 3.0*   CHLORIDE 103  --  106 108   CO2 27  --  27 25   GLUCOSE 117*  --  100* 97   BUN 5*  --  4* 3*   CREATININE 0.41*  --  0.50 0.41*   CALCIUM 8.2*  --  8.3* 8.6                   Imaging  CT-HEAD W/O   Final Result         1.  Moderate bilateral ventricular dilatation with ventriculostomy tube in place, stable since prior study. Consider component of hydrocephalus and/or shunt dysfunction.   2.  Unspecific white matter changes, commonly associated with small vessel ischemic disease.  Associated mild  cerebral atrophy is noted.   3.  Atherosclerosis.         CT-ABDOMEN-PELVIS WITH   Final Result   Addendum (preliminary) 1 of 1   Addendum: There is slight hazy fat stranding seen posterior to the sacrum    and coccyx, no discrete fluid collection or definite sacral wound is    appreciated.      Final         1.  Large quantity of stool in the rectal vault compatible with constipation, likely fecal impaction.   2.  Indeterminate left adrenal nodule, follow-up adrenal protocol CT or MRI for further characterization as clinically appropriate   3.  Diverticulosis      CT-HEAD W/O   Final Result         1.  No acute intracranial abnormality is identified, there are nonspecific white matter changes, commonly associated with small vessel ischemic disease.  Associated mild cerebral atrophy is noted.   2.  Moderate bilateral ventricular dilatation, overall appears slightly increased since prior study particularly of the temporal horns, could represent component of shunt dysfunction and/or evolving hydrocephalus.   3.  Atherosclerosis.         [id number] VIEW   Final Result      RIGHT-sided  shunt catheter intact throughout its visualized course.      DX-SKULL-LIMITED 3-   Final Result      RIGHT-sided  shunt catheter intact throughout its visualized course.      DX-SPINE-ANY ONE VIEW   Final Result      RIGHT-sided  shunt catheter intact throughout its visualized course.      DX-CHEST-LIMITED (1 VIEW)   Final Result      RIGHT-sided  shunt catheter intact throughout its visualized course.           Assessment/Plan  * Hypokalemia- (present on admission)  Assessment & Plan  Replete for low potassium and low phosphorus    Constipated  Assessment & Plan  Chronic issue per    digitally disimpacted by wound care bedside  Bowel regimen ordered    ACP (advance care planning)  Assessment & Plan   wants full code at this time      Pressure injury of skin of sacral region  Assessment & Plan  Wound culture had  no growth  Transition Zosyn and Vanco to oral Augmentin for additional 5 days  Wound care apply wound VAC tomorrow      Sepsis ([facility])  Assessment & Plan  SIRS criteria identified on my evaluation include:  Tachycardia, with heart rate greater than 90 BPM and Leukocytosis, with WBC greater than 12,000  With encephalopathy  Due to UTI versus sacral wound versus bacteremia  Leukocytosis improved.  Urine culture growing Enterococcus.  Blood culture with contaminant.  Wound culture with no growth.  Transition to augmentin      Bipolar disorder ([facility])- (present on admission)  Assessment & Plan  Tatian improved, can continue on Risperdal at on discharge    Hypomagnesemia- (present on admission)  Assessment & Plan  Poor oral intake, monitor level    Acute encephalopathy- (present on admission)  Assessment & Plan  Has had slow decline of mentation.  Neurology was consulted in 3/2023 that this may be the progression of her dementia.  Now with increased lethargy and decreased oral intake  CT head neg for bleed or mass but does show increased ventricular dilation for which NSGY has been consulted.  They have adjusted shunt setting  May be secondary to UTI   Fall, seizure, aspiration precautions   Limit sedatives  Her mentation has improved to baseline  PT OT ST        Schizophrenia ([facility])- (present on admission)  Assessment & Plan  History of   Resume home medications once mentation improves    S/P  shunt- (present on admission)  Assessment & Plan  Hx of Dr. Ugo campbell concerned for patency  CT Head w/o showing bilateral mod increased ventricular dilation  NSGY Consulted -they have adjusted shunt settings  Neuro checks every 4 hours     Asthma- (present on admission)  Assessment & Plan  Resume home inhalers     Obesity- (present on admission)  Assessment & Plan  BMI 35.29     Essential hypertension- (present on admission)  Assessment & Plan  Intermittently hypertensive, resume metoprolol          VTE prophylaxis:    enoxaparin  ppx      I have performed a physical exam and reviewed and updated ROS and Plan today (8/24/2023). In review of yesterday's note (8/23/2023), there are no changes except as documented above.

## 2023-08-24 NOTE — DISCHARGE PLANNING
1131  Agency/Facility Name: [facility]   Spoke To: [facility]   Outcome: DPA called to notify [facility] that Pt should medically cleared tomorrow will just need to wait for the wound vac to be placed. [facility] requesting transport to be setup 1530 tomorrow and if transport needs to push back, it can be before 1700.     LSW notified.

## 2023-08-24 NOTE — PROGRESS NOTES
Received bedside report from RN, pt care assumed, VSS, pt assessment complete. Tele monitor on. No signs of acute distress noted at this time. Plan of care discussed with pt and verbalizes no questions. Pt denies any additional needs at this time. Bed locked/in lowest position, bed alarm on, call light within reach, hourly rounding initiated.

## 2023-08-24 NOTE — WOUND TEAM
Pt seen by wound team 8/22/23 for sacral wound.  Orders in place for dakins changes.  Wound team will return 8/25/23 for possible VAC placement.

## 2023-08-25 VITALS
TEMPERATURE: 98.5 F | HEIGHT: 69 IN | DIASTOLIC BLOOD PRESSURE: 96 MMHG | WEIGHT: 208.11 LBS | HEART RATE: 100 BPM | SYSTOLIC BLOOD PRESSURE: 135 MMHG | BODY MASS INDEX: 30.82 KG/M2 | OXYGEN SATURATION: 94 % | RESPIRATION RATE: 18 BRPM

## 2023-08-25 LAB
ANION GAP SERPL CALC-SCNC: 10 MMOL/L (ref 7–16)
BUN SERPL-MCNC: 3 MG/DL (ref 8–22)
CALCIUM SERPL-MCNC: 8.9 MG/DL (ref 8.5–10.5)
CHLORIDE SERPL-SCNC: 105 MMOL/L (ref 96–112)
CO2 SERPL-SCNC: 23 MMOL/L (ref 20–33)
CREAT SERPL-MCNC: 0.36 MG/DL (ref 0.5–1.4)
GFR SERPLBLD CREATININE-BSD FMLA CKD-EPI: 109 ML/MIN/1.73 M 2
GLUCOSE SERPL-MCNC: 78 MG/DL (ref 65–99)
MAGNESIUM SERPL-MCNC: 1.7 MG/DL (ref 1.5–2.5)
PHOSPHATE SERPL-MCNC: 2.1 MG/DL (ref 2.5–4.5)
POTASSIUM SERPL-SCNC: 3.7 MMOL/L (ref 3.6–5.5)
SODIUM SERPL-SCNC: 138 MMOL/L (ref 135–145)

## 2023-08-25 PROCEDURE — 700102 HCHG RX REV CODE 250 W/ 637 OVERRIDE(OP): Performed by: INTERNAL MEDICINE

## 2023-08-25 PROCEDURE — 302098 PASTE RING (FLAT): Performed by: INTERNAL MEDICINE

## 2023-08-25 PROCEDURE — A9270 NON-COVERED ITEM OR SERVICE: HCPCS | Performed by: STUDENT IN AN ORGANIZED HEALTH CARE EDUCATION/TRAINING PROGRAM

## 2023-08-25 PROCEDURE — 36415 COLL VENOUS BLD VENIPUNCTURE: CPT

## 2023-08-25 PROCEDURE — 700102 HCHG RX REV CODE 250 W/ 637 OVERRIDE(OP): Performed by: STUDENT IN AN ORGANIZED HEALTH CARE EDUCATION/TRAINING PROGRAM

## 2023-08-25 PROCEDURE — 84100 ASSAY OF PHOSPHORUS: CPT

## 2023-08-25 PROCEDURE — 99239 HOSP IP/OBS DSCHRG MGMT >30: CPT | Performed by: INTERNAL MEDICINE

## 2023-08-25 PROCEDURE — 80048 BASIC METABOLIC PNL TOTAL CA: CPT

## 2023-08-25 PROCEDURE — 83735 ASSAY OF MAGNESIUM: CPT

## 2023-08-25 PROCEDURE — 700101 HCHG RX REV CODE 250: Performed by: INTERNAL MEDICINE

## 2023-08-25 PROCEDURE — A9270 NON-COVERED ITEM OR SERVICE: HCPCS | Performed by: INTERNAL MEDICINE

## 2023-08-25 RX ORDER — AMOXICILLIN AND CLAVULANATE POTASSIUM 875; 125 MG/1; MG/1
1 TABLET, FILM COATED ORAL EVERY 12 HOURS
Refills: 0 | Status: ACTIVE | DISCHARGE
Start: 2023-08-25 | End: 2023-08-31

## 2023-08-25 RX ORDER — BISACODYL 10 MG
10 SUPPOSITORY, RECTAL RECTAL EVERY EVENING
Refills: 0 | Status: SHIPPED
Start: 2023-08-25 | End: 2023-09-06

## 2023-08-25 RX ORDER — AMOXICILLIN 250 MG
2 CAPSULE ORAL 2 TIMES DAILY
Qty: 30 TABLET | Refills: 0 | Status: SHIPPED
Start: 2023-08-25 | End: 2023-09-06

## 2023-08-25 RX ADMIN — AMOXICILLIN AND CLAVULANATE POTASSIUM 1 TABLET: 875; 125 TABLET, FILM COATED ORAL at 05:29

## 2023-08-25 RX ADMIN — METOPROLOL SUCCINATE 25 MG: 25 TABLET, EXTENDED RELEASE ORAL at 05:29

## 2023-08-25 RX ADMIN — SODIUM HYPOCHLORITE 0.12 ML: 1.25 SOLUTION TOPICAL at 05:29

## 2023-08-25 RX ADMIN — TIOTROPIUM BROMIDE INHALATION SPRAY 5 MCG: 3.12 SPRAY, METERED RESPIRATORY (INHALATION) at 05:29

## 2023-08-25 RX ADMIN — LAMOTRIGINE 100 MG: 100 TABLET ORAL at 05:29

## 2023-08-25 RX ADMIN — DIBASIC SODIUM PHOSPHATE, MONOBASIC POTASSIUM PHOSPHATE AND MONOBASIC SODIUM PHOSPHATE 250 MG: 852; 155; 130 TABLET ORAL at 08:14

## 2023-08-25 RX ADMIN — POTASSIUM CHLORIDE AND SODIUM CHLORIDE: 900; 150 INJECTION, SOLUTION INTRAVENOUS at 00:11

## 2023-08-25 ASSESSMENT — PAIN DESCRIPTION - PAIN TYPE: TYPE: ACUTE PAIN

## 2023-08-25 NOTE — CARE PLAN
The patient is Watcher - Medium risk of patient condition declining or worsening    Shift Goals  Clinical Goals: VSS, return to snf  Patient Goals: antonio  Family Goals: antonio    Progress made toward(s) clinical / shift goals:        Problem: Pain - Standard  Goal: Alleviation of pain or a reduction in pain to the patient’s comfort goal  Outcome: Met       Patient is not progressing towards the following goals:      Problem: Knowledge Deficit - Standard  Goal: Patient and family/care givers will demonstrate understanding of plan of care, disease process/condition, diagnostic tests and medications  Outcome: Not Progressing     Problem: Skin Integrity  Goal: Skin integrity is maintained or improved  Outcome: Not Progressing     Problem: Fall Risk  Goal: Patient will remain free from falls  Outcome: Not Progressing

## 2023-08-25 NOTE — CARE PLAN
The patient is Watcher - Medium risk of patient condition declining or worsening    Shift Goals  Clinical Goals: dressing change on sacrum, IV abx, monitor mentation  Patient Goals: comfort  Family Goals: SHAZIA    Progress made toward(s) clinical / shift goals:      Problem: Knowledge Deficit - Standard  Goal: Patient and family/care givers will demonstrate understanding of plan of care, disease process/condition, diagnostic tests and medications  Outcome: Progressing     Problem: Skin Integrity  Goal: Skin integrity is maintained or improved  Outcome: Progressing     Problem: Fall Risk  Goal: Patient will remain free from falls  Outcome: Progressing       Patient is not progressing towards the following goals:

## 2023-08-25 NOTE — PROGRESS NOTES
Received bedside report from RN, pt care assumed, VSS, pt assessment complete. Pt AAOx1, oriented to self. No signs of acute distress noted at this time. Pt denies any additional needs at this time. Bed locked/in lowest position, bed alarm on, call light within reach, hourly rounding initiated.

## 2023-08-25 NOTE — DISCHARGE SUMMARY
Discharge Summary    CHIEF COMPLAINT ON ADMISSION  Chief Complaint   Patient presents with    Wound Check     Pt sent here for sacral wound evaluation. Concern for infection.  at [facility] recommending debridement and wound vac placement.        Reason for Admission  ems    Admission Date  8/21/2023     CODE STATUS  Full Code    HPI & HOSPITAL COURSE  71 yo woman with hypothyroidism, HTN, NPH s/p  shunt, schizophrenia who presented from [place] with lethargy and altered mental status.  Per , patient has been declining mentally for many months, but reached a point where she had been hallucinating, eating small amounts but stable.  For the last 4 days she has been lethargic and not waking up per usual.  She is noted to have a possible UTI and sacral wound.  CTAP showed slight hazy fat stranding posterior to sacrum, coccyx with large amount of stool in the rectal vault, indeterminate left adrenal nodule.  Patient was started on Zosyn and Vanco.  CT head showed moderate bilateral ventricular dilation overall slightly increased in size.  Dr. Box with neurosurgery was consulted.  X-ray series showed expected course of  shunt. Her shunt setting was adjusted.   During this time, her mentation improved back to baseline. She is AO to self and place, still hallucinating and eating some of her meals. I discussed goals of care with her , including hospice, given her medical state in the past year, and her  wished to keep her full code.  Her urine culture grew enterococcus. Her wound culture and blood cultures had no growth. Wound care debrided her wound and recommends wound vac placement. Case management discussed with [facility], who will place wound vac on Monday. I will continue her on Augmentin for additional 6 days for total of 10 days treatment, given her sacral wound.  She was noted to have severe impacted rectal constipation on CT. She had digital disimpaction. I recommend she continue on carmen along  with daily suppositories.     Therefore, she is discharged in good and stable condition to skilled nursing facility.    The patient met 2-midnight criteria for an inpatient stay at the time of discharge.      FOLLOW UP ITEMS POST DISCHARGE  Needs sacral wound vac    DISCHARGE DIAGNOSES  Principal Problem:    Hypokalemia (POA: Yes)  Active Problems:    Essential hypertension (POA: Yes)    Obesity (POA: Yes)    Asthma (POA: Yes)    S/P  shunt (POA: Yes)    Schizophrenia (HCC) (POA: Yes)    Acute encephalopathy (POA: Yes)    Hypomagnesemia (POA: Yes)    Bipolar disorder (HCC) (POA: Yes)    Sepsis (HCC) (POA: Unknown)    Pressure injury of skin of sacral region (POA: Unknown)    ACP (advance care planning) (POA: Unknown)    Constipated (POA: Unknown)  Resolved Problems:    * No resolved hospital problems. *      FOLLOW UP  Zaira Swenson, A.P.N.  [address] [address] [phone]    Call  As needed    [facility]  [address] [facility] [address] [phone]          MEDICATIONS ON DISCHARGE     Medication List        START taking these medications        Instructions   amoxicillin-clavulanate 875-125 MG Tabs  Commonly known as: Augmentin   Take 1 Tablet by mouth every 12 hours for 6 days.  Dose: 1 Tablet     bisacodyl 10 MG Supp  Commonly known as: Dulcolax   Insert 1 Suppository into the rectum every evening.  Dose: 10 mg     phosphorus 250 MG tablet  Commonly known as: K-Phos-Neutral   Take 1 Tablet by mouth every 6 hours for 5 days.  Dose: 1 Tablet            CHANGE how you take these medications        Instructions   senna-docusate 8.6-50 MG Tabs  What changed: when to take this  Commonly known as: Pericolace Or Senokot S   Take 2 Tablets by mouth 2 times a day.  Dose: 2 Tablet            CONTINUE taking these medications        Instructions   acetaminophen 325 MG Tabs  Commonly known as: Tylenol   Take 650 mg by mouth every four hours as needed. Indications:  Pain  Dose: 650 mg     Incruse Ellipta 62.5 MCG/ACT Aepb  Generic drug: Umeclidinium Bromide   Inhale 1 Puff every day.  Dose: 1 Puff     lamoTRIgine 100 MG Tabs  Commonly known as: LaMICtal   Take 100 mg by mouth every day.  Dose: 100 mg     Lidocaine 4 % Ptch   Apply 1 Patch topically every day. Remove per schedule:  Apply @ 0800  Remove @ 0759  Dose: 1 Patch     metoprolol SR 25 MG Tb24  Commonly known as: Toprol XL   Take 25 mg by mouth every day.  Dose: 25 mg     multivitamin Tabs   Take 1 Tablet by mouth every day.  Dose: 1 Tablet     nystatin powder  Commonly known as: Mycostatin   Apply 1 Application topically 3 times a day. Apply to groin  Dose: 1 Application     Potassium Chloride 40 MEQ/15ML (20%) Soln   Take 15 mL by mouth every day. X7 days  Dose: 15 mL     Pro-Stat Liqd   Take 30 mL by mouth every day.  Dose: 30 mL     risperiDONE 1 MG Tabs  Commonly known as: RisperDAL   Take 1 mg by mouth 2 times a day.  Dose: 1 mg     tizanidine 2 MG tablet  Commonly known as: Zanaflex   Take 4 mg by mouth 2 times a day.  Dose: 4 mg            STOP taking these medications      levoFLOXacin 750 MG tablet  Commonly known as: Levaquin              Allergies  Allergies   Allergen Reactions    Gabapentin Unspecified     seizure       DIET  Orders Placed This Encounter   Procedures    Diet Order Diet: Level 6 - Soft and Bite Sized (meds whole in puree per pt); Liquid level: Level 0 - Thin; Tray Modifications (optional): SLP - 1:1 Supervision by Nursing, SLP - Deliver to Nursing Station     Standing Status:   Standing     Number of Occurrences:   1     Order Specific Question:   Diet:     Answer:   Level 6 - Soft and Bite Sized [23]     Comments:   meds whole in puree per pt     Order Specific Question:   Liquid level     Answer:   Level 0 - Thin     Order Specific Question:   Tray Modifications (optional)     Answer:   SLP - 1:1 Supervision by Nursing     Order Specific Question:   Tray Modifications (optional)      Answer:   SLP - Deliver to Nursing Station       ACTIVITY  As tolerated and directed by skilled nursing.    LINES, DRAINS, AND WOUNDS  This is an automated list. Peripheral IVs will be removed prior to discharge.  Peripheral IV 08/21/23 18 G Right Antecubital (Active)   Site Assessment Clean;Dry;Intact 08/24/23 2100   Dressing Type Transparent Film 08/24/23 2100   Line Status Scrubbed the hub prior to access;Flushed;Infusing 08/24/23 2100   Dressing Status Clean;Dry;Intact 08/24/23 2100   Dressing Intervention N/A 08/24/23 2100   Dressing Change Due 08/26/23 08/23/23 0800   Infiltration Grading ([facility], [facility]) 0 08/24/23 2100   Phlebitis Scale ([facility] Only) 0 08/24/23 2100     Urethral Catheter Non-latex;Straight-tip 16 Fr. (Active)   Site Assessment Clean;Skin intact 08/24/23 2000   Collection Container Standard drainage bag 08/24/23 2000   Urinary Catheter Care Tamper Evident Seal in Place;Drainage Tube Extended;Drainage Bag Not Overfilled;Drainage Tubing Properly Secured;Drainage Bag Below Bladder Level and Not on Floor;Cath Care Done with Soap & Water 08/24/23 2000   Securement Method Securing device (Describe) 08/24/23 2000   Output (mL) 900 mL 08/25/23 0300      Wound 08/22/23 Pressure Injury Coccyx Mid POA Unstageable (Active)   Wound Image     08/22/23 1500   Site Assessment Yellow;Red;Painful 08/24/23 2000   Periwound Assessment Clean;Dry;Intact;Pink 08/24/23 2000   Margins Attached edges;Defined edges 08/24/23 2000   Closure Adhesive bandage 08/24/23 2000   Drainage Amount Small 08/24/23 2000   Drainage Description Serosanguineous 08/24/23 2000   Treatments Cleansed;Offloading;Site care 08/24/23 2000   Wound Cleansing Approved Wound Cleanser 08/24/23 2000   Periwound Protectant Barrier Paste 08/24/23 2000   Dressing Status Clean;Dry;Intact 08/24/23 2000   Dressing Changed New 08/24/23 2000   Dressing Cleansing/Solutions 1/4 Strength Dakin's Solution 08/24/23 2000   Dressing Options Moist Roll Gauze 08/24/23  2000   Dressing Change/Treatment Frequency Every Shift, and As Needed 08/24/23 2000   NEXT Dressing Change/Treatment Date 08/25/23 08/24/23 2000   NEXT Weekly Photo (Inpatient Only) 08/29/23 08/22/23 1500   Wound Team Following Weekly 08/22/23 1500   WOUND NURSE ONLY - Pressure Injury Stage U 08/22/23 1500   Wound Length (cm) 9.5 cm 08/22/23 1500   Wound Width (cm) 9 cm 08/22/23 1500   Wound Depth (cm) 3 cm 08/22/23 1500   Wound Surface Area (cm^2) 85.5 cm^2 08/22/23 1500   Wound Volume (cm^3) 256.5 cm^3 08/22/23 1500   Shape Circular 08/22/23 1500   Wound Odor None 08/22/23 1500   WOUND NURSE ONLY - Time Spent with Patient (mins) 60 08/22/23 1500       Peripheral IV 08/21/23 18 G Right Antecubital (Active)   Site Assessment Clean;Dry;Intact 08/24/23 2100   Dressing Type Transparent Film 08/24/23 2100   Line Status Scrubbed the hub prior to access;Flushed;Infusing 08/24/23 2100   Dressing Status Clean;Dry;Intact 08/24/23 2100   Dressing Intervention N/A 08/24/23 2100   Dressing Change Due 08/26/23 08/23/23 0800   Infiltration Grading ([facility], [facility]) 0 08/24/23 2100   Phlebitis Scale ([facility] Only) 0 08/24/23 2100           Urethral Catheter Non-latex;Straight-tip 16 Fr. (Active)   Site Assessment Clean;Skin intact 08/24/23 2000   Collection Container Standard drainage bag 08/24/23 2000   Urinary Catheter Care Tamper Evident Seal in Place;Drainage Tube Extended;Drainage Bag Not Overfilled;Drainage Tubing Properly Secured;Drainage Bag Below Bladder Level and Not on Floor;Cath Care Done with Soap & Water 08/24/23 2000   Securement Method Securing device (Describe) 08/24/23 2000   Output (mL) 900 mL 08/25/23 0300        MENTAL STATUS ON TRANSFER   Oriented to self and hospital, hallucinations          CONSULTATIONS  Dr. Glynn    PROCEDURES  CT-HEAD W/O   Final Result         1.  Moderate bilateral ventricular dilatation with ventriculostomy tube in place, stable since prior study. Consider component of hydrocephalus  and/or shunt dysfunction.   2.  Unspecific white matter changes, commonly associated with small vessel ischemic disease.  Associated mild cerebral atrophy is noted.   3.  Atherosclerosis.         CT-ABDOMEN-PELVIS WITH   Final Result   Addendum (preliminary) 1 of 1   Addendum: There is slight hazy fat stranding seen posterior to the sacrum    and coccyx, no discrete fluid collection or definite sacral wound is    appreciated.      Final         1.  Large quantity of stool in the rectal vault compatible with constipation, likely fecal impaction.   2.  Indeterminate left adrenal nodule, follow-up adrenal protocol CT or MRI for further characterization as clinically appropriate   3.  Diverticulosis      CT-HEAD W/O   Final Result         1.  No acute intracranial abnormality is identified, there are nonspecific white matter changes, commonly associated with small vessel ischemic disease.  Associated mild cerebral atrophy is noted.   2.  Moderate bilateral ventricular dilatation, overall appears slightly increased since prior study particularly of the temporal horns, could represent component of shunt dysfunction and/or evolving hydrocephalus.   3.  Atherosclerosis.         [id number] VIEW   Final Result      RIGHT-sided  shunt catheter intact throughout its visualized course.      DX-SKULL-LIMITED 3-   Final Result      RIGHT-sided  shunt catheter intact throughout its visualized course.      DX-SPINE-ANY ONE VIEW   Final Result      RIGHT-sided  shunt catheter intact throughout its visualized course.      DX-CHEST-LIMITED (1 VIEW)   Final Result      RIGHT-sided  shunt catheter intact throughout its visualized course.            LABORATORY  Lab Results   Component Value Date    SODIUM 138 08/25/2023    POTASSIUM 3.7 08/25/2023    CHLORIDE 105 08/25/2023    CO2 23 08/25/2023    GLUCOSE 78 08/25/2023    BUN 3 (L) 08/25/2023    CREATININE 0.36 (L) 08/25/2023        Lab Results   Component Value Date    WBC 11.4  (H) 08/24/2023    HEMOGLOBIN 11.6 (L) 08/24/2023    HEMATOCRIT 35.2 (L) 08/24/2023    PLATELETCT 352 08/24/2023        Total time of the discharge process exceeds 34 minutes.

## 2023-08-25 NOTE — DISCHARGE PLANNING
DC Transport Scheduled    Received request at: 8/25/2023 at 0737    Transport Company Scheduled:  [employer]    Scheduled Date: 8/25/2023  Scheduled Time: 1530    Destination: [facility] [address] [facility]    Notified care team of scheduled transport via Voalte.     If there are any changes needed to the DC transportation scheduled, please contact [facility] at ext. [phone] between the hours of 6321-4386 Mon-Fri. If outside those hours, contact the ED Case Manager at ext. [phone].

## 2023-08-25 NOTE — DISCHARGE PLANNING
LSW received notice that wound care team was initially planning on putting wound vac on pt on Monday as they are doing wet to dry. Per Voalte sent to [facility], wound care notes that pt can transport to [facility] today and have wound vac placed there on Monday.     LSW called [facility] coordinator, Lion, about this being a possibility. Per Lion, they can place wound vac on pt. LSW to confirm which kind of wound vac pt needs. Lion asking for 1200 transport.    LSW reached out to [facility] about type of wound vac. Per voalte received, pt needs a regular [employer] wound vac. LSW notified Lion.     LSW reached out to Susan with [employer] [facility] for transport change time. (See Susan Lozada's note).

## 2023-08-26 LAB
BACTERIA BLD CULT: NORMAL
SIGNIFICANT IND 70042: NORMAL
SITE SITE: NORMAL
SOURCE SOURCE: NORMAL

## 2023-08-29 LAB
BACTERIA BLD CULT: NORMAL
BACTERIA BLD CULT: NORMAL
SIGNIFICANT IND 70042: NORMAL
SIGNIFICANT IND 70042: NORMAL
SITE SITE: NORMAL
SITE SITE: NORMAL
SOURCE SOURCE: NORMAL
SOURCE SOURCE: NORMAL

## 2023-09-05 NOTE — DOCUMENTATION QUERY
"                                                                         [facility]                                                                       Query Response Note      PATIENT:               MENDOZA ROMAN  ACCT #:                  [id number]  MRN:                     [id number]  :                      1950  ADMIT DATE:       2023 4:18 PM  DISCH DATE:        2023 12:32 PM  RESPONDING  PROVIDER #:        [id number]           QUERY TEXT:    Sepsis is noted in the medical record.  Can clarification of dx be determined?    The patient's Clinical Indicators include:  73yo with dx of UTI, unstageable sacal ulcer, obesity     Douglas PN \"Sepsis With encephalopathy. Due to UTI versus sacral wound versus bacteremia\"     WBC 15   WBC 13.7     Epic Nurse VS HR     Risk factors: advanced age, UTI with enterococcus, unstageable sacral pressure injury, acute encephalopathy  Treatments: IVF, labwork, imaging, antibiotics, monitoring    Contact me with questions.  Thank you,  Domenica Cabello, RICARDOP, CDI  [email or web address]  Options provided:   -- Sepsis present on admission   -- Sepsis developed after admission   -- Sepsis ruled out   -- SIRS only   -- Other explanation, Please specify   -- Unable to determine      Query created by: Irineo April on 2023 4:10 AM    RESPONSE TEXT:    Sepsis present on admission          Electronically signed by:  MARCELINO DOUGLAS MD 2023 11:29 AM              "

## 2023-09-06 ENCOUNTER — APPOINTMENT (OUTPATIENT)
Dept: RADIOLOGY | Facility: MEDICAL CENTER | Age: 73
DRG: 698 | End: 2023-09-06
Attending: EMERGENCY MEDICINE
Payer: MEDICARE

## 2023-09-06 ENCOUNTER — HOSPITAL ENCOUNTER (INPATIENT)
Facility: MEDICAL CENTER | Age: 73
LOS: 5 days | DRG: 698 | End: 2023-09-11
Attending: EMERGENCY MEDICINE | Admitting: HOSPITALIST
Payer: MEDICARE

## 2023-09-06 DIAGNOSIS — R53.2 FUNCTIONAL QUADRIPLEGIA (HCC): ICD-10-CM

## 2023-09-06 DIAGNOSIS — N39.0 URINARY TRACT INFECTION WITHOUT HEMATURIA, SITE UNSPECIFIED: ICD-10-CM

## 2023-09-06 DIAGNOSIS — R41.82 ALTERED MENTAL STATUS, UNSPECIFIED ALTERED MENTAL STATUS TYPE: ICD-10-CM

## 2023-09-06 DIAGNOSIS — L89.154 PRESSURE INJURY OF SACRAL REGION, STAGE 4 (HCC): ICD-10-CM

## 2023-09-06 DIAGNOSIS — Z51.5 COMFORT MEASURES ONLY STATUS: ICD-10-CM

## 2023-09-06 PROBLEM — R79.89 ELEVATED TROPONIN: Status: ACTIVE | Noted: 2023-09-06

## 2023-09-06 PROBLEM — E87.1 HYPONATREMIA: Status: ACTIVE | Noted: 2023-09-06

## 2023-09-06 PROBLEM — E87.0 HYPERNATREMIA: Status: ACTIVE | Noted: 2023-09-06

## 2023-09-06 LAB
ALBUMIN SERPL BCP-MCNC: 3 G/DL (ref 3.2–4.9)
ALBUMIN/GLOB SERPL: 0.9 G/DL
ALP SERPL-CCNC: 71 U/L (ref 30–99)
ALT SERPL-CCNC: 5 U/L (ref 2–50)
ANION GAP SERPL CALC-SCNC: 12 MMOL/L (ref 7–16)
ANION GAP SERPL CALC-SCNC: 13 MMOL/L (ref 7–16)
APPEARANCE UR: CLEAR
AST SERPL-CCNC: 13 U/L (ref 12–45)
BACTERIA #/AREA URNS HPF: NEGATIVE /HPF
BASOPHILS # BLD AUTO: 0.4 % (ref 0–1.8)
BASOPHILS # BLD: 0.04 K/UL (ref 0–0.12)
BILIRUB SERPL-MCNC: 0.4 MG/DL (ref 0.1–1.5)
BILIRUB UR QL STRIP.AUTO: NEGATIVE
BLOOD CULTURE HOLD CXBCH: NORMAL
BLOOD CULTURE HOLD CXBCH: NORMAL
BUN SERPL-MCNC: 10 MG/DL (ref 8–22)
BUN SERPL-MCNC: 11 MG/DL (ref 8–22)
CALCIUM ALBUM COR SERPL-MCNC: 9.9 MG/DL (ref 8.5–10.5)
CALCIUM SERPL-MCNC: 9 MG/DL (ref 8.5–10.5)
CALCIUM SERPL-MCNC: 9.1 MG/DL (ref 8.5–10.5)
CAOX CRY #/AREA URNS HPF: ABNORMAL /HPF
CHLORIDE SERPL-SCNC: 108 MMOL/L (ref 96–112)
CHLORIDE SERPL-SCNC: 109 MMOL/L (ref 96–112)
CO2 SERPL-SCNC: 26 MMOL/L (ref 20–33)
CO2 SERPL-SCNC: 26 MMOL/L (ref 20–33)
COLOR UR: YELLOW
CREAT SERPL-MCNC: 0.4 MG/DL (ref 0.5–1.4)
CREAT SERPL-MCNC: 0.42 MG/DL (ref 0.5–1.4)
EKG IMPRESSION: NORMAL
EOSINOPHIL # BLD AUTO: 0.47 K/UL (ref 0–0.51)
EOSINOPHIL NFR BLD: 4.2 % (ref 0–6.9)
EPI CELLS #/AREA URNS HPF: ABNORMAL /HPF
ERYTHROCYTE [DISTWIDTH] IN BLOOD BY AUTOMATED COUNT: 46.8 FL (ref 35.9–50)
FLUAV RNA SPEC QL NAA+PROBE: NEGATIVE
FLUBV RNA SPEC QL NAA+PROBE: NEGATIVE
GFR SERPLBLD CREATININE-BSD FMLA CKD-EPI: 105 ML/MIN/1.73 M 2
GFR SERPLBLD CREATININE-BSD FMLA CKD-EPI: 106 ML/MIN/1.73 M 2
GLOBULIN SER CALC-MCNC: 3.4 G/DL (ref 1.9–3.5)
GLUCOSE SERPL-MCNC: 101 MG/DL (ref 65–99)
GLUCOSE SERPL-MCNC: 90 MG/DL (ref 65–99)
GLUCOSE UR STRIP.AUTO-MCNC: NEGATIVE MG/DL
HCT VFR BLD AUTO: 38.3 % (ref 37–47)
HGB BLD-MCNC: 12.2 G/DL (ref 12–16)
HYALINE CASTS #/AREA URNS LPF: ABNORMAL /LPF
IMM GRANULOCYTES # BLD AUTO: 0.04 K/UL (ref 0–0.11)
IMM GRANULOCYTES NFR BLD AUTO: 0.4 % (ref 0–0.9)
KETONES UR STRIP.AUTO-MCNC: 40 MG/DL
LACTATE SERPL-SCNC: 0.8 MMOL/L (ref 0.5–2)
LACTATE SERPL-SCNC: 1 MMOL/L (ref 0.5–2)
LEUKOCYTE ESTERASE UR QL STRIP.AUTO: ABNORMAL
LYMPHOCYTES # BLD AUTO: 2.34 K/UL (ref 1–4.8)
LYMPHOCYTES NFR BLD: 20.9 % (ref 22–41)
MAGNESIUM SERPL-MCNC: 1.9 MG/DL (ref 1.5–2.5)
MCH RBC QN AUTO: 29.6 PG (ref 27–33)
MCHC RBC AUTO-ENTMCNC: 31.9 G/DL (ref 32.2–35.5)
MCV RBC AUTO: 93 FL (ref 81.4–97.8)
MICRO URNS: ABNORMAL
MONOCYTES # BLD AUTO: 0.83 K/UL (ref 0–0.85)
MONOCYTES NFR BLD AUTO: 7.4 % (ref 0–13.4)
NEUTROPHILS # BLD AUTO: 7.45 K/UL (ref 1.82–7.42)
NEUTROPHILS NFR BLD: 66.7 % (ref 44–72)
NITRITE UR QL STRIP.AUTO: NEGATIVE
NRBC # BLD AUTO: 0 K/UL
NRBC BLD-RTO: 0 /100 WBC (ref 0–0.2)
NT-PROBNP SERPL IA-MCNC: 120 PG/ML (ref 0–125)
PH UR STRIP.AUTO: 6 [PH] (ref 5–8)
PHOSPHATE SERPL-MCNC: 2.9 MG/DL (ref 2.5–4.5)
PLATELET # BLD AUTO: 344 K/UL (ref 164–446)
PMV BLD AUTO: 9.9 FL (ref 9–12.9)
POTASSIUM SERPL-SCNC: 3.5 MMOL/L (ref 3.6–5.5)
POTASSIUM SERPL-SCNC: 3.6 MMOL/L (ref 3.6–5.5)
PROT SERPL-MCNC: 6.4 G/DL (ref 6–8.2)
PROT UR QL STRIP: 100 MG/DL
RBC # BLD AUTO: 4.12 M/UL (ref 4.2–5.4)
RBC # URNS HPF: ABNORMAL /HPF
RBC UR QL AUTO: ABNORMAL
RENAL EPI CELLS #/AREA URNS HPF: ABNORMAL /HPF
RSV RNA SPEC QL NAA+PROBE: NEGATIVE
SARS-COV-2 RNA RESP QL NAA+PROBE: NOTDETECTED
SODIUM SERPL-SCNC: 146 MMOL/L (ref 135–145)
SODIUM SERPL-SCNC: 148 MMOL/L (ref 135–145)
SP GR UR STRIP.AUTO: 1.04
SPECIMEN SOURCE: NORMAL
TROPONIN T SERPL-MCNC: 44 NG/L (ref 6–19)
UROBILINOGEN UR STRIP.AUTO-MCNC: 1 MG/DL
WBC # BLD AUTO: 11.2 K/UL (ref 4.8–10.8)
WBC #/AREA URNS HPF: ABNORMAL /HPF
YEAST BUDDING URNS QL: PRESENT /HPF
YEAST HYPHAE #/AREA URNS HPF: PRESENT /HPF

## 2023-09-06 PROCEDURE — 93005 ELECTROCARDIOGRAM TRACING: CPT | Performed by: EMERGENCY MEDICINE

## 2023-09-06 PROCEDURE — C9803 HOPD COVID-19 SPEC COLLECT: HCPCS | Performed by: EMERGENCY MEDICINE

## 2023-09-06 PROCEDURE — 80053 COMPREHEN METABOLIC PANEL: CPT

## 2023-09-06 PROCEDURE — 84100 ASSAY OF PHOSPHORUS: CPT

## 2023-09-06 PROCEDURE — 700105 HCHG RX REV CODE 258: Performed by: HOSPITALIST

## 2023-09-06 PROCEDURE — 99285 EMERGENCY DEPT VISIT HI MDM: CPT

## 2023-09-06 PROCEDURE — 0241U HCHG SARS-COV-2 COVID-19 NFCT DS RESP RNA 4 TRGT MIC: CPT

## 2023-09-06 PROCEDURE — 84484 ASSAY OF TROPONIN QUANT: CPT

## 2023-09-06 PROCEDURE — 87086 URINE CULTURE/COLONY COUNT: CPT

## 2023-09-06 PROCEDURE — 700105 HCHG RX REV CODE 258: Mod: UD | Performed by: EMERGENCY MEDICINE

## 2023-09-06 PROCEDURE — 99223 1ST HOSP IP/OBS HIGH 75: CPT | Mod: 25,AI | Performed by: HOSPITALIST

## 2023-09-06 PROCEDURE — 36415 COLL VENOUS BLD VENIPUNCTURE: CPT

## 2023-09-06 PROCEDURE — 71045 X-RAY EXAM CHEST 1 VIEW: CPT

## 2023-09-06 PROCEDURE — 94760 N-INVAS EAR/PLS OXIMETRY 1: CPT

## 2023-09-06 PROCEDURE — 70250 X-RAY EXAM OF SKULL: CPT

## 2023-09-06 PROCEDURE — 81001 URINALYSIS AUTO W/SCOPE: CPT

## 2023-09-06 PROCEDURE — 87186 SC STD MICRODIL/AGAR DIL: CPT

## 2023-09-06 PROCEDURE — 700117 HCHG RX CONTRAST REV CODE 255: Mod: UD | Performed by: EMERGENCY MEDICINE

## 2023-09-06 PROCEDURE — 770001 HCHG ROOM/CARE - MED/SURG/GYN PRIV*

## 2023-09-06 PROCEDURE — 87077 CULTURE AEROBIC IDENTIFY: CPT | Mod: 91

## 2023-09-06 PROCEDURE — 70450 CT HEAD/BRAIN W/O DYE: CPT

## 2023-09-06 PROCEDURE — 94640 AIRWAY INHALATION TREATMENT: CPT

## 2023-09-06 PROCEDURE — 85025 COMPLETE CBC W/AUTO DIFF WBC: CPT

## 2023-09-06 PROCEDURE — 71275 CT ANGIOGRAPHY CHEST: CPT

## 2023-09-06 PROCEDURE — 99497 ADVNCD CARE PLAN 30 MIN: CPT | Performed by: HOSPITALIST

## 2023-09-06 PROCEDURE — 83605 ASSAY OF LACTIC ACID: CPT

## 2023-09-06 PROCEDURE — 87040 BLOOD CULTURE FOR BACTERIA: CPT | Mod: 91

## 2023-09-06 PROCEDURE — 72020 X-RAY EXAM OF SPINE 1 VIEW: CPT

## 2023-09-06 PROCEDURE — 83735 ASSAY OF MAGNESIUM: CPT

## 2023-09-06 PROCEDURE — 74018 RADEX ABDOMEN 1 VIEW: CPT

## 2023-09-06 PROCEDURE — 80048 BASIC METABOLIC PNL TOTAL CA: CPT

## 2023-09-06 PROCEDURE — 87150 DNA/RNA AMPLIFIED PROBE: CPT

## 2023-09-06 PROCEDURE — 700101 HCHG RX REV CODE 250: Performed by: HOSPITALIST

## 2023-09-06 PROCEDURE — 83880 ASSAY OF NATRIURETIC PEPTIDE: CPT

## 2023-09-06 RX ORDER — BISACODYL 10 MG
10 SUPPOSITORY, RECTAL RECTAL
Status: DISCONTINUED | OUTPATIENT
Start: 2023-09-06 | End: 2023-09-08

## 2023-09-06 RX ORDER — IPRATROPIUM BROMIDE AND ALBUTEROL SULFATE 2.5; .5 MG/3ML; MG/3ML
3 SOLUTION RESPIRATORY (INHALATION)
Status: DISCONTINUED | OUTPATIENT
Start: 2023-09-06 | End: 2023-09-08

## 2023-09-06 RX ORDER — METOPROLOL SUCCINATE 25 MG/1
25 TABLET, EXTENDED RELEASE ORAL DAILY
Status: DISCONTINUED | OUTPATIENT
Start: 2023-09-06 | End: 2023-09-09

## 2023-09-06 RX ORDER — SODIUM CHLORIDE 450 MG/100ML
INJECTION, SOLUTION INTRAVENOUS CONTINUOUS
Status: DISCONTINUED | OUTPATIENT
Start: 2023-09-06 | End: 2023-09-07

## 2023-09-06 RX ORDER — SODIUM CHLORIDE, SODIUM LACTATE, POTASSIUM CHLORIDE, AND CALCIUM CHLORIDE .6; .31; .03; .02 G/100ML; G/100ML; G/100ML; G/100ML
30 INJECTION, SOLUTION INTRAVENOUS ONCE
Status: COMPLETED | OUTPATIENT
Start: 2023-09-06 | End: 2023-09-06

## 2023-09-06 RX ORDER — ACETAMINOPHEN 325 MG/1
650 TABLET ORAL EVERY 6 HOURS PRN
Status: DISCONTINUED | OUTPATIENT
Start: 2023-09-06 | End: 2023-09-09

## 2023-09-06 RX ORDER — TIZANIDINE 4 MG/1
4 TABLET ORAL 2 TIMES DAILY
Status: DISCONTINUED | OUTPATIENT
Start: 2023-09-06 | End: 2023-09-09

## 2023-09-06 RX ORDER — RISPERIDONE 1 MG/1
1 TABLET ORAL 2 TIMES DAILY
Status: DISCONTINUED | OUTPATIENT
Start: 2023-09-06 | End: 2023-09-09

## 2023-09-06 RX ORDER — AMOXICILLIN 250 MG
2 CAPSULE ORAL
Status: ON HOLD | COMMUNITY
End: 2023-09-10

## 2023-09-06 RX ORDER — LAMOTRIGINE 100 MG/1
100 TABLET ORAL DAILY
Status: DISCONTINUED | OUTPATIENT
Start: 2023-09-06 | End: 2023-09-09

## 2023-09-06 RX ORDER — POLYETHYLENE GLYCOL 3350 17 G/17G
1 POWDER, FOR SOLUTION ORAL
Status: DISCONTINUED | OUTPATIENT
Start: 2023-09-06 | End: 2023-09-08

## 2023-09-06 RX ORDER — AMOXICILLIN 250 MG
2 CAPSULE ORAL 2 TIMES DAILY
Status: DISCONTINUED | OUTPATIENT
Start: 2023-09-06 | End: 2023-09-08

## 2023-09-06 RX ORDER — SODIUM CHLORIDE, SODIUM LACTATE, POTASSIUM CHLORIDE, CALCIUM CHLORIDE 600; 310; 30; 20 MG/100ML; MG/100ML; MG/100ML; MG/100ML
INJECTION, SOLUTION INTRAVENOUS CONTINUOUS
Status: DISCONTINUED | OUTPATIENT
Start: 2023-09-06 | End: 2023-09-06

## 2023-09-06 RX ORDER — POTASSIUM CHLORIDE 20 MEQ/1
20 TABLET, EXTENDED RELEASE ORAL ONCE
Status: DISCONTINUED | OUTPATIENT
Start: 2023-09-06 | End: 2023-09-07

## 2023-09-06 RX ORDER — POTASSIUM CHLORIDE 1500 MG/1
20 TABLET, EXTENDED RELEASE ORAL DAILY
Status: ON HOLD | COMMUNITY
End: 2023-09-10

## 2023-09-06 RX ORDER — SOD PHOS DI, MONO/K PHOS MONO 250 MG
1 TABLET ORAL
Status: ON HOLD | COMMUNITY
End: 2023-09-10

## 2023-09-06 RX ADMIN — IOHEXOL 65 ML: 350 INJECTION, SOLUTION INTRAVENOUS at 11:35

## 2023-09-06 RX ADMIN — IPRATROPIUM BROMIDE AND ALBUTEROL SULFATE 3 ML: 2.5; .5 SOLUTION RESPIRATORY (INHALATION) at 19:21

## 2023-09-06 RX ADMIN — SODIUM CHLORIDE, POTASSIUM CHLORIDE, SODIUM LACTATE AND CALCIUM CHLORIDE: 600; 310; 30; 20 INJECTION, SOLUTION INTRAVENOUS at 12:45

## 2023-09-06 RX ADMIN — SODIUM CHLORIDE: 4.5 INJECTION, SOLUTION INTRAVENOUS at 21:44

## 2023-09-06 RX ADMIN — SODIUM CHLORIDE, POTASSIUM CHLORIDE, SODIUM LACTATE AND CALCIUM CHLORIDE 1986 ML: 600; 310; 30; 20 INJECTION, SOLUTION INTRAVENOUS at 09:56

## 2023-09-06 ASSESSMENT — COPD QUESTIONNAIRES
COPD SCREENING SCORE: 7
DURING THE PAST 4 WEEKS HOW MUCH DID YOU FEEL SHORT OF BREATH: SOME OF THE TIME
HAVE YOU SMOKED AT LEAST 100 CIGARETTES IN YOUR ENTIRE LIFE: YES
DO YOU EVER COUGH UP ANY MUCUS OR PHLEGM?: YES, A FEW DAYS A WEEK OR MONTH

## 2023-09-06 ASSESSMENT — FIBROSIS 4 INDEX: FIB4 SCORE: 1.07

## 2023-09-06 NOTE — ED NOTES
Med Rec complete per MAR from   [facility]   Allergies reviewed  No oral antibiotics in the last 30 days

## 2023-09-06 NOTE — ED TRIAGE NOTES
Pt to  red 9 .  Chief Complaint   Patient presents with    ALOC     AMS for self. Pt is awake, drowsy, oriented x 2    UTI     Possible UTI, pt had parker on arrival with cloudy urine     Shortness of Breath     Pt c/o sob found to be hypoxic pta 88 % RA

## 2023-09-06 NOTE — H&P
Hospital Medicine History & Physical Note    Date of Service  9/6/2023    Primary Care Physician  JENNIFER Childers.    Consultants  None    Code Status  Full Code    Chief Complaint  Chief Complaint   Patient presents with    ALOC     AMS for self. Pt is awake, drowsy, oriented x 2    UTI     Possible UTI, pt had parker on arrival with cloudy urine     Shortness of Breath     Pt c/o sob found to be hypoxic pta 88 % RA        History of Presenting Illness  Sheyla Garcia is a 72 y.o. female who presented 9/6/2023 with past medical history of asthma, emphysema, fibromyalgia, hypertension, hypothyroidism, psychiatry disorder, patient recently admitted to hospital due to UTI and altered mental status, patient had CT that showed increased hydrocephalus and neurosurgery evaluated patient and adjusted her  shunt, patient improved and she was able to transfer back to skilled nursing, patient apparently after a few days she became more lethargic and with minimal oral intake, as per patient's family patient has been declining over the last several month and most of the time she is only landing in bed and she does not eat or drink, patient had CT head in the emergency room that did not show any acute findings, patient was found to have hyper natremia, UA showed UTI, I had long discussion with patient's family regarding goals of care, regarding plan of care, all questions were answered.  I discussed with ER physician, nurse staff, patient's family.        Review of Systems  Review of Systems   Unable to perform ROS: Medical condition       Past Medical History   has a past medical history of Asthma (08/05/2021), Bowel habit changes, Breath shortness (08/05/2021), Emphysema of lung (HCC) (08/05/2021), Fibromyalgia, Heart burn, Hypertension, Hypothyroidism (2008), Indigestion, Migraine, Obesity, Pneumonia (2012), Psychiatric problem, Renal disorder (08/05/2021), Seizure ([facility]) (2017), and Urinary  incontinence.    Surgical History   has a past surgical history that includes nasal septal reconstruction; tonsillectomy; bladder sling female; other surgical procedure; orif, knee (1/3/08); other orthopedic surgery; gyn surgery; thoracic fusion o-arm (Right, 10/13/2021); and cath placement capd (N/A, 10/13/2021).     Family History  family history includes Cancer in her father and maternal grandfather; Heart Disease in her mother; Stroke in her father.       Social History   reports that she quit smoking about 37 years ago. Her smoking use included cigarettes. She started smoking about 39 years ago. She has never used smokeless tobacco. She reports current alcohol use. She reports that she does not use drugs.    Allergies  Allergies   Allergen Reactions    Gabapentin Unspecified     seizure       Medications  Prior to Admission Medications   Prescriptions Last Dose Informant Patient Reported? Taking?   Amino Acids-Protein Hydrolys (PRO-STAT) Liquid 9/4/2023 at 0800 MAR from Other Facility Yes No   Sig: Take 30 mL by mouth every day.   K Phos [place]-Sod Phos Di & Mono (PHOSPHOROUS) 155-852-130 MG Tab 9/4/2023 at 0800 MAR from Other Facility Yes Yes   Sig: Take 1 Tablet by mouth every day.   Lidocaine 4 % Patch 9/5/2023 at 0800 MAR from Other Facility Yes No   Sig: Apply 1 Patch topically every day. Remove per schedule:  Apply @ 0800  Remove @ 0759   Umeclidinium Bromide (INCRUSE ELLIPTA) 62.5 MCG/ACT AEROSOL POWDER, BREATH ACTIVATED 9/3/2023 at 0800 MAR from Other Facility Yes No   Sig: Inhale 1 Puff every day.   acetaminophen (TYLENOL) 325 MG Tab 9/3/2023 at PRN MAR from Other Facility Yes No   Sig: Take 650 mg by mouth every 8 hours as needed for Mild Pain. Indications: Pain   lamoTRIgine (LAMICTAL) 100 MG Tab 9/4/2023 at 0800 MAR from Other Facility Yes No   Sig: Take 100 mg by mouth every day.   magnesium hydroxide (MILK OF MAGNESIA) 400 MG/5ML Suspension 9/1/2023 at PRN MAR from Other Facility Yes Yes   Sig: Take  30 mL by mouth every 24 hours as needed (BOWEL REGIMEN). GIVE IF NO BOWEL MOVEMENT FOR 3 DAYS   metoprolol SR (TOPROL XL) 25 MG TABLET SR 24 HR 9/4/2023 at 0800 MAR from Other Facility Yes No   Sig: Take 25 mg by mouth every day.   multivitamin Tab 9/4/2023 at 0800 MAR from Other Facility Yes No   Sig: Take 1 Tablet by mouth every day.   nystatin (MYCOSTATIN) powder 9/5/2023 at PM MAR from Other Facility Yes No   Sig: Apply 1 Application topically 2 times a day. Apply to groin   potassium Chloride ER (K-TAB) 20 MEQ Tab CR tablet 9/4/2023 at 0800 MAR from Other Facility Yes Yes   Sig: Take 20 mEq by mouth every day.   risperiDONE (RISPERDAL) 1 MG Tab 9/4/2023 at 0800 MAR from Other Facility Yes No   Sig: Take 1 mg by mouth 2 times a day.   senna-docusate (PERICOLACE OR SENOKOT S) 8.6-50 MG Tab 9/4/2023 at 0800 MAR from Other Facility Yes Yes   Sig: Take 2 Tablets by mouth 1 time a day as needed for Constipation.   tizanidine (ZANAFLEX) 2 MG tablet 9/4/2023 at 0800 MAR from Other Facility Yes No   Sig: Take 4 mg by mouth 2 times a day. 4 MG = 2 TABS      Facility-Administered Medications: None       Physical Exam  Temp:  [36.3 °C (97.4 °F)] 36.3 °C (97.4 °F)  Pulse:  [106] 106  Resp:  [16] 16  BP: (161)/(75) 161/75  SpO2:  [88 %] 88 %  Blood Pressure : (!) 161/75   Temperature: 36.3 °C (97.4 °F)   Pulse: (!) 106   Respiration: 16   Pulse Oximetry: 88 %       Physical Exam  Vitals and nursing note reviewed.   Constitutional:       General: She is in acute distress.      Appearance: She is ill-appearing and toxic-appearing.   HENT:      Mouth/Throat:      Mouth: Mucous membranes are dry.      Pharynx: No oropharyngeal exudate or posterior oropharyngeal erythema.   Eyes:      General: No scleral icterus.        Right eye: No discharge.         Left eye: No discharge.      Conjunctiva/sclera: Conjunctivae normal.   Cardiovascular:      Rate and Rhythm: Normal rate and regular rhythm.      Pulses: Normal pulses.      Heart  sounds: Normal heart sounds.   Pulmonary:      Effort: Pulmonary effort is normal. No respiratory distress.      Breath sounds: Rales present. No wheezing.   Abdominal:      General: Bowel sounds are normal. There is no distension.      Tenderness: There is no abdominal tenderness. There is no guarding.   Musculoskeletal:         General: Normal range of motion.      Cervical back: Normal range of motion and neck supple. No rigidity or tenderness.      Right lower leg: Edema present.      Left lower leg: Edema present.   Skin:     General: Skin is warm and dry.      Capillary Refill: Capillary refill takes less than 2 seconds.      Coloration: Skin is not jaundiced.      Findings: No bruising.   Neurological:      General: No focal deficit present.      Comments: Patient is able to tell me her name and date of birth, she is able to recognize her , follows minimal commands   Psychiatric:         Mood and Affect: Mood normal.         Laboratory:  Recent Labs     09/06/23  0937   WBC 11.2*   RBC 4.12*   HEMOGLOBIN 12.2   HEMATOCRIT 38.3   MCV 93.0   MCH 29.6   MCHC 31.9*   RDW 46.8   PLATELETCT 344   MPV 9.9     Recent Labs     09/06/23  0937   SODIUM 148*   POTASSIUM 3.5*   CHLORIDE 109   CO2 26   GLUCOSE 101*   BUN 11   CREATININE 0.42*   CALCIUM 9.1     Recent Labs     09/06/23  0937   ALTSGPT 5   ASTSGOT 13   ALKPHOSPHAT 71   TBILIRUBIN 0.4   GLUCOSE 101*         Recent Labs     09/06/23  0937   NTPROBNP 120         Recent Labs     09/06/23  0937   TROPONINT 44*       Imaging:  CT-CTA CHEST PULMONARY ARTERY W/ RECONS   Final Result      1.  No evidence of central or segmental pulmonary embolus or other acute intrathoracic pathology   2.  15 mm LEFT adrenal nodule incompletely evaluated but unchanged. Most incidentally detected adrenal nodules are benign adenomas.            TK-[id number]-4 VIEW   Final Result      Unremarkable appearance of the shunt tubing      DX-SPINE-ANY ONE VIEW   Final Result       Unremarkable appearance of the shunt tubing      DX-SKULL-LIMITED 3-   Final Result      Unremarkable appearance of the shunt tubing      DX-CHEST-PORTABLE (1 VIEW)   Final Result      No acute cardiopulmonary disease.      CT-HEAD W/O   Final Result      1.  Stable hydrocephalus with ventriculostomy catheter in similar position.   2.  No intracranial hemorrhage.   3.  Diffuse cortical atrophy.             X-Ray:  My impression is: Chest x-ray showed no infiltrates, no pleural effusion no pneumothorax.  EKG:  My impression is: Sinus tachycardia, no acute ischemic changes    Assessment/Plan:  Justification for Admission Status  I anticipate this patient will require at least two midnights for appropriate medical management, necessitating inpatient admission because require close monitoring, telemetry, IV antibiotics, neurochecks, follow-up blood cultures, hospice evaluation.        * AMS (altered mental status)- (present on admission)  Assessment & Plan  Due to encephalopathy, as above    Elevated troponin- (present on admission)  Assessment & Plan  But no chest pain, EKG no acute changes, continue trending troponin.    Hypernatremia- (present on admission)  Assessment & Plan  Due to decrease free water intake  Continue half NS, continue monitoring BMP  Speech eval so patient can start drinking water    UTI (urinary tract infection)- (present on admission)  Assessment & Plan  Started on IV ceftriaxone  Follow-up urine culture  Follow-up blood cultures    ACP (advance care planning)- (present on admission)  Assessment & Plan  I have discussed with patient's  and daughter regarding goals of care, discussed regarding CODE STATUS, discussed regarding CPR and intubation with mechanical ventilation, we discussed regarding risk and benefits, we discussed regarding mechanism of action, at this time due to overall patient's medical condition family has decided to transition to DNR/DNI, I also discussed hospice and  comfort care, for now patient's family would like to have more information, I have placed consult for hospice, I spent 18 minutes discussing advance care planning with patient's family.    Acute metabolic encephalopathy- (present on admission)  Assessment & Plan  Multifactorial due to hyponatremia, UTI, dehydration, continue IV antibiotics, continue IV fluids.    S/P  shunt- (present on admission)  Assessment & Plan  CT head showed stable hydrocephalus shunt appears to be in good position    Fibromyalgia- (present on admission)  Assessment & Plan  Continue supportive treatment    Hypokalemia- (present on admission)  Assessment & Plan  Replacing  Follow-up potassium in a.m.  Follow-up magnesium levels    Asthma- (present on admission)  Assessment & Plan  Continue RT per protocol  Not in acute exacerbation  Continue Spiriva    Essential hypertension- (present on admission)  Assessment & Plan  Continue metoprolol  Holding parameters        VTE prophylaxis: SCDs/TEDs      I have reviewed patient's CBC  Reviewed patient's BMP  I have discussed with ER physician  I have reviewed and interpreted patient's EKG  I have reviewed and interpreted patient's chest x-ray  I have reviewed CT head  I have ordered IV ceftriaxone and I am monitoring for side effects included but not limited to allergic reaction  I have ordered labs for in a.m.  I am monitoring sodium levels.  I have placed order to admit patient to telemetry.

## 2023-09-06 NOTE — ED PROVIDER NOTES
ED Provider Note    CHIEF COMPLAINT  Chief Complaint   Patient presents with    ALOC     AMS for self. Pt is awake, drowsy, oriented x 2    UTI     Possible UTI, pt had parker on arrival with cloudy urine     Shortness of Breath     Pt c/o sob found to be hypoxic pta 88 % RA        EXTERNAL RECORDS REVIEWED  Inpatient Notes patient was admitted to this facility from 8/21 to 8/25/2023 for wound check.  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  She presented with altered mental status and declining mentally for many months but was hallucinating and taking in only tiny amounts of p.o.  She was noted to have a possible UTI and sacral wound, a CT scan showed slight hazy fat stranding posterior to the sacrum and coccyx.  She was started on Zosyn and vancomycin.  A CT head revealed moderate bilateral ventricular dilatation overall slightly increased in size.  Dr. Box with neurosurgery was consulted and an x-ray series showed the expected course of her  shunt.  Her shunt setting was adjusted.  Her mentation improved back to baseline.  She was alert and oriented to self and place, still hallucinating and eating some of her meals.  Goals of care was discussed with her  including hospice but her  wish to keep her full code.  Urine culture grew Enterococcus but wound and blood cultures were reassuring.  Wound VAC was recommended and plan was to place that after discharge.  She underwent digital disimpaction and it was recommended that she continue on senna with daily suppositories.  She was continued on Augmentin for a total of 10 days.    HPI/ROS  LIMITATION TO HISTORY   Select: Altered mental status / Confusion  OUTSIDE HISTORIAN(S):  Significant other - at bedside reports that he initially declined any conversation about hospice but is willing to have that discussion at this point.  He is interested to find out    Sheyla Gabrielle Garcia is a 72 y.o. female who presents with a  chief complaint of altered mental status that has been ongoing for about 1 week.  She currently lives at a skilled nursing facility and her mental status has been worsening over the course of the past 1 week.  Her  notes that she has not been eating for the past week.  She has not had any fevers, vomiting.  She has complained of allover body pain but her  notes that she has a history of fibromyalgia.  Patient has an indwelling Monson catheter.   notes that the patient does have a history of dementia but states that she is typically alert and oriented x2.    PAST MEDICAL HISTORY   has a past medical history of Asthma (2021), Bowel habit changes, Breath shortness (2021), Emphysema of lung ([facility]) (2021), Fibromyalgia, Heart burn, Hypertension, Hypothyroidism (), Indigestion, Migraine, Obesity, Pneumonia (), Psychiatric problem, Renal disorder (2021), Seizure ([facility]) (), and Urinary incontinence.    SURGICAL HISTORY   has a past surgical history that includes nasal septal reconstruction; tonsillectomy; bladder sling female; other surgical procedure; orif, knee (1/3/08); other orthopedic surgery; gyn surgery; thoracic fusion o-arm (Right, 10/13/2021); and cath placement capd (N/A, 10/13/2021).    FAMILY HISTORY  Family History   Problem Relation Age of Onset    Heart Disease Mother         chf    Cancer Father         lung, berrylium exposure    Stroke Father         tia's    Cancer Maternal Grandfather         lung    Diabetes Neg Hx        SOCIAL HISTORY  Social History     Tobacco Use    Smoking status: Former     Current packs/day: 0.00     Types: Cigarettes     Start date: 1984     Quit date: 1986     Years since quittin.7    Smokeless tobacco: Never    Tobacco comments:     Age [age]    Vaping Use    Vaping Use: Never used   Substance and Sexual Activity    Alcohol use: Yes     Comment: Wine - daily    Drug use: Never    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 (Registered Nurse) on 09/06/23 at 0918  Med List Status: <None>     Medication Last Dose Status   acetaminophen (TYLENOL) 325 MG Tab  Active   Amino Acids-Protein Hydrolys (PRO-STAT) Liquid  Active   bisacodyl (DULCOLAX) 10 MG Suppos  Active   lamoTRIgine (LAMICTAL) 100 MG Tab  Active   Lidocaine 4 % Patch  Active   metoprolol SR (TOPROL XL) 25 MG TABLET SR 24 HR  Active   multivitamin Tab  Active   nystatin (MYCOSTATIN) powder  Active   Potassium Chloride 40 MEQ/15ML (20%) Solution  Active   risperiDONE (RISPERDAL) 1 MG Tab  Active   senna-docusate (PERICOLACE OR SENOKOT S) 8.6-50 MG Tab  Active   tizanidine (ZANAFLEX) 2 MG tablet  Active   Umeclidinium Bromide (INCRUSE ELLIPTA) 62.5 MCG/ACT AEROSOL POWDER, BREATH ACTIVATED  Active                    ALLERGIES  Allergies   Allergen Reactions    Gabapentin Unspecified     seizure       PHYSICAL EXAM  VITAL SIGNS: BP (!) 161/75   Pulse (!) 106   Temp 36.3 °C (97.4 °F) (Temporal)   Resp 16   Wt 94.3 kg (208 lb)   SpO2 88%   BMI 30.72 kg/m²    Physical Exam  Vitals and nursing note reviewed.   Constitutional:       Comments: Drowsy but responsive   HENT:      Head: Normocephalic and atraumatic.      Mouth/Throat:      Comments: Dry mucous membranes  Eyes:      Extraocular Movements: Extraocular movements intact.      Pupils: Pupils are equal, round, and reactive to light.   Cardiovascular:      Rate and Rhythm: Regular rhythm. Tachycardia present.   Pulmonary:      Effort: Pulmonary effort is normal.      Breath sounds: Normal breath sounds.   Chest:      Chest wall: No mass.   Abdominal:      Palpations: Abdomen is soft.      Tenderness: There is no abdominal tenderness.   Musculoskeletal:      Cervical back: Normal range of motion and neck supple.      Right lower leg: No edema.      Left lower leg: No edema.   Skin:     General: Skin is warm and dry.    Neurological:      General: No focal deficit present.       DIAGNOSTIC STUDIES / PROCEDURES  LABS  Results for orders placed or performed during the hospital encounter of 09/06/23   CoV-2, FLU A/B, and RSV by PCR (2-4 Hours [employer]) : Collect NP swab in VTM    Specimen: Respirate   Result Value Ref Range    Influenza virus A RNA Negative Negative    Influenza virus B, PCR Negative Negative    RSV, PCR Negative Negative    SARS-CoV-2 by PCR NotDetected     SARS-CoV-2 Source NP Swab    Comp Metabolic Panel   Result Value Ref Range    Sodium 148 (H) 135 - 145 mmol/L    Potassium 3.5 (L) 3.6 - 5.5 mmol/L    Chloride 109 96 - 112 mmol/L    Co2 26 20 - 33 mmol/L    Anion Gap 13.0 7.0 - 16.0    Glucose 101 (H) 65 - 99 mg/dL    Bun 11 8 - 22 mg/dL    Creatinine 0.42 (L) 0.50 - 1.40 mg/dL    Calcium 9.1 8.5 - 10.5 mg/dL    Correct Calcium 9.9 8.5 - 10.5 mg/dL    AST(SGOT) 13 12 - 45 U/L    ALT(SGPT) 5 2 - 50 U/L    Alkaline Phosphatase 71 30 - 99 U/L    Total Bilirubin 0.4 0.1 - 1.5 mg/dL    Albumin 3.0 (L) 3.2 - 4.9 g/dL    Total Protein 6.4 6.0 - 8.2 g/dL    Globulin 3.4 1.9 - 3.5 g/dL    A-G Ratio 0.9 g/dL   Lactic Acid   Result Value Ref Range    Lactic Acid 1.0 0.5 - 2.0 mmol/L   Lactic Acid   Result Value Ref Range    Lactic Acid 0.8 0.5 - 2.0 mmol/L   Urinalysis    Specimen: Urine, Monson Cath   Result Value Ref Range    Color Yellow     Character Clear     Specific Gravity 1.044 <1.035    Ph 6.0 5.0 - 8.0    Glucose Negative Negative mg/dL    Ketones 40 (A) Negative mg/dL    Protein 100 (A) Negative mg/dL    Bilirubin Negative Negative    Urobilinogen, Urine 1.0 Negative    Nitrite Negative Negative    Leukocyte Esterase Small (A) Negative    Occult Blood Moderate (A) Negative    Micro Urine Req Microscopic    Troponin   Result Value Ref Range    Troponin T 44 (H) 6 - 19 ng/L   proBrain Natriuretic Peptide, NT   Result Value Ref Range    NT-proBNP 120 0 - 125 pg/mL   CBC WITH DIFFERENTIAL   Result Value Ref Range     WBC 11.2 (H) 4.8 - 10.8 K/uL    RBC 4.12 (L) 4.20 - 5.40 M/uL    Hemoglobin 12.2 12.0 - 16.0 g/dL    Hematocrit 38.3 37.0 - 47.0 %    MCV 93.0 81.4 - 97.8 fL    MCH 29.6 27.0 - 33.0 pg    MCHC 31.9 (L) 32.2 - 35.5 g/dL    RDW 46.8 35.9 - 50.0 fL    Platelet Count 344 164 - 446 K/uL    MPV 9.9 9.0 - 12.9 fL    Neutrophils-Polys 66.70 44.00 - 72.00 %    Lymphocytes 20.90 (L) 22.00 - 41.00 %    Monocytes 7.40 0.00 - 13.40 %    Eosinophils 4.20 0.00 - 6.90 %    Basophils 0.40 0.00 - 1.80 %    Immature Granulocytes 0.40 0.00 - 0.90 %    Nucleated RBC 0.00 0.00 - 0.20 /100 WBC    Neutrophils (Absolute) 7.45 (H) 1.82 - 7.42 K/uL    Lymphs (Absolute) 2.34 1.00 - 4.80 K/uL    Monos (Absolute) 0.83 0.00 - 0.85 K/uL    Eos (Absolute) 0.47 0.00 - 0.51 K/uL    Baso (Absolute) 0.04 0.00 - 0.12 K/uL    Immature Granulocytes (abs) 0.04 0.00 - 0.11 K/uL    NRBC (Absolute) 0.00 K/uL   ESTIMATED GFR   Result Value Ref Range    GFR (CKD-EPI) 105 >60 mL/min/1.73 m 2   Blood Culture,Hold   Result Value Ref Range    Blood Culture Hold Collected    Blood Culture,Hold   Result Value Ref Range    Blood Culture Hold Collected    Magnesium   Result Value Ref Range    Magnesium 1.9 1.5 - 2.5 mg/dL   PHOSPHORUS   Result Value Ref Range    Phosphorus 2.9 2.5 - 4.5 mg/dL   URINE MICROSCOPIC (W/UA)   Result Value Ref Range    WBC  (A) /hpf    RBC 10-20 (A) /hpf    Bacteria Negative None /hpf    Epithelial Cells Few /hpf    Epithelial Cells Renal Few /hpf    Ca Oxalate Crystal Few /hpf    Hyaline Cast 6-10 (A) /lpf    Budding Yeast Present (A) Absent /hpf    Hyphae Yeast Present (A) Absent /hpf   Basic Metabolic Panel   Result Value Ref Range    Sodium 146 (H) 135 - 145 mmol/L    Potassium 3.6 3.6 - 5.5 mmol/L    Chloride 108 96 - 112 mmol/L    Co2 26 20 - 33 mmol/L    Glucose 90 65 - 99 mg/dL    Bun 10 8 - 22 mg/dL    Creatinine 0.40 (L) 0.50 - 1.40 mg/dL    Calcium 9.0 8.5 - 10.5 mg/dL    Anion Gap 12.0 7.0 - 16.0   ESTIMATED GFR   Result  Value Ref Range    GFR (CKD-EPI) 106 >60 mL/min/1.73 m 2   EKG   Result Value Ref Range    Report       [facility] Emergency Dept.    Test Date:  2023  Pt Name:    MENDOZA ROMAN                 Department: ER  MRN:        [id number]                      Room:       [facility]  Gender:     Female                       Technician: [id number]  :        1950                   Requested By:RAI BELTRAN  Order #:    [id number]                    Reading MD: Rai Beltran MD    Measurements  Intervals                                Axis  Rate:       101                          P:          46  NJ:         159                          QRS:        16  QRSD:       98                           T:          65  QT:         372  QTc:        483    Interpretive Statements  Sinus tachycardia  Low voltage, extremity and precordial leads  Compared to ECG 2023 20:15:43  Low QRS voltage now present  ST (T wave) deviation no longer present  Electronically Signed On 2023 20:47:26 PDT by Rai Beltran MD       RADIOLOGY  I have independently interpreted the diagnostic imaging associated with this visit and am waiting the final reading from the radiologist.   My preliminary interpretation is as follows: No intracranial hemorrhage    Radiologist interpretation:   CT-CTA CHEST PULMONARY ARTERY W/ RECONS   Final Result      1.  No evidence of central or segmental pulmonary embolus or other acute intrathoracic pathology   2.  15 mm LEFT adrenal nodule incompletely evaluated but unchanged. Most incidentally detected adrenal nodules are benign adenomas.            [id number] VIEW   Final Result      Unremarkable appearance of the shunt tubing      DX-SPINE-ANY ONE VIEW   Final Result      Unremarkable appearance of the shunt tubing      DX-SKULL-LIMITED 3-   Final Result      Unremarkable appearance of the shunt tubing      DX-CHEST-PORTABLE (1 VIEW)   Final Result      No acute cardiopulmonary disease.       CT-HEAD W/O   Final Result      1.  Stable hydrocephalus with ventriculostomy catheter in similar position.   2.  No intracranial hemorrhage.   3.  Diffuse cortical atrophy.         US-EXTREMITY VENOUS UPPER UNILAT RIGHT    (Results Pending)     COURSE & MEDICAL DECISION MAKING    ED Observation Status? No; Patient does not meet criteria for ED Observation.     INITIAL ASSESSMENT, COURSE AND PLAN  Care Narrative: This is a 72 year old female with a history of dementia, typically oriented x2, who was sent here via EMS from her SNF due to altered mental status.    DDx includes, but is not limited to, UTI, pneumonia, viral syndrome, renal failure, electrolyte abnormality, PE, ACS, ICH, stroke, increased intracranial pressure.    Arrives hypertensive, tachycardic, and hypoxic. She has very dry mucous membranes and her  states she has had minimal PO intake. Her  notes that she occasionally uses supplemental O2 at baseline. She cannot tell me if she is short of breath or if she has chest pain. She is drowsy but responsive to voice. She has no focal neurologic deficits, occasionally follows commands. No focal abnormalities on exam.    Started on IV fluids for evidence of dehydration.    CBC reveals mild leukocytosis to 11.2 with minimal increase in absolute neutrophil count. Metabolic panel demonstrates mild hypernatremia to 148, hypokalemia to 135. Glucose 101. She has normal renal and liver function. Lactic acid is normal. BNP is normal. CXR does not suggest fluid overload or pneumonia. Viral swabs are negative. Troponin is slightly elevated but EKG does not suggest acute ischemia.     CT head reveals stable ventriculomegaly and BP shunt tubing is in continuity per XR. CTA chest obtained to rule out PE and this thankfully was without acute abnormality.    I had an extensive discussion with the patient's  about goals of care. He notes that he initially was reluctant to have a hospice consult placed  but we talked about learning from hospice personnel about services they offer and he is ready to do that. He would also like more clarification on her medical issues and trajectory. We also discussed code status and although the patient has always been FULL CODE, recent discussions with her when she is more lucid makes him think that she no longer wants CPR/intubation but rather is tired of being in such constant pain. He wants to consider changing her code status.    Patient will require hospitalization for additional workup and management. She was discussed with the hospitalist and admitted in guarded condition.    --After admission, UA returned and suggests infection. Hospitalist started her on appropriate antibiotics.    HYDRATION: Based on the patient's presentation of Dehydration the patient was given IV fluids. IV Hydration was used because oral hydration was not adequate alone. Upon recheck following hydration, the patient was unchanged.    ADDITIONAL PROBLEM LIST  I spent 13 minutes discussing advance care planning with patient's .    DISPOSITION AND DISCUSSIONS  I have discussed management of the patient with the following physicians and PILAR's:  Dr. Brown, hospitalist.    Discussion of management with other Q or appropriate source(s): None     Escalation of care considered, and ultimately not performed: N/A.    Barriers to care at this time, including but not limited to:  None .     Decision tools and prescription drugs considered including, but not limited to: N/A.    FINAL DIAGNOSIS  Encephalopathy  UTI  Elevated troponin  Advance care planning    Electronically signed by: Brayan Abarca M.D., 9/6/2023 9:22 AM

## 2023-09-07 PROBLEM — R41.82 AMS (ALTERED MENTAL STATUS): Status: RESOLVED | Noted: 2023-09-06 | Resolved: 2023-09-07

## 2023-09-07 LAB
ALBUMIN SERPL BCP-MCNC: 2.6 G/DL (ref 3.2–4.9)
ALBUMIN/GLOB SERPL: 0.9 G/DL
ALP SERPL-CCNC: 62 U/L (ref 30–99)
ALT SERPL-CCNC: 5 U/L (ref 2–50)
ANION GAP SERPL CALC-SCNC: 10 MMOL/L (ref 7–16)
AST SERPL-CCNC: 12 U/L (ref 12–45)
BILIRUB SERPL-MCNC: 0.3 MG/DL (ref 0.1–1.5)
BUN SERPL-MCNC: 8 MG/DL (ref 8–22)
CALCIUM ALBUM COR SERPL-MCNC: 9.7 MG/DL (ref 8.5–10.5)
CALCIUM SERPL-MCNC: 8.6 MG/DL (ref 8.5–10.5)
CHLORIDE SERPL-SCNC: 107 MMOL/L (ref 96–112)
CO2 SERPL-SCNC: 28 MMOL/L (ref 20–33)
CREAT SERPL-MCNC: 0.35 MG/DL (ref 0.5–1.4)
ERYTHROCYTE [DISTWIDTH] IN BLOOD BY AUTOMATED COUNT: 46 FL (ref 35.9–50)
GFR SERPLBLD CREATININE-BSD FMLA CKD-EPI: 109 ML/MIN/1.73 M 2
GLOBULIN SER CALC-MCNC: 2.9 G/DL (ref 1.9–3.5)
GLUCOSE SERPL-MCNC: 95 MG/DL (ref 65–99)
HCT VFR BLD AUTO: 36.1 % (ref 37–47)
HGB BLD-MCNC: 11.5 G/DL (ref 12–16)
MCH RBC QN AUTO: 29.8 PG (ref 27–33)
MCHC RBC AUTO-ENTMCNC: 31.9 G/DL (ref 32.2–35.5)
MCV RBC AUTO: 93.5 FL (ref 81.4–97.8)
PLATELET # BLD AUTO: 293 K/UL (ref 164–446)
PMV BLD AUTO: 10.3 FL (ref 9–12.9)
POTASSIUM SERPL-SCNC: 3.2 MMOL/L (ref 3.6–5.5)
PROT SERPL-MCNC: 5.5 G/DL (ref 6–8.2)
RBC # BLD AUTO: 3.86 M/UL (ref 4.2–5.4)
SODIUM SERPL-SCNC: 145 MMOL/L (ref 135–145)
TROPONIN T SERPL-MCNC: 27 NG/L (ref 6–19)
WBC # BLD AUTO: 10.4 K/UL (ref 4.8–10.8)

## 2023-09-07 PROCEDURE — 99232 SBSQ HOSP IP/OBS MODERATE 35: CPT | Performed by: INTERNAL MEDICINE

## 2023-09-07 PROCEDURE — 97535 SELF CARE MNGMENT TRAINING: CPT

## 2023-09-07 PROCEDURE — 94640 AIRWAY INHALATION TREATMENT: CPT

## 2023-09-07 PROCEDURE — 97162 PT EVAL MOD COMPLEX 30 MIN: CPT

## 2023-09-07 PROCEDURE — A9270 NON-COVERED ITEM OR SERVICE: HCPCS | Performed by: INTERNAL MEDICINE

## 2023-09-07 PROCEDURE — A9270 NON-COVERED ITEM OR SERVICE: HCPCS | Performed by: HOSPITALIST

## 2023-09-07 PROCEDURE — 94760 N-INVAS EAR/PLS OXIMETRY 1: CPT

## 2023-09-07 PROCEDURE — 97597 DBRDMT OPN WND 1ST 20 CM/<: CPT

## 2023-09-07 PROCEDURE — 700101 HCHG RX REV CODE 250: Performed by: HOSPITALIST

## 2023-09-07 PROCEDURE — 92610 EVALUATE SWALLOWING FUNCTION: CPT

## 2023-09-07 PROCEDURE — 85027 COMPLETE CBC AUTOMATED: CPT

## 2023-09-07 PROCEDURE — 700101 HCHG RX REV CODE 250: Performed by: INTERNAL MEDICINE

## 2023-09-07 PROCEDURE — 770001 HCHG ROOM/CARE - MED/SURG/GYN PRIV*

## 2023-09-07 PROCEDURE — 36415 COLL VENOUS BLD VENIPUNCTURE: CPT

## 2023-09-07 PROCEDURE — 84484 ASSAY OF TROPONIN QUANT: CPT

## 2023-09-07 PROCEDURE — 302098 PASTE RING (FLAT): Performed by: INTERNAL MEDICINE

## 2023-09-07 PROCEDURE — 80053 COMPREHEN METABOLIC PANEL: CPT

## 2023-09-07 PROCEDURE — 700111 HCHG RX REV CODE 636 W/ 250 OVERRIDE (IP): Mod: JZ | Performed by: INTERNAL MEDICINE

## 2023-09-07 PROCEDURE — 700102 HCHG RX REV CODE 250 W/ 637 OVERRIDE(OP): Performed by: HOSPITALIST

## 2023-09-07 PROCEDURE — 700102 HCHG RX REV CODE 250 W/ 637 OVERRIDE(OP): Performed by: INTERNAL MEDICINE

## 2023-09-07 PROCEDURE — 97605 NEG PRS WND THER DME<=50SQCM: CPT

## 2023-09-07 PROCEDURE — 700111 HCHG RX REV CODE 636 W/ 250 OVERRIDE (IP): Performed by: HOSPITALIST

## 2023-09-07 RX ORDER — SODIUM HYPOCHLORITE 1.25 MG/ML
SOLUTION TOPICAL PRN
Status: DISCONTINUED | OUTPATIENT
Start: 2023-09-07 | End: 2023-09-09

## 2023-09-07 RX ORDER — TRAMADOL HYDROCHLORIDE 50 MG/1
50-100 TABLET ORAL EVERY 4 HOURS PRN
Status: DISCONTINUED | OUTPATIENT
Start: 2023-09-07 | End: 2023-09-09

## 2023-09-07 RX ORDER — ENOXAPARIN SODIUM 100 MG/ML
40 INJECTION SUBCUTANEOUS DAILY
Status: DISCONTINUED | OUTPATIENT
Start: 2023-09-07 | End: 2023-09-08

## 2023-09-07 RX ORDER — SODIUM HYPOCHLORITE 1.25 MG/ML
SOLUTION TOPICAL 2 TIMES DAILY
Status: DISCONTINUED | OUTPATIENT
Start: 2023-09-07 | End: 2023-09-07

## 2023-09-07 RX ORDER — SODIUM CHLORIDE AND POTASSIUM CHLORIDE 150; 450 MG/100ML; MG/100ML
INJECTION, SOLUTION INTRAVENOUS CONTINUOUS
Status: DISCONTINUED | OUTPATIENT
Start: 2023-09-07 | End: 2023-09-08

## 2023-09-07 RX ADMIN — IPRATROPIUM BROMIDE AND ALBUTEROL SULFATE 3 ML: 2.5; .5 SOLUTION RESPIRATORY (INHALATION) at 07:11

## 2023-09-07 RX ADMIN — LAMOTRIGINE 100 MG: 100 TABLET ORAL at 08:03

## 2023-09-07 RX ADMIN — CEFTRIAXONE SODIUM 1000 MG: 10 INJECTION, POWDER, FOR SOLUTION INTRAVENOUS at 05:47

## 2023-09-07 RX ADMIN — METOPROLOL SUCCINATE 25 MG: 25 TABLET, EXTENDED RELEASE ORAL at 08:03

## 2023-09-07 RX ADMIN — IPRATROPIUM BROMIDE AND ALBUTEROL SULFATE 3 ML: 2.5; .5 SOLUTION RESPIRATORY (INHALATION) at 18:44

## 2023-09-07 RX ADMIN — TIZANIDINE 4 MG: 4 TABLET ORAL at 19:07

## 2023-09-07 RX ADMIN — RISPERIDONE 1 MG: 0.5 TABLET ORAL at 08:04

## 2023-09-07 RX ADMIN — TIZANIDINE 4 MG: 4 TABLET ORAL at 08:05

## 2023-09-07 RX ADMIN — TRAMADOL HYDROCHLORIDE 100 MG: 50 TABLET ORAL at 10:47

## 2023-09-07 RX ADMIN — POTASSIUM CHLORIDE AND SODIUM CHLORIDE: 450; 150 INJECTION, SOLUTION INTRAVENOUS at 09:17

## 2023-09-07 RX ADMIN — TIOTROPIUM BROMIDE INHALATION SPRAY 5 MCG: 3.12 SPRAY, METERED RESPIRATORY (INHALATION) at 08:00

## 2023-09-07 RX ADMIN — IPRATROPIUM BROMIDE AND ALBUTEROL SULFATE 3 ML: 2.5; .5 SOLUTION RESPIRATORY (INHALATION) at 10:35

## 2023-09-07 RX ADMIN — POTASSIUM CHLORIDE AND SODIUM CHLORIDE: 450; 150 INJECTION, SOLUTION INTRAVENOUS at 21:12

## 2023-09-07 RX ADMIN — RISPERIDONE 1 MG: 0.5 TABLET ORAL at 19:07

## 2023-09-07 RX ADMIN — ENOXAPARIN SODIUM 40 MG: 100 INJECTION SUBCUTANEOUS at 19:07

## 2023-09-07 ASSESSMENT — PAIN DESCRIPTION - PAIN TYPE: TYPE: ACUTE PAIN

## 2023-09-07 ASSESSMENT — COGNITIVE AND FUNCTIONAL STATUS - GENERAL
WALKING IN HOSPITAL ROOM: TOTAL
TURNING FROM BACK TO SIDE WHILE IN FLAT BAD: UNABLE
MOVING TO AND FROM BED TO CHAIR: UNABLE
MOVING FROM LYING ON BACK TO SITTING ON SIDE OF FLAT BED: UNABLE
CLIMB 3 TO 5 STEPS WITH RAILING: TOTAL
SUGGESTED CMS G CODE MODIFIER MOBILITY: CN
STANDING UP FROM CHAIR USING ARMS: TOTAL
MOBILITY SCORE: 6

## 2023-09-07 ASSESSMENT — ENCOUNTER SYMPTOMS
MYALGIAS: 0
WEAKNESS: 1
ABDOMINAL PAIN: 0
BACK PAIN: 0
MEMORY LOSS: 1

## 2023-09-07 NOTE — WOUND TEAM
[facility]  Inpatient Services  Initial Wound and Skin Care Evaluation    Admission Date: 2023     Last order of IP CONSULT TO WOUND CARE was found on 2023 from Hospital Encounter on 2023     HPI, PMH, SH: Reviewed    Past Surgical History:   Procedure Laterality Date    THORACIC FUSION O-ARM Right 10/13/2021    Procedure: INSERTION, SHUNT, VENTRICULOPERITONEAL, USING FRAMELESS STEREOTAXY - FRONTAL;  Surgeon: Shady Glynn M.D.;  Location: SURGERY [facility];  Service: Neurosurgery    CATH PLACEMENT CAPD N/A 10/13/2021    Procedure: INSERTION, CATHETER, CAPD;  Surgeon: Shady Glynn M.D.;  Location: SURGERY [facility];  Service: Neurosurgery    ORIF, KNEE  1/3/08    tibial plateau fracture    BLADDER SLING FEMALE      GYN SURGERY      Hysterectomy    NASAL SEPTAL RECONSTRUCTION      age 26    OTHER ORTHOPEDIC SURGERY      Bilateral tibial fracture    OTHER SURGICAL PROCEDURE      SI joint fusion - Dr. Pineda 2019    TONSILLECTOMY       Social History     Tobacco Use    Smoking status: Former     Current packs/day: 0.00     Types: Cigarettes     Start date: 1984     Quit date: 1986     Years since quittin.7    Smokeless tobacco: Never    Tobacco comments:     Age [age]    Substance Use Topics    Alcohol use: Yes     Comment: Wine - daily     Chief Complaint   Patient presents with    ALOC     AMS for self. Pt is awake, drowsy, oriented x 2    UTI     Possible UTI, pt had parker on arrival with cloudy urine     Shortness of Breath     Pt c/o sob found to be hypoxic pta 88 % RA      Diagnosis: AMS (altered mental status) [R41.82]    Unit where seen by Wound Team: S182/     WOUND CONSULT RELATED TO:  sacrum    WOUND TEAM PLAN OF CARE - Frequency of Follow-up:   Nursing to follow dressing orders written for wound care. Contact wound team if area fails to progress, deteriorates or with any questions/concerns if something comes up before next scheduled follow up (See below  "as to whether wound is following and frequency of wound follow up)   NPWT change 3 times weekly - sacrum    WOUND HISTORY:   \"71 yo F admitted with AMS. Hx of schizophrenia, NPH with  shunt, COPD, HTN, hypothyroidism. Patient came from [facility] where she had a wound vac. Patient is bedbound per . Previously treated by IP Wound Team for sacral wound. \"       WOUND ASSESSMENT/LDA  Wound 08/22/23 Pressure Injury Coccyx;Sacrum Mid POA Stage 4 (Active)   Date First Assessed/Time First Assessed: 08/22/23 1507   Present on Original Admission: Yes  Primary Wound Type: Pressure Injury  Location: Coccyx;Sacrum  Wound Orientation: Mid  Wound Description (Comments): POA Stage 4      Assessments 9/7/2023  1:00 PM   Wound Image       Site Assessment Red;Slough;Undermining   Periwound Assessment Red;Purple;Fragile   Margins Unattached edges;Undefined edges   Closure Secondary intention   Drainage Amount Small   Drainage Description Serosanguineous   Treatments Cleansed;Site care;CSWD - Conservative Sharp Wound Debridement;Chemical debridement;Offloading   Wound Cleansing Approved Wound Cleanser   Periwound Protectant No-sting Skin Prep;Paste Ring;Drape   Dressing Status Clean;Dry;Intact   Dressing Changed Changed   Dressing Cleansing/Solutions 1/4 Strength Dakin's Solution   Dressing Options Wound Vac;Offloading Dressing - Sacral   Dressing Change/Treatment Frequency Tuesday, Thursday, Saturday, and As Needed   NEXT Dressing Change/Treatment Date 09/09/23   NEXT Weekly Photo (Inpatient Only) 09/14/23   Wound Team Following 3x Weekly   Pressure Injury Stage Stage 4   Wound Length (cm) 6.5 cm   Wound Width (cm) 7.5 cm   Wound Depth (cm) 5.4 cm   Wound Surface Area (cm^2) 48.75 cm^2   Wound Volume (cm^3) 263.25 cm^3   Wound Healing % -3   Undermining (cm) 2.6 cm   Undermining of Wound, 1st Location From 11 o'clock;To 1 o'clock   Wound Odor Mild   Pulses N/A   WOUND NURSE ONLY - Time Spent with Patient (mins) 60     "   Negative Pressure Wound Therapy 09/07/23 Pressure injury: stage 4 Coccyx;Sacrum (Active)   Placement Date/Time: 09/07/23 1300   Wound Type: Pressure injury: stage 4  Location: Coccyx;Sacrum      Assessments 9/7/2023  1:00 PM   Wound Image      Vacuum Serial Number [id number]   NPWT Pump Mode / Pressure Setting Ulta;Continuous;125 mmHg   Dressing Type Medium;Black Foam (Veraflo)   Number of Foam Pieces Used 2   Canister Changed No   NEXT Dressing Change/Treatment Date 09/09/23   VAC VeraFlo Irrigant 1/4 Strength Dakins   VAC VeraFlo Soak Time (mins) 10   VAC VeraFlo Instill Volume (ml) 26   VAC VeraFlo - Therapy Time (hrs) 2   VAC VeraFlo Pressure (mm/Hg) Continuous;125 mmHg        Vascular:    RIVER:   No results found.    Lab Values:    Lab Results   Component Value Date/Time    WBC 10.4 09/07/2023 06:09 AM    RBC 3.86 (L) 09/07/2023 06:09 AM    HEMOGLOBIN 11.5 (L) 09/07/2023 06:09 AM    HEMATOCRIT 36.1 (L) 09/07/2023 06:09 AM    CREACTPROT 10.44 (H) 08/21/2023 06:10 PM    SEDRATEWES 78 (H) 08/21/2023 06:10 PM    HBA1C 5.6 05/03/2021 04:08 PM         Culture Results show:  Recent Results (from the past 720 hour(s))   Culture Wound With Gram Stain    Collection Time: 08/21/23  6:15 PM    Specimen: Decubitus; Wound   Result Value Ref Range    Significant Indicator NEG     Source WND     Site DECUBITUS     Culture Result Light growth mixed enteric wayne.     Gram Stain Result       Rare WBCs.  Moderate Gram positive cocci.  Rare Gram negative rods.         Pain Level/Medicated:  PO pain medications administered by bedside RN 45 prior       INTERVENTIONS BY WOUND TEAM:  Chart and images reviewed. Discussed with bedside RN. All areas of concern (based on picture review, LDA review and discussion with bedside RN) have been thoroughly assessed. Documentation of areas based on significant findings. This RN in to assess patient. Performed standard wound care which includes appropriate positioning, dressing removal and  non-selective debridement. Pictures and measurements obtained weekly if/when required.    Wound:  SACROCOCCYGEAL  Preparation for Dressing removal: Dressing soaked with wound cleanser  Cleansed/Non-selectively Debrided with:  Wound cleanser and Gauze  Non-Excisional Conservative Sharp debridement: Slough debrided away using scissors and forceps < 20cm2 debrided down to slough and non-viable/devitalized tissue.  No Bleeding noted.  Shannan wound: Cleansed with Wound cleanser and Gauze, Prepped with No Sting, Paste Rings, and Drape  Primary Dressin piece of black spiral VF filled into wound bed and continued to mini bridge along L buttock. Foam secured with drape.   Secondary (Outer) Dressing: VF tracpad applied. VF NPWT initiated using fill assist. No leaks noted. See settings in LDA. Offloading dressing x2 underneath tubing.    Advanced Wound Care Discharge Planning  Number of Clinicians necessary to complete wound care: 2  Is patient requiring IV pain medications for dressing changes:  No   Length of time for dressing change 45 min. (This does not include chart review, pre-medication time, set up, clean up or time spent charting.)    Interdisciplinary consultation: Patient, Bedside RN (Ioana), Yohana SZYMANSKI (Wound RN).  Pressure injury and staging reviewed with Yohana SZYMANSKI (Wound RN).    EVALUATION / RATIONALE FOR TREATMENT:     Date:  23  Wound Status:  Initial evaluation    Wound with significantly less slough since previous admit. Small bone exposure. Recommend MRI to r/o osteomyelitis. VF NPWT initiated to assist with mechanical debridement, increase oxygenation and granulation tissue production to the area, and assist in wound closure by secondary intention. Dakins applied to chemically debride nonviable tissue, decrease bioburden and odor. MD Douglas updated.         Goals: Steady decrease in wound area and depth weekly.    NURSING PLAN OF CARE ORDERS:  Dressing changes: See Dressing Care orders  RN Prevention  Protocol    NUTRITION RECOMMENDATIONS   Wound Team Recommendations:  N/A    DIET ORDERS (From admission to next 24h)       Start     Ordered    09/07/23 0806  Diet Order Diet: Level 4 - Pureed; Liquid level: Level 0 - Thin; Tray Modifications (optional): SLP - 1:1 Supervision by Nursing  ALL MEALS        Question Answer Comment   Diet: Level 4 - Pureed    Liquid level Level 0 - Thin    Tray Modifications (optional) SLP - 1:1 Supervision by Nursing        09/07/23 0805                    PREVENTATIVE INTERVENTIONS:    Q shift Taye - performed per nursing policy  Q shift pressure point assessments - performed per nursing policy    Surface/Positioning  Standard/trauma mattress - Currently in Place  Reposition q 2 hours - Currently in Place  TAPs Turning system - Currently in Place    Offloading/Redistribution  Sacral offloading dressing (Silicone dressing) - Applied this Visit  Heel float boots (Prevalon boot) - Currently in Place      Respiratory  Silicone O2 tubing - Currently in Place    Containment/Moisture Prevention    Monson Catheter - Currently in Place    Mobilization      Bedound baseline      Anticipated discharge plans:  Skilled Nursing        Vac Discharge Needs:  Vac Discharge plan is purely a recommendation from wound team and not a requirement for discharge unless otherwise stated by physician.  Veraflo Vac while inpatient, ok to transition to Regular Vac on discharge

## 2023-09-07 NOTE — PROGRESS NOTES
Pt arrive to S182, drowsy but awakens to voice. A&O x2, respirations are regular and non labored. Sacral wound present on admission with wound vac dressing. Dressing removed and wet to drg dressing in place. Wound pictures uploaded and wound team notified by bassam. Iona also present on admission which was changed by ED rn quique.  at bedside, updated pt and  with plan of care. Call light in reach and encourage to call for assistance.

## 2023-09-07 NOTE — THERAPY
Physical Therapy   Initial Evaluation     Patient Name: Sheyla Garcia  Age:  72 y.o., Sex:  female  Medical Record #: [id number]  Today's Date: 9/7/2023     Precautions  Precautions: Swallow Precautions    Assessment  Patient is 72 y.o. female w/ hx of asthma, emphysema, fibromyalgia, HTN, hypothyroid, psych d/o, dementia, indwellling parker.  Recently admitted 2/2 UTI w/ recent adjustment to her  shunt.  Admitted for AMS. UTI.  She is oriented to self and place, ([place]), but does not know she is in the hospital.  No LE active movement noted except for toes.  She is able initiate UE movement, but unable to move through full ROM.  With passive ROM, she shouts out in pain w/ elbow extension and hip and knee flexion.  Unable to achieve 90 degrees of hip flexion bilaterally.  It would appear that pt has been bedbound for some time in light of tightness/contractures and lack of active participation.  Anticipate that she may be at her most recent baseline.  No active participation on the part of the pt.  PT for d/c needs.  Plan    Physical Therapy Initial Treatment Plan   Duration: Discharge Needs Only    DC Equipment Recommendations: None  Discharge Recommendations:  (Return to snf)         Objective       09/07/23 0816   Prior Living Situation   Housing / Facility Skilled Nursing Facility   Comments Pt resides at a snf per chart review   Prior Level of Functional Mobility   Bed Mobility Unable To Determine At This Time   Transfer Status Unable To Determine At This Time   Ambulation Unable To Determine At This Time   Cognition    Level of Consciousness   (A&Ox2)   Passive ROM Upper Body   Comments unable to achieve full extension at the elbows and full flexion at the shoulders   Strength Upper Body   Comments able to initiate momement UE's against g ravity but unable to achieve full ROM   Passive ROM Lower Body   Comments Unable to achieve 90 degrees of flexion at the hips.  Knee flexion to 90   Strength Lower Body    Comments able to move toes, no other acive LE movement noted.   Physical Therapy Initial Treatment Plan    Duration Discharge Needs Only   Anticipated Discharge Equipment and Recommendations   DC Equipment Recommendations None   Discharge Recommendations   (Return to snf)

## 2023-09-07 NOTE — DISCHARGE PLANNING
Combinaion of drugs must have been tried:    Ex: Glyburide/Metformin, Glipizide/Metformin    Please advise MARCELLO DCE chart review. Monitoring for developments in discharge disposition and will round back to obtain choice if indicated. Thank you.

## 2023-09-07 NOTE — THERAPY
"Speech Language Pathology   Clinical Swallow Evaluation     Patient Name: Sheyla Garcia  AGE:  72 y.o., SEX:  female  Medical Record #: [id number]  Date of Service: 9/7/2023      History of Present Illness    71 y/o admitted on 9/6 ALOC, UTI, SOB. Last seen by SLP in August 2023 recommended SB6/TN0.    CTA of chest 9/6: \"No evidence of central or segmental pulmonary embolus or other acute intrathoracic pathology. 15 mm LEFT adrenal nodule incompletely evaluated but unchanged.\"      PMHx:  asthma, emphysema, fibromyalgia, HTN, hypothroidism, psych disorder    General Information:  Vitals  O2 (LPM): 2  O2 Delivery Device: Silicone Nasal Cannula  Level of Consciousness: Drowsy  Patient Behaviors: Agitated, Flat Affect, Confused  Orientation: Self  Follows Directives: Inconsistent      Prior Living Situation & Level of Function:  Housing / Facility: Skilled Nursing Facility  Lives with - Patient's Self Care Capacity: Attendant / Paid Care Giver  Comments: Pt resides in SNF  Swallowing: seen by SLP during last admit and an SB6/TN0 diet was recommended       Oral Mechanism Evaluation:  Dentition: Good       Laryngeal Function:  Secretion Management: Adequate  Voice Quality: WFL      Subjective  Pt confused and agitated through out session. She required max encouragement for participation in therapy and only oriented to self. She endorsed being on a regular diet at SNF and was on a soft and bite size diet during last admit.      Assessment  Current Method of Nutrition: NPO until cleared by speech pathology  Positioning: Semi-Gomez's (30-45 degrees)  Bolus Administration: SLP  O2 (LPM): 2 O2 Delivery Device: Silicone Nasal Cannula  Factor(s) Affecting Performance: Impaired endurance         Swallowing Trials:  Swallowing Trials  Thin Liquid (TN0): WFL  Pureed (PU4): WFL      Comments: No overt s/sx of aspiration noted with trials of thin liquids via straw sip and puree. Pt required 1:1 feeding for all trials. Vocal quality " "clear following the swallow. Suspect delayed AP propulsion as noted by suspected prolonged bolus holding. Pt declined further trials of puree and offerings of soft solids.      Clinical Impressions  Pt is limited by participation so unable to recommend advanced diet at this time. Recommend initiation of a puree/thin liquid diet with 1:1 feeding. Will trial upgraded textures as pt participates.     Recommendations  Diet Consistency: puree/thin liquids  Instrumentation: None indicated at this time  Supervision: 1:1 feeding with constant supervision  Positioning: Fully upright and midline during oral intake  Risk Management : Small bites/sips  Oral Care: Q2h         SLP Treatment Plan  Treatment Plan: Dysphagia Treatment  SLP Frequency: 3x Per Week  Estimated Duration: Until Therapy Goals Met      Anticipated Discharge Needs  Discharge Recommendations: Recommend post-acute placement for additional speech therapy services prior to discharge home   Therapy Recommendations Upon DC: Dysphagia Training, Community Re-Integration, Patient / Family / Caregiver Education        Patient / Family Goals  Patient / Family Goal #1: \"No more (PO)\"  Short Term Goals  Short Term Goal # 1: Pt will consume a puree/thin liquid diet with no overt s/sx of aspiration      CHAPIS Emerson   "

## 2023-09-07 NOTE — ASSESSMENT & PLAN NOTE
Multifactorial due to hyponatremia, UTI, dehydration, continue IV antibiotics, continue IV fluids.  Comfort measures

## 2023-09-07 NOTE — CARE PLAN
The patient is Unstable - High likelihood or risk of patient condition declining or worsening    Shift Goals  Clinical Goals: neuro checks; aspiration, sz, fall precs  Patient Goals: rest  Family Goals: SHAZIA    Progress made toward(s) clinical / shift goals:    Problem: Pain - Standard  Goal: Alleviation of pain or a reduction in pain to the patient’s comfort goal  Description: Target End Date:  Prior to discharge or change in level of care    Document on Vitals flowsheet    1.  Document pain using the appropriate pain scale per order or unit policy  2.  Educate and implement non-pharmacologic comfort measures (i.e. relaxation, distraction, massage, cold/heat therapy, etc.)  3.  Pain management medications as ordered  4.  Reassess pain after pain med administration per policy  5.  If opiods administered assess patient's response to pain medication is appropriate per POSS sedation scale  6.  Follow pain management plan developed in collaboration with patient and interdisciplinary team (including palliative care or pain specialists if applicable)  Outcome: Progressing    Problem: Skin Integrity  Goal: Skin integrity is maintained or improved  Description: Target End Date:  Prior to discharge or change in level of care    Document interventions on Skin Risk/Taye flowsheet groups and corresponding LDA    1.  Assess and monitor skin integrity, appearance and/or temperature  2.  Assess risk factors for impaired skin integrity and/or pressures ulcers  3.  Implement precautions to protect skin integrity in collaboration with interdisciplinary team  4.  Implement pressure ulcer prevention protocol if at risk for skin breakdown  5.  Confirm wound care consult if at risk for skin breakdown  6.  Ensure patient use of pressure relieving devices  (Low air loss bed, waffle overlay, heel protectors, ROHO cushion, etc)  Outcome: Progressing          Patient is not progressing towards the following goals:      Problem: Knowledge  Deficit - Standard  Goal: Patient and family/care givers will demonstrate understanding of plan of care, disease process/condition, diagnostic tests and medications  Description: Target End Date:  1-3 days or as soon as patient condition allows    Document in Patient Education    1.  Patient and family/caregiver oriented to unit, equipment, visitation policy and means for communicating concern  2.  Complete/review Learning Assessment  3.  Assess knowledge level of disease process/condition, treatment plan, diagnostic tests and medications  4.  Explain disease process/condition, treatment plan, diagnostic tests and medications  Outcome: Not Progressing

## 2023-09-07 NOTE — ASSESSMENT & PLAN NOTE
Terminal encephalopathy due to chronic osteomyelitis  Morphine PRN pain, dyspnea  Lorazepam PRN anxiety, agitation  APAP PRN fever  Lactulose, bisacodyl PRN constipation  Ondansetron PRN nausea, vomiting  Hospice Referral to [facility]

## 2023-09-07 NOTE — PROGRESS NOTES
4 Eyes Skin Assessment Completed by BERLIN raphael and BERLIN banegas.    Head shunt  Ears Redness and Non-Blanching  Nose WDL  Mouth dry  Neck WDL  Breast/Chest WDL  Shoulder Blades WDL  Spine WDL  (R) Arm/Elbow/Hand Swelling  (L) Arm/Elbow/Hand Swelling  Abdomen WDL  Groin Redness  Scrotum/Coccyx/Buttocks sacral wound-see picture  (R) Leg WDL  (L) Leg WDL  (R) Heel/Foot/Toe dry, flaky  (L) Heel/Foot/Toe dry flaky          Devices In Places Pulse Ox, Monson, and Nasal Cannula      Interventions In Place NC W/Ear Foams, Heel Mepilex, TAP System, Elbow Mepilex, and Low Air Loss Mattress    Possible Skin Injury Yes    Pictures Uploaded Into Epic Yes  Wound Consult Placed Yes  RN Wound Prevention Protocol Ordered Yes

## 2023-09-07 NOTE — PROGRESS NOTES
Hospital Medicine Daily Progress Note    Date of Service  9/7/2023    Chief Complaint  Sheyla Garcia is a 72 y.o. female admitted 9/6/2023 with AMS    Hospital Course  71 yo woman with schizophrenia, NPH with  shunt, COPD, HTN, hypothyroidism who was recently hospitalized for altered mental status and treated for UTI and neurosurgery also adjusted her  shunt for increased hydrocephalus on CT.  At SNF, patient became lethargic again and not willing to eat or drink.  Patient was admitted for possible UTI.  Goals of care were discussed with the , who was okay with making her CODE STATUS DNR and will consider hospice if the patient does not improve with medical treatment.    Interval Problem Update  Hypernatremia improving, 146 sodium  Upper extremity Doppler pending  Urine culture pending, on ceftriaxone  Is conversing some, not eating much.  She is not reporting any pain    I have discussed this patient's plan of care and discharge plan at IDT rounds today with Case Management, Nursing, Nursing leadership, and other members of the IDT team.    Consultants/Specialty  none    Code Status  DNAR/DNI    Disposition  The patient is not medically cleared for discharge to home or a post-acute facility.  Anticipate discharge to: hospice    I have placed the appropriate orders for post-discharge needs.    Review of Systems  Review of Systems   Constitutional:  Positive for malaise/fatigue.   Cardiovascular:  Negative for chest pain.   Gastrointestinal:  Negative for abdominal pain.   Musculoskeletal:  Negative for back pain and myalgias.   Neurological:  Positive for weakness.   Psychiatric/Behavioral:  Positive for memory loss.         Physical Exam  Temp:  [35.9 °C (96.6 °F)-37 °C (98.6 °F)] 35.9 °C (96.6 °F)  Pulse:  [] 59  Resp:  [16-19] 17  BP: (104-165)/(62-90) 127/63  SpO2:  [91 %-98 %] 96 %    Physical Exam  Vitals and nursing note reviewed.   Constitutional:       Appearance: She is ill-appearing.    HENT:      Head: Normocephalic.      Mouth/Throat:      Mouth: Mucous membranes are dry.   Eyes:      General:         Right eye: No discharge.         Left eye: No discharge.   Cardiovascular:      Rate and Rhythm: Normal rate and regular rhythm.   Pulmonary:      Effort: Pulmonary effort is normal. No respiratory distress.      Breath sounds: No wheezing or rales.   Abdominal:      Palpations: Abdomen is soft.      Tenderness: There is no abdominal tenderness.   Musculoskeletal:         General: Swelling present.      Cervical back: Neck supple.   Skin:     General: Skin is warm and dry.      Coloration: Skin is pale.   Neurological:      Mental Status: She is alert.      Comments: Oriented to self, mumbles, weakly moves her hands and legs with commands         Fluids    Intake/Output Summary (Last 24 hours) at 9/7/2023 1343  Last data filed at 9/7/2023 1125  Gross per 24 hour   Intake 0 ml   Output 650 ml   Net -650 ml       Laboratory  Recent Labs     09/06/23  0937 09/07/23  0609   WBC 11.2* 10.4   RBC 4.12* 3.86*   HEMOGLOBIN 12.2 11.5*   HEMATOCRIT 38.3 36.1*   MCV 93.0 93.5   MCH 29.6 29.8   MCHC 31.9* 31.9*   RDW 46.8 46.0   PLATELETCT 344 293   MPV 9.9 10.3     Recent Labs     09/06/23  0937 09/06/23  1359 09/07/23  0609   SODIUM 148* 146* 145   POTASSIUM 3.5* 3.6 3.2*   CHLORIDE 109 108 107   CO2 26 26 28   GLUCOSE 101* 90 95   BUN 11 10 8   CREATININE 0.42* 0.40* 0.35*   CALCIUM 9.1 9.0 8.6                   Imaging  CT-CTA CHEST PULMONARY ARTERY W/ RECONS   Final Result      1.  No evidence of central or segmental pulmonary embolus or other acute intrathoracic pathology   2.  15 mm LEFT adrenal nodule incompletely evaluated but unchanged. Most incidentally detected adrenal nodules are benign adenomas.            [id number]-3 VIEW   Final Result      Unremarkable appearance of the shunt tubing      DX-SPINE-ANY ONE VIEW   Final Result      Unremarkable appearance of the shunt tubing       DX-SKULL-LIMITED 3-   Final Result      Unremarkable appearance of the shunt tubing      DX-CHEST-PORTABLE (1 VIEW)   Final Result      No acute cardiopulmonary disease.      CT-HEAD W/O   Final Result      1.  Stable hydrocephalus with ventriculostomy catheter in similar position.   2.  No intracranial hemorrhage.   3.  Diffuse cortical atrophy.         US-EXTREMITY VENOUS UPPER UNILAT RIGHT    (Results Pending)        Assessment/Plan  * Acute metabolic encephalopathy- (present on admission)  Assessment & Plan  Multifactorial due to hyponatremia, UTI, dehydration, continue IV antibiotics, continue IV fluids.  Her  may consider hospice care if she does not improve as she has been progressively declining for many months    Elevated troponin- (present on admission)  Assessment & Plan  But no chest pain, EKG no acute changes, recheck troponin level    Hypernatremia- (present on admission)  Assessment & Plan  Due to decrease free water intake  Sodium level is improving.  Continue half NS infusion and monitor her oral intake.  Trend BMP    UTI (urinary tract infection)- (present on admission)  Assessment & Plan  Suspected UTI, patient is a poor historian  Started on IV ceftriaxone  Follow-up urine culture  Follow-up blood cultures    ACP (advance care planning)- (present on admission)  Assessment & Plan  Patient has been progressively declining for many months.   agreed to DNR.  He is considering hospice care if she does not improve    S/P  shunt- (present on admission)  Assessment & Plan  CT head showed stable hydrocephalus shunt appears to be in good position    Fibromyalgia- (present on admission)  Assessment & Plan  Continue supportive treatment    Hypokalemia- (present on admission)  Assessment & Plan  Low, replete and recheck level    Asthma- (present on admission)  Assessment & Plan  Continue RT per protocol  Not in acute exacerbation  Continue Spiriva    Essential hypertension- (present on  admission)  Assessment & Plan  She is intermittently hypertensive.  Continue metoprolol           VTE prophylaxis:    enoxaparin ppx      I have performed a physical exam and reviewed and updated ROS and Plan today (9/7/2023). In review of yesterday's note (9/6/2023), there are no changes except as documented above.

## 2023-09-07 NOTE — DISCHARGE PLANNING
Agency/Facility Name: [facility]  Spoke To: Analy  Outcome: DPA called facility to ask if pt resides at [place]. Per Analy, pt is a resident at facility and is welcome back if pt wants to go back     RN CM notified       Received Choice Form @: 1146  Agency/ Facility Name: [facility]  Referral Sent per Choice Form @: 1202

## 2023-09-07 NOTE — THERAPY
Occupational Therapy Contact Note    Patient Name: Sheyla Garcia  Age:  72 y.o., Sex:  female  Medical Record #: [id number]  Today's Date: 9/7/2023 09/07/23 0840   Initial Contact Note    Initial Contact Note Order Received and Verified, Occupational Therapy Evaluation in Progress with Full Report to Follow.   Prior Living Situation   Housing / Facility Skilled Nursing Facility   Interdisciplinary Plan of Care Collaboration   IDT Collaboration with  Nursing   Collaboration Comments Attempted to see patient for OT evaluation, pt unable to provide history/PLOF any attempt to assist patient was foudn to be painful even gentle PROM, per EMR pt is chronically bed bound. Confirmed with TCN, pt is a LTC SNF resident with plans to go back, anticipate no acute OT needs.

## 2023-09-08 ENCOUNTER — APPOINTMENT (OUTPATIENT)
Dept: RADIOLOGY | Facility: MEDICAL CENTER | Age: 73
DRG: 698 | End: 2023-09-08
Attending: HOSPITALIST
Payer: MEDICARE

## 2023-09-08 PROBLEM — R53.2 FUNCTIONAL QUADRIPLEGIA (HCC): Status: ACTIVE | Noted: 2023-09-08

## 2023-09-08 LAB
ANION GAP SERPL CALC-SCNC: 10 MMOL/L (ref 7–16)
BACTERIA UR CULT: ABNORMAL
BASOPHILS # BLD AUTO: 0.4 % (ref 0–1.8)
BASOPHILS # BLD: 0.04 K/UL (ref 0–0.12)
BUN SERPL-MCNC: 6 MG/DL (ref 8–22)
CALCIUM SERPL-MCNC: 8.3 MG/DL (ref 8.5–10.5)
CHLORIDE SERPL-SCNC: 105 MMOL/L (ref 96–112)
CO2 SERPL-SCNC: 26 MMOL/L (ref 20–33)
CREAT SERPL-MCNC: 0.3 MG/DL (ref 0.5–1.4)
EOSINOPHIL # BLD AUTO: 0.43 K/UL (ref 0–0.51)
EOSINOPHIL NFR BLD: 4.4 % (ref 0–6.9)
ERYTHROCYTE [DISTWIDTH] IN BLOOD BY AUTOMATED COUNT: 45.4 FL (ref 35.9–50)
GFR SERPLBLD CREATININE-BSD FMLA CKD-EPI: 113 ML/MIN/1.73 M 2
GLUCOSE SERPL-MCNC: 88 MG/DL (ref 65–99)
HCT VFR BLD AUTO: 34.1 % (ref 37–47)
HGB BLD-MCNC: 10.7 G/DL (ref 12–16)
IMM GRANULOCYTES # BLD AUTO: 0.04 K/UL (ref 0–0.11)
IMM GRANULOCYTES NFR BLD AUTO: 0.4 % (ref 0–0.9)
LYMPHOCYTES # BLD AUTO: 2.22 K/UL (ref 1–4.8)
LYMPHOCYTES NFR BLD: 22.8 % (ref 22–41)
MAGNESIUM SERPL-MCNC: 1.6 MG/DL (ref 1.5–2.5)
MCH RBC QN AUTO: 29.2 PG (ref 27–33)
MCHC RBC AUTO-ENTMCNC: 31.4 G/DL (ref 32.2–35.5)
MCV RBC AUTO: 93.2 FL (ref 81.4–97.8)
MONOCYTES # BLD AUTO: 0.9 K/UL (ref 0–0.85)
MONOCYTES NFR BLD AUTO: 9.2 % (ref 0–13.4)
NEUTROPHILS # BLD AUTO: 6.1 K/UL (ref 1.82–7.42)
NEUTROPHILS NFR BLD: 62.8 % (ref 44–72)
NRBC # BLD AUTO: 0 K/UL
NRBC BLD-RTO: 0 /100 WBC (ref 0–0.2)
PHOSPHATE SERPL-MCNC: 2.8 MG/DL (ref 2.5–4.5)
PLATELET # BLD AUTO: 266 K/UL (ref 164–446)
PMV BLD AUTO: 10.2 FL (ref 9–12.9)
POTASSIUM SERPL-SCNC: 3.6 MMOL/L (ref 3.6–5.5)
RBC # BLD AUTO: 3.66 M/UL (ref 4.2–5.4)
SIGNIFICANT IND 70042: ABNORMAL
SITE SITE: ABNORMAL
SODIUM SERPL-SCNC: 141 MMOL/L (ref 135–145)
SOURCE SOURCE: ABNORMAL
WBC # BLD AUTO: 9.7 K/UL (ref 4.8–10.8)

## 2023-09-08 PROCEDURE — 99232 SBSQ HOSP IP/OBS MODERATE 35: CPT | Performed by: INTERNAL MEDICINE

## 2023-09-08 PROCEDURE — 84100 ASSAY OF PHOSPHORUS: CPT

## 2023-09-08 PROCEDURE — 80048 BASIC METABOLIC PNL TOTAL CA: CPT

## 2023-09-08 PROCEDURE — 36415 COLL VENOUS BLD VENIPUNCTURE: CPT

## 2023-09-08 PROCEDURE — 700111 HCHG RX REV CODE 636 W/ 250 OVERRIDE (IP): Performed by: HOSPITALIST

## 2023-09-08 PROCEDURE — 700111 HCHG RX REV CODE 636 W/ 250 OVERRIDE (IP): Mod: JZ | Performed by: INTERNAL MEDICINE

## 2023-09-08 PROCEDURE — 700102 HCHG RX REV CODE 250 W/ 637 OVERRIDE(OP): Performed by: HOSPITALIST

## 2023-09-08 PROCEDURE — 700101 HCHG RX REV CODE 250: Performed by: INTERNAL MEDICINE

## 2023-09-08 PROCEDURE — 85025 COMPLETE CBC W/AUTO DIFF WBC: CPT

## 2023-09-08 PROCEDURE — 83735 ASSAY OF MAGNESIUM: CPT

## 2023-09-08 PROCEDURE — A9270 NON-COVERED ITEM OR SERVICE: HCPCS | Performed by: HOSPITALIST

## 2023-09-08 PROCEDURE — 770004 HCHG ROOM/CARE - ONCOLOGY PRIVATE *

## 2023-09-08 PROCEDURE — 700101 HCHG RX REV CODE 250: Performed by: HOSPITALIST

## 2023-09-08 RX ORDER — MORPHINE SULFATE 4 MG/ML
1-4 INJECTION INTRAVENOUS
Status: DISCONTINUED | OUTPATIENT
Start: 2023-09-08 | End: 2023-09-09

## 2023-09-08 RX ORDER — ATROPINE SULFATE 10 MG/ML
2 SOLUTION/ DROPS OPHTHALMIC EVERY 4 HOURS PRN
Status: DISCONTINUED | OUTPATIENT
Start: 2023-09-08 | End: 2023-09-09

## 2023-09-08 RX ADMIN — MORPHINE SULFATE 4 MG: 4 INJECTION, SOLUTION INTRAMUSCULAR; INTRAVENOUS at 18:36

## 2023-09-08 RX ADMIN — SODIUM HYPOCHLORITE 473 ML: 1.25 SOLUTION TOPICAL at 21:47

## 2023-09-08 RX ADMIN — MORPHINE SULFATE 4 MG: 4 INJECTION, SOLUTION INTRAMUSCULAR; INTRAVENOUS at 15:04

## 2023-09-08 RX ADMIN — RISPERIDONE 1 MG: 0.5 TABLET ORAL at 17:16

## 2023-09-08 RX ADMIN — TIZANIDINE 4 MG: 4 TABLET ORAL at 17:16

## 2023-09-08 RX ADMIN — CEFTRIAXONE SODIUM 1000 MG: 10 INJECTION, POWDER, FOR SOLUTION INTRAVENOUS at 04:45

## 2023-09-08 ASSESSMENT — ENCOUNTER SYMPTOMS
BACK PAIN: 0
WEAKNESS: 1
MYALGIAS: 0
ABDOMINAL PAIN: 0
MEMORY LOSS: 1

## 2023-09-08 ASSESSMENT — PAIN DESCRIPTION - PAIN TYPE: TYPE: ACUTE PAIN

## 2023-09-08 NOTE — CARE PLAN
Problem: Pain - Standard  Goal: Alleviation of pain or a reduction in pain to the patient’s comfort goal  Description: Target End Date:  Prior to discharge or change in level of care    Document on Vitals flowsheet    1.  Document pain using the appropriate pain scale per order or unit policy  2.  Educate and implement non-pharmacologic comfort measures (i.e. relaxation, distraction, massage, cold/heat therapy, etc.)  3.  Pain management medications as ordered  4.  Reassess pain after pain med administration per policy  5.  If opiods administered assess patient's response to pain medication is appropriate per POSS sedation scale  6.  Follow pain management plan developed in collaboration with patient and interdisciplinary team (including palliative care or pain specialists if applicable)  Outcome: Progressing   The patient is Watcher - Medium risk of patient condition declining or worsening    Shift Goals  Clinical Goals: monitor pain  Patient Goals: SHAZIA  Family Goals: hospice comfort care    Progress made toward(s) clinical / shift goals:      Patient is not progressing towards the following goals:

## 2023-09-08 NOTE — PROGRESS NOTES
Pt overall unresponsive to questioning, only replied hello at the very beginning of shift; opens right eye when being repositioned, appears to be in pain with turning, but falls right back to sleep; no PO intake so far this shift, too lethargic; doesn't withdraw to painful stimuli, nonpurposeful movement in arms, needs 2 person assist with repositioning; dry mucous membranes, oral care done with Q 2 hour turns.

## 2023-09-08 NOTE — PROGRESS NOTES
Hospital Medicine Daily Progress Note    Date of Service  9/8/2023    Chief Complaint  Sheyla Garcia is a 72 y.o. female admitted 9/6/2023 with AMS    Hospital Course  73 yo woman with schizophrenia, NPH with  shunt, COPD, HTN, hypothyroidism who was recently hospitalized for altered mental status and treated for UTI and neurosurgery also adjusted her  shunt for increased hydrocephalus on CT.  At SNF, patient became lethargic again and not willing to eat or drink.  Patient was admitted for possible UTI.  Goals of care were discussed with the , who was okay with making her CODE STATUS DNR and will consider hospice if the patient does not improve with medical treatment.    Interval Problem Update  Hypernatremia resolved.  Her sacral wound shows bone is exposed.  Patient is eating very little and interacting very little with care staff.  I discussed goals of care with the patient and the  regarding hospice and comfort care.  The  mentions that several doctors including her SNF doctor has mentioned hospice care.  He is agreeable to transitioning her to comfort care and back to [place] with hospice.  I discussed with case management.    I have discussed this patient's plan of care and discharge plan at IDT rounds today with Case Management, Nursing, Nursing leadership, and other members of the IDT team.    Consultants/Specialty  none    Code Status  Comfort Care/DNR    Disposition  The patient is medically cleared for discharge to home or a post-acute facility.  Anticipate discharge to: hospice    I have placed the appropriate orders for post-discharge needs.    Review of Systems  Review of Systems   Constitutional:  Positive for malaise/fatigue.   Cardiovascular:  Negative for chest pain.   Gastrointestinal:  Negative for abdominal pain.   Musculoskeletal:  Negative for back pain and myalgias.   Neurological:  Positive for weakness.   Psychiatric/Behavioral:  Positive for memory loss.          Physical Exam  Temp:  [36.6 °C (97.9 °F)-37.2 °C (99 °F)] 36.6 °C (97.9 °F)  Pulse:  [] 89  Resp:  [17-18] 18  BP: ()/(50-72) 133/72  SpO2:  [93 %-97 %] 95 %    Physical Exam  Vitals and nursing note reviewed.   Constitutional:       Appearance: She is ill-appearing.   HENT:      Head: Normocephalic.      Mouth/Throat:      Mouth: Mucous membranes are dry.   Eyes:      General:         Right eye: No discharge.         Left eye: No discharge.   Cardiovascular:      Rate and Rhythm: Normal rate and regular rhythm.   Pulmonary:      Effort: Pulmonary effort is normal. No respiratory distress.      Breath sounds: No wheezing or rales.   Abdominal:      Palpations: Abdomen is soft.      Tenderness: There is no abdominal tenderness.   Musculoskeletal:         General: Swelling present.      Cervical back: Neck supple.   Skin:     General: Skin is warm and dry.      Coloration: Skin is pale.      Comments: Sacral wound photos reviewed   Neurological:      Mental Status: She is alert.      Comments: Oriented to self and , occasionally answers to questions, weakly moves her hands and legs with commands         Fluids    Intake/Output Summary (Last 24 hours) at 9/8/2023 1650  Last data filed at 9/8/2023 0500  Gross per 24 hour   Intake 0 ml   Output 400 ml   Net -400 ml       Laboratory  Recent Labs     09/06/23  0937 09/07/23  0609 09/08/23  0359   WBC 11.2* 10.4 9.7   RBC 4.12* 3.86* 3.66*   HEMOGLOBIN 12.2 11.5* 10.7*   HEMATOCRIT 38.3 36.1* 34.1*   MCV 93.0 93.5 93.2   MCH 29.6 29.8 29.2   MCHC 31.9* 31.9* 31.4*   RDW 46.8 46.0 45.4   PLATELETCT 344 293 266   MPV 9.9 10.3 10.2     Recent Labs     09/06/23  1359 09/07/23  0609 09/08/23  0359   SODIUM 146* 145 141   POTASSIUM 3.6 3.2* 3.6   CHLORIDE 108 107 105   CO2 26 28 26   GLUCOSE 90 95 88   BUN 10 8 6*   CREATININE 0.40* 0.35* 0.30*   CALCIUM 9.0 8.6 8.3*                   Imaging  CT-CTA CHEST PULMONARY ARTERY W/ RECONS   Final Result      1.   No evidence of central or segmental pulmonary embolus or other acute intrathoracic pathology   2.  15 mm LEFT adrenal nodule incompletely evaluated but unchanged. Most incidentally detected adrenal nodules are benign adenomas.            [id number] VIEW   Final Result      Unremarkable appearance of the shunt tubing      DX-SPINE-ANY ONE VIEW   Final Result      Unremarkable appearance of the shunt tubing      DX-SKULL-LIMITED 3-   Final Result      Unremarkable appearance of the shunt tubing      DX-CHEST-PORTABLE (1 VIEW)   Final Result      No acute cardiopulmonary disease.      CT-HEAD W/O   Final Result      1.  Stable hydrocephalus with ventriculostomy catheter in similar position.   2.  No intracranial hemorrhage.   3.  Diffuse cortical atrophy.              Assessment/Plan  * Acute metabolic encephalopathy- (present on admission)  Assessment & Plan  Multifactorial due to hyponatremia, UTI, dehydration, continue IV antibiotics, continue IV fluids.  She will transition to comfort care/hospice    Functional quadriplegia (HCC)  Assessment & Plan  Bedbound, transitioning to comfort care    Elevated troponin- (present on admission)  Assessment & Plan  But no chest pain, EKG no acute changes, troponin down trended    Hypernatremia- (present on admission)  Assessment & Plan  Due to decrease free water intake  Transition to comfort care    UTI (urinary tract infection)- (present on admission)  Assessment & Plan  Stop antibiotics, comfort care    ACP (advance care planning)- (present on admission)  Assessment & Plan  Patient has been progressively declining for many months.   agreed to DNR.  I discussed with the patient and  today,  is agreeable to comfort care and hospice.    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, stage IV  Patient transitioning to comfort care      S/P  shunt- (present on admission)  Assessment & Plan  CT head  showed stable hydrocephalus shunt appears to be in good position    Fibromyalgia- (present on admission)  Assessment & Plan  Continue supportive treatment, comfort measures    Hypokalemia- (present on admission)  Assessment & Plan  Comfort care    Asthma- (present on admission)  Assessment & Plan  Continue RT per protocol  Not in acute exacerbation  Continue Spiriva    Essential hypertension- (present on admission)  Assessment & Plan  Continue metoprolol           VTE prophylaxis: Comfort measures    I have performed a physical exam and reviewed and updated ROS and Plan today (9/8/2023). In review of yesterday's note (9/7/2023), there are no changes except as documented above.

## 2023-09-08 NOTE — DOCUMENTATION QUERY
[facility]                                                                       Query Response Note      PATIENT:               MENDOZA ROMAN  ACCT #:                  [id number]  MRN:                     [id number]  :                      1950  ADMIT DATE:       2023 9:03 AM  DISCH DATE:          RESPONDING  PROVIDER #:        [id number]           QUERY TEXT:    Documentation in the wound team notes indicate that this patient is being treated for ulcer of the following site:     Pressure injury: stage 4 Coccyx; Sacrum    Can the diagnosis of ulcer be further clarified as to type/etiology of the wound, location, and if present on admission (POA) based on the clinical indicators?      The patient's Clinical Indicators include:  - Findings:  Wound team assessment 23:    Pressure injury: stage 4 Coccyx; Sacrum (Active) POA    - Treatments:  veraflo, 1/4 strength dakins, Non-selectively Debrided, offloading     - Risk factors:  dehydration, UTI, hydrocephalus, encephalopathy, fibromyalgia     Thank You,  Maria Elena Ewing RN  Clinical Documentation   [email or web address]  Connect via [employer]  Options provided:   -- Pressure injury: stage 4 Coccyx; Sacrum present on admission   -- Pressure injury: stage 4 Coccyx; Sacrum not present on admission   -- Wound is not a Pressure Ulcer/DTI and is due to ____________   -- Other explanation, Please specify other explanation   -- Unable to Determine      Query created by: Maria Elena Ewing on 2023 5:45 PM    RESPONSE TEXT:    Pressure injury: stage 4 Coccyx; Sacrum present on admission          Electronically signed by:  MARCELINO CHAVEZ MD 2023 4:49 PM

## 2023-09-08 NOTE — THERAPY
Speech Language Therapy Contact Note    Patient Name: Sheyla Garcia  Age:  72 y.o., Sex:  female  Medical Record #: [id number]  Today's Date: 9/8/2023 09/08/23 0937   Treatment Variance   Reason For Missed Therapy Non-Medical - Patient Refused   Interdisciplinary Plan of Care Collaboration   IDT Collaboration with  Nursing   Collaboration Comments Attempted to see pt x2 for dysphagia therapy. Pt intermittently responding to SLPs questions and not agreeable to PO trials at this time. Recommend continuation of puree/thin liquids.

## 2023-09-08 NOTE — CARE PLAN
The patient is Unstable - High likelihood or risk of patient condition declining or worsening    Shift Goals  Clinical Goals: neuro checks; safety; monitor wound vac system; turn q 2 hours; sz precs; monitor vital signs  Patient Goals: SHAZIA  Family Goals: SHAZIA    Progress made toward(s) clinical / shift goals:    Problem: Pain - Standard  Goal: Alleviation of pain or a reduction in pain to the patient’s comfort goal  Description: Target End Date:  Prior to discharge or change in level of care    Document on Vitals flowsheet    1.  Document pain using the appropriate pain scale per order or unit policy  2.  Educate and implement non-pharmacologic comfort measures (i.e. relaxation, distraction, massage, cold/heat therapy, etc.)  3.  Pain management medications as ordered  4.  Reassess pain after pain med administration per policy  5.  If opiods administered assess patient's response to pain medication is appropriate per POSS sedation scale  6.  Follow pain management plan developed in collaboration with patient and interdisciplinary team (including palliative care or pain specialists if applicable)  Outcome: Progressing     Problem: Skin Integrity  Goal: Skin integrity is maintained or improved  Description: Target End Date:  Prior to discharge or change in level of care    Document interventions on Skin Risk/Taye flowsheet groups and corresponding LDA    1.  Assess and monitor skin integrity, appearance and/or temperature  2.  Assess risk factors for impaired skin integrity and/or pressures ulcers  3.  Implement precautions to protect skin integrity in collaboration with interdisciplinary team  4.  Implement pressure ulcer prevention protocol if at risk for skin breakdown  5.  Confirm wound care consult if at risk for skin breakdown  6.  Ensure patient use of pressure relieving devices  (Low air loss bed, waffle overlay, heel protectors, ROHO cushion, etc)  Outcome: Progressing     Problem: Fall Risk  Goal: Patient  will remain free from falls  Description: Target End Date:  Prior to discharge or change in level of care    Document interventions on the [place] Subramanian Fall Risk Assessment    1.  Assess for fall risk factors  2.  Implement fall precautions  Outcome: Progressing       Patient is not progressing towards the following goals:      Problem: Knowledge Deficit - Standard  Goal: Patient and family/care givers will demonstrate understanding of plan of care, disease process/condition, diagnostic tests and medications  Description: Target End Date:  1-3 days or as soon as patient condition allows    Document in Patient Education    1.  Patient and family/caregiver oriented to unit, equipment, visitation policy and means for communicating concern  2.  Complete/review Learning Assessment  3.  Assess knowledge level of disease process/condition, treatment plan, diagnostic tests and medications  4.  Explain disease process/condition, treatment plan, diagnostic tests and medications  Outcome: Not Progressing

## 2023-09-09 PROBLEM — N30.00 ACUTE CYSTITIS WITHOUT HEMATURIA: Status: ACTIVE | Noted: 2023-09-06

## 2023-09-09 PROBLEM — Z51.5 COMFORT MEASURES ONLY STATUS: Status: ACTIVE | Noted: 2023-08-22

## 2023-09-09 PROCEDURE — 99232 SBSQ HOSP IP/OBS MODERATE 35: CPT | Performed by: STUDENT IN AN ORGANIZED HEALTH CARE EDUCATION/TRAINING PROGRAM

## 2023-09-09 PROCEDURE — 770004 HCHG ROOM/CARE - ONCOLOGY PRIVATE *

## 2023-09-09 RX ORDER — LORAZEPAM 2 MG/ML
1 INJECTION INTRAMUSCULAR
Status: DISCONTINUED | OUTPATIENT
Start: 2023-09-09 | End: 2023-09-11 | Stop reason: HOSPADM

## 2023-09-09 RX ORDER — MORPHINE SULFATE 100 MG/5ML
10 SOLUTION ORAL
Status: DISCONTINUED | OUTPATIENT
Start: 2023-09-09 | End: 2023-09-11 | Stop reason: HOSPADM

## 2023-09-09 RX ORDER — CARBOXYMETHYLCELLULOSE SODIUM 5 MG/ML
1 SOLUTION/ DROPS OPHTHALMIC PRN
Status: DISCONTINUED | OUTPATIENT
Start: 2023-09-09 | End: 2023-09-11 | Stop reason: HOSPADM

## 2023-09-09 RX ORDER — MORPHINE SULFATE 100 MG/5ML
20 SOLUTION ORAL
Status: DISCONTINUED | OUTPATIENT
Start: 2023-09-09 | End: 2023-09-11 | Stop reason: HOSPADM

## 2023-09-09 RX ORDER — ACETAMINOPHEN 325 MG/1
650 TABLET ORAL EVERY 4 HOURS PRN
Status: DISCONTINUED | OUTPATIENT
Start: 2023-09-09 | End: 2023-09-11 | Stop reason: HOSPADM

## 2023-09-09 RX ORDER — IPRATROPIUM BROMIDE AND ALBUTEROL SULFATE 2.5; .5 MG/3ML; MG/3ML
3 SOLUTION RESPIRATORY (INHALATION)
Status: DISCONTINUED | OUTPATIENT
Start: 2023-09-09 | End: 2023-09-11 | Stop reason: HOSPADM

## 2023-09-09 RX ORDER — LACTULOSE 20 G/30ML
10 SOLUTION ORAL
Status: DISCONTINUED | OUTPATIENT
Start: 2023-09-09 | End: 2023-09-11 | Stop reason: HOSPADM

## 2023-09-09 RX ORDER — ONDANSETRON 2 MG/ML
4 INJECTION INTRAMUSCULAR; INTRAVENOUS EVERY 4 HOURS PRN
Status: DISCONTINUED | OUTPATIENT
Start: 2023-09-09 | End: 2023-09-11 | Stop reason: HOSPADM

## 2023-09-09 RX ORDER — ACETAMINOPHEN 650 MG/1
650 SUPPOSITORY RECTAL EVERY 4 HOURS PRN
Status: DISCONTINUED | OUTPATIENT
Start: 2023-09-09 | End: 2023-09-11 | Stop reason: HOSPADM

## 2023-09-09 RX ORDER — LORAZEPAM 2 MG/ML
1 CONCENTRATE ORAL
Status: DISCONTINUED | OUTPATIENT
Start: 2023-09-09 | End: 2023-09-11 | Stop reason: HOSPADM

## 2023-09-09 RX ORDER — ONDANSETRON 4 MG/1
4 TABLET, ORALLY DISINTEGRATING ORAL EVERY 4 HOURS PRN
Status: DISCONTINUED | OUTPATIENT
Start: 2023-09-09 | End: 2023-09-11 | Stop reason: HOSPADM

## 2023-09-09 RX ORDER — BISACODYL 10 MG
10 SUPPOSITORY, RECTAL RECTAL
Status: DISCONTINUED | OUTPATIENT
Start: 2023-09-09 | End: 2023-09-11 | Stop reason: HOSPADM

## 2023-09-09 ASSESSMENT — PAIN DESCRIPTION - PAIN TYPE
TYPE: ACUTE PAIN
TYPE: ACUTE PAIN

## 2023-09-09 NOTE — DISCHARGE PLANNING
Per hospitalist request- Choice form sent to [facility] for [facility]. RNCM spoke with [facility] [place]. Per Lion- Patient can return to [place] on comfort care with hospice services.

## 2023-09-09 NOTE — PROGRESS NOTES
4 Eyes Skin Assessment Completed by Fatuma JARAMILLO RN and Karen BENNETT RN.    Head WDL  Ears WDL  Nose WDL  Mouth WDL  Neck WDL  Breast/Chest WDL  Shoulder Blades WDL  Spine WDL  (R) Arm/Elbow/Hand WDL  (L) Arm/Elbow/Hand WDL  Abdomen WDL  Groin WDL  Scrotum/Coccyx/Buttocks wound vac in place for sacrococcygeal  wound  (R) Leg WDL  (L) Leg WDL  (R) Heel/Foot/Toe dry, flaky  (L) Heel/Foot/Toe dry, flaky          Devices In Places Monson and Nasal Cannula, wound vac      Interventions In Place NC W/Ear Foams, Heel Float Boots, TAP System, Pillows, Q2 Turns, and Heels Loaded W/Pillows    Possible Skin Injury Yes, wound team following    Pictures Uploaded Into Epic N/A  Wound Consult Placed N/A  RN Wound Prevention Protocol Ordered Yes

## 2023-09-09 NOTE — CARE PLAN
The patient is Watcher - Medium risk of patient condition declining or worsening    Shift Goals  Clinical Goals: comfort  Patient Goals: SHAZIA  Family Goals: hospice comfort care    Progress made toward(s) clinical / shift goals:      Pt only responding to pain at this time, unable to answer questions. Pt appears to be resting comfortably at this time.     Problem: Knowledge Deficit - Comfort Care  Goal: Patient and family/care givers will demonstrate understanding of dying process and grieving  Outcome: Progressing     Problem: Psychosocial - Comfort Care  Goal: Patient's level of anxiety will decrease  Outcome: Progressing     Problem: Discharge Planning - Comfort Care  Goal: Patient's continuum of care needs are met  Outcome: Progressing       Patient is not progressing towards the following goals:

## 2023-09-09 NOTE — DISCHARGE PLANNING
Received choice form @: 0618  Agency/Facility name: [facility]  Sent referral per choice form @: 6801

## 2023-09-09 NOTE — CARE PLAN
The patient is Stable - Low risk of patient condition declining or worsening    Shift Goals  Clinical Goals: pain control, comfort  Patient Goals: SHAZIA  Family Goals: hospice comfort care    Progress made toward(s) clinical / shift goals:    Problem: Fall Risk  Goal: Patient will remain free from falls  Outcome: Progressing  Note: Bed is locked and in lowest position, call light and personal belongings within reach, bed alarm in place.        Patient is not progressing towards the following goals:

## 2023-09-09 NOTE — PROGRESS NOTES
Report given to susanna Hanna. Patient transported via bed with wounc vac and oxygen. Stable on transfer

## 2023-09-10 LAB
BACTERIA BLD CULT: ABNORMAL
BACTERIA BLD CULT: ABNORMAL
SIGNIFICANT IND 70042: ABNORMAL
SITE SITE: ABNORMAL
SOURCE SOURCE: ABNORMAL

## 2023-09-10 PROCEDURE — 99231 SBSQ HOSP IP/OBS SF/LOW 25: CPT | Performed by: STUDENT IN AN ORGANIZED HEALTH CARE EDUCATION/TRAINING PROGRAM

## 2023-09-10 PROCEDURE — 770004 HCHG ROOM/CARE - ONCOLOGY PRIVATE *

## 2023-09-10 RX ORDER — MORPHINE SULFATE 100 MG/5ML
10-20 SOLUTION ORAL
Qty: 30 ML | Refills: 0 | Status: SHIPPED
Start: 2023-09-10 | End: 2023-09-17

## 2023-09-10 RX ORDER — BISACODYL 10 MG
10 SUPPOSITORY, RECTAL RECTAL
Refills: 0 | Status: SHIPPED
Start: 2023-09-10

## 2023-09-10 RX ORDER — LORAZEPAM 2 MG/ML
1 CONCENTRATE ORAL
Qty: 30 ML | Status: SHIPPED
Start: 2023-09-10 | End: 2023-09-17

## 2023-09-10 RX ORDER — SODIUM HYPOCHLORITE 1.25 MG/ML
SOLUTION TOPICAL DAILY
Status: DISCONTINUED | OUTPATIENT
Start: 2023-09-10 | End: 2023-09-11 | Stop reason: HOSPADM

## 2023-09-10 RX ORDER — CARBOXYMETHYLCELLULOSE SODIUM 5 MG/ML
1 SOLUTION/ DROPS OPHTHALMIC PRN
Status: SHIPPED
Start: 2023-09-10

## 2023-09-10 RX ORDER — ONDANSETRON 4 MG/1
4 TABLET, ORALLY DISINTEGRATING ORAL EVERY 4 HOURS PRN
Qty: 10 TABLET | Refills: 0 | Status: SHIPPED
Start: 2023-09-10

## 2023-09-10 RX ORDER — LACTULOSE 20 G/30ML
10 SOLUTION ORAL
Qty: 450 ML | Status: SHIPPED
Start: 2023-09-10

## 2023-09-10 RX ORDER — IPRATROPIUM BROMIDE AND ALBUTEROL SULFATE 2.5; .5 MG/3ML; MG/3ML
3 SOLUTION RESPIRATORY (INHALATION) EVERY 4 HOURS PRN
Status: SHIPPED
Start: 2023-09-10

## 2023-09-10 RX ORDER — SODIUM HYPOCHLORITE 1.25 MG/ML
SOLUTION TOPICAL 2 TIMES DAILY
Status: DISCONTINUED | OUTPATIENT
Start: 2023-09-10 | End: 2023-09-10

## 2023-09-10 RX ORDER — ACETAMINOPHEN 650 MG/1
650 SUPPOSITORY RECTAL EVERY 4 HOURS PRN
Refills: 0 | Status: SHIPPED
Start: 2023-09-10

## 2023-09-10 ASSESSMENT — PAIN DESCRIPTION - PAIN TYPE
TYPE: ACUTE PAIN
TYPE: ACUTE PAIN

## 2023-09-10 ASSESSMENT — ENCOUNTER SYMPTOMS
NAUSEA: 0
EYE PAIN: 0
HEADACHES: 0
MYALGIAS: 0
SHORTNESS OF BREATH: 0
NECK PAIN: 0
SORE THROAT: 0
SINUS PAIN: 0
BACK PAIN: 0
FLANK PAIN: 0

## 2023-09-10 ASSESSMENT — COGNITIVE AND FUNCTIONAL STATUS - GENERAL
SUGGESTED CMS G CODE MODIFIER MOBILITY: CN
WALKING IN HOSPITAL ROOM: TOTAL
EATING MEALS: TOTAL
DRESSING REGULAR LOWER BODY CLOTHING: TOTAL
STANDING UP FROM CHAIR USING ARMS: TOTAL
DAILY ACTIVITIY SCORE: 6
MOVING TO AND FROM BED TO CHAIR: UNABLE
MOBILITY SCORE: 6
SUGGESTED CMS G CODE MODIFIER DAILY ACTIVITY: CN
DRESSING REGULAR UPPER BODY CLOTHING: TOTAL
TOILETING: TOTAL
TURNING FROM BACK TO SIDE WHILE IN FLAT BAD: UNABLE
PERSONAL GROOMING: TOTAL
MOVING FROM LYING ON BACK TO SITTING ON SIDE OF FLAT BED: UNABLE
HELP NEEDED FOR BATHING: TOTAL
CLIMB 3 TO 5 STEPS WITH RAILING: TOTAL

## 2023-09-10 NOTE — WOUND TEAM
Received call from RN, VAC dressing still in place.  Vac order and machine discontinued at tome of comfort care status.  Orders updated for dakin's wet to dry once daily.  No additional wound care needs at this time.

## 2023-09-10 NOTE — PROGRESS NOTES
"Hospital Medicine Daily Progress Note    Date of Service  9/10/2023    Chief Complaint  Sheyla Garcia is a 72 y.o. female admitted 9/6/2023 with AMS    Hospital Course  71 yo woman with schizophrenia, NPH with  shunt, COPD, HTN, hypothyroidism who was recently hospitalized for altered mental status and treated for UTI and neurosurgery also adjusted her  shunt for increased hydrocephalus on CT.  At SNF, patient became lethargic again and not willing to eat or drink.  Patient was admitted for possible UTI.  Goals of care were discussed with the , who was okay with making her CODE STATUS DNR and will consider hospice if the patient does not improve with medical treatment.    Interval Problem Update    ODALYS ON  MAR reviewed, no medications received in past 24.  She is awake and states she got \"advil\" today.  She requests to know what doses of what medications she received as her treatment plan is \"computerized and very secret to my other doctor.\"  She denies pain, dyspnea, N/V.  Advised she will return to [place] tomorrow.  She was appreciative of my visit and care.  RNCM to contact [facility] today if possible for coordination of enrollment and coordinate transportation to [place] for tomorrow.    I have discussed this patient's plan of care and discharge plan at IDT rounds today with Case Management, Nursing, Nursing leadership, and other members of the IDT team.    Consultants/Specialty  none    Code Status  Comfort Care/DNR    Disposition  The patient is medically cleared for discharge to home or a post-acute facility.  Anticipate discharge to: skilled nursing facility    I have placed the appropriate orders for post-discharge needs.    Review of Systems  Review of Systems   Unable to perform ROS: Mental acuity   HENT:  Negative for sinus pain and sore throat.    Eyes:  Negative for pain.   Respiratory:  Negative for shortness of breath.    Cardiovascular:  Negative for chest pain. "   Gastrointestinal:  Negative for nausea.   Genitourinary:  Negative for flank pain.   Musculoskeletal:  Negative for back pain, joint pain, myalgias and neck pain.   Neurological:  Negative for headaches.        Physical Exam  Temp:  [36.4 °C (97.5 °F)] 36.4 °C (97.5 °F)  Pulse:  [90] 90  Resp:  [18] 18  BP: (177)/(92) 177/92  SpO2:  [92 %] 92 %    Physical Exam  Vitals and nursing note reviewed.   Constitutional:       General: She is not in acute distress.     Appearance: She is ill-appearing (Chronically). She is not toxic-appearing or diaphoretic.   HENT:      Head: Normocephalic.      Mouth/Throat:      Mouth: Mucous membranes are dry.   Pulmonary:      Effort: Pulmonary effort is normal. No respiratory distress.   Genitourinary:     Comments: +Monson. Urine yellow / clear.  Musculoskeletal:      Cervical back: Neck supple.   Skin:     General: Skin is warm and dry.   Neurological:      Mental Status: She is alert.      Comments: Conversant   Psychiatric:         Attention and Perception: Attention normal.         Mood and Affect: Mood normal. Affect is blunt.         Behavior: Behavior is cooperative.         Thought Content: Thought content is paranoid.         Cognition and Memory: She exhibits impaired recent memory.         Fluids    Intake/Output Summary (Last 24 hours) at 9/10/2023 1438  Last data filed at 9/10/2023 0428  Gross per 24 hour   Intake --   Output 1100 ml   Net -1100 ml         Laboratory  Recent Labs     09/08/23  0359   WBC 9.7   RBC 3.66*   HEMOGLOBIN 10.7*   HEMATOCRIT 34.1*   MCV 93.2   MCH 29.2   MCHC 31.4*   RDW 45.4   PLATELETCT 266   MPV 10.2       Recent Labs     09/08/23  0359   SODIUM 141   POTASSIUM 3.6   CHLORIDE 105   CO2 26   GLUCOSE 88   BUN 6*   CREATININE 0.30*   CALCIUM 8.3*                     Imaging  CT-CTA CHEST PULMONARY ARTERY W/ RECONS   Final Result      1.  No evidence of central or segmental pulmonary embolus or other acute intrathoracic pathology   2.  15 mm  LEFT adrenal nodule incompletely evaluated but unchanged. Most incidentally detected adrenal nodules are benign adenomas.            [id number] VIEW   Final Result      Unremarkable appearance of the shunt tubing      DX-SPINE-ANY ONE VIEW   Final Result      Unremarkable appearance of the shunt tubing      DX-SKULL-LIMITED 3-   Final Result      Unremarkable appearance of the shunt tubing      DX-CHEST-PORTABLE (1 VIEW)   Final Result      No acute cardiopulmonary disease.      CT-HEAD W/O   Final Result      1.  Stable hydrocephalus with ventriculostomy catheter in similar position.   2.  No intracranial hemorrhage.   3.  Diffuse cortical atrophy.                Assessment/Plan  * Acute metabolic encephalopathy- (present on admission)  Assessment & Plan  Multifactorial due to hyponatremia, UTI, dehydration, continue IV antibiotics, continue IV fluids.  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  Morphine PRN pain, dyspnea  Lorazepam PRN anxiety, agitation  APAP PRN fever  Lactulose, bisacodyl PRN constipation  Ondansetron PRN nausea, vomiting  Hospice Referral to [facility]    Functional quadriplegia (HCC)- (present on admission)  Assessment & Plan  Comfort measures    Elevated troponin- (present on admission)  Assessment & Plan  But no chest pain, EKG no acute changes, troponin down trended  Comfort measures    Hypernatremia- (present on admission)  Assessment & Plan  Due to decreased free water intake  Comfort measures    Acute cystitis without hematuria- (present on admission)  Assessment & Plan  Comfort measures    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, stage IV  Comfort measures      S/P  shunt- (present on admission)  Assessment & Plan  CT head showed stable hydrocephalus shunt appears to be in good position  Comfort  measures    Fibromyalgia- (present on admission)  Assessment & Plan  Comfort measures    Hypokalemia- (present on admission)  Assessment & Plan  Comfort care    Mild intermittent asthma without complication- (present on admission)  Assessment & Plan  Duoneb PRN wheezing, dyspnea    Essential hypertension- (present on admission)  Assessment & Plan  Comfort measures       VTE prophylaxis: Comfort care    I have performed a physical exam and reviewed and updated ROS and Plan today (9/10/2023). In review of yesterday's note (9/9/2023), there are no changes except as documented above.

## 2023-09-10 NOTE — CARE PLAN
The patient is Watcher - Medium risk of patient condition declining or worsening    Shift Goals  Clinical Goals: Comfort Care  Patient Goals: SHAZIA  Family Goals: hospice comfort care    Progress made toward(s) clinical / shift goals:        Problem: Knowledge Deficit - Standard  Goal: Patient and family/care givers will demonstrate understanding of plan of care, disease process/condition, diagnostic tests and medications  Description: Target End Date:  1-3 days or as soon as patient condition allows    Document in Patient Education    1.  Patient and family/caregiver oriented to unit, equipment, visitation policy and means for communicating concern  2.  Complete/review Learning Assessment  3.  Assess knowledge level of disease process/condition, treatment plan, diagnostic tests and medications  4.  Explain disease process/condition, treatment plan, diagnostic tests and medications  Outcome: Progressing  Note: Family understands comfort care measures.

## 2023-09-10 NOTE — CARE PLAN
The patient is Unstable - High likelihood or risk of patient condition declining or worsening - comfort care    Shift Goals  Clinical Goals: Comfort  Patient Goals: SHAZIA  Family Goals: NA    Progress made toward(s) clinical / shift goals:    Problem: Pain - Standard  Goal: Alleviation of pain or a reduction in pain to the patient’s comfort goal  Outcome: Progressing     Problem: Fall Risk  Goal: Patient will remain free from falls  Outcome: Progressing     Problem: Psychosocial - Comfort Care  Goal: Spiritual and cultural needs incorporated into hospitalization  Outcome: Progressing  Goal: Patient's level of anxiety will decrease  Outcome: Progressing     Problem: Discharge Planning - Comfort Care  Goal: Patient's continuum of care needs are met  Outcome: Progressing       Patient is not progressing towards the following goals:      Problem: Knowledge Deficit - Standard  Goal: Patient and family/care givers will demonstrate understanding of plan of care, disease process/condition, diagnostic tests and medications  Outcome: Not Progressing     Problem: Skin Integrity  Goal: Skin integrity is maintained or improved  Outcome: Not Progressing     Problem: Knowledge Deficit - Comfort Care  Goal: Patient and family/care givers will demonstrate understanding of dying process and grieving  Outcome: Not Progressing

## 2023-09-10 NOTE — DISCHARGE PLANNING
RNCM left voice mail with [facility] to advise of patient transfer tomorrow (9/11) To [facility]. Transportation will need to be set up via [facility].

## 2023-09-10 NOTE — PROGRESS NOTES
Hospital Medicine Daily Progress Note    Date of Service  9/9/2023    Chief Complaint  Sheyla Garcia is a 72 y.o. female admitted 9/6/2023 with AMS    Hospital Course  71 yo woman with schizophrenia, NPH with  shunt, COPD, HTN, hypothyroidism who was recently hospitalized for altered mental status and treated for UTI and neurosurgery also adjusted her  shunt for increased hydrocephalus on CT.  At SNF, patient became lethargic again and not willing to eat or drink.  Patient was admitted for possible UTI.  Goals of care were discussed with the , who was okay with making her CODE STATUS DNR and will consider hospice if the patient does not improve with medical treatment.    Interval Problem Update    Mumbles and moves head when touched. Does not open eyes to voice nor touch.  Accepted back to [place] for hospice.  Hospice choice obtained for [facility].  Awaiting acceptance and transportation    I have discussed this patient's plan of care and discharge plan at IDT rounds today with Case Management, Nursing, Nursing leadership, and other members of the IDT team.    Consultants/Specialty  none    Code Status  Comfort Care/DNR    Disposition  The patient is medically cleared for discharge to home or a post-acute facility.  Anticipate discharge to: hospice    I have placed the appropriate orders for post-discharge needs.    Review of Systems  Review of Systems   Unable to perform ROS: Mental acuity        Physical Exam  Temp:  [36.2 °C (97.2 °F)-36.6 °C (97.8 °F)] 36.6 °C (97.8 °F)  Pulse:  [88-89] 88  Resp:  [16] 16  BP: (108-166)/(73-82) 166/82  SpO2:  [94 %-97 %] 94 %    Physical Exam  Vitals and nursing note reviewed.   Constitutional:       General: She is not in acute distress.     Appearance: She is ill-appearing (Chronically). She is not toxic-appearing or diaphoretic.   HENT:      Head: Normocephalic.      Mouth/Throat:      Mouth: Mucous membranes are dry.   Pulmonary:      Effort: Pulmonary effort is  normal. No respiratory distress.   Genitourinary:     Comments: +Monson. Urine yellow / clear.  Skin:     General: Skin is warm and dry.   Neurological:      Comments: Mumbles when touched. Does not open eyes to voice nor touch.   Psychiatric:      Comments: Unable to assess         Fluids  No intake or output data in the 24 hours ending 09/09/23 1950      Laboratory  Recent Labs     09/07/23  0609 09/08/23  0359   WBC 10.4 9.7   RBC 3.86* 3.66*   HEMOGLOBIN 11.5* 10.7*   HEMATOCRIT 36.1* 34.1*   MCV 93.5 93.2   MCH 29.8 29.2   MCHC 31.9* 31.4*   RDW 46.0 45.4   PLATELETCT 293 266   MPV 10.3 10.2       Recent Labs     09/07/23  0609 09/08/23  0359   SODIUM 145 141   POTASSIUM 3.2* 3.6   CHLORIDE 107 105   CO2 28 26   GLUCOSE 95 88   BUN 8 6*   CREATININE 0.35* 0.30*   CALCIUM 8.6 8.3*                     Imaging  CT-CTA CHEST PULMONARY ARTERY W/ RECONS   Final Result      1.  No evidence of central or segmental pulmonary embolus or other acute intrathoracic pathology   2.  15 mm LEFT adrenal nodule incompletely evaluated but unchanged. Most incidentally detected adrenal nodules are benign adenomas.            [id number] VIEW   Final Result      Unremarkable appearance of the shunt tubing      DX-SPINE-ANY ONE VIEW   Final Result      Unremarkable appearance of the shunt tubing      DX-SKULL-LIMITED 3-   Final Result      Unremarkable appearance of the shunt tubing      DX-CHEST-PORTABLE (1 VIEW)   Final Result      No acute cardiopulmonary disease.      CT-HEAD W/O   Final Result      1.  Stable hydrocephalus with ventriculostomy catheter in similar position.   2.  No intracranial hemorrhage.   3.  Diffuse cortical atrophy.                Assessment/Plan  * Acute metabolic encephalopathy- (present on admission)  Assessment & Plan  Multifactorial due to hyponatremia, UTI, dehydration, continue IV antibiotics, continue IV fluids.  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  Morphine PRN pain, dyspnea  Lorazepam PRN anxiety, agitation  APAP PRN fever  Lactulose, bisacodyl PRN constipation  Ondansetron PRN nausea, vomiting  Hospice Referral to [facility]    Functional quadriplegia (HCC)- (present on admission)  Assessment & Plan  Comfort measures    Elevated troponin- (present on admission)  Assessment & Plan  But no chest pain, EKG no acute changes, troponin down trended  Comfort measures    Hypernatremia- (present on admission)  Assessment & Plan  Due to decreased free water intake  Comfort measures    Acute cystitis without hematuria- (present on admission)  Assessment & Plan  Comfort measures    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, stage IV  Comfort measures      S/P  shunt- (present on admission)  Assessment & Plan  CT head showed stable hydrocephalus shunt appears to be in good position  Comfort measures    Fibromyalgia- (present on admission)  Assessment & Plan  Comfort measures    Hypokalemia- (present on admission)  Assessment & Plan  Comfort care    Mild intermittent asthma without complication- (present on admission)  Assessment & Plan  Duoneb PRN wheezing, dyspnea    Essential hypertension- (present on admission)  Assessment & Plan  Comfort measures         VTE prophylaxis: Comfort care    I have performed a physical exam and reviewed and updated ROS and Plan today (9/9/2023). In review of yesterday's note (9/8/2023), there are no changes except as documented above.

## 2023-09-11 VITALS
SYSTOLIC BLOOD PRESSURE: 137 MMHG | TEMPERATURE: 98.4 F | HEART RATE: 105 BPM | WEIGHT: 208 LBS | OXYGEN SATURATION: 89 % | BODY MASS INDEX: 30.72 KG/M2 | RESPIRATION RATE: 16 BRPM | DIASTOLIC BLOOD PRESSURE: 72 MMHG

## 2023-09-11 PROCEDURE — A9270 NON-COVERED ITEM OR SERVICE: HCPCS | Performed by: STUDENT IN AN ORGANIZED HEALTH CARE EDUCATION/TRAINING PROGRAM

## 2023-09-11 PROCEDURE — 99238 HOSP IP/OBS DSCHRG MGMT 30/<: CPT | Performed by: STUDENT IN AN ORGANIZED HEALTH CARE EDUCATION/TRAINING PROGRAM

## 2023-09-11 PROCEDURE — 700101 HCHG RX REV CODE 250: Performed by: STUDENT IN AN ORGANIZED HEALTH CARE EDUCATION/TRAINING PROGRAM

## 2023-09-11 PROCEDURE — 700102 HCHG RX REV CODE 250 W/ 637 OVERRIDE(OP): Performed by: STUDENT IN AN ORGANIZED HEALTH CARE EDUCATION/TRAINING PROGRAM

## 2023-09-11 RX ADMIN — MORPHINE SULFATE 20 MG: 100 SOLUTION ORAL at 05:23

## 2023-09-11 RX ADMIN — SODIUM HYPOCHLORITE 10 ML: 1.25 SOLUTION TOPICAL at 05:20

## 2023-09-11 NOTE — DISCHARGE PLANNING
Case Management Discharge Planning    Admission Date: 9/6/2023  GMLOS: 4.8  ALOS: 5    6-Clicks ADL Score: 6      Anticipated Discharge Dispo: Discharge Disposition: D/T to hospice medical facility (51)    DME Needed: No    Action(s) Taken: Pt is medically cleared for SNF, pt accepted back at [facility] as she is a long term resident there. Pt has elected [facility], they will complete admission at [facility]. COBRA packet complete, pt to transfer via [facility] at 1200 today.     Escalations Completed: None    Medically Clear: Yes

## 2023-09-11 NOTE — DISCHARGE PLANNING
Agency/Facility Name: [facility]  Spoke To: Alyson  Outcome: They have accepted patient on service. DEION Reid advised.    Spoke with Lion at [facility], they will accept patient in transfer today. DEION Reid advised.

## 2023-09-11 NOTE — DOCUMENTATION QUERY
[facility]                                                                       Query Response Note      PATIENT:               MENDOZA ROMAN  ACCT #:                  [id number]  MRN:                     [id number]  :                      1950  ADMIT DATE:       2023 9:03 AM  DISCH DATE:          RESPONDING  PROVIDER #:        [id number]           QUERY TEXT:    Please clarify in documentation the relationship, if any, between Cystitis and urinary catheter    The patient's Clinical Indicators include:  - Findings:  H&P and progress notes:  possible UTI, patient had a parker on arrival with cloudy urine     - Progress note 9/10:  Acute cystitis without hematuria.  Acute metabolic encephalopathy, Multifactorial due to hyponatremia, UTI, dehydration    - Treatments:  comfort measures, UA, cultures, IVF, antibiotics     - Risk factors:  parker catheter, cystitis, pressure ulcer, functional quadreplegia    Thank You,  Maria Elena Ewing RN  Clinical Documentation   [email or web address]  Connect via [email or web address] Messenger  Options provided:   -- Cystitis is due to or associated with urinary catheter present on admission   -- Cystitis not due to or associated with urinary catheter present on admission   -- Other explanation, Pllease specify other explanation   -- Unable to determine      Query created by: Maria Elena Ewing on 9/10/2023 8:01 PM    RESPONSE TEXT:    Cystitis is due to or associated with urinary catheter present on admission          Electronically signed by:  SCOTTY JASON MD 2023 6:37 AM

## 2023-09-11 NOTE — DISCHARGE SUMMARY
Discharge Summary    CHIEF COMPLAINT ON ADMISSION  Chief Complaint   Patient presents with    ALOC     AMS for self. Pt is awake, drowsy, oriented x 2    UTI     Possible UTI, pt had parker on arrival with cloudy urine     Shortness of Breath     Pt c/o sob found to be hypoxic pta 88 % RA        Reason for Admission  Acute on Chronic Encephalopathy    Admission Date  9/6/2023     Discharge Date  9/11/2023    CODE STATUS  Comfort Care/DNR    HPI & HOSPITAL COURSE  This is a 72 y.o. female with asthma, pulmonary emphysema, HTN, hypothyroidism, schizophrenia, hydrocephalus s/p  shunt, and chronic osteomyelitis due to sacral decubitus wound here with progressive encephalopathy.    She is a resident of [facility] for which she became more lethargic and minimal oral intake. Her  indicated that she has been declining for months and become bedbound. CTH was negative for acute abnormality. Hypernatremia was attributed to inadequate water intake. She was empirically treated for CAUTI cystitis due to abnormal UA from diverting parker of her sacral wound with visible bone involvement. She had no improvement in her mental status for which her  was agreeable to transition to comfort measures and hospice referral.    Therefore, she is discharged in guarded and stable condition to hospice.    The patient met 2-midnight criteria for an inpatient stay at the time of discharge.      FOLLOW UP ITEMS POST DISCHARGE    Hospice    DISCHARGE DIAGNOSES  Principal Problem:    Acute metabolic encephalopathy (POA: Yes)  Active Problems:    Comfort measures only status (POA: Yes)    Essential hypertension (POA: Yes)    Mild intermittent asthma without complication (POA: Yes)    Hypokalemia (POA: Yes)    Fibromyalgia (POA: Yes)    S/P  shunt (POA: Yes)    Pressure injury of skin of sacral region (POA: Yes)    Acute cystitis without hematuria (POA: Yes)    Hypernatremia (POA: Yes)    Elevated troponin (POA: Yes)    Functional  quadriplegia (HCC) (POA: Yes)  Resolved Problems:    AMS (altered mental status) (POA: Yes)      FOLLOW UP  No future appointments.  [facility]  [address] [place] [address]  [phone]          MEDICATIONS ON DISCHARGE     Medication List        START taking these medications        Instructions   acetaminophen 650 MG Supp  Commonly known as: Tylenol  Replaces: acetaminophen 325 MG Tabs   Insert 1 Suppository into the rectum every four hours as needed for Fever.  Dose: 650 mg     bisacodyl 10 MG Supp  Commonly known as: Dulcolax   Insert 1 Suppository into the rectum 1 time a day as needed (constipation unable to take PO).  Dose: 10 mg     carboxymethylcellulose 0.5 % Soln  Commonly known as: Refresh Tears   Administer 1 Drop into both eyes as needed (dry eyes).  Dose: 1 Drop     ipratropium-albuterol 0.5-2.5 (3) MG/3ML nebulizer solution  Commonly known as: Duoneb   Take 3 mL by nebulization every four hours as needed for Shortness of Breath.  Dose: 3 mL     lactulose 20 GM/30ML Soln   Take 15 mL by mouth 1 time a day as needed (constipation).  Dose: 10 g     LORazepam 2 MG/ML Conc  Commonly known as: Ativan   Place 0.5 mL under the tongue every 1 hour as needed (Anxiety/Restlessness) for up to 7 days.  Dose: 1 mg     morphine 20 MG/ML Soln  Commonly known as: Roxanol   Place 0.5-1 mL under the tongue every 1 hour as needed (pain dyspnea) for up to 7 days.  Dose: 10-20 mg     ondansetron 4 MG Tbdp  Commonly known as: Zofran ODT   Take 1 Tablet by mouth every four hours as needed for Nausea/Vomiting.  Dose: 4 mg            STOP taking these medications      acetaminophen 325 MG Tabs  Commonly known as: Tylenol  Replaced by: acetaminophen 650 MG Supp     Incruse Ellipta 62.5 MCG/ACT Aepb  Generic drug: Umeclidinium Bromide     lamoTRIgine 100 MG Tabs  Commonly known as: LaMICtal     Lidocaine 4 % Ptch     metoprolol SR 25 MG Tb24  Commonly known as: Toprol XL     Milk of Magnesia 400 MG/5ML  Susp  Generic drug: magnesium hydroxide     multivitamin Tabs     nystatin powder  Commonly known as: Mycostatin     Phosphorous 155-852-130 MG Tabs     potassium Chloride ER 20 MEQ Tbcr tablet  Commonly known as: K-Tab     Pro-Stat Liqd     risperiDONE 1 MG Tabs  Commonly known as: RisperDAL     senna-docusate 8.6-50 MG Tabs  Commonly known as: Pericolace Or Senokot S     tizanidine 2 MG tablet  Commonly known as: Zanaflex              Allergies  Allergies   Allergen Reactions    Gabapentin Unspecified     seizure       DIET  No orders of the defined types were placed in this encounter.      ACTIVITY  As tolerated.  Weight bearing as tolerated    LINES, DRAINS, AND WOUNDS  This is an automated list. Peripheral IVs will be removed prior to discharge.  Peripheral IV 09/06/23 18 G Left Forearm (Active)   Site Assessment Clean;Dry;Intact 09/10/23 0830   Dressing Type Transparent 09/10/23 0830   Line Status No blood return;Flushed;Scrubbed the hub prior to access;Infusing 09/10/23 0830   Dressing Status Clean;Dry;Intact 09/10/23 0830   Dressing Intervention N/A 09/10/23 0830   Dressing Change Due 09/13/23 09/10/23 0830   Date Primary Tubing Changed 09/06/23 09/06/23 2100   Infiltration Grading ([facility], [facility]) 0 09/10/23 0830   Phlebitis Scale ([facility] Only) 0 09/10/23 0830     Urethral Catheter Non-latex (Active)   Site Assessment Clean;Skin intact 09/10/23 0830   Collection Container Standard drainage bag 09/10/23 0830   Urinary Catheter Care Drainage Tube Extended;Tamper Evident Seal in Place;Drainage Bag Not Overfilled;Drainage Tubing Properly Secured;Drainage Bag Below Bladder Level and Not on Floor 09/10/23 0830   Securement Method Securing device (Describe) 09/10/23 0830   Output (mL) 600 mL 09/10/23 1745      Wound 08/22/23 Pressure Injury Coccyx;Sacrum Mid POA Stage 4 (Active)   Wound Image     09/07/23 1300   Site Assessment SHAZIA 09/10/23 0830   Periwound Assessment SHAZIA 09/08/23 2030   Margins Unattached  edges;Undefined edges 09/07/23 1300   Closure Secondary intention 09/07/23 1300   Drainage Amount Small 09/07/23 1300   Drainage Description Serosanguineous 09/07/23 1300   Treatments Cleansed;Site care;CSWD - Conservative Sharp Wound Debridement;Chemical debridement;Offloading 09/07/23 1300   Wound Cleansing Approved Wound Cleanser 09/07/23 1300   Periwound Protectant No-sting Skin Prep;Paste Ring;Drape 09/07/23 1300   Dressing Status Clean;Dry;Intact 09/08/23 2030   Dressing Changed Observed 09/08/23 2030   Dressing Cleansing/Solutions 1/4 Strength Dakin's Solution 09/08/23 2030   Dressing Options Wound Vac;Offloading Dressing - Sacral 09/08/23 2030   Dressing Change/Treatment Frequency Tuesday, Thursday, Saturday, and As Needed 09/08/23 2030   NEXT Dressing Change/Treatment Date 09/09/23 09/08/23 2030   NEXT Weekly Photo (Inpatient Only) 09/14/23 09/08/23 2030   Wound Team Following 3x Weekly 09/07/23 1300   WOUND NURSE ONLY - Pressure Injury Stage 4 09/07/23 1300   Wound Length (cm) 6.5 cm 09/07/23 1300   Wound Width (cm) 7.5 cm 09/07/23 1300   Wound Depth (cm) 5.4 cm 09/07/23 1300   Wound Surface Area (cm^2) 48.75 cm^2 09/07/23 1300   Wound Volume (cm^3) 263.25 cm^3 09/07/23 1300   Wound Healing % -3 09/07/23 1300   Undermining (cm) 2.6 cm 09/07/23 1300   Undermining of Wound, 1st Location From 11 o'clock;To 1 o'clock 09/07/23 1300   Wound Odor Mild 09/07/23 1300   Pulses N/A 09/07/23 1300   WOUND NURSE ONLY - Time Spent with Patient (mins) 60 09/07/23 1300       Peripheral IV 09/06/23 18 G Left Forearm (Active)   Site Assessment Clean;Dry;Intact 09/10/23 0830   Dressing Type Transparent 09/10/23 0830   Line Status No blood return;Flushed;Scrubbed the hub prior to access;Infusing 09/10/23 0830   Dressing Status Clean;Dry;Intact 09/10/23 0830   Dressing Intervention N/A 09/10/23 0830   Dressing Change Due 09/13/23 09/10/23 0830   Date Primary Tubing Changed 09/06/23 09/06/23 2100   Infiltration Grading ([facility] 0 09/10/23 0830   Phlebitis Scale ([facility] Only) 0 09/10/23 0830           Urethral Catheter Non-latex (Active)   Site Assessment Clean;Skin intact 09/10/23 0830   Collection Container Standard drainage bag 09/10/23 0830   Urinary Catheter Care Drainage Tube Extended;Tamper Evident Seal in Place;Drainage Bag Not Overfilled;Drainage Tubing Properly Secured;Drainage Bag Below Bladder Level and Not on Floor 09/10/23 0830   Securement Method Securing device (Describe) 09/10/23 0830   Output (mL) 600 mL 09/10/23 1745        MENTAL STATUS ON TRANSFER     Alert, [place] to self but not situation, Lacks Decisional Capacity     CONSULTATIONS  None    PROCEDURES  None    LABORATORY  Lab Results   Component Value Date    SODIUM 141 09/08/2023    POTASSIUM 3.6 09/08/2023    CHLORIDE 105 09/08/2023    CO2 26 09/08/2023    GLUCOSE 88 09/08/2023    BUN 6 (L) 09/08/2023    CREATININE 0.30 (L) 09/08/2023        Lab Results   Component Value Date    WBC 9.7 09/08/2023    HEMOGLOBIN 10.7 (L) 09/08/2023    HEMATOCRIT 34.1 (L) 09/08/2023    PLATELETCT 266 09/08/2023        Total time of the discharge process exceeds 24 minutes.

## 2023-09-11 NOTE — DISCHARGE INSTRUCTIONS
Discharge Instructions    Discharged to [facility] by ambulance with self. Discharged via ambulance, hospital escort: Refused.  Special equipment needed: Not Applicable    Be sure to schedule a follow-up appointment with your primary care doctor or any specialists as instructed.     Discharge Plan:   Diet Plan: Discussed  Activity Level: Discussed  Confirmed Follow up Appointment: Appointment Scheduled (hospice to meet patient at SNF)  Confirmed Symptoms Management: Discussed  Medication Reconciliation Updated: Yes    I understand that a diet low in cholesterol, fat, and sodium is recommended for good health. Unless I have been given specific instructions below for another diet, I accept this instruction as my diet prescription.   Other diet: As tolerated    Special Instructions: None    -Is this patient being discharged with medication to prevent blood clots?  No    Is patient discharged on Warfarin / Coumadin?   No

## 2023-09-11 NOTE — DISCHARGE PLANNING
DC Transport Scheduled    Received request at: 9/11/2023 at 0850    Transport Company Scheduled:  [employer]  Spoke with Ryan at [facility] to schedule transport.    Scheduled Date: 9/11/2023  Scheduled Time: 1200    Destination: [facility] [address] [facility]     Notified care team of scheduled transport via Vo[employer].     If there are any changes needed to the DC transportation scheduled, please contact [facility] at ext. [phone] between the hours of 3491-0191 Mon-Fri. If outside those hours, contact the ED Case Manager at ext. [phone].

## 2023-09-11 NOTE — CARE PLAN
The patient is Watcher - Medium risk of patient condition declining or worsening    Shift Goals  Clinical Goals: Comfort Care  Patient Goals: SHAZIA  Family Goals: NA    Progress made toward(s) clinical / shift goals:        Problem: Knowledge Deficit - Standard  Goal: Patient and family/care givers will demonstrate understanding of plan of care, disease process/condition, diagnostic tests and medications  Description: Target End Date:  1-3 days or as soon as patient condition allows    Document in Patient Education    1.  Patient and family/caregiver oriented to unit, equipment, visitation policy and means for communicating concern  2.  Complete/review Learning Assessment  3.  Assess knowledge level of disease process/condition, treatment plan, diagnostic tests and medications  4.  Explain disease process/condition, treatment plan, diagnostic tests and medications  Outcome: Progressing  Note: Family understands comfort care measures.

## 2023-09-12 LAB
BACTERIA BLD CULT: NORMAL
SIGNIFICANT IND 70042: NORMAL
SITE SITE: NORMAL
SOURCE SOURCE: NORMAL

## 2023-10-17 ENCOUNTER — TELEPHONE (OUTPATIENT)
Dept: HEALTH INFORMATION MANAGEMENT | Facility: OTHER | Age: 73
End: 2023-10-17
Payer: MEDICARE

## 2024-02-02 NOTE — DIETARY
NUTRITION SERVICES: BMI - Pt with BMI >40 (=Body mass index is 41.2 kg/m².), morbid obesity. Weight loss counseling not appropriate in acute care setting. RECOMMEND - Referral to outpatient nutrition services for weight management after D/C.    [Slightly Antalgic] : slightly antalgic [LE] : Sensory: Intact in bilateral lower extremities [Normal RLE] : Right Lower Extremity: No scars, rashes, lesions, ulcers, skin intact [Normal LLE] : Left Lower Extremity: No scars, rashes, lesions, ulcers, skin intact [Normal Touch] : sensation intact for touch [Normal] : Oriented to person, place, and time, insight and judgement were intact and the affect was normal [de-identified] : Low back Flat back. She is walking well.  She can raise on her heels and toes. She appears more comfortable changing position and moving today. Motor and sensation intact bilateral lower extremities.  Intact anterior tibial tendon, gastrocsoleus, EHL. Tender in the lumbar spine about the level of the fracture at L1. Pain with lumbar spine range of motion but can flex forward and extend better than last visit..  [de-identified] : report from the emergency room 12/21/2023 stating there is CT lumbar spine with mild superior endplate compression deformity of L1 less than 25% of the height.  X-rays lumbar spine AP and lateral views performed today to follow-up on the fracture were ordered, performed and reviewed showing about 66% compression of the L1 vertebrae similar to last visit but greater than her initial imaging by the report. Fracture appears to have stabilized.  Degenerative changes present

## 2024-06-03 NOTE — TELEPHONE ENCOUNTER
From: Sheyla Garcia  To: Nurse Practitioner Zaira Swenson  Sent: 12/13/2021 3:23 PM PST  Subject: Lidocaine Patch    The last prescription was not there for . Can you please send it again and I will  by the end of the week. Yes I know it is not covered with the insurance but put the order in anyway.   [Negative] : Heme/Lymph

## 2024-07-23 NOTE — PROGRESS NOTES
Pt is A&Ox1, denies pain at this time. All needs attended. Reinforced the use of call light when assistance needed. Safety measures maintained, call light in reach.   Was on Plavix and Lipitor  Antiplatelet presently on hold d/t recent GIB

## 2024-09-28 NOTE — PROGRESS NOTES
1222 Report received from SHEREE KennedyU RN over the phone.    1245 Received patient from PACU via gurney accompanied by one female Transport. Transferred patient to bed using slide board x3 assist. Spouse visiting.     1325 Admit profile completed.  Fall protocol in effect. Call light within reach. Reminded patient to call for assist.    1410 Educated patient on the importance/use of IS at least 10x every hour while awake, able to reach 1500. Assessment completed. No distress noted. Plan of care reviewed with the patient and spouse. Verbalized understanding.    1514 Placed a call to MELISSA Gonzalez. Notified the said MELISSA that patient had nausea and vomiting and no medication for it. New order received and acknowledged (see MAR).    1537 Medicated with Zofran (see MAR) for c/o's nausea and vomiting.    1725 Patient's sitting up in bed, having Dinner. No distress noted.     1913   Patient's in bed. No changes in status. Bedside report given to Oncoming NOC RN (Denise).   No

## 2025-06-02 NOTE — ASSESSMENT & PLAN NOTE
Blood pressure is under control.  Continue lisinopril   My leg is red all the way around and I am going to see Dr. Martinez (spelling?) but I think I am going to need antibiotics in the meantime.  Let me know if you will send in something or what I need to do.

## (undated) DEVICE — LACTATED RINGERS INJ. 500 ML - (24EA/CA)

## (undated) DEVICE — FORCEPS IRRIGATING 8 X 1.5MM (5EA/BX)

## (undated) DEVICE — GLOVE BIOGEL SZ 8 SURGICAL PF LTX - (50PR/BX 4BX/CA)

## (undated) DEVICE — SLEEVE, VASO, THIGH, MED

## (undated) DEVICE — BLADE CLIPPER FITS 2501 CLIPPER (BLUE)  (20EA/CA)

## (undated) DEVICE — INSTRUMENT JAWCOVER SUTURE - BOOTS (5PK/BX)

## (undated) DEVICE — DISSECT TOOL MIDAS REX 9MH30

## (undated) DEVICE — KIT SURGIFLO W/OUT THROMBIN - (6EA/CA)

## (undated) DEVICE — PROTECTOR ULNA NERVE - (36PR/CA)

## (undated) DEVICE — DERMABOND ADVANCED - (12EA/BX)

## (undated) DEVICE — DRESSING TRANSPARENT FILM TEGADERM 2.375 X 2.75"  (100EA/BX)"

## (undated) DEVICE — SODIUM CHL IRRIGATION 0.9% 1000ML (12EA/CA)

## (undated) DEVICE — SET EXTENSION WITH 2 PORTS (48EA/CA) ***PART #2C8610 IS A SUBSTITUTE*****

## (undated) DEVICE — TRAY CATHETER FOLEY URINE METER W/STATLOCK 350ML (10EA/CA)

## (undated) DEVICE — TUBING INSUFFLATION PNEUMOCLEAR HIGH-FLOW (10EA/BX)

## (undated) DEVICE — SUTURE 3-0 ETHILON FS-1 - (36/BX) 30 INCH

## (undated) DEVICE — SENSOR SPO2 NEO LNCS ADHESIVE (20/BX) SEE USER NOTES

## (undated) DEVICE — STYLET COIL 2 SINGLE PACKAGE

## (undated) DEVICE — TUBING SETDISPOS HIGH FLOW II - (10/BX)

## (undated) DEVICE — ELECTRODE DUAL RETURN W/ CORD - (50/PK)

## (undated) DEVICE — NEPTUNE 4 PORT MANIFOLD - (20/PK)

## (undated) DEVICE — GLOVE BIOGEL PI INDICATOR SZ 6.5 SURGICAL PF LF - (50/BX 4BX/CA)

## (undated) DEVICE — TRACKER ENT PATIENT

## (undated) DEVICE — SPONGE GAUZESTER 4 X 4 4PLY - (128PK/CA)

## (undated) DEVICE — BOVIE NEEDLE TIP 3CM COLORADO

## (undated) DEVICE — NEEDLE INSFL 120MM 14GA VRRS - (20/BX)

## (undated) DEVICE — BLADE SURGICAL #15 - (50/BX 3BX/CA)

## (undated) DEVICE — PACK NEURO - (2EA/CA)

## (undated) DEVICE — KIT CATHETER Y TEC SYSTEM  FOR PERITONEAL DIALYSIS

## (undated) DEVICE — DRAPE SURGICAL U 77X120 - (10/CA)

## (undated) DEVICE — MINICAP EXTENDED LIFE TRANSFER SET (6EA/CA)

## (undated) DEVICE — TROCAR SEPARATOR 5X55 - 6/BX

## (undated) DEVICE — DRAPE STRLE REG TOWEL 18X24 - (10/BX 4BX/CA)"

## (undated) DEVICE — GLOVESZ 8.5 BIOGEL PI MICRO - PF LF (50PR/BX)

## (undated) DEVICE — ANTI-FOG SOLUTION - 60BTL/CA

## (undated) DEVICE — PACK LAP CHOLE OR - (2EA/CA)

## (undated) DEVICE — GLOVE PROTEXIS W/NEU-THERA SZ 8.5 (50PR/BX)

## (undated) DEVICE — SUTURE GENERAL

## (undated) DEVICE — SURGIFOAM (12X7) - (12EA/CA)

## (undated) DEVICE — GOWN WARMING STANDARD FLEX - (30/CA)

## (undated) DEVICE — TRAY SRGPRP PVP IOD WT PRP - (20/CA)

## (undated) DEVICE — DRESSING XEROFORM 1X8 - (50/BX 4BX/CA)

## (undated) DEVICE — TUBING C&T SET FLYING LEADS DRAIN TUBING (10EA/BX)

## (undated) DEVICE — GLOVE PROTEXIS PI MICRO SZ 8.5 (200PR/CA)

## (undated) DEVICE — POINTER TRACKER 1-PACK

## (undated) DEVICE — SUTURE 4-0 NUROLON CR/8 TF - (12/BX) ETHICON

## (undated) DEVICE — SET LEADWIRE 5 LEAD BEDSIDE DISPOSABLE ECG (1SET OF 5/EA)

## (undated) DEVICE — SUTURE 3-0 VICRYL PLUS SH - 8X 18 INCH (12/BX)

## (undated) DEVICE — SUCTION INSTRUMENT YANKAUER BULBOUS TIP W/O VENT (50EA/CA)

## (undated) DEVICE — TOWEL STOP TIMEOUT SAFETY FLAG (40EA/CA)

## (undated) DEVICE — TROCAR 5X100 NON BLADED Z-TH - READ KII (6/BX)

## (undated) DEVICE — TUBING CLEARLINK DUO-VENT - C-FLO (48EA/CA)

## (undated) DEVICE — SUTURE 2-0 SILK 12 X 18" (36PK/BX)"

## (undated) DEVICE — CHLORAPREP 26 ML APPLICATOR - ORANGE TINT(25/CA)

## (undated) DEVICE — BLADE SURGICAL CLIPPER - (50EA/CA)

## (undated) DEVICE — GVL 3 STAT DISPOSABLE - (10/BX)

## (undated) DEVICE — ELECTRODE 850 FOAM ADHESIVE - HYDROGEL RADIOTRNSPRNT (50/PK)

## (undated) DEVICE — LACTATED RINGERS INJ 1000 ML - (14EA/CA 60CA/PF)

## (undated) DEVICE — SLEEVE VASO CALF MED - (10PR/CA)

## (undated) DEVICE — HEAD HOLDER JUNIOR/ADULT

## (undated) DEVICE — MASK ANESTHESIA ADULT  - (100/CA)

## (undated) DEVICE — SUTURE 4-0 MONOCRYL PLUS PS-2 - 27 INCH (36/BX)

## (undated) DEVICE — MIDAS LUBRICATOR DIFFUSER PACK (4EA/CA)

## (undated) DEVICE — TUBING INSUFFLATION - (10/BX)

## (undated) DEVICE — CANNULA W/SEAL 5X100 Z-THRE - ADED KII (12/BX)

## (undated) DEVICE — KIT ANESTHESIA W/CIRCUIT & 3/LT BAG W/FILTER (20EA/CA)

## (undated) DEVICE — GLOVE BIOGEL ECLIPSE PF LATEX SIZE 8.5 (50PR/BX)

## (undated) DEVICE — CANISTER SUCTION 3000ML MECHANICAL FILTER AUTO SHUTOFF MEDI-VAC NONSTERILE LF DISP  (40EA/CA)